# Patient Record
Sex: FEMALE | Race: BLACK OR AFRICAN AMERICAN | NOT HISPANIC OR LATINO | Employment: OTHER | URBAN - METROPOLITAN AREA
[De-identification: names, ages, dates, MRNs, and addresses within clinical notes are randomized per-mention and may not be internally consistent; named-entity substitution may affect disease eponyms.]

---

## 2017-01-30 ENCOUNTER — PATIENT MESSAGE (OUTPATIENT)
Dept: OPTOMETRY | Facility: CLINIC | Age: 72
End: 2017-01-30

## 2017-01-31 ENCOUNTER — TELEPHONE (OUTPATIENT)
Dept: OPTOMETRY | Facility: CLINIC | Age: 72
End: 2017-01-31

## 2017-02-08 ENCOUNTER — OFFICE VISIT (OUTPATIENT)
Dept: OPTOMETRY | Facility: CLINIC | Age: 72
End: 2017-02-08
Payer: MEDICARE

## 2017-02-08 DIAGNOSIS — H52.03 HYPEROPIA, BILATERAL: Primary | ICD-10-CM

## 2017-02-08 DIAGNOSIS — H25.13 NUCLEAR SCLEROSIS, BILATERAL: ICD-10-CM

## 2017-02-08 DIAGNOSIS — H52.4 PRESBYOPIA: ICD-10-CM

## 2017-02-08 PROCEDURE — 99999 PR PBB SHADOW E&M-EST. PATIENT-LVL II: CPT | Mod: PBBFAC,,, | Performed by: OPTOMETRIST

## 2017-02-08 PROCEDURE — 92014 COMPRE OPH EXAM EST PT 1/>: CPT | Mod: S$GLB,,, | Performed by: OPTOMETRIST

## 2017-02-08 PROCEDURE — 92015 DETERMINE REFRACTIVE STATE: CPT | Mod: S$GLB,,, | Performed by: OPTOMETRIST

## 2017-02-08 NOTE — PROGRESS NOTES
HPI     Odessa Vaughn is a 71 y.o. Female who returns  for continued    Hypertensive eye care. Date last seen was 09/24/2015.  She reports that   her distance and near vision is getting more blurry, especially while   driving.  She is not as confident as she once was when driving. Glare is   bothering her especially when the sun sets.    (+)blurred vision  (--)Headaches  (--)diplopia  (--)flashes  (+)floaters yes but stable  (--)pain  (--)Itching  (+)tearing the right eye horton a lot  (--)burning  (--)Dryness  (--) OTC Drops  (--)Photophobia       Last edited by Thais Culp, OD on 2/8/2017 11:01 AM.     ROS     Positive for: Genitourinary (h/o breast cancer s/p mastectomy)    Negative for: Constitutional, Gastrointestinal, Neurological, Skin,   Musculoskeletal, HENT, Endocrine, Cardiovascular, Eyes, Respiratory,   Psychiatric, Allergic/Imm, Heme/Lymph    Last edited by Thais Culp, OD on 2/8/2017 11:01 AM. (History)        Assessment /Plan     For exam results, see Encounter Report.    1. Hyperopia, bilateral with Presbyopia  - Spec Rx per final Rx below    Glasses Prescription (2/8/2017)       Sphere Cylinder Add   Right +3.50 Sphere +3.00   Left +2.25 Sphere +3.00       Type:  PAL    Expiration Date:  2/9/2018            2. Nuclear sclerosis, bilateral  - Defer treatment; Monitor annually        Parent education; RTC in 1 year, sooner prn at [facility]

## 2017-02-16 ENCOUNTER — TELEPHONE (OUTPATIENT)
Dept: SURGERY | Facility: CLINIC | Age: 72
End: 2017-02-16

## 2017-02-16 DIAGNOSIS — C50.511 BREAST CANCER OF LOWER-OUTER QUADRANT OF RIGHT FEMALE BREAST: ICD-10-CM

## 2017-02-16 RX ORDER — ANASTROZOLE 1 MG/1
TABLET ORAL
Qty: 90 TABLET | Refills: 0 | Status: SHIPPED | OUTPATIENT
Start: 2017-02-16 | End: 2017-05-21 | Stop reason: SDUPTHER

## 2017-02-16 NOTE — TELEPHONE ENCOUNTER
----- Message from Carmita Medina sent at 2/16/2017 11:56 AM CST -----  Contact: self  Pt is a Yolanda Gray pt and states she got a letter in the mail but, she isn't sure when she needs to schedule. Please call Odessa lara @[phone]. Thank you.

## 2017-02-16 NOTE — TELEPHONE ENCOUNTER
Called patient regarding message below, informed the patient her follow up breast exam and mammogram is due in 4/2017.  The patient voiced understanding of this information and stated she will call back to schedule the appointments when it gets closer to 4/2017.

## 2017-02-22 ENCOUNTER — OFFICE VISIT (OUTPATIENT)
Dept: RHEUMATOLOGY | Facility: CLINIC | Age: 72
End: 2017-02-22
Payer: MEDICARE

## 2017-02-22 VITALS
DIASTOLIC BLOOD PRESSURE: 63 MMHG | WEIGHT: 178.5 LBS | TEMPERATURE: 98 F | BODY MASS INDEX: 30.48 KG/M2 | HEART RATE: 73 BPM | SYSTOLIC BLOOD PRESSURE: 112 MMHG | HEIGHT: 64 IN

## 2017-02-22 DIAGNOSIS — R79.82 ELEVATED C-REACTIVE PROTEIN (CRP): ICD-10-CM

## 2017-02-22 DIAGNOSIS — M79.642 PAIN IN BOTH HANDS: ICD-10-CM

## 2017-02-22 DIAGNOSIS — M19.042 PRIMARY OSTEOARTHRITIS OF BOTH HANDS: ICD-10-CM

## 2017-02-22 DIAGNOSIS — M79.641 PAIN IN BOTH HANDS: ICD-10-CM

## 2017-02-22 DIAGNOSIS — M70.62 TROCHANTERIC BURSITIS OF LEFT HIP: Primary | ICD-10-CM

## 2017-02-22 DIAGNOSIS — M19.041 PRIMARY OSTEOARTHRITIS OF BOTH HANDS: ICD-10-CM

## 2017-02-22 PROBLEM — M19.049 PRIMARY OSTEOARTHRITIS OF HAND: Status: ACTIVE | Noted: 2017-02-22

## 2017-02-22 PROCEDURE — 99999 PR PBB SHADOW E&M-EST. PATIENT-LVL III: CPT | Mod: PBBFAC,,, | Performed by: INTERNAL MEDICINE

## 2017-02-22 PROCEDURE — 1160F RVW MEDS BY RX/DR IN RCRD: CPT | Mod: S$GLB,,, | Performed by: INTERNAL MEDICINE

## 2017-02-22 PROCEDURE — 3074F SYST BP LT 130 MM HG: CPT | Mod: S$GLB,,, | Performed by: INTERNAL MEDICINE

## 2017-02-22 PROCEDURE — 1159F MED LIST DOCD IN RCRD: CPT | Mod: S$GLB,,, | Performed by: INTERNAL MEDICINE

## 2017-02-22 PROCEDURE — 1125F AMNT PAIN NOTED PAIN PRSNT: CPT | Mod: S$GLB,,, | Performed by: INTERNAL MEDICINE

## 2017-02-22 PROCEDURE — 1157F ADVNC CARE PLAN IN RCRD: CPT | Mod: S$GLB,,, | Performed by: INTERNAL MEDICINE

## 2017-02-22 PROCEDURE — 3078F DIAST BP <80 MM HG: CPT | Mod: S$GLB,,, | Performed by: INTERNAL MEDICINE

## 2017-02-22 PROCEDURE — 99214 OFFICE O/P EST MOD 30 MIN: CPT | Mod: S$GLB,,, | Performed by: INTERNAL MEDICINE

## 2017-02-22 NOTE — PROGRESS NOTES
"Subjective:       Patient ID: Odessa Vaughn is a 71 y.o. female.    Chief Complaint: Back Pain and Pain      HPI:  Odessa Vaughn is a 71 y.o. female seen in July for left hip pain.  She improved with injection by primary care.  Since last visit pain returns intermittently.  Left thumb pain with nodule related to doing cleaning.   She has one hour of morning stiffness.  Meloxicam helps some times but not as well as it did in past.  Activity worsens pain.       Review of Systems   Constitutional: Positive for fatigue.   HENT: Negative.    Eyes: Negative.    Respiratory: Negative.    Cardiovascular: Negative.    Gastrointestinal: Negative.    Endocrine: Negative.    Genitourinary: Negative.    Musculoskeletal: Positive for arthralgias.   Skin: Negative.    Allergic/Immunologic: Negative.    Neurological: Negative.    Psychiatric/Behavioral: Negative.          Objective:     Visit Vitals    /63 (BP Location: Right arm, Patient Position: Sitting, BP Method: Automatic)    Pulse 73    Temp 98 °F (36.7 °C) (Oral)    Ht 5' 4" (1.626 m)    Wt 81 kg (178 lb 8 oz)    BMI 30.64 kg/m2        Physical Exam   Constitutional: She is oriented to person, place, and time and well-developed, well-nourished, and in no distress.   HENT:   Head: Normocephalic and atraumatic.   Eyes: Conjunctivae and EOM are normal.   Neck: Neck supple.   Cardiovascular: Normal rate and regular rhythm.    Pulmonary/Chest: Effort normal and breath sounds normal.   Abdominal: Soft. Bowel sounds are normal.   Neurological: She is alert and oriented to person, place, and time. Gait normal.   Skin: Skin is warm and dry.     Psychiatric: Mood and affect normal.   Musculoskeletal: She exhibits deformity. She exhibits no edema.   28 joint count: 0 swollen and 0 tender  Flexion contracture of right 5th finger  [place] on left 1st, 5th and right 2nd   FROM of hips.  No trochanteric bursa bilaterally               LABS    Component      " Latest Ref Rng & Units 7/21/2016 6/16/2016   Anti Sm Antibody      0.00 - 19.99 EU  4.44   Anti-Sm Interpretation      Negative  Negative   Anti-SSA Antibody      0.00 - 19.99 EU  1.07   Anti-SSA Interpretation      Negative  Negative   Anti-SSB Antibody      0.00 - 19.99 EU  1.32   Anti-SSB Interpretation      Negative  Negative   ds DNA Ab      Negative 1:10  Negative 1:10   Anti Sm/RNP Antibody      0.00 - 19.99 EU  0.98   Anti-Sm/RNP Interpretation      Negative  Negative   Rheumatoid Factor      0.0 - 15.0 IU/mL  <10.0   TALIB Screen      Negative <1:160  Positive (A)   TALIB HEP-2 Titer        Positive 1:320 Speckled   CRP      0.0 - 8.2 mg/L 13.1 (H)    Sed Rate      0 - 20 mm/Hr 47 (H)    CCP Antibodies      <5.0 U/mL <0.5        Assessment:       1. Left hip pain. Suspect trochanteric bursitis  2. Elevated CRP  3. 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 Left hand pain intermittent.    Plan:       1.  ESR and CRP  2.  Occupational Therapy  3.  RTO in 6 months/prn

## 2017-02-22 NOTE — MR AVS SNAPSHOT
[facility] - Rheumatology  [address]  [facility] [address]  Phone: [phone]  Fax: [phone]                  Odessa Vaughn   2017 11:00 AM   Office Visit    Description:  Female : 1945   Provider:  Taylor Warner MD   [facility]  [facility]           Reason for Visit     Back Pain     Pain           Diagnoses this Visit        Comments    Trochanteric bursitis of left hip    -  Primary     Pain in both hands         Elevated C-reactive protein (CRP)         Primary osteoarthritis of both hands                To Do List           Future Appointments        Provider Department Dept Phone    3/21/2017 7:45 AM Mariela Zavaleta MD [facility] - OB/GYN 5th Floor [phone]    2017 11:00 AM Taylor Warner MD [facility] Rheumatology [phone]      Goals (5 Years of Data)     None      Follow-Up and Disposition     Return in about 6 months (around 2017).    Follow-up and Disposition History      [facility] On Call     [facility] On Call Nurse Care Line - 24/ Assistance  Registered nurses in the [facility] On Call Center provide clinical advisement, health education, appointment booking, and other advisory services.  Call for this free service at [phone].             Medications           Message regarding Medications     Verify the changes and/or additions to your medication regime listed below are the same as discussed with your clinician today.  If any of these changes or additions are incorrect, please notify your healthcare provider.             Verify that the below list of medications is an accurate representation of the medications you are currently taking.  If none reported, the list may be blank. If incorrect, please contact your healthcare provider. Carry this list with you in case of emergency.           Current Medications     anastrozole (ARIMIDEX) 1 mg Tab TAKE 1 TABLET BY MOUTH EVERY DAY    aspirin 81 MG Chew Take 81 mg by  "mouth once daily.      cyanocobalamin (VITAMIN B-12) 250 MCG tablet Take 250 mcg by mouth once daily.    meloxicam (MOBIC) 15 MG tablet Take daily x 5 days, then daily as needed for pain. Take with food.    multivitamin capsule Take 1 capsule by mouth once daily.    PROPYLENE GLYCOL//PF (SYSTANE, PF, OPHT) Apply to eye.    triamterene-hydrochlorothiazide 37.5-25 mg (DYAZIDE) 37.5-25 mg per capsule TAKE ONE CAPSULE BY MOUTH ONCE DAILY           Clinical Reference Information           Your Vitals Were     BP Pulse Temp Height Weight BMI    112/63 (BP Location: Right arm, Patient Position: Sitting, BP Method: Automatic) 73 98 °F (36.7 °C) (Oral) 5' 4" (1.626 m) 81 kg (178 lb 8 oz) 30.64 kg/m2      Blood Pressure          Most Recent Value    BP  112/63      Allergies as of 2/22/2017     Vicodin [Hydrocodone-acetaminophen]      Immunizations Administered on Date of Encounter - 2/22/2017     None      Orders Placed During Today's Visit      Normal Orders This Visit    Ambulatory consult to Occupational Therapy     Future Labs/Procedures Expected by Expires    C-reactive protein  2/22/2017 2/22/2018    Sedimentation rate, manual  2/22/2017 2/22/2018      Language Assistance Services     ATTENTION: Language assistance services are available, free of charge. Please call [phone].      ATENCIÓN: Si habla jovani, tiene a acosta disposición servicios gratuitos de asistencia lingüística. Llame al [phone].     CHÚ Ý: N?u b?n nói Ti?ng Vi?t, có các d?ch v? h? tr? ngôn ng? mi?n phí dành cho b?n. G?i s? [phone].         [facility] complies with applicable Federal civil rights laws and does not discriminate on the basis of race, color, national origin, age, disability, or sex.        "

## 2017-02-23 ENCOUNTER — PATIENT MESSAGE (OUTPATIENT)
Dept: RHEUMATOLOGY | Facility: CLINIC | Age: 72
End: 2017-02-23

## 2017-03-08 ENCOUNTER — CLINICAL SUPPORT (OUTPATIENT)
Dept: REHABILITATION | Facility: HOSPITAL | Age: 72
End: 2017-03-08
Attending: INTERNAL MEDICINE
Payer: MEDICARE

## 2017-03-08 DIAGNOSIS — M79.642 PAIN IN BOTH HANDS: Primary | ICD-10-CM

## 2017-03-08 DIAGNOSIS — M79.641 PAIN IN BOTH HANDS: Primary | ICD-10-CM

## 2017-03-08 PROCEDURE — 97165 OT EVAL LOW COMPLEX 30 MIN: CPT

## 2017-03-08 PROCEDURE — 97110 THERAPEUTIC EXERCISES: CPT

## 2017-03-08 PROCEDURE — G8985 CARRY GOAL STATUS: HCPCS | Mod: CJ

## 2017-03-08 PROCEDURE — G8984 CARRY CURRENT STATUS: HCPCS | Mod: CJ

## 2017-03-08 PROCEDURE — 97018 PARAFFIN BATH THERAPY: CPT

## 2017-03-08 NOTE — PLAN OF CARE
Occupational Therapy -Hand / Wrist  Evaluation    Patient: Odessa Vaughn  Date of Evaluation: 3/8/2017  Referring Physician:  Dr. Taylor Warner  Diagnosis: BL osteoarthritis in hands  1. Pain in both hands       MRN: [id number]    Referral Orders:   Eval and treat     Start Time: 10:30  End Time: 11:30  Total Time: 60 min     Hand dominance: Right    Occupation:  retired  Working presently:  no  Workmen's Compensation:  no    Date of onset: gradual onset over the years  Involved area:  Bilateral hands  Mechanism of Injury:   No specific cause  Past Medical History/Physical Systems Review: Unremarkable     Environmental Concerns/ Fall Risk:  None  Barriers to Learning: None   Cultural/Spiritual : None   Developmental/Education: None   Abuse/ Neglect: none   Nutritional Deficit: None   Language: None   Hearing/Vision Deficit: None   Other:     Subjective:  Pt reports My hands are stiff and painful in the morning but when I run them under hot water, they feel better.    Pain   At rest: 0 out of 10  With work/ Activity: 3-4 out of 10  Sleepin out of 10  Location of Pain : both hands and fingers    Patients goals for therapy are:  decrease pain and stiffness in both hands    Objective:   Observation  :Pt has arthritic nodules noted of the IP of left thumb, DIP of right index and right small finger    Sensation: Pt reports occasional numbness/tingling in right middle finger but overall intact  Scar / Wound: none noted  Edema:  None noted       Range of Motion:   WFL in both wrists. Pt.'s AROM is WFL in BL hands with the following exceptions: Index to DPC: Right - lacks 1 cm; Left - lacks 1 cm                                                Manual Muscle Test: 5/5 throughout BL hands/wrists                                       Strength: (ROSA Dynamometer in lbs.), Average 3 trials, Position II: Right: 51 lbs., Left 46 lbs     Pinch Strength: (Pinch Gauge in psis), Average 3  trials   Right Left  Key 15 14  3pt 13 11  2 pt 10 10           Functional Limitations:   Self Care / ADL: increased pain in both hands with grooming, dressing in morning    Treatment Included:   OT evaluation and instruction in written HEP including paraffin bath x 15', TGEs,  thumb opposition. Thumb MP/IP flex/ext, RA/PA      Patient demonstrates good understanding of HEP/modality use for pain management.     Patient w/ FOTO score of 21% intake limitation rating, see attached for details     Assessment:   Problem List :     1) Pt exhibits increased pain in both hands in am with functional activities      History Examination Decision Making Complexity Score   Occupational Profile: Did an brief chart review        Medical and Therapy History:     Comorbidities that may affect POC include: None          Low   Performance Deficits   Dominant/Nondominant UE affected    Physical  Pain in BL hands  Cognitive  N/A      Psychosocial:    No deficits noted    Low Foto score:21%    Pt has several treatment options including modalities and instruction in written HEP for 1 x visit.      Discussed goals and Pt in agreement with goals.    Issued HEP at eval and pt able to perform all with minimal verbal and tactile cues provided by OT. Pt able to complete all without c/o and demonstrating good technique    Low Low               Goals: ( 1 weeks)   1)  Patient to be IND with HEP and modalities for pain management and to maintain ROM in both hands    Plan:   Patient to be treated by Occupational Therapy    1   time per week for   1                   weeks  during the certification period from    3/8/2017     to  3/8/2017   to achieve the established goals.  Treatment to include : paraffin bath instruction and HEP for AROM exercises to both hands.   I certify the need for these services furnished under this plan of treatment and while under my care    ____________________________________                          __________________  Physician/Referring Practitioner                                               Date of Signature

## 2017-03-21 ENCOUNTER — OFFICE VISIT (OUTPATIENT)
Dept: OBSTETRICS AND GYNECOLOGY | Facility: CLINIC | Age: 72
End: 2017-03-21
Payer: MEDICARE

## 2017-03-21 VITALS
HEIGHT: 64 IN | SYSTOLIC BLOOD PRESSURE: 126 MMHG | BODY MASS INDEX: 30.14 KG/M2 | DIASTOLIC BLOOD PRESSURE: 62 MMHG | WEIGHT: 176.56 LBS

## 2017-03-21 DIAGNOSIS — Z01.419 VISIT FOR GYNECOLOGIC EXAMINATION: Primary | ICD-10-CM

## 2017-03-21 DIAGNOSIS — N95.9 MENOPAUSAL AND PERIMENOPAUSAL DISORDER: ICD-10-CM

## 2017-03-21 PROCEDURE — G0101 CA SCREEN;PELVIC/BREAST EXAM: HCPCS | Mod: S$GLB,,, | Performed by: OBSTETRICS & GYNECOLOGY

## 2017-03-21 PROCEDURE — 99499 UNLISTED E&M SERVICE: CPT | Mod: S$GLB,,, | Performed by: OBSTETRICS & GYNECOLOGY

## 2017-03-21 PROCEDURE — 99999 PR PBB SHADOW E&M-EST. PATIENT-LVL III: CPT | Mod: PBBFAC,,, | Performed by: OBSTETRICS & GYNECOLOGY

## 2017-03-21 NOTE — PROGRESS NOTES
HISTORY OF PRESENT ILLNESS:    Odessa Bauman is a 71 y.o. female , presents for a routine exam and has no complaints.  DIAGNOSTIC MAMMO HAS BEEN ORDERED, PAP NORMAL LAST YEAR, REPEAT NOT INDICATED.  NO GYN C/O AND ALMOST 5 YRS SINCE DIAGNOSIS    LAST VISIT 2016:  MAMMO HAS BEEN ORDERED AND LAST PAP , PT WITH CANCER HX REQUESTS  OCCASIONAL VAGINAL ITCHING AND REQUESTS REFILL PRN LOTRISONE     LAST VISIT 2013:  AFTER VISIT LAST YEAR BREAST CANCER DIAGNOSED, TREATED. PAP IS UP TO DATE AND HAD BREAST F/U, MAMMO TODAY     LAST VISIT 2012:  DOING WELL AND STILL LOOKING AFTER HER GRANDKIDS (MOM IS LUC BAUMAN). REF MAMMO AND BMD UP TO DATE UNTIL .   PAP SUBMITTED - LAST ONE. DISCUSSED NEW SCREENING RECOMMENDATIONS, BUT PT WANTS LAST PAP DONE . . .    Past Medical History:   Diagnosis Date    Arthritis     hands, legs    Breast cancer 2012    Right breast invasive ductal carcinoma, ER positive, AK and Her2 negative    Bursitis of left hip     resolved    Essential hypertension 2015    Hyperlipidemia 9/15/2014       Past Surgical History:   Procedure Laterality Date    BREAST BIOPSY  2012    Right breast- IDC    BREAST SURGERY Right 2013    right mastectomy, SN biopsy     DILATION AND CURETTAGE OF UTERUS      MASTECTOMY          MEDICATIONS AND ALLERGIES:      Current Outpatient Prescriptions:     anastrozole (ARIMIDEX) 1 mg Tab, TAKE 1 TABLET BY MOUTH EVERY DAY, Disp: 90 tablet, Rfl: 0    aspirin 81 MG Chew, Take 81 mg by mouth once daily.  , Disp: , Rfl:     cyanocobalamin (VITAMIN B-12) 250 MCG tablet, Take 250 mcg by mouth once daily., Disp: , Rfl:     meloxicam (MOBIC) 15 MG tablet, Take daily x 5 days, then daily as needed for pain. Take with food., Disp: 30 tablet, Rfl: 1    multivitamin capsule, Take 1 capsule by mouth once daily., Disp: , Rfl:     PROPYLENE GLYCOL//PF (SYSTANE, PF, OPHT), Apply to eye., Disp: , Rfl:      triamterene-hydrochlorothiazide 37.5-25 mg (DYAZIDE) 37.5-25 mg per capsule, TAKE ONE CAPSULE BY MOUTH ONCE DAILY, Disp: 90 capsule, Rfl: 10    Review of patient's allergies indicates:   Allergen Reactions    Vicodin [hydrocodone-acetaminophen] Other (See Comments)     Dizziness       Family History   Problem Relation Age of Onset    Cancer Father      Pancreatic CA    Cataracts Father     Breast cancer Cousin 58    Breast cancer Cousin 58    No Known Problems Mother     Hypertension Sister     Arthritis Brother     No Known Problems Daughter     COPD Sister     No Known Problems Brother     No Known Problems Brother     No Known Problems Brother     No Known Problems Daughter     Breast cancer Paternal Aunt 55    Cancer Paternal Aunt      Breast Ca and Lung CA    No Known Problems Maternal Aunt     No Known Problems Maternal Uncle     No Known Problems Paternal Uncle     No Known Problems Maternal Grandmother     No Known Problems Maternal Grandfather     No Known Problems Paternal Grandmother     No Known Problems Paternal Grandfather     Ovarian cancer Neg Hx     Amblyopia Neg Hx     Blindness Neg Hx     Diabetes Neg Hx     Glaucoma Neg Hx     Macular degeneration Neg Hx     Retinal detachment Neg Hx     Strabismus Neg Hx     Stroke Neg Hx     Thyroid disease Neg Hx     Osteoarthritis Neg Hx     Rheum arthritis Neg Hx        Social History     Social History    Marital status:      Spouse name: N/A    Number of children: N/A    Years of education: N/A     Occupational History    Not on file.     Social History Main Topics    Smoking status: Never Smoker    Smokeless tobacco: Not on file      Comment: Retired;     Alcohol use 0.0 oz/week     0 Standard drinks or equivalent per week      Comment: holiday - occasional wine    Drug use: No    Sexual activity: Yes     Partners: Male     Other Topics Concern    Not on file     Social History  "Narrative       COMPREHENSIVE GYN HISTORY:  PAP History:  Denies abnormal Paps except a noted above.  Infection History: Denies STDs. Denies PID.  Benign History: Denies uterine fibroids. Denies ovarian cysts. Denies endometriosis.  Denies other conditions.  Cancer History: Denies cervical cancer. Denies uterine cancer or hyperplasia. Denies ovarian cancer. Denies vulvar cancer or pre-cancer. Denies vaginal cancer or pre-cancer. Denies breast cancer. Denies colon cancer.    ROS:  GENERAL: No weight changes. No swelling. No fatigue. No fever.  CARDIOVASCULAR: No chest pain. No shortness of breath. No leg cramps.   NEUROLOGICAL: No headaches. No vision changes.  BREASTS: No pain. No lumps. No discharge.  ABDOMEN: No pain. No nausea. No vomiting. No diarrhea. No constipation.  REPRODUCTIVE: No abnormal bleeding.   VULVA: No pain. No lesions. No itching.  VAGINA: No relaxation. No itching. No odor. No discharge. No lesions.  URINARY: No incontinence. No nocturia. No frequency. No dysuria.    /62  Ht 5' 4" (1.626 m)  Wt 80.1 kg (176 lb 9.4 oz)  BMI 30.31 kg/m2    PE:  APPEARANCE: Well nourished, well developed, in no acute distress.  AFFECT: WNL, alert and oriented x 3.  SKIN: No hirsutism or acne.  NECK: Neck symmetric without masses or thyromegaly.  NODES: No inguinal, cervical, axillary or femoral lymph node enlargement.  CHEST: Good respiratory effort.   ABDOMEN: Soft. No tenderness or masses. No hepatosplenomegaly. No hernias.  BREASTS: Symmetrical, no skin changes or visible lesions. No palpable masses, nipple discharge bilaterally.  PELVIC: ATROPHIC EXTERNAL FEMALE GENITALIA without lesions. Normal hair distribution. Adequate perineal body, normal urethral meatus. VAGINA DRY without lesions or discharge. CERVIX STENOTIC without lesions, discharge or tenderness. No significant cystocele or rectocele. Bimanual exam shows uterus to be normal size, regular, mobile and nontender. Adnexa without masses or " tenderness.  EXTREMITIES: No edema.    PROCEDURES:  Pap    DIAGNOSIS:  1. Visit for gynecologic examination     2. Menopausal and perimenopausal disorder         PLAN:    LABS AND TESTS ORDERED:  Mammogram    COUNSELING:  The patient was counseled today on osteoporosis prevention, calcium supplementation, and regular weight bearing exercise. The patient was also counseled today on ACS PAP guidelines, with recommendations for yearly pelvic exams unless their uterus, cervix, and ovaries were removed for benign reasons; in that case, examinations every 3-5 years are recommended.  The patient was also counseled regarding monthly breast self-examination, routine STD screening for at-risk populations, prophylactic immunizations for transmitted infections such as  HPV, Pertussis, or Influenza as appropriate, and yearly mammograms when indicated by ACS guidelines.  She was advised to see her primary care physician for all other health maintenance.    FOLLOW-UP with me annually.

## 2017-04-06 ENCOUNTER — TELEPHONE (OUTPATIENT)
Dept: OBSTETRICS AND GYNECOLOGY | Facility: CLINIC | Age: 72
End: 2017-04-06

## 2017-04-06 DIAGNOSIS — Z12.31 VISIT FOR SCREENING MAMMOGRAM: Primary | ICD-10-CM

## 2017-04-06 NOTE — TELEPHONE ENCOUNTER
----- Message from Elza Carmen sent at 4/6/2017  9:31 AM CDT -----  Contact: pt  x_  1st Request  _  2nd Request  _  3rd Request      Who:pt    Why: pt calling in regards MMG orders     What Number to Call Back: [phone] or [phone]    When to Expect a call back: (Before the end of the day)   -- if call after 3:00 call back will be tomorrow.

## 2017-04-11 ENCOUNTER — HOSPITAL ENCOUNTER (OUTPATIENT)
Dept: RADIOLOGY | Facility: HOSPITAL | Age: 72
Discharge: HOME | End: 2017-04-11
Attending: OBSTETRICS & GYNECOLOGY
Payer: MEDICARE

## 2017-04-11 DIAGNOSIS — Z12.31 VISIT FOR SCREENING MAMMOGRAM: ICD-10-CM

## 2017-04-11 PROCEDURE — 77067 SCR MAMMO BI INCL CAD: CPT | Mod: 26,,, | Performed by: RADIOLOGY

## 2017-04-11 PROCEDURE — 77063 BREAST TOMOSYNTHESIS BI: CPT | Mod: 26,,, | Performed by: RADIOLOGY

## 2017-04-11 PROCEDURE — 77067 SCR MAMMO BI INCL CAD: CPT | Mod: TC

## 2017-04-13 ENCOUNTER — OFFICE VISIT (OUTPATIENT)
Dept: INTERNAL MEDICINE | Facility: CLINIC | Age: 72
End: 2017-04-13
Payer: MEDICARE

## 2017-04-13 VITALS
HEART RATE: 88 BPM | DIASTOLIC BLOOD PRESSURE: 59 MMHG | SYSTOLIC BLOOD PRESSURE: 108 MMHG | HEIGHT: 64 IN | WEIGHT: 167.31 LBS | BODY MASS INDEX: 28.56 KG/M2

## 2017-04-13 DIAGNOSIS — R63.4 WEIGHT LOSS: ICD-10-CM

## 2017-04-13 DIAGNOSIS — T78.40XA ALLERGY, INITIAL ENCOUNTER: Primary | ICD-10-CM

## 2017-04-13 DIAGNOSIS — C50.511 BREAST CANCER OF LOWER-OUTER QUADRANT OF RIGHT FEMALE BREAST: ICD-10-CM

## 2017-04-13 PROCEDURE — 1159F MED LIST DOCD IN RCRD: CPT | Mod: S$GLB,,, | Performed by: FAMILY MEDICINE

## 2017-04-13 PROCEDURE — 1157F ADVNC CARE PLAN IN RCRD: CPT | Mod: S$GLB,,, | Performed by: FAMILY MEDICINE

## 2017-04-13 PROCEDURE — 3078F DIAST BP <80 MM HG: CPT | Mod: S$GLB,,, | Performed by: FAMILY MEDICINE

## 2017-04-13 PROCEDURE — 3074F SYST BP LT 130 MM HG: CPT | Mod: S$GLB,,, | Performed by: FAMILY MEDICINE

## 2017-04-13 PROCEDURE — 99214 OFFICE O/P EST MOD 30 MIN: CPT | Mod: S$GLB,,, | Performed by: FAMILY MEDICINE

## 2017-04-13 PROCEDURE — 99999 PR PBB SHADOW E&M-EST. PATIENT-LVL III: CPT | Mod: PBBFAC,,, | Performed by: FAMILY MEDICINE

## 2017-04-13 PROCEDURE — 1160F RVW MEDS BY RX/DR IN RCRD: CPT | Mod: S$GLB,,, | Performed by: FAMILY MEDICINE

## 2017-04-13 PROCEDURE — 1126F AMNT PAIN NOTED NONE PRSNT: CPT | Mod: S$GLB,,, | Performed by: FAMILY MEDICINE

## 2017-04-13 RX ORDER — MINERAL OIL
180 ENEMA (ML) RECTAL DAILY
Qty: 30 TABLET | Refills: 11 | Status: SHIPPED | OUTPATIENT
Start: 2017-04-13 | End: 2017-09-18

## 2017-04-13 RX ORDER — FLUTICASONE PROPIONATE 50 MCG
2 SPRAY, SUSPENSION (ML) NASAL DAILY
Qty: 16 G | Refills: 12 | Status: SHIPPED | OUTPATIENT
Start: 2017-04-13 | End: 2017-05-13

## 2017-04-13 NOTE — PROGRESS NOTES
Subjective:       Patient ID: Odessa Vaughn is a 71 y.o. female.    Chief Complaint: No chief complaint on file.  Odessa Vaughn 71 y.o. female is here for office visit to review care and physical exam, reports cough and sinus congestion for a few weeks now. Using otc decongestants w/o relief.  Note saw Gynecology a few week sago.  Pt asks that weight be checked from that visit.  Appears has lost 9-10 pounds?  Pt states diet the same?  No nausea or change in stools or abd discomfort.      HPI  Review of Systems   Constitutional: Negative for activity change, fatigue, fever and unexpected weight change.   HENT: Positive for postnasal drip, rhinorrhea and sneezing. Negative for congestion and hearing loss.    Eyes: Negative for redness and visual disturbance.   Respiratory: Negative for chest tightness, shortness of breath and wheezing.    Cardiovascular: Negative for chest pain, palpitations and leg swelling.   Gastrointestinal: Negative for abdominal distention.   Genitourinary: Negative for decreased urine volume, dysuria, flank pain, hematuria, pelvic pain and urgency.   Musculoskeletal: Negative for back pain, gait problem, joint swelling and neck stiffness.   Skin: Negative for color change, rash and wound.   Neurological: Negative for dizziness, syncope, weakness and headaches.   Psychiatric/Behavioral: Negative for behavioral problems, confusion and sleep disturbance. The patient is not nervous/anxious.        Objective:      Physical Exam   Constitutional: She is oriented to person, place, and time. She appears well-developed and well-nourished. No distress.   HENT:   Head: Normocephalic.   Mouth/Throat: No oropharyngeal exudate.   Blue and boggy turbinates.   Eyes: EOM are normal. Pupils are equal, round, and reactive to light. No scleral icterus.   Neck: Neck supple. No JVD present. No thyromegaly present.   Cardiovascular: Normal rate, regular rhythm and normal heart sounds.  Exam reveals no  gallop and no friction rub.    No murmur heard.  Pulmonary/Chest: Effort normal and breath sounds normal. She has no wheezes. She has no rales.   Abdominal: Soft. Bowel sounds are normal. She exhibits no distension and no mass. There is no tenderness. There is no guarding.   Musculoskeletal: Normal range of motion. She exhibits no edema.   Lymphadenopathy:     She has no cervical adenopathy.   Neurological: She is alert and oriented to person, place, and time. She has normal reflexes. She displays normal reflexes. No cranial nerve deficit. She exhibits normal muscle tone.   Skin: Skin is warm. No rash noted. No erythema.   Psychiatric: She has a normal mood and affect. Thought content normal.       Assessment:       No diagnosis found.    Plan:       Odessa was seen today for cough.    Diagnoses and all orders for this visit:    Allergy, initial encounter  -     fluticasone (FLONASE) 50 mcg/actuation nasal spray; 2 sprays by Each Nare route once daily.  -     fexofenadine (ALLEGRA) 180 MG tablet; Take 1 tablet (180 mg total) by mouth once daily.   -- otc tx discussed as well, call if no imp  Breast cancer of lower-outer quadrant of right female breast  - has f/u, chart reviewed  Weight loss  - has scale at home, call if weight not stable over next few weeks

## 2017-04-13 NOTE — MR AVS SNAPSHOT
[facility]  [address]  [place] [address]  Phone: [phone]  Fax: [phone]                  Odessa Vaughn   2017 10:40 AM   Office Visit    Description:  Female : 1945   Provider:  Xander Whitney MD   Department:  [facility] - Internal Medicine           Reason for Visit     Cough           Diagnoses this Visit        Comments    Allergy, initial encounter    -  Primary     Breast cancer of lower-outer quadrant of right female breast         Weight loss                To Do List           Future Appointments        Provider Department Dept Phone    2017 11:00 AM Taylor Warner MD [facility] - Rheumatology [phone]    2017 1:00 PM Bryce Perkins MD [facility] Internal Medicine [phone]      Goals (5 Years of Data)     None      Follow-Up and Disposition     Return if symptoms worsen or fail to improve.       These Medications        Disp Refills Start End    fluticasone (FLONASE) 50 mcg/actuation nasal spray 16 g 12 2017    2 sprays by Each Nare route once daily. - Each Nare    Pharmacy: [facility] [address] [place] [address] [address] AT [facility]ob Rod[facility]guez Ph #: [phone]       fexofenadine (ALLEGRA) 180 MG tablet 30 tablet 11 2017    Take 1 tablet (180 mg total) by mouth once daily. - Oral    Pharmacy: [facility] [address] [place] [address] [address] AT [facility] Rod[facility]uez Ph #: [phone]         [facility]     [facility] Nurse Care Line - 24/7 Assistance  Unless otherwise directed by your provider, please contact [facility], our nurse care line that is available for 24/7 assistance.     Registered nurses in the [facility] provide: appointment scheduling, clinical advisement, health education, and other advisory services.  Call: [phone] (toll free)               Medications           Message  "regarding Medications     Verify the changes and/or additions to your medication regime listed below are the same as discussed with your clinician today.  If any of these changes or additions are incorrect, please notify your healthcare provider.        START taking these NEW medications        Refills    fluticasone (FLONASE) 50 mcg/actuation nasal spray 12    Si sprays by Each Nare route once daily.    Class: Normal    Route: Each Nare    fexofenadine (ALLEGRA) 180 MG tablet 11    Sig: Take 1 tablet (180 mg total) by mouth once daily.    Class: Normal    Route: Oral           Verify that the below list of medications is an accurate representation of the medications you are currently taking.  If none reported, the list may be blank. If incorrect, please contact your healthcare provider. Carry this list with you in case of emergency.           Current Medications     anastrozole (ARIMIDEX) 1 mg Tab TAKE 1 TABLET BY MOUTH EVERY DAY    aspirin 81 MG Chew Take 81 mg by mouth once daily.      cyanocobalamin (VITAMIN B-12) 250 MCG tablet Take 250 mcg by mouth once daily.    meloxicam (MOBIC) 15 MG tablet Take daily x 5 days, then daily as needed for pain. Take with food.    multivitamin capsule Take 1 capsule by mouth once daily.    PROPYLENE GLYCOL//PF (SYSTANE, PF, OPHT) Apply to eye.    triamterene-hydrochlorothiazide 37.5-25 mg (DYAZIDE) 37.5-25 mg per capsule TAKE ONE CAPSULE BY MOUTH ONCE DAILY    fexofenadine (ALLEGRA) 180 MG tablet Take 1 tablet (180 mg total) by mouth once daily.    fluticasone (FLONASE) 50 mcg/actuation nasal spray 2 sprays by Each Nare route once daily.           Clinical Reference Information           Your Vitals Were     BP Pulse Height Weight BMI    108/59 88 5' 4" (1.626 m) 75.9 kg (167 lb 5.3 oz) 28.72 kg/m2      Blood Pressure          Most Recent Value    BP  (!)  108/59      Allergies as of 2017     Vicodin [Hydrocodone-acetaminophen]      Immunizations Administered on " Date of Encounter - 4/13/2017     None      Language Assistance Services     ATTENTION: Language assistance services are available, free of charge. Please call [phone].      ATENCIÓN: Si habdavid hahn, tiene a acosta disposición servicios gratuitos de asistencia lingüística. Llame al [phone].     CHÚ Ý: N?u b?n nói Ti?ng Vi?t, có các d?ch v? h? tr? ngôn ng? mi?n phí dành cho b?n. G?i s? [phone].         [facility] complies with applicable Federal civil rights laws and does not discriminate on the basis of race, color, national origin, age, disability, or sex.

## 2017-04-19 ENCOUNTER — TELEPHONE (OUTPATIENT)
Dept: INTERNAL MEDICINE | Facility: CLINIC | Age: 72
End: 2017-04-19

## 2017-04-19 NOTE — TELEPHONE ENCOUNTER
----- Message from Kevyn Velasquez MA sent at 4/18/2017  4:13 PM CDT -----  Contact: self - [phone]   Patient stated she was seen by Dr Whitney on 4/13/17 and still has a severe cough. Requesting to speak with Dr Whitney regarding medication. Possibly guaifenesin w/codeine. Please call. Thanks!

## 2017-05-09 ENCOUNTER — TELEPHONE (OUTPATIENT)
Dept: INTERNAL MEDICINE | Facility: CLINIC | Age: 72
End: 2017-05-09

## 2017-05-09 DIAGNOSIS — R79.9 ABNORMAL FINDING OF BLOOD CHEMISTRY: ICD-10-CM

## 2017-05-09 DIAGNOSIS — Z00.00 ROUTINE GENERAL MEDICAL EXAMINATION AT A HEALTH CARE FACILITY: Primary | ICD-10-CM

## 2017-05-09 NOTE — TELEPHONE ENCOUNTER
----- Message from Hernan Ko MA sent at 5/9/2017  3:18 PM CDT -----  Contact: [phone]  Type: Returning a call    Who left a message? Haydee    When did the practice call? 5/9/17    Comments: Please advise and call. Thanks!

## 2017-05-21 DIAGNOSIS — C50.511 BREAST CANCER OF LOWER-OUTER QUADRANT OF RIGHT FEMALE BREAST: ICD-10-CM

## 2017-05-22 RX ORDER — TRIAMTERENE AND HYDROCHLOROTHIAZIDE 37.5; 25 MG/1; MG/1
CAPSULE ORAL
Qty: 90 CAPSULE | Refills: 3 | Status: SHIPPED | OUTPATIENT
Start: 2017-05-22 | End: 2018-05-29 | Stop reason: SDUPTHER

## 2017-05-22 RX ORDER — ANASTROZOLE 1 MG/1
TABLET ORAL
Qty: 90 TABLET | Refills: 3 | Status: SHIPPED | OUTPATIENT
Start: 2017-05-22 | End: 2018-05-15 | Stop reason: SDUPTHER

## 2017-05-24 ENCOUNTER — OFFICE VISIT (OUTPATIENT)
Dept: SURGERY | Facility: CLINIC | Age: 72
End: 2017-05-24
Payer: MEDICARE

## 2017-05-24 VITALS
TEMPERATURE: 98 F | BODY MASS INDEX: 28.81 KG/M2 | SYSTOLIC BLOOD PRESSURE: 150 MMHG | HEART RATE: 76 BPM | WEIGHT: 172.94 LBS | HEIGHT: 65 IN | DIASTOLIC BLOOD PRESSURE: 63 MMHG

## 2017-05-24 DIAGNOSIS — Z80.3 FAMILY HISTORY OF BREAST CANCER: ICD-10-CM

## 2017-05-24 DIAGNOSIS — Z85.3 PERSONAL HISTORY OF BREAST CANCER: Primary | ICD-10-CM

## 2017-05-24 DIAGNOSIS — B37.89 CANDIDIASIS OF BREAST: ICD-10-CM

## 2017-05-24 PROCEDURE — 99213 OFFICE O/P EST LOW 20 MIN: CPT | Mod: S$GLB,,, | Performed by: NURSE PRACTITIONER

## 2017-05-24 PROCEDURE — 99999 PR PBB SHADOW E&M-EST. PATIENT-LVL III: CPT | Mod: PBBFAC,,, | Performed by: NURSE PRACTITIONER

## 2017-05-24 PROCEDURE — 1126F AMNT PAIN NOTED NONE PRSNT: CPT | Mod: S$GLB,,, | Performed by: NURSE PRACTITIONER

## 2017-05-24 PROCEDURE — 3078F DIAST BP <80 MM HG: CPT | Mod: S$GLB,,, | Performed by: NURSE PRACTITIONER

## 2017-05-24 PROCEDURE — 1160F RVW MEDS BY RX/DR IN RCRD: CPT | Mod: S$GLB,,, | Performed by: NURSE PRACTITIONER

## 2017-05-24 PROCEDURE — 1159F MED LIST DOCD IN RCRD: CPT | Mod: S$GLB,,, | Performed by: NURSE PRACTITIONER

## 2017-05-24 PROCEDURE — 3077F SYST BP >= 140 MM HG: CPT | Mod: S$GLB,,, | Performed by: NURSE PRACTITIONER

## 2017-05-24 RX ORDER — NYSTATIN 100000 U/G
CREAM TOPICAL 2 TIMES DAILY
Qty: 15 G | Refills: 3 | Status: SHIPPED | OUTPATIENT
Start: 2017-05-24 | End: 2017-10-24

## 2017-05-24 NOTE — PROGRESS NOTES
Subjective:      Patient ID: Odessa Vaughn is a 72 y.o. female.    Chief Complaint: Breast Cancer Screening (CBE/Hx of Right Breast Cancer)      HPI: (PF, EPF - 1-3) (Detailed, Comp, - 4) patient presents new to me, previously seen by CRISTIANO GOETZ and Dr Ibarra. S/p right mastectomy. Denies left breast mass, pain, nipple discharge, skin changes, right mass or skin changes associated with anterior chest wall, new onset bone pain, unexplained weight loss, cough/SOB    Left diag mmg 4- with no abnormality reported     12- right core breast biopsy with IDC, ER+, SC and HER-2 negative  1-2-2013 right mastectomy with 3cm IDC along with DCIS, negative margins, negative SN 0/5. Remains on adjuvant endocrine therapy, followed by Dr Ramires and last seen in October of 2016 with return in one year recommended.         Review of Systems   Constitutional: Negative for appetite change and fatigue.   Respiratory: Negative for cough and shortness of breath.    Cardiovascular: Negative for chest pain.   Musculoskeletal: Negative for back pain.     Objective:   Physical Exam   Pulmonary/Chest: She exhibits no mass, no tenderness, no laceration, no edema, no swelling and no retraction. Left breast exhibits no inverted nipple, no mass, no nipple discharge and no tenderness.   S/p right mastectomy with no mass or skin changes anterior chest wall. No upper extremity lymphedema. Breathing non-labored . Skin along left intramammary fold with darkened pigment suggestive of previous candidiasis , she reports intermittent rash and itching in this area   Lymphadenopathy:     She has no cervical adenopathy.     She has no axillary adenopathy.        Right: No supraclavicular adenopathy present.        Left: No supraclavicular adenopathy present.     Assessment:       1. Personal history of breast cancer    2. Candidiasis of breast        Plan:       Clinically NEELIMA  Prescription given for nystatin to use as needed for  itching/rash left inframammary fold.   Return in April 2018 with left diag mmg  She will also see Dr Ramires as planned in October.   Call for any interval palpable breast mass, pain, nipple discharge, skin changes or other breast related concerns    Family history of breast cancer, all post menopausal , most likely family clustering

## 2017-05-25 ENCOUNTER — TELEPHONE (OUTPATIENT)
Dept: INTERNAL MEDICINE | Facility: CLINIC | Age: 72
End: 2017-05-25

## 2017-05-25 NOTE — TELEPHONE ENCOUNTER
----- Message from Minda Gregory sent at 5/25/2017  1:17 PM CDT -----  Contact: self/[phone]  Pt called in regards to why she does not have an appointment  for June the second.        Please advise

## 2017-05-31 ENCOUNTER — LAB VISIT (OUTPATIENT)
Dept: LAB | Facility: HOSPITAL | Age: 72
End: 2017-05-31
Payer: MEDICARE

## 2017-05-31 DIAGNOSIS — R79.9 ABNORMAL FINDING OF BLOOD CHEMISTRY: ICD-10-CM

## 2017-05-31 DIAGNOSIS — Z00.00 ROUTINE GENERAL MEDICAL EXAMINATION AT A HEALTH CARE FACILITY: ICD-10-CM

## 2017-05-31 LAB
ALBUMIN SERPL BCP-MCNC: 3.3 G/DL
ALP SERPL-CCNC: 114 U/L
ALT SERPL W/O P-5'-P-CCNC: 21 U/L
ANION GAP SERPL CALC-SCNC: 9 MMOL/L
AST SERPL-CCNC: 20 U/L
BASOPHILS # BLD AUTO: 0.05 K/UL
BASOPHILS NFR BLD: 0.6 %
BILIRUB SERPL-MCNC: 0.4 MG/DL
BUN SERPL-MCNC: 18 MG/DL
CALCIUM SERPL-MCNC: 9.5 MG/DL
CHLORIDE SERPL-SCNC: 104 MMOL/L
CHOLEST/HDLC SERPL: 5.7 {RATIO}
CO2 SERPL-SCNC: 27 MMOL/L
CREAT SERPL-MCNC: 0.9 MG/DL
DIFFERENTIAL METHOD: ABNORMAL
EOSINOPHIL # BLD AUTO: 0.3 K/UL
EOSINOPHIL NFR BLD: 3.3 %
ERYTHROCYTE [DISTWIDTH] IN BLOOD BY AUTOMATED COUNT: 14.3 %
EST. GFR  (AFRICAN AMERICAN): >60 ML/MIN/1.73 M^2
EST. GFR  (NON AFRICAN AMERICAN): >60 ML/MIN/1.73 M^2
GLUCOSE SERPL-MCNC: 92 MG/DL
HCT VFR BLD AUTO: 39 %
HDL/CHOLESTEROL RATIO: 17.5 %
HDLC SERPL-MCNC: 240 MG/DL
HDLC SERPL-MCNC: 42 MG/DL
HGB BLD-MCNC: 12.4 G/DL
LDLC SERPL CALC-MCNC: 176.4 MG/DL
LYMPHOCYTES # BLD AUTO: 3.3 K/UL
LYMPHOCYTES NFR BLD: 36.7 %
MCH RBC QN AUTO: 26.8 PG
MCHC RBC AUTO-ENTMCNC: 31.8 %
MCV RBC AUTO: 84 FL
MONOCYTES # BLD AUTO: 0.6 K/UL
MONOCYTES NFR BLD: 7.2 %
NEUTROPHILS # BLD AUTO: 4.6 K/UL
NEUTROPHILS NFR BLD: 52.1 %
NONHDLC SERPL-MCNC: 198 MG/DL
PLATELET # BLD AUTO: 388 K/UL
PMV BLD AUTO: 8.4 FL
POTASSIUM SERPL-SCNC: 3.7 MMOL/L
PROT SERPL-MCNC: 7.4 G/DL
RBC # BLD AUTO: 4.62 M/UL
SODIUM SERPL-SCNC: 140 MMOL/L
TRIGL SERPL-MCNC: 108 MG/DL
WBC # BLD AUTO: 8.86 K/UL

## 2017-05-31 PROCEDURE — 36415 COLL VENOUS BLD VENIPUNCTURE: CPT

## 2017-05-31 PROCEDURE — 80061 LIPID PANEL: CPT

## 2017-05-31 PROCEDURE — 85025 COMPLETE CBC W/AUTO DIFF WBC: CPT

## 2017-05-31 PROCEDURE — 80053 COMPREHEN METABOLIC PANEL: CPT

## 2017-06-01 ENCOUNTER — TELEPHONE (OUTPATIENT)
Dept: INTERNAL MEDICINE | Facility: CLINIC | Age: 72
End: 2017-06-01

## 2017-06-01 ENCOUNTER — OFFICE VISIT (OUTPATIENT)
Dept: INTERNAL MEDICINE | Facility: CLINIC | Age: 72
End: 2017-06-01
Payer: MEDICARE

## 2017-06-01 VITALS
HEIGHT: 65 IN | WEIGHT: 174.38 LBS | SYSTOLIC BLOOD PRESSURE: 102 MMHG | BODY MASS INDEX: 29.05 KG/M2 | HEART RATE: 68 BPM | DIASTOLIC BLOOD PRESSURE: 58 MMHG

## 2017-06-01 DIAGNOSIS — Z23 VACCINE FOR STREPTOCOCCUS PNEUMONIAE AND INFLUENZA: ICD-10-CM

## 2017-06-01 DIAGNOSIS — M54.32 LEFT SIDED SCIATICA: Primary | ICD-10-CM

## 2017-06-01 DIAGNOSIS — D12.6 ADENOMATOUS POLYP OF COLON, UNSPECIFIED PART OF COLON: ICD-10-CM

## 2017-06-01 DIAGNOSIS — E78.5 HYPERLIPIDEMIA, UNSPECIFIED HYPERLIPIDEMIA TYPE: ICD-10-CM

## 2017-06-01 PROCEDURE — 90670 PCV13 VACCINE IM: CPT | Mod: S$GLB,,, | Performed by: INTERNAL MEDICINE

## 2017-06-01 PROCEDURE — 1125F AMNT PAIN NOTED PAIN PRSNT: CPT | Mod: S$GLB,,, | Performed by: INTERNAL MEDICINE

## 2017-06-01 PROCEDURE — 99999 PR PBB SHADOW E&M-EST. PATIENT-LVL III: CPT | Mod: PBBFAC,,, | Performed by: INTERNAL MEDICINE

## 2017-06-01 PROCEDURE — G0009 ADMIN PNEUMOCOCCAL VACCINE: HCPCS | Mod: S$GLB,,, | Performed by: INTERNAL MEDICINE

## 2017-06-01 PROCEDURE — 1159F MED LIST DOCD IN RCRD: CPT | Mod: S$GLB,,, | Performed by: INTERNAL MEDICINE

## 2017-06-01 PROCEDURE — 99214 OFFICE O/P EST MOD 30 MIN: CPT | Mod: S$GLB,,, | Performed by: INTERNAL MEDICINE

## 2017-06-01 PROCEDURE — 99499 UNLISTED E&M SERVICE: CPT | Mod: S$GLB,,, | Performed by: INTERNAL MEDICINE

## 2017-06-01 RX ORDER — ATORVASTATIN CALCIUM 10 MG/1
10 TABLET, FILM COATED ORAL DAILY
Qty: 90 TABLET | Refills: 3 | Status: SHIPPED | OUTPATIENT
Start: 2017-06-01 | End: 2018-05-29 | Stop reason: SDUPTHER

## 2017-06-01 NOTE — PROGRESS NOTES
Subjective:       Patient ID: Odessa Vaughn is a 72 y.o. female.    Chief Complaint: Annual Exam; Leg Pain (L); and Arm Pain (under R arm)    Leg Pain    The incident occurred more than 1 week ago. There was no injury mechanism. The pain is present in the left leg, left hip and left thigh. The quality of the pain is described as aching. The pain is moderate. The pain has been fluctuating since onset. Pertinent negatives include no inability to bear weight.   Hyperlipidemia   This is a chronic problem. Associated symptoms include leg pain. Pertinent negatives include no chest pain or shortness of breath. Current antihyperlipidemic treatment includes diet change. The current treatment provides no improvement of lipids.     Review of Systems   Constitutional: Negative for fatigue.   HENT: Negative for sore throat.    Eyes: Negative for visual disturbance.   Respiratory: Negative for shortness of breath.    Cardiovascular: Negative for chest pain and palpitations.   Gastrointestinal: Negative for abdominal pain.   Genitourinary: Negative for dysuria, frequency and hematuria.   Musculoskeletal: Positive for arthralgias. Negative for back pain, gait problem and joint swelling.   Skin: Negative for rash.   Neurological: Negative for speech difficulty and weakness.   Psychiatric/Behavioral: Negative for dysphoric mood.       Objective:      Physical Exam   Constitutional: She is oriented to person, place, and time. She appears well-developed and well-nourished. No distress.   HENT:   Head: Normocephalic and atraumatic.   Mouth/Throat: Oropharynx is clear and moist.   Eyes: Conjunctivae are normal. Pupils are equal, round, and reactive to light.   Neck: Normal range of motion. Neck supple.   Cardiovascular: Normal rate, regular rhythm and normal heart sounds.    Pulmonary/Chest: Effort normal and breath sounds normal. She has no wheezes.   Abdominal: Soft. Bowel sounds are normal. There is no tenderness.    Musculoskeletal: Normal range of motion. She exhibits no edema or tenderness.   Neurological: She is alert and oriented to person, place, and time. No cranial nerve deficit.   Skin: No erythema.   Psychiatric: She has a normal mood and affect.   Vitals reviewed.      Assessment:       1. Left sided sciatica    2. Hyperlipidemia, unspecified hyperlipidemia type    3. Adenomatous polyp of colon, unspecified part of colon    4. Vaccine for streptococcus pneumoniae and influenza        Plan:       Odessa was seen today for annual exam, leg pain and arm pain.    Diagnoses and all orders for this visit:    Left sided sciatica  -     Ambulatory consult to Physical Therapy    Hyperlipidemia, unspecified hyperlipidemia type  -     atorvastatin (LIPITOR) 10 MG tablet; Take 1 tablet (10 mg total) by mouth once daily.  -     Comprehensive metabolic panel; Future  -     Lipid panel; Future    Adenomatous polyp of colon, unspecified part of colon  -     Case request GI: COLONOSCOPY    Vaccine for streptococcus pneumoniae and influenza  -     Pneumococcal Conjugate Vaccine (13 Valent) (IM)        Return in about 3 months (around 9/1/2017) for F/U APPOINTMENT WITH ME.

## 2017-06-01 NOTE — TELEPHONE ENCOUNTER
----- Message from Dara Vianney sent at 6/1/2017 12:23 PM CDT -----  Contact: pt [phone]  Pt said she think she left her paper work at the office if she did her  will come pick it up,please advise pt

## 2017-06-05 ENCOUNTER — HOSPITAL ENCOUNTER (OUTPATIENT)
Dept: RADIOLOGY | Facility: HOSPITAL | Age: 72
Discharge: HOME | End: 2017-06-05
Attending: INTERNAL MEDICINE
Payer: MEDICARE

## 2017-06-05 ENCOUNTER — OFFICE VISIT (OUTPATIENT)
Dept: RHEUMATOLOGY | Facility: CLINIC | Age: 72
End: 2017-06-05
Payer: MEDICARE

## 2017-06-05 VITALS
TEMPERATURE: 98 F | SYSTOLIC BLOOD PRESSURE: 103 MMHG | BODY MASS INDEX: 28.66 KG/M2 | WEIGHT: 172 LBS | HEIGHT: 65 IN | HEART RATE: 71 BPM | DIASTOLIC BLOOD PRESSURE: 57 MMHG

## 2017-06-05 DIAGNOSIS — M89.9 DISORDER OF BONE AND CARTILAGE: ICD-10-CM

## 2017-06-05 DIAGNOSIS — M85.80 OSTEOPENIA, UNSPECIFIED LOCATION: ICD-10-CM

## 2017-06-05 DIAGNOSIS — G89.29 CHRONIC MIDLINE LOW BACK PAIN WITH LEFT-SIDED SCIATICA: Primary | ICD-10-CM

## 2017-06-05 DIAGNOSIS — G89.29 CHRONIC MIDLINE LOW BACK PAIN WITH LEFT-SIDED SCIATICA: ICD-10-CM

## 2017-06-05 DIAGNOSIS — M94.9 DISORDER OF BONE AND CARTILAGE: ICD-10-CM

## 2017-06-05 DIAGNOSIS — M54.42 CHRONIC MIDLINE LOW BACK PAIN WITH LEFT-SIDED SCIATICA: ICD-10-CM

## 2017-06-05 DIAGNOSIS — M54.42 CHRONIC MIDLINE LOW BACK PAIN WITH LEFT-SIDED SCIATICA: Primary | ICD-10-CM

## 2017-06-05 PROCEDURE — 99214 OFFICE O/P EST MOD 30 MIN: CPT | Mod: S$GLB,,, | Performed by: INTERNAL MEDICINE

## 2017-06-05 PROCEDURE — 1159F MED LIST DOCD IN RCRD: CPT | Mod: S$GLB,,, | Performed by: INTERNAL MEDICINE

## 2017-06-05 PROCEDURE — 72114 X-RAY EXAM L-S SPINE BENDING: CPT | Mod: 26,,, | Performed by: RADIOLOGY

## 2017-06-05 PROCEDURE — 1125F AMNT PAIN NOTED PAIN PRSNT: CPT | Mod: S$GLB,,, | Performed by: INTERNAL MEDICINE

## 2017-06-05 PROCEDURE — 99999 PR PBB SHADOW E&M-EST. PATIENT-LVL III: CPT | Mod: PBBFAC,,, | Performed by: INTERNAL MEDICINE

## 2017-06-05 PROCEDURE — 72114 X-RAY EXAM L-S SPINE BENDING: CPT | Mod: TC

## 2017-06-05 ASSESSMENT — ROUTINE ASSESSMENT OF PATIENT INDEX DATA (RAPID3)
PATIENT GLOBAL ASSESSMENT SCORE: 0
PSYCHOLOGICAL DISTRESS SCORE: 0
MDHAQ FUNCTION SCORE: .1
TOTAL RAPID3 SCORE: 1.94
AM STIFFNESS SCORE: 0, NO
FATIGUE SCORE: 5
PAIN SCORE: 5.5

## 2017-06-05 NOTE — PROGRESS NOTES
"Subjective:       Patient ID: Odessa Vaughn is a 72 y.o. female.    Chief Complaint: Hip Pain and Hand Pain      HPI:  Odessa Vaughn is a 72 y.o. female reports improvement in hand pain but now has left hip pain that goes down leg behind knee.  In past had lower back pain.  Last episode 1 month ago.  Aleve helps as well as heat.  Pain is 5/10 ache constantly worse with standing.   Pain in right hip sometimes (noted when sleeping in soft bed).    Review of Systems   Constitutional: Positive for fatigue.   HENT: Negative.    Eyes: Negative.    Respiratory: Negative.    Cardiovascular: Negative.    Gastrointestinal: Negative.    Endocrine: Negative.    Genitourinary: Negative.    Musculoskeletal: Positive for back pain.   Skin: Negative.    Allergic/Immunologic: Negative.    Neurological: Negative.    Hematological: Negative.    Psychiatric/Behavioral: Negative.          Objective:   BP (!) 103/57 (BP Location: Right arm, Patient Position: Sitting, BP Method: Automatic)   Pulse 71   Temp 98.1 °F (36.7 °C) (Oral)   Ht 5' 5" (1.651 m)   Wt 78 kg (172 lb)   BMI 28.62 kg/m²      Physical Exam   Constitutional: She is oriented to person, place, and time and well-developed, well-nourished, and in no distress.   HENT:   Head: Normocephalic and atraumatic.   Eyes: Conjunctivae and EOM are normal.   Neck: Neck supple.   Cardiovascular: Normal rate, regular rhythm and normal heart sounds.    Pulmonary/Chest: Effort normal and breath sounds normal.   Abdominal: Soft. Bowel sounds are normal.   Neurological: She is alert and oriented to person, place, and time. Gait normal.   Skin: Skin is warm and dry.     Psychiatric: Mood and affect normal.   Musculoskeletal: Normal range of motion.   No pain on palpation of trochanteric bursa bilaterally            Comprehensive Initial Assessment  Pain level: see MHAQ  Functional status: see MHAQ  Patient has:     Previous history malignancy: Breast   Weight loss: " No   Previous longstanding steroid use or immunosuppression: No    Recent significant infection: No (fungus under breast)   Fracture or suspected fracture: No   Bowel or bladder incontinence: No    Prior treatment and response: Aleve  Employment status: retired  Are radicular symptoms present? Left leg to knee    Physical examination  Straight leg raise test b/l : negative  Ankle and knee reflexes: hypoactive reflexes  Quadriceps; ankle and great toe dorsiflexion and plantar flexion strength: Normal  Pulses in lower extremities: Normal  Sensory exam: Normal    Treatment Plan  Advised against bed rest: Yes  Advised normal activity as tolerated: Yes  Assessment:       1. Left hip pain. Suspect related history of low back pain  2. Elevated CRP  3. 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   4. Left hand pain intermittent.  Now improved  5. Low back pain intermittently  6. Osteopenia. On Arimidex.  Last 2013  7. History breast cancer  Plan:       1.  X-ray lumbar spine  2.  Check DEXA  3.  NSAID  4.  Consider PT.

## 2017-06-06 ENCOUNTER — PATIENT MESSAGE (OUTPATIENT)
Dept: RHEUMATOLOGY | Facility: CLINIC | Age: 72
End: 2017-06-06

## 2017-06-07 ENCOUNTER — TELEPHONE (OUTPATIENT)
Dept: RHEUMATOLOGY | Facility: CLINIC | Age: 72
End: 2017-06-07

## 2017-06-07 DIAGNOSIS — M47.26 OSTEOARTHRITIS OF SPINE WITH RADICULOPATHY, LUMBAR REGION: ICD-10-CM

## 2017-06-07 NOTE — TELEPHONE ENCOUNTER
Patient informed of x-ray.  She would like to proceed with PT.  Will order.  She is taking Miralax for constipation now.

## 2017-06-07 NOTE — TELEPHONE ENCOUNTER
----- Message from Wendy Harrell MA sent at 6/6/2017  4:28 PM CDT -----  Contact: [email or web address], [phone]  Did you call for her?  ----- Message -----  From: Lexis Tinoco  Sent: 6/6/2017   4:21 PM  To: Bhaskar Vazquez A Staff    Pt called in stating that she was returning a missed call. She asked to please call her back, but if no answer, please call her daughter, Roxi at [phone].    Thank you

## 2017-06-08 ENCOUNTER — HOSPITAL ENCOUNTER (OUTPATIENT)
Dept: RADIOLOGY | Facility: CLINIC | Age: 72
Discharge: HOME | End: 2017-06-08
Attending: INTERNAL MEDICINE
Payer: MEDICARE

## 2017-06-08 DIAGNOSIS — M94.9 DISORDER OF BONE AND CARTILAGE: ICD-10-CM

## 2017-06-08 DIAGNOSIS — M89.9 DISORDER OF BONE AND CARTILAGE: ICD-10-CM

## 2017-06-08 PROCEDURE — 77080 DXA BONE DENSITY AXIAL: CPT | Mod: TC

## 2017-06-08 PROCEDURE — 77080 DXA BONE DENSITY AXIAL: CPT | Mod: 26,,, | Performed by: INTERNAL MEDICINE

## 2017-06-14 ENCOUNTER — PATIENT MESSAGE (OUTPATIENT)
Dept: RHEUMATOLOGY | Facility: CLINIC | Age: 72
End: 2017-06-14

## 2017-06-21 ENCOUNTER — CLINICAL SUPPORT (OUTPATIENT)
Dept: REHABILITATION | Facility: HOSPITAL | Age: 72
End: 2017-06-21
Attending: INTERNAL MEDICINE
Payer: MEDICARE

## 2017-06-21 ENCOUNTER — TELEPHONE (OUTPATIENT)
Dept: REHABILITATION | Facility: HOSPITAL | Age: 72
End: 2017-06-21

## 2017-06-21 DIAGNOSIS — M53.3 SACROILIAC DYSFUNCTION: ICD-10-CM

## 2017-06-21 DIAGNOSIS — M54.42 LEFT-SIDED LOW BACK PAIN WITH LEFT-SIDED SCIATICA, UNSPECIFIED CHRONICITY: ICD-10-CM

## 2017-06-21 DIAGNOSIS — R53.1 WEAKNESS: Primary | ICD-10-CM

## 2017-06-21 PROCEDURE — G8979 MOBILITY GOAL STATUS: HCPCS | Mod: CJ

## 2017-06-21 PROCEDURE — 97110 THERAPEUTIC EXERCISES: CPT

## 2017-06-21 PROCEDURE — G8978 MOBILITY CURRENT STATUS: HCPCS | Mod: CK

## 2017-06-21 PROCEDURE — 97161 PT EVAL LOW COMPLEX 20 MIN: CPT

## 2017-06-21 NOTE — PLAN OF CARE
Physical Therapy Evaluation    Name: Odessa Vaughn  [facility] [place] Number: [id number]    Diagnosis:   Encounter Diagnoses   Name Primary?    Weakness Yes    Sacroiliac dysfunction     Left-sided low back pain with left-sided sciatica, unspecified chronicity      Physician: Taylor Warner*  Treatment Orders: PT Eval and Treat    History     Past Medical History:   Diagnosis Date    Arthritis     hands, legs    Breast cancer 12/2012    Right breast invasive ductal carcinoma, ER positive, [place] and Her2 negative    Bursitis of left hip 2016    resolved    Chronic midline low back pain with left-sided sciatica 6/5/2017    Essential hypertension 9/21/2015    Hyperlipidemia 9/15/2014     Current Outpatient Prescriptions   Medication Sig    anastrozole (ARIMIDEX) 1 mg Tab TAKE 1 TABLET BY MOUTH EVERY DAY    aspirin 81 MG Chew Take 81 mg by mouth once daily.      atorvastatin (LIPITOR) 10 MG tablet Take 1 tablet (10 mg total) by mouth once daily.    fexofenadine (ALLEGRA) 180 MG tablet Take 1 tablet (180 mg total) by mouth once daily.    multivitamin capsule Take 1 capsule by mouth once daily.    nystatin (MYCOSTATIN) cream Apply topically 2 (two) times daily.    PROPYLENE GLYCOL//PF (SYSTANE, PF, OPHT) Apply to eye.    triamterene-hydrochlorothiazide 37.5-25 mg (DYAZIDE) 37.5-25 mg per capsule TAKE 1 CAPSULE BY MOUTH ONCE DAILY     No current facility-administered medications for this visit.      Review of patient's allergies indicates:   Allergen Reactions    Vicodin [hydrocodone-acetaminophen] Other (See Comments)     Dizziness       Precautions: standard    Evaluation Date: 6/21/17  Visit # authorized: 1/25  Authorization period: 12/31/17  Plan of care expiration: 8/20/17    Subjective     PMH: left hip bursitis: 2016  PLOF: care for grandchildren; household activities: washing clothes, mopping/sweeping, and stair negotiation: non-reciprocal; walking for leisure: walking 4 to 5 miles per day,  but has not continued since February.    Occupation: Pt is retired    Primary concern/ Chief complaints:  Odessa is a 72 y.o. female that presents to [facility] secondary to left lumbar radiculopathy. Injury/surgery occurred approximately: February 2017. Pt. presents with the following co-morbidities and personal factors that directly impact her plan of care: none.   X-ray/MRI was taken and revealed Multilevel degenerative disc disease.  Further evaluation with an MRI if clinically indicated. Pt reports numbness and tingling in left LE radiating to left knee.     Red flags:  Pt. denies bowel/bladder symptoms (urinary retention/fecal incontinence). Recent weight loss? No; Constant/Night pain that is unchanging with change of position? No; PMH of CA? breast CA: sx in 2013    Onset/LUDIN: gradual: gradual insidious onset left radiculopathy, nagging ache, which has reduced in irritation since cortisone shot    Previous treatment: cortisone injection     Pain Scale: Odessa rates pain on a scale of 0-10 to be 4 currently; 4 at best; 9 at worst .    Aggravating factors: Quite a bit of difficulty: standing 1hr; moderate difficulty: performing heavy activities around home, hobbies, bending/stooping; a little bit of difficulty: performing usual housework   Relieving factors: rest    ADLs: Pt has a decreased ability to perform ADLs such as see above.    Patient Goals: Pt would like to decrease pain and increase function so she can return to her normal daily activities: see above    Objective     Observation: deconditioned    Posture: subcranial hyperextension, cervical flattened lordosis, FHP, mild Tspine kyphosis, rolled anterior/IR shoulders, shoulder protraction, and excessive hip/knee flexion    Lumbar ROM: (measured in degrees)    Degrees Quality   Flexion   40 left leg ERP   Extension       Left Side Bending 35 ERP   Right Side Bending 40      Dermatomes: (impaired/normal)     RLE LLE   L2 Intact  Intact   L3 Intact Intact   L4 Intact Intact   L5 Intact Intact   S1 Intact Intact     Reflexes: unable to assess bilaterally    Lower Extremity Strength (graded 0-5 out of 5)   RLE LLE   Hip flexion: 4/5 4/5   Hip ER 4/5 4/5   Hip IR 4+/5 4+/5   Knee extension: 4+/5 4+/5   Ankle dorsiflexion: 4+/5 4+/5   Great toe extension: 4+/5 4+/5   Posterior fibers of Gluteus medius 4+/5 4-/5   Knee flexion 4/5 3-/5 pain posterior hip   Ankle plantarflexion 4+/5 4/5 pain posterior hip   Hip extension: 3+/5 3-/5 pain posterior hip     Special Tests: ((+): pos.; (-): neg.)   · Quadrant test: + left  · Slump Test: -  · SLR Test: right: 75 deg. increased with IR; left: 65 deg.  · Bridge Test: +  · Pirformis Test: moderate restriction left greater than right  Flexibility:   · Popliteal Angle: R =75  degrees ; L = 70 degrees  Palpation for condition:   · Position:  right ilial anterior, right sacral rotation right     · Warmth:  normal  · Swelling: none  · Texture: hypertonic left piriformis    Joint Mobility: (graded 0-6 out of 6) 2/6 sacrum/left hip    Functional Status Measures:    Intake Score     Pts Physical FS Primary Measure      55                          Risk Adjustment Statistical FOTO     57        PT reviewed FOTO scores for Odessa Vaughn on 06/21/2017.   FOTO scores were entered into [facility] - see media section.    History  Co-morbidities and personal factors that may impact the plan of care Examination  Body Structures and Functions, activity limitations and participation restrictions that may impact the plan of care Clinical Presentation   Decision Making/ Complexity Score   Co-morbidities:   none            Personal Factors:   none Body Regions: hip/lumbar/SIJ    Body Systems: Decreased AROM; pelvic dysfunction; core hip lumbopelvic weakness; poor posture; pain with transitional activities, decrease exercise ability, and pain with ADLs.     Activity limitations: lifting, prolonged standing    Participation  Restrictions: lifting/caring objects, prolonged sitting   stable and uncomplicated   low     Clinical Presentation/complexity category  Low complexity category: pt. has no personal factors and/or co-morbidities directly impacting the POC, 1-2 or more body system impairments/functional limitations/participation restrictions; as well as, condition is stable and uncomplicated clinical complexity.    PT Evaluation Completed? Yes  Discussed Plan of Care with patient: Yes    TREATMENT:  Therapeutic exercise: Odessa received therapeutic exercises to develop strength and endurance, flexibility for 10 minutes including: [id number]: bilateral piriformis stretch, MET right ilial anterior/left ilial posterior, bilateral sciatic nerve glide, diaphragmatic breathing, and knee fall out.    Pt. Education: Instructed pt. regarding:body mechanics, posture, activity modification/avoidance, and proper technique with all exercises. Pt. to demonstrate good understanding of the education provided. Odessa demonstrated good return demonstration of activities. No cultural, environmental, or spiritual barriers identified to treatment or learning.    Medical necessity is demonstrated by the following IMPAIRMENTS/PROBLEM LIST:   1) Pain limiting function   2) Posture dysfunction   3) Core/Lumbar/LE weakness   4) Decreased thoracic/lumbar joint mobility   5) Decreased Lumbar ROM   6) Decreased soft tissue extensibility/fascia restriction   7) Decreased LE flexibility: bilateral sciatic nerve, left hamstrings   8) Lack of HEP   9) LE paresthesia left radiating to left knee    GOALS:   Short Term Goals:  4 weeks  1. Report decreased left leg pain </= 5/10 at worst to increase tolerance for prolonged standing/sitting  2. Pt. to demonstrate proper cervical and scapula retraction requiring min. to no verbal cues from PT  3. Pt. to demonstrate increased MMT for core/lumbar paraspinals to 3/5 to increase endurance with prolonged sitting/standing.  4.  Pt. to demonstrate increased MMT for left gluteus medius to 4/5  to increase stability during community ambulation  5. Pt to tolerate HEP to improve ROM and independence with ADL's  6. Pt. to report a decrease in left LE paresthesia by >/= 50% to improve ability to perform prolonged sitting/standing    Long Term Goals: 8 weeks  1. Report decreased left leg pain </=  21/10 at worst to increase tolerance for community ambulation  2. Pt. to demonstrate proper cervical and scapula retraction requiring no verbal cues from PT  3. Increase thoracic joint mobility to 2+/6 to promote greater ease with self care skills  4. Increase lumbar joint mobility to 2+/6 to promote greater ease with prolonged sitting/standing  5. Pt. to demonstrate increased MMT for core/lumbar paraspinals to 3+/5 to increase endurance with prolonged sitting.   6. Pt. to demonstrate increased MMT for left gluteus medius to 4+/5  to increase stability during ambulation on uneven surfaces.  7. Pt. to demonstrate increased MMT for bilateral hip flexor to 4+/5 to increase tolerance for ADL and work activities.   8. Pt to be independent with HEP to improve ROM and independence with ADL's  9. Pt. to report a decrease in left LE paresthesia by >/= 75% to improve ability to perform community ambulation/liftin    Assessment   This is a 72 y.o. female referred to outpatient physical therapy who presents with a medical diagnosis of lumbar OA with radiculopathy and PT diagnosis of weakness, SIJ dysfunction, left sided sciatica demonstrating joint dysfunction and functional limitation as described above. Level of complexity is low;  based on patient's past medical history including the above co-morbidities and personal factors; functional limitations, and clinical presentation directly impacting his/her plan of care.     Patient was in agreement with set goals and plan of care. Pt was given a HEP consisting of posture reeducation, diaphragmatic breathing,  instruction on body mechanics, activity modification/avoidance, and core/lumbar/LE strengthening regimen. Pt. verbally understood instructions and demonstrated proper form/technique. Pt was advised to perform these exercises free of pain, and discontinue use if symptoms persist/worsen. Pt will benefit from physical therapy services in order to maximize pain free functional independence.     Plan     Pt will be treated by physical therapy 1-3 times a week for 8 weeks for pt. education, HEP, therapeutic exercises, neuromuscular re-education, soft tissue and joint mobilizations; and modalities prn to achieve established goals. Odessa may at times be seen by a PTA as part of the Rehab Team.     I certify the need for these services furnished under this plan of treatment and while under my care.______________________________ Physician/Referring Practitioner  Date of Signature

## 2017-06-21 NOTE — PROGRESS NOTES
Physical Therapy Evaluation    Name: Odessa Vaughn  [facility] Number: [id number]    Diagnosis:   Encounter Diagnoses   Name Primary?    Weakness Yes    Sacroiliac dysfunction     Left-sided low back pain with left-sided sciatica, unspecified chronicity      Physician: Taylor Warner*  Treatment Orders: PT Eval and Treat    History     Past Medical History:   Diagnosis Date    Arthritis     hands, legs    Breast cancer 12/2012    Right breast invasive ductal carcinoma, ER positive, [place] and Her2 negative    Bursitis of left hip 2016    resolved    Chronic midline low back pain with left-sided sciatica 6/5/2017    Essential hypertension 9/21/2015    Hyperlipidemia 9/15/2014     Current Outpatient Prescriptions   Medication Sig    anastrozole (ARIMIDEX) 1 mg Tab TAKE 1 TABLET BY MOUTH EVERY DAY    aspirin 81 MG Chew Take 81 mg by mouth once daily.      atorvastatin (LIPITOR) 10 MG tablet Take 1 tablet (10 mg total) by mouth once daily.    fexofenadine (ALLEGRA) 180 MG tablet Take 1 tablet (180 mg total) by mouth once daily.    multivitamin capsule Take 1 capsule by mouth once daily.    nystatin (MYCOSTATIN) cream Apply topically 2 (two) times daily.    PROPYLENE GLYCOL//PF (SYSTANE, PF, OPHT) Apply to eye.    triamterene-hydrochlorothiazide 37.5-25 mg (DYAZIDE) 37.5-25 mg per capsule TAKE 1 CAPSULE BY MOUTH ONCE DAILY     No current facility-administered medications for this visit.      Review of patient's allergies indicates:   Allergen Reactions    Vicodin [hydrocodone-acetaminophen] Other (See Comments)     Dizziness       Precautions: standard    Evaluation Date: 6/21/17  Visit # authorized: 1/25  Authorization period: 12/31/17  Plan of care expiration: 8/20/17    Subjective     PMH: left hip bursitis: 2016  PLOF: care for grandchildren; household activities: washing clothes, mopping/sweeping, and stair negotiation: non-reciprocal; walking for leisure: walking 4 to 5 miles per day,  but has not continued since February.    Occupation: Pt is retired    Primary concern/ Chief complaints:  Odessa is a 72 y.o. female that presents to [facility] secondary to left lumbar radiculopathy. Injury/surgery occurred approximately: February 2017. Pt. presents with the following co-morbidities and personal factors that directly impact her plan of care: none.   X-ray/MRI was taken and revealed Multilevel degenerative disc disease.  Further evaluation with an MRI if clinically indicated. Pt reports numbness and tingling in left LE radiating to left knee.     Red flags:  Pt. denies bowel/bladder symptoms (urinary retention/fecal incontinence). Recent weight loss? No; Constant/Night pain that is unchanging with change of position? No; PMH of CA? breast CA: sx in 2013    Onset/LUDIN: gradual: gradual insidious onset left radiculopathy, nagging ache, which has reduced in irritation since cortisone shot    Previous treatment: cortisone injection     Pain Scale: Odessa rates pain on a scale of 0-10 to be 4 currently; 4 at best; 9 at worst .    Aggravating factors: Quite a bit of difficulty: standing 1hr; moderate difficulty: performing heavy activities around home, hobbies, bending/stooping; a little bit of difficulty: performing usual housework   Relieving factors: rest    ADLs: Pt has a decreased ability to perform ADLs such as see above.    Patient Goals: Pt would like to decrease pain and increase function so she can return to her normal daily activities: see above    Objective     Observation: deconditioned    Posture: subcranial hyperextension, cervical flattened lordosis, FHP, mild Tspine kyphosis, rolled anterior/IR shoulders, shoulder protraction, and excessive hip/knee flexion    Lumbar ROM: (measured in degrees)    Degrees Quality   Flexion   40 left leg ERP   Extension       Left Side Bending 35 ERP   Right Side Bending 40      Dermatomes: (impaired/normal)     RLE LLE   L2 Intact  Intact   L3 Intact Intact   L4 Intact Intact   L5 Intact Intact   S1 Intact Intact     Reflexes: unable to assess bilaterally    Lower Extremity Strength (graded 0-5 out of 5)   RLE LLE   Hip flexion: 4/5 4/5   Hip ER 4/5 4/5   Hip IR 4+/5 4+/5   Knee extension: 4+/5 4+/5   Ankle dorsiflexion: 4+/5 4+/5   Great toe extension: 4+/5 4+/5   Posterior fibers of Gluteus medius 4+/5 4-/5   Knee flexion 4/5 3-/5 pain posterior hip   Ankle plantarflexion 4+/5 4/5 pain posterior hip   Hip extension: 3+/5 3-/5 pain posterior hip     Special Tests: ((+): pos.; (-): neg.)   · Quadrant test: + left  · Slump Test: -  · SLR Test: right: 75 deg. increased with IR; left: 65 deg.  · Bridge Test: +  · Pirformis Test: moderate restriction left greater than right  Flexibility:   · Popliteal Angle: R =75  degrees ; L = 70 degrees  Palpation for condition:   · Position:  right ilial anterior, right sacral rotation right     · Warmth:  normal  · Swelling: none  · Texture: hypertonic left piriformis    Joint Mobility: (graded 0-6 out of 6) 2/6 sacrum/left hip    Functional Status Measures:    Intake Score     Pts Physical FS Primary Measure      55                          Risk Adjustment Statistical FOTO     57        PT reviewed FOTO scores for Odessa Vaughn on 06/21/2017.   FOTO scores were entered into [employer] - see media section.    History  Co-morbidities and personal factors that may impact the plan of care Examination  Body Structures and Functions, activity limitations and participation restrictions that may impact the plan of care Clinical Presentation   Decision Making/ Complexity Score   Co-morbidities:   none            Personal Factors:   none Body Regions: hip/lumbar/SIJ    Body Systems: Decreased AROM; pelvic dysfunction; core hip lumbopelvic weakness; poor posture; pain with transitional activities, decrease exercise ability, and pain with ADLs.     Activity limitations: lifting, prolonged standing    Participation  Restrictions: lifting/caring objects, prolonged sitting   stable and uncomplicated   low     Clinical Presentation/complexity category  Low complexity category: pt. has no personal factors and/or co-morbidities directly impacting the POC, 1-2 or more body system impairments/functional limitations/participation restrictions; as well as, condition is stable and uncomplicated clinical complexity.    PT Evaluation Completed? Yes  Discussed Plan of Care with patient: Yes    TREATMENT:  Therapeutic exercise: Odessa received therapeutic exercises to develop strength and endurance, flexibility for 10 minutes including: YXQCRD3: bilateral piriformis stretch, MET right ilial anterior/left ilial posterior, bilateral sciatic nerve glide, diaphragmatic breathing, and knee fall out.    Pt. Education: Instructed pt. regarding:body mechanics, posture, activity modification/avoidance, and proper technique with all exercises. Pt. to demonstrate good understanding of the education provided. Odessa demonstrated good return demonstration of activities. No cultural, environmental, or spiritual barriers identified to treatment or learning.    Medical necessity is demonstrated by the following IMPAIRMENTS/PROBLEM LIST:   1) Pain limiting function   2) Posture dysfunction   3) Core/Lumbar/LE weakness   4) Decreased thoracic/lumbar joint mobility   5) Decreased Lumbar ROM   6) Decreased soft tissue extensibility/fascia restriction   7) Decreased LE flexibility: bilateral sciatic nerve, left hamstrings   8) Lack of HEP   9) LE paresthesia left radiating to left knee    GOALS:   Short Term Goals:  4 weeks  1. Report decreased left leg pain </= 5/10 at worst to increase tolerance for prolonged standing/sitting  2. Pt. to demonstrate proper cervical and scapula retraction requiring min. to no verbal cues from PT  3. Pt. to demonstrate increased MMT for core/lumbar paraspinals to 3/5 to increase endurance with prolonged sitting/standing.  4.  Pt. to demonstrate increased MMT for left gluteus medius to 4/5  to increase stability during community ambulation  5. Pt to tolerate HEP to improve ROM and independence with ADL's  6. Pt. to report a decrease in left LE paresthesia by >/= 50% to improve ability to perform prolonged sitting/standing    Long Term Goals: 8 weeks  1. Report decreased left leg pain </=  21/10 at worst to increase tolerance for community ambulation  2. Pt. to demonstrate proper cervical and scapula retraction requiring no verbal cues from PT  3. Increase thoracic joint mobility to 2+/6 to promote greater ease with self care skills  4. Increase lumbar joint mobility to 2+/6 to promote greater ease with prolonged sitting/standing  5. Pt. to demonstrate increased MMT for core/lumbar paraspinals to 3+/5 to increase endurance with prolonged sitting.   6. Pt. to demonstrate increased MMT for left gluteus medius to 4+/5  to increase stability during ambulation on uneven surfaces.  7. Pt. to demonstrate increased MMT for bilateral hip flexor to 4+/5 to increase tolerance for ADL and work activities.   8. Pt to be independent with HEP to improve ROM and independence with ADL's  9. Pt. to report a decrease in left LE paresthesia by >/= 75% to improve ability to perform community ambulation/liftin    Assessment   This is a 72 y.o. female referred to outpatient physical therapy who presents with a medical diagnosis of lumbar OA with radiculopathy and PT diagnosis of weakness, SIJ dysfunction, left sided sciatica demonstrating joint dysfunction and functional limitation as described above. Level of complexity is low;  based on patient's past medical history including the above co-morbidities and personal factors; functional limitations, and clinical presentation directly impacting his/her plan of care.     Patient was in agreement with set goals and plan of care. Pt was given a HEP consisting of posture reeducation, diaphragmatic breathing,  instruction on body mechanics, activity modification/avoidance, and core/lumbar/LE strengthening regimen. Pt. verbally understood instructions and demonstrated proper form/technique. Pt was advised to perform these exercises free of pain, and discontinue use if symptoms persist/worsen. Pt will benefit from physical therapy services in order to maximize pain free functional independence.     Plan     Pt will be treated by physical therapy 1-3 times a week for 8 weeks for pt. education, HEP, therapeutic exercises, neuromuscular re-education, soft tissue and joint mobilizations; and modalities prn to achieve established goals. Odessa may at times be seen by a PTA as part of the Rehab Team.     I certify the need for these services furnished under this plan of treatment and while under my care.______________________________ Physician/Referring Practitioner  Date of Signature

## 2017-06-23 ENCOUNTER — CLINICAL SUPPORT (OUTPATIENT)
Dept: REHABILITATION | Facility: HOSPITAL | Age: 72
End: 2017-06-23
Attending: INTERNAL MEDICINE
Payer: MEDICARE

## 2017-06-23 DIAGNOSIS — M53.3 SACROILIAC DYSFUNCTION: Primary | ICD-10-CM

## 2017-06-23 PROCEDURE — 97110 THERAPEUTIC EXERCISES: CPT

## 2017-06-23 NOTE — PROGRESS NOTES
Physical Therapy Progress Note    Name: Odessa Cai [facility] Number: [id number]    Diagnosis:   Encounter Diagnosis   Name Primary?    Sacroiliac dysfunction Yes     Physician: Taylor Warner*  Treatment Orders: PT Eval and Treat    History     Past Medical History:   Diagnosis Date    Arthritis     hands, legs    Breast cancer 12/2012    Right breast invasive ductal carcinoma, ER positive, [place] and Her2 negative    Bursitis of left hip 2016    resolved    Chronic midline low back pain with left-sided sciatica 6/5/2017    Essential hypertension 9/21/2015    Hyperlipidemia 9/15/2014     Current Outpatient Prescriptions   Medication Sig    anastrozole (ARIMIDEX) 1 mg Tab TAKE 1 TABLET BY MOUTH EVERY DAY    aspirin 81 MG Chew Take 81 mg by mouth once daily.      atorvastatin (LIPITOR) 10 MG tablet Take 1 tablet (10 mg total) by mouth once daily.    fexofenadine (ALLEGRA) 180 MG tablet Take 1 tablet (180 mg total) by mouth once daily.    multivitamin capsule Take 1 capsule by mouth once daily.    nystatin (MYCOSTATIN) cream Apply topically 2 (two) times daily.    PROPYLENE GLYCOL//PF (SYSTANE, PF, OPHT) Apply to eye.    triamterene-hydrochlorothiazide 37.5-25 mg (DYAZIDE) 37.5-25 mg per capsule TAKE 1 CAPSULE BY MOUTH ONCE DAILY     No current facility-administered medications for this visit.      Review of patient's allergies indicates:   Allergen Reactions    Vicodin [hydrocodone-acetaminophen] Other (See Comments)     Dizziness       Precautions: standard    Evaluation Date: 6/21/17  Visit # authorized: 2/25  Authorization period: 12/31/17  Plan of care expiration: 8/20/17    Subjective   Pt reports w/ 4/10 pn in L hip and hamstring.     Patient Goals: Pt would like to decrease pain and increase function so she can return to her normal daily activities: see above    Objective     Observation: deconditioned    Posture: subcranial hyperextension, cervical flattened lordosis, FHP, mild  Tspine kyphosis, rolled anterior/IR shoulders, shoulder protraction, and excessive hip/knee flexion    TREATMENT:  Therapeutic exercise: Odessa received therapeutic exercises to develop strength and endurance, flexibility for 38 minutes including: YXQCRD3:     bilateral piriformis stretch 5 x 30 sec   Supine clam shells 2 x 10 B w/ pilates ring  PPT 2 x 10   Bridges 2 x 10  Supine hip add 2 x 10 w/ sq and TA  Sciatic nerve glides 3 x 10 B     Pt. Education: Instructed pt. regarding:body mechanics, posture, activity modification/avoidance, and proper technique with all exercises. Pt. to demonstrate good understanding of the education provided. Odessa demonstrated good return demonstration of activities. No cultural, environmental, or spiritual barriers identified to treatment or learning.    Medical necessity is demonstrated by the following IMPAIRMENTS/PROBLEM LIST:   1) Pain limiting function   2) Posture dysfunction   3) Core/Lumbar/LE weakness   4) Decreased thoracic/lumbar joint mobility   5) Decreased Lumbar ROM   6) Decreased soft tissue extensibility/fascia restriction   7) Decreased LE flexibility: bilateral sciatic nerve, left hamstrings   8) Lack of HEP   9) LE paresthesia left radiating to left knee    GOALS:   Short Term Goals:  4 weeks  1. Report decreased left leg pain </= 5/10 at worst to increase tolerance for prolonged standing/sitting  2. Pt. to demonstrate proper cervical and scapula retraction requiring min. to no verbal cues from PT  3. Pt. to demonstrate increased MMT for core/lumbar paraspinals to 3/5 to increase endurance with prolonged sitting/standing.  4. Pt. to demonstrate increased MMT for left gluteus medius to 4/5  to increase stability during community ambulation  5. Pt to tolerate HEP to improve ROM and independence with ADL's  6. Pt. to report a decrease in left LE paresthesia by >/= 50% to improve ability to perform prolonged sitting/standing    Long Term Goals: 8 weeks  1.  Report decreased left leg pain </=  21/10 at worst to increase tolerance for community ambulation  2. Pt. to demonstrate proper cervical and scapula retraction requiring no verbal cues from PT  3. Increase thoracic joint mobility to 2+/6 to promote greater ease with self care skills  4. Increase lumbar joint mobility to 2+/6 to promote greater ease with prolonged sitting/standing  5. Pt. to demonstrate increased MMT for core/lumbar paraspinals to 3+/5 to increase endurance with prolonged sitting.   6. Pt. to demonstrate increased MMT for left gluteus medius to 4+/5  to increase stability during ambulation on uneven surfaces.  7. Pt. to demonstrate increased MMT for bilateral hip flexor to 4+/5 to increase tolerance for ADL and work activities.   8. Pt to be independent with HEP to improve ROM and independence with ADL's  9. Pt. to report a decrease in left LE paresthesia by >/= 75% to improve ability to perform community ambulation/liftin    Assessment   Pt jimena tx well.  Pn level decreased after tx session.  Pt disp;layed increased endurance during therex w/ VCs for technique.  Pt edu on and instructed to cont HEP.  Cont to progress as jimena.     Patient was in agreement with set goals and plan of care. Pt was given a HEP consisting of posture reeducation, diaphragmatic breathing, instruction on body mechanics, activity modification/avoidance, and core/lumbar/LE strengthening regimen. Pt. verbally understood instructions and demonstrated proper form/technique. Pt was advised to perform these exercises free of pain, and discontinue use if symptoms persist/worsen. Pt will benefit from physical therapy services in order to maximize pain free functional independence.     Plan     Pt will be treated by physical therapy 1-3 times a week for 8 weeks for pt. education, HEP, therapeutic exercises, neuromuscular re-education, soft tissue and joint mobilizations; and modalities prn to achieve established goals. Odessa caruso at  times be seen by a PTA as part of the Rehab Team.

## 2017-06-28 ENCOUNTER — CLINICAL SUPPORT (OUTPATIENT)
Dept: REHABILITATION | Facility: HOSPITAL | Age: 72
End: 2017-06-28
Attending: INTERNAL MEDICINE
Payer: MEDICARE

## 2017-06-28 DIAGNOSIS — R53.1 WEAKNESS: ICD-10-CM

## 2017-06-28 DIAGNOSIS — M53.3 SACROILIAC DYSFUNCTION: Primary | ICD-10-CM

## 2017-06-28 DIAGNOSIS — M54.42 CHRONIC LEFT-SIDED LOW BACK PAIN WITH LEFT-SIDED SCIATICA: ICD-10-CM

## 2017-06-28 DIAGNOSIS — G89.29 CHRONIC LEFT-SIDED LOW BACK PAIN WITH LEFT-SIDED SCIATICA: ICD-10-CM

## 2017-06-28 PROCEDURE — 97110 THERAPEUTIC EXERCISES: CPT

## 2017-06-28 NOTE — PROGRESS NOTES
Physical Therapy Progress Note    Name: Odessa Vaughn  [facility] Number: [id number]    Diagnosis:   Encounter Diagnoses   Name Primary?    Chronic left-sided low back pain with left-sided sciatica     Sacroiliac dysfunction Yes    Weakness      Physician: Taylor Warner*  Treatment Orders: PT Eval and Treat    History     Past Medical History:   Diagnosis Date    Arthritis     hands, legs    Breast cancer 12/2012    Right breast invasive ductal carcinoma, ER positive, [place] and Her2 negative    Bursitis of left hip 2016    resolved    Chronic midline low back pain with left-sided sciatica 6/5/2017    Essential hypertension 9/21/2015    Hyperlipidemia 9/15/2014     Current Outpatient Prescriptions   Medication Sig    anastrozole (ARIMIDEX) 1 mg Tab TAKE 1 TABLET BY MOUTH EVERY DAY    aspirin 81 MG Chew Take 81 mg by mouth once daily.      atorvastatin (LIPITOR) 10 MG tablet Take 1 tablet (10 mg total) by mouth once daily.    fexofenadine (ALLEGRA) 180 MG tablet Take 1 tablet (180 mg total) by mouth once daily.    multivitamin capsule Take 1 capsule by mouth once daily.    nystatin (MYCOSTATIN) cream Apply topically 2 (two) times daily.    PROPYLENE GLYCOL//PF (SYSTANE, PF, OPHT) Apply to eye.    triamterene-hydrochlorothiazide 37.5-25 mg (DYAZIDE) 37.5-25 mg per capsule TAKE 1 CAPSULE BY MOUTH ONCE DAILY     No current facility-administered medications for this visit.      Review of patient's allergies indicates:   Allergen Reactions    Vicodin [hydrocodone-acetaminophen] Other (See Comments)     Dizziness       Precautions: standard    Evaluation Date: 6/21/17  Visit # authorized: 3/25  Authorization period: 12/31/17  Plan of care expiration: 8/20/17    Subjective   Pt reports she had increased pain the last two days with this morning being about a 7/10; not sure if related to starting new exercises. Notes feeling better now. Pain is about 2/10.    Patient Goals: Pt would like to  "decrease pain and increase function so she can return to her normal daily activities: see above    Objective     Observation: deconditioned    Posture: subcranial hyperextension, cervical flattened lordosis, FHP, mild Tspine kyphosis, rolled anterior/IR shoulders, shoulder protraction, and excessive hip/knee flexion    TREATMENT:  Therapeutic exercise: Odessa received therapeutic exercises to develop strength and endurance, flexibility for 38 minutes including: YXQCRD3:     · bilateral piriformis stretch 3 x 30 sec   · MET right ilial anterior/left ilial posterior: 3x5"  · sciatic nerve glides: 3x10 bilaterally  · PPT 2 x 10   · Supine hip add 2 x 10 w/ sq and TA  · Bridges 2 x 10  · Supine clam shells 2 x 10 B w/ pilates ring    Pt. Education: Instructed pt. regarding:body mechanics, posture, activity modification/avoidance, and proper technique with all exercises. Pt. to demonstrate good understanding of the education provided. Odessa demonstrated good return demonstration of activities. No cultural, environmental, or spiritual barriers identified to treatment or learning.    Medical necessity is demonstrated by the following IMPAIRMENTS/PROBLEM LIST:   1) Pain limiting function   2) Posture dysfunction   3) Core/Lumbar/LE weakness   4) Decreased thoracic/lumbar joint mobility   5) Decreased Lumbar ROM   6) Decreased soft tissue extensibility/fascia restriction   7) Decreased LE flexibility: bilateral sciatic nerve, left hamstrings   8) Lack of HEP   9) LE paresthesia left radiating to left knee    GOALS:   Short Term Goals:  4 weeks  1. Report decreased left leg pain </= 5/10 at worst to increase tolerance for prolonged standing/sitting  2. Pt. to demonstrate proper cervical and scapula retraction requiring min. to no verbal cues from PT  3. Pt. to demonstrate increased MMT for core/lumbar paraspinals to 3/5 to increase endurance with prolonged sitting/standing.  4. Pt. to demonstrate increased MMT for left " gluteus medius to 4/5  to increase stability during community ambulation  5. Pt to tolerate HEP to improve ROM and independence with ADL's  6. Pt. to report a decrease in left LE paresthesia by >/= 50% to improve ability to perform prolonged sitting/standing    Long Term Goals: 8 weeks  1. Report decreased left leg pain </=  21/10 at worst to increase tolerance for community ambulation  2. Pt. to demonstrate proper cervical and scapula retraction requiring no verbal cues from PT  3. Increase thoracic joint mobility to 2+/6 to promote greater ease with self care skills  4. Increase lumbar joint mobility to 2+/6 to promote greater ease with prolonged sitting/standing  5. Pt. to demonstrate increased MMT for core/lumbar paraspinals to 3+/5 to increase endurance with prolonged sitting.   6. Pt. to demonstrate increased MMT for left gluteus medius to 4+/5  to increase stability during ambulation on uneven surfaces.  7. Pt. to demonstrate increased MMT for bilateral hip flexor to 4+/5 to increase tolerance for ADL and work activities.   8. Pt to be independent with HEP to improve ROM and independence with ADL's  9. Pt. to report a decrease in left LE paresthesia by >/= 75% to improve ability to perform community ambulation/liftin    Assessment   Pt required recurrent verbal/tactile cues for proper exercise technique during push/pull activity as pt. was performing it backwards. Pt. demonstrated improved endurance, exercise jimena. and denied increased pain. Pt. is progressing well towards goals.    Pt edu on and instructed to cont HEP.  Cont to progress as jimena.     Patient was in agreement with set goals and plan of care. Pt was given a HEP consisting of posture reeducation, diaphragmatic breathing, instruction on body mechanics, activity modification/avoidance, and core/lumbar/LE strengthening regimen. Pt. verbally understood instructions and demonstrated proper form/technique. Pt was advised to perform these exercises free  of pain, and discontinue use if symptoms persist/worsen. Pt will benefit from physical therapy services in order to maximize pain free functional independence.     Plan     Pt will be treated by physical therapy 1-3 times a week for 8 weeks for pt. education, HEP, therapeutic exercises, neuromuscular re-education, soft tissue and joint mobilizations; and modalities prn to achieve established goals. Odessa may at times be seen by a PTA as part of the Rehab Team.

## 2017-06-30 ENCOUNTER — CLINICAL SUPPORT (OUTPATIENT)
Dept: REHABILITATION | Facility: HOSPITAL | Age: 72
End: 2017-06-30
Attending: INTERNAL MEDICINE
Payer: MEDICARE

## 2017-06-30 DIAGNOSIS — M53.3 SACROILIAC DYSFUNCTION: Primary | ICD-10-CM

## 2017-06-30 DIAGNOSIS — G89.29 CHRONIC LEFT-SIDED LOW BACK PAIN WITH LEFT-SIDED SCIATICA: ICD-10-CM

## 2017-06-30 DIAGNOSIS — M54.42 CHRONIC LEFT-SIDED LOW BACK PAIN WITH LEFT-SIDED SCIATICA: ICD-10-CM

## 2017-06-30 DIAGNOSIS — R53.1 WEAKNESS: ICD-10-CM

## 2017-06-30 PROCEDURE — 97110 THERAPEUTIC EXERCISES: CPT

## 2017-06-30 NOTE — PROGRESS NOTES
Physical Therapy Progress Note    Name: Odessa Cai Johana  [facility] Number: [id number]    Diagnosis:   Encounter Diagnoses   Name Primary?    Chronic left-sided low back pain with left-sided sciatica     Sacroiliac dysfunction Yes    Weakness      Physician: Taylor Warner*  Treatment Orders: PT Eval and Treat    History     Past Medical History:   Diagnosis Date    Arthritis     hands, legs    Breast cancer 12/2012    Right breast invasive ductal carcinoma, ER positive, [place] and Her2 negative    Bursitis of left hip 2016    resolved    Chronic midline low back pain with left-sided sciatica 6/5/2017    Essential hypertension 9/21/2015    Hyperlipidemia 9/15/2014     Current Outpatient Prescriptions   Medication Sig    anastrozole (ARIMIDEX) 1 mg Tab TAKE 1 TABLET BY MOUTH EVERY DAY    aspirin 81 MG Chew Take 81 mg by mouth once daily.      atorvastatin (LIPITOR) 10 MG tablet Take 1 tablet (10 mg total) by mouth once daily.    fexofenadine (ALLEGRA) 180 MG tablet Take 1 tablet (180 mg total) by mouth once daily.    multivitamin capsule Take 1 capsule by mouth once daily.    nystatin (MYCOSTATIN) cream Apply topically 2 (two) times daily.    PROPYLENE GLYCOL//PF (SYSTANE, PF, OPHT) Apply to eye.    triamterene-hydrochlorothiazide 37.5-25 mg (DYAZIDE) 37.5-25 mg per capsule TAKE 1 CAPSULE BY MOUTH ONCE DAILY     No current facility-administered medications for this visit.      Review of patient's allergies indicates:   Allergen Reactions    Vicodin [hydrocodone-acetaminophen] Other (See Comments)     Dizziness       Precautions: standard    Evaluation Date: 6/21/17  Visit # authorized: 4/25  Authorization period: 12/31/17  Plan of care expiration: 8/20/17    Subjective   Pt reports she had some pain this morning: 3/10 that got worse with movement; but denies pain right now.     Patient Goals: Pt would like to decrease pain and increase function so she can return to her normal daily  "activities: see above    Objective     Observation: deconditioned    Posture: subcranial hyperextension, cervical flattened lordosis, FHP, mild Tspine kyphosis, rolled anterior/IR shoulders, shoulder protraction, and excessive hip/knee flexion    TREATMENT:  Therapeutic exercise: Odessa received therapeutic exercises to develop strength and endurance, flexibility for 38 minutes including: YXQCRD3:   Supine  · bilateral piriformis stretch 3 x 30 sec   · MET right ilial anterior: 3x5"  · sciatic nerve glides: 3x10 bilaterally  · PPT with ball hip adduction: 3x8  · Bridges with diaphragmatic breathing: 3x8  · Supine clam shells 3x8 bilateral w/ pilates ring  Sitting:  · scapular retraction with bilateral shoulder ER: 2x8    Manual therapy: Odessa  received the following manual therapy techniques x 15 min. to include soft tissue and joint mobilizations were applied to the: left leg/lumbar spine To include:   Soft tissue mobilization:  · massage roller left piriformis, ITB  Joint mobilization  · left hip short axis lateral distraction  Assessment   Pt was still challenged with the push/pull activity. As a result changed the activity to be isometric right hip extension with opposite leg straight. Pt. was able to duplicate MET without difficulty. Pt. demonstrated improved endurance, exercise jimena. and denied increased pain. Pt. is progressing well towards goals.    Pt edu on and instructed to cont HEP.  Cont to progress as jimena.     Patient was in agreement with set goals and plan of care. Pt was given a HEP consisting of posture reeducation, diaphragmatic breathing, instruction on body mechanics, activity modification/avoidance, and core/lumbar/LE strengthening regimen. Pt. verbally understood instructions and demonstrated proper form/technique. Pt was advised to perform these exercises free of pain, and discontinue use if symptoms persist/worsen. Pt will benefit from physical therapy services in order to maximize pain " free functional independence.   Pt. Education: Instructed pt. regarding:body mechanics, posture, activity modification/avoidance, and proper technique with all exercises. Pt. to demonstrate good understanding of the education provided. Odessa demonstrated good return demonstration of activities. No cultural, environmental, or spiritual barriers identified to treatment or learning.    Medical necessity is demonstrated by the following IMPAIRMENTS/PROBLEM LIST:   1) Pain limiting function   2) Posture dysfunction   3) Core/Lumbar/LE weakness   4) Decreased thoracic/lumbar joint mobility   5) Decreased Lumbar ROM   6) Decreased soft tissue extensibility/fascia restriction   7) Decreased LE flexibility: bilateral sciatic nerve, left hamstrings   8) Lack of HEP   9) LE paresthesia left radiating to left knee    GOALS:   Short Term Goals:  4 weeks  1. Report decreased left leg pain </= 5/10 at worst to increase tolerance for prolonged standing/sitting  2. Pt. to demonstrate proper cervical and scapula retraction requiring min. to no verbal cues from PT  3. Pt. to demonstrate increased MMT for core/lumbar paraspinals to 3/5 to increase endurance with prolonged sitting/standing.  4. Pt. to demonstrate increased MMT for left gluteus medius to 4/5  to increase stability during community ambulation  5. Pt to tolerate HEP to improve ROM and independence with ADL's  6. Pt. to report a decrease in left LE paresthesia by >/= 50% to improve ability to perform prolonged sitting/standing    Long Term Goals: 8 weeks  1. Report decreased left leg pain </=  21/10 at worst to increase tolerance for community ambulation  2. Pt. to demonstrate proper cervical and scapula retraction requiring no verbal cues from PT  3. Increase thoracic joint mobility to 2+/6 to promote greater ease with self care skills  4. Increase lumbar joint mobility to 2+/6 to promote greater ease with prolonged sitting/standing  5. Pt. to demonstrate increased MMT  for core/lumbar paraspinals to 3+/5 to increase endurance with prolonged sitting.   6. Pt. to demonstrate increased MMT for left gluteus medius to 4+/5  to increase stability during ambulation on uneven surfaces.  7. Pt. to demonstrate increased MMT for bilateral hip flexor to 4+/5 to increase tolerance for ADL and work activities.   8. Pt to be independent with HEP to improve ROM and independence with ADL's  9. Pt. to report a decrease in left LE paresthesia by >/= 75% to improve ability to perform community ambulation/liftin    Plan     Pt will be treated by physical therapy 1-3 times a week for 8 weeks for pt. education, HEP, therapeutic exercises, neuromuscular re-education, soft tissue and joint mobilizations; and modalities prn to achieve established goals. Odessa may at times be seen by a PTA as part of the Rehab Team.

## 2017-07-05 ENCOUNTER — CLINICAL SUPPORT (OUTPATIENT)
Dept: REHABILITATION | Facility: HOSPITAL | Age: 72
End: 2017-07-05
Attending: INTERNAL MEDICINE
Payer: MEDICARE

## 2017-07-05 DIAGNOSIS — R53.1 WEAKNESS: ICD-10-CM

## 2017-07-05 DIAGNOSIS — G89.29 CHRONIC LEFT-SIDED LOW BACK PAIN WITH LEFT-SIDED SCIATICA: ICD-10-CM

## 2017-07-05 DIAGNOSIS — M54.42 CHRONIC LEFT-SIDED LOW BACK PAIN WITH LEFT-SIDED SCIATICA: ICD-10-CM

## 2017-07-05 DIAGNOSIS — M53.3 SACROILIAC DYSFUNCTION: Primary | ICD-10-CM

## 2017-07-05 PROCEDURE — 97110 THERAPEUTIC EXERCISES: CPT

## 2017-07-05 NOTE — PROGRESS NOTES
Physical Therapy Progress Note    Name: Odessa Vaughn  Clinic Number: [id number]    Diagnosis:   No diagnosis found.  Physician: Taylor Warner*  Treatment Orders: PT Eval and Treat    History     Past Medical History:   Diagnosis Date    Arthritis     hands, legs    Breast cancer 12/2012    Right breast invasive ductal carcinoma, ER positive, [place] and Her2 negative    Bursitis of left hip 2016    resolved    Chronic midline low back pain with left-sided sciatica 6/5/2017    Essential hypertension 9/21/2015    Hyperlipidemia 9/15/2014     Current Outpatient Prescriptions   Medication Sig    anastrozole (ARIMIDEX) 1 mg Tab TAKE 1 TABLET BY MOUTH EVERY DAY    aspirin 81 MG Chew Take 81 mg by mouth once daily.      atorvastatin (LIPITOR) 10 MG tablet Take 1 tablet (10 mg total) by mouth once daily.    fexofenadine (ALLEGRA) 180 MG tablet Take 1 tablet (180 mg total) by mouth once daily.    multivitamin capsule Take 1 capsule by mouth once daily.    nystatin (MYCOSTATIN) cream Apply topically 2 (two) times daily.    PROPYLENE GLYCOL//PF (SYSTANE, PF, OPHT) Apply to eye.    triamterene-hydrochlorothiazide 37.5-25 mg (DYAZIDE) 37.5-25 mg per capsule TAKE 1 CAPSULE BY MOUTH ONCE DAILY     No current facility-administered medications for this visit.      Review of patient's allergies indicates:   Allergen Reactions    Vicodin [hydrocodone-acetaminophen] Other (See Comments)     Dizziness       Precautions: standard    Evaluation Date: 6/21/17  Visit # authorized: 5/25  Authorization period: 12/31/17  Plan of care expiration: 8/20/17    Subjective   Pt reports she had some pain this morning: 3/10 that got worse with movement; but denies pain right now.     Patient Goals: Pt would like to decrease pain and increase function so she can return to her normal daily activities: see above    Objective     Functional Status Measures:    Intake Score   7/5/17  Pts Physical FS Primary Measure     55     "61                           Risk Adjustment Statistical FOTO  57        PT reviewed FOTO scores for Odessa Vaughn on 7/5/17.   FOTO scores were entered into [employer] - see media section.  TREATMENT:  Therapeutic exercise: Odessa received therapeutic exercises to develop strength and endurance, flexibility for 38 minutes including: YXQCRD3:   Supine  · bilateral piriformis stretch 3 x 30 sec   · MET right ilial anterior: 3x5"  · sciatic nerve glides: 3x10 bilaterally  · PPT with ball hip adduction: 3x8  · Bridges with diaphragmatic breathing: 3x8  · Supine clam shells 3x8 bilateral w/ pilates ring  · LTR with ball hip adduction: 2x8  Sitting:  · scapular retraction with bilateral shoulder ER: 2x8    Manual therapy: Odessa  received the following manual therapy techniques x 15 min. to include soft tissue and joint mobilizations were applied to the: left leg/lumbar spine To include:   Soft tissue mobilization:  · massage roller left piriformis, ITB  Joint mobilization  · left hip short axis lateral distraction  Assessment   Pt had increased pain after LTR, that was releaved after piriformis stretch. Pt. demonstrated improved endurance, exercise jimena. and denied increased pain. Pt. is progressing well towards goals.    Pt edu on and instructed to cont HEP.  Cont to progress as jimena.     Patient was in agreement with set goals and plan of care. Pt was given a HEP consisting of posture reeducation, diaphragmatic breathing, instruction on body mechanics, activity modification/avoidance, and core/lumbar/LE strengthening regimen. Pt. verbally understood instructions and demonstrated proper form/technique. Pt was advised to perform these exercises free of pain, and discontinue use if symptoms persist/worsen. Pt will benefit from physical therapy services in order to maximize pain free functional independence.   Pt. Education: Instructed pt. regarding:body mechanics, posture, activity modification/avoidance, and proper " technique with all exercises. Pt. to demonstrate good understanding of the education provided. Odessa demonstrated good return demonstration of activities. No cultural, environmental, or spiritual barriers identified to treatment or learning.    Medical necessity is demonstrated by the following IMPAIRMENTS/PROBLEM LIST:   1) Pain limiting function   2) Posture dysfunction   3) Core/Lumbar/LE weakness   4) Decreased thoracic/lumbar joint mobility   5) Decreased Lumbar ROM   6) Decreased soft tissue extensibility/fascia restriction   7) Decreased LE flexibility: bilateral sciatic nerve, left hamstrings   8) Lack of HEP   9) LE paresthesia left radiating to left knee    GOALS:   Short Term Goals:  4 weeks  1. Report decreased left leg pain </= 5/10 at worst to increase tolerance for prolonged standing/sitting  2. Pt. to demonstrate proper cervical and scapula retraction requiring min. to no verbal cues from PT  3. Pt. to demonstrate increased MMT for core/lumbar paraspinals to 3/5 to increase endurance with prolonged sitting/standing.  4. Pt. to demonstrate increased MMT for left gluteus medius to 4/5  to increase stability during community ambulation  5. Pt to tolerate HEP to improve ROM and independence with ADL's  6. Pt. to report a decrease in left LE paresthesia by >/= 50% to improve ability to perform prolonged sitting/standing    Long Term Goals: 8 weeks  1. Report decreased left leg pain </=  21/10 at worst to increase tolerance for community ambulation  2. Pt. to demonstrate proper cervical and scapula retraction requiring no verbal cues from PT  3. Increase thoracic joint mobility to 2+/6 to promote greater ease with self care skills  4. Increase lumbar joint mobility to 2+/6 to promote greater ease with prolonged sitting/standing  5. Pt. to demonstrate increased MMT for core/lumbar paraspinals to 3+/5 to increase endurance with prolonged sitting.   6. Pt. to demonstrate increased MMT for left gluteus  medius to 4+/5  to increase stability during ambulation on uneven surfaces.  7. Pt. to demonstrate increased MMT for bilateral hip flexor to 4+/5 to increase tolerance for ADL and work activities.   8. Pt to be independent with HEP to improve ROM and independence with ADL's  9. Pt. to report a decrease in left LE paresthesia by >/= 75% to improve ability to perform community ambulation/liftin    Plan     Pt will be treated by physical therapy 1-3 times a week for 8 weeks for pt. education, HEP, therapeutic exercises, neuromuscular re-education, soft tissue and joint mobilizations; and modalities prn to achieve established goals. Odessa may at times be seen by a PTA as part of the Rehab Team.

## 2017-07-07 ENCOUNTER — CLINICAL SUPPORT (OUTPATIENT)
Dept: REHABILITATION | Facility: HOSPITAL | Age: 72
End: 2017-07-07
Attending: INTERNAL MEDICINE
Payer: MEDICARE

## 2017-07-07 DIAGNOSIS — R53.1 WEAKNESS: ICD-10-CM

## 2017-07-07 DIAGNOSIS — M53.3 SACROILIAC DYSFUNCTION: Primary | ICD-10-CM

## 2017-07-07 DIAGNOSIS — G89.29 CHRONIC LEFT-SIDED LOW BACK PAIN WITH LEFT-SIDED SCIATICA: ICD-10-CM

## 2017-07-07 DIAGNOSIS — M54.42 CHRONIC LEFT-SIDED LOW BACK PAIN WITH LEFT-SIDED SCIATICA: ICD-10-CM

## 2017-07-07 PROCEDURE — 97110 THERAPEUTIC EXERCISES: CPT

## 2017-07-07 NOTE — PROGRESS NOTES
Physical Therapy Progress Note    Name: Odessa Cai [facility] Number: [id number]    Diagnosis:   Encounter Diagnoses   Name Primary?    Chronic left-sided low back pain with left-sided sciatica     Sacroiliac dysfunction Yes    Weakness      Physician: Taylor Warner*  Treatment Orders: PT Eval and Treat    History     Past Medical History:   Diagnosis Date    Arthritis     hands, legs    Breast cancer 12/2012    Right breast invasive ductal carcinoma, ER positive, [place] and Her2 negative    Bursitis of left hip 2016    resolved    Chronic midline low back pain with left-sided sciatica 6/5/2017    Essential hypertension 9/21/2015    Hyperlipidemia 9/15/2014     Current Outpatient Prescriptions   Medication Sig    anastrozole (ARIMIDEX) 1 mg Tab TAKE 1 TABLET BY MOUTH EVERY DAY    aspirin 81 MG Chew Take 81 mg by mouth once daily.      atorvastatin (LIPITOR) 10 MG tablet Take 1 tablet (10 mg total) by mouth once daily.    fexofenadine (ALLEGRA) 180 MG tablet Take 1 tablet (180 mg total) by mouth once daily.    multivitamin capsule Take 1 capsule by mouth once daily.    nystatin (MYCOSTATIN) cream Apply topically 2 (two) times daily.    PROPYLENE GLYCOL//PF (SYSTANE, PF, OPHT) Apply to eye.    triamterene-hydrochlorothiazide 37.5-25 mg (DYAZIDE) 37.5-25 mg per capsule TAKE 1 CAPSULE BY MOUTH ONCE DAILY     No current facility-administered medications for this visit.      Review of patient's allergies indicates:   Allergen Reactions    Vicodin [hydrocodone-acetaminophen] Other (See Comments)     Dizziness       Precautions: standard    Evaluation Date: 6/21/17  Visit # authorized: 6/25  Authorization period: 12/31/17  Plan of care expiration: 8/20/17    Subjective   Pt reports she had some left hip pain this morning when she woke up; pain lingered for a short while and then it resolved on it's own.    Patient Goals: Pt would like to decrease pain and increase function so she can return  "to her normal daily activities: see above    Objective     Functional Status Measures:    Intake Score   7/5/17  Pts Physical FS Primary Measure     55    61                           Risk Adjustment Statistical FOTO  57        PT reviewed FOTO scores for Odessa Vaughn on 7/5/17.   FOTO scores were entered into [facility] - see media section.  TREATMENT:  Therapeutic exercise: Odessa received therapeutic exercises to develop strength and endurance, flexibility for 38 minutes including: YXQCRD3:   Supine  · bilateral piriformis stretch 3 x 30 sec   · MET right ilial anterior: 3x5"  · sciatic nerve glides: 3x10 bilaterally  · PPT with ball hip adduction: 3x8  · Bridges with diaphragmatic breathing: 3x8  · Supine clam shells 3x8 bilateral w/ pilates ring  · LTR with ball hip adduction: 2x8  Sitting:  · scapular retraction with bilateral shoulder ER: 2x8    Manual therapy: Odessa  received the following manual therapy techniques x 15 min. to include soft tissue and joint mobilizations were applied to the: left leg/lumbar spine To include:   Soft tissue mobilization:  · massage roller left piriformis, ITB  Joint mobilization  · left hip short axis lateral distraction  Assessment   Pt. had some relief after cupping at the end of the treatment session. Pt. is still challenged with LTR right causing left hip/low back pain. Good jimena. to exercise progression. Reassessment next visit. Pt. is progressing well towards goals.    Pt edu on and instructed to cont HEP.  Cont to progress as jimena.     Patient was in agreement with set goals and plan of care. Pt was given a HEP consisting of posture reeducation, diaphragmatic breathing, instruction on body mechanics, activity modification/avoidance, and core/lumbar/LE strengthening regimen. Pt. verbally understood instructions and demonstrated proper form/technique. Pt was advised to perform these exercises free of pain, and discontinue use if symptoms persist/worsen. Pt will " benefit from physical therapy services in order to maximize pain free functional independence.   Pt. Education: Instructed pt. regarding:body mechanics, posture, activity modification/avoidance, and proper technique with all exercises. Pt. to demonstrate good understanding of the education provided. Odessa demonstrated good return demonstration of activities. No cultural, environmental, or spiritual barriers identified to treatment or learning.    Medical necessity is demonstrated by the following IMPAIRMENTS/PROBLEM LIST:   1) Pain limiting function   2) Posture dysfunction   3) Core/Lumbar/LE weakness   4) Decreased thoracic/lumbar joint mobility   5) Decreased Lumbar ROM   6) Decreased soft tissue extensibility/fascia restriction   7) Decreased LE flexibility: bilateral sciatic nerve, left hamstrings   8) Lack of HEP   9) LE paresthesia left radiating to left knee    GOALS:   Short Term Goals:  4 weeks  1. Report decreased left leg pain </= 5/10 at worst to increase tolerance for prolonged standing/sitting  2. Pt. to demonstrate proper cervical and scapula retraction requiring min. to no verbal cues from PT  3. Pt. to demonstrate increased MMT for core/lumbar paraspinals to 3/5 to increase endurance with prolonged sitting/standing.  4. Pt. to demonstrate increased MMT for left gluteus medius to 4/5  to increase stability during community ambulation  5. Pt to tolerate HEP to improve ROM and independence with ADL's  6. Pt. to report a decrease in left LE paresthesia by >/= 50% to improve ability to perform prolonged sitting/standing    Long Term Goals: 8 weeks  1. Report decreased left leg pain </=  21/10 at worst to increase tolerance for community ambulation  2. Pt. to demonstrate proper cervical and scapula retraction requiring no verbal cues from PT  3. Increase thoracic joint mobility to 2+/6 to promote greater ease with self care skills  4. Increase lumbar joint mobility to 2+/6 to promote greater ease with  prolonged sitting/standing  5. Pt. to demonstrate increased MMT for core/lumbar paraspinals to 3+/5 to increase endurance with prolonged sitting.   6. Pt. to demonstrate increased MMT for left gluteus medius to 4+/5  to increase stability during ambulation on uneven surfaces.  7. Pt. to demonstrate increased MMT for bilateral hip flexor to 4+/5 to increase tolerance for ADL and work activities.   8. Pt to be independent with HEP to improve ROM and independence with ADL's  9. Pt. to report a decrease in left LE paresthesia by >/= 75% to improve ability to perform community ambulation/liftin    Plan     Pt will be treated by physical therapy 1-3 times a week for 8 weeks for pt. education, HEP, therapeutic exercises, neuromuscular re-education, soft tissue and joint mobilizations; and modalities prn to achieve established goals. Odessa may at times be seen by a PTA as part of the Rehab Team.

## 2017-07-12 ENCOUNTER — CLINICAL SUPPORT (OUTPATIENT)
Dept: REHABILITATION | Facility: HOSPITAL | Age: 72
End: 2017-07-12
Attending: INTERNAL MEDICINE
Payer: MEDICARE

## 2017-07-12 DIAGNOSIS — M53.3 SACROILIAC DYSFUNCTION: Primary | ICD-10-CM

## 2017-07-12 DIAGNOSIS — R53.1 WEAKNESS: ICD-10-CM

## 2017-07-12 DIAGNOSIS — G89.29 CHRONIC LEFT-SIDED LOW BACK PAIN WITH LEFT-SIDED SCIATICA: ICD-10-CM

## 2017-07-12 DIAGNOSIS — M54.42 CHRONIC LEFT-SIDED LOW BACK PAIN WITH LEFT-SIDED SCIATICA: ICD-10-CM

## 2017-07-12 PROCEDURE — 97110 THERAPEUTIC EXERCISES: CPT

## 2017-07-12 NOTE — PROGRESS NOTES
Physical Therapy Reassessment    Name: Odessa Cai [facility] Number: [id number]    Diagnosis:   Encounter Diagnoses   Name Primary?    Chronic left-sided low back pain with left-sided sciatica     Sacroiliac dysfunction Yes    Weakness      Physician: Taylor Warner*  Treatment Orders: PT Eval and Treat    History     Past Medical History:   Diagnosis Date    Arthritis     hands, legs    Breast cancer 12/2012    Right breast invasive ductal carcinoma, ER positive, [place] and Her2 negative    Bursitis of left hip 2016    resolved    Chronic midline low back pain with left-sided sciatica 6/5/2017    Essential hypertension 9/21/2015    Hyperlipidemia 9/15/2014     Current Outpatient Prescriptions   Medication Sig    anastrozole (ARIMIDEX) 1 mg Tab TAKE 1 TABLET BY MOUTH EVERY DAY    aspirin 81 MG Chew Take 81 mg by mouth once daily.      atorvastatin (LIPITOR) 10 MG tablet Take 1 tablet (10 mg total) by mouth once daily.    fexofenadine (ALLEGRA) 180 MG tablet Take 1 tablet (180 mg total) by mouth once daily.    multivitamin capsule Take 1 capsule by mouth once daily.    nystatin (MYCOSTATIN) cream Apply topically 2 (two) times daily.    PROPYLENE GLYCOL//PF (SYSTANE, PF, OPHT) Apply to eye.    triamterene-hydrochlorothiazide 37.5-25 mg (DYAZIDE) 37.5-25 mg per capsule TAKE 1 CAPSULE BY MOUTH ONCE DAILY     No current facility-administered medications for this visit.      Review of patient's allergies indicates:   Allergen Reactions    Vicodin [hydrocodone-acetaminophen] Other (See Comments)     Dizziness       Precautions: standard    Evaluation Date: 6/21/17  Visit # authorized: 7/25  Authorization period: 12/31/17  Plan of care expiration: 8/20/17    Subjective   Pt reports she is feeling so much better; notes she can turn over in bed without pain. Pt. notes compliance with HEP.    Patient Goals: Pt would like to decrease pain and increase function so she can return to her normal  "daily activities: see above    Objective     Functional Status Measures:    Intake Score   7/5/17  Pts Physical FS Primary Measure     55    61                           Risk Adjustment Statistical FOTO  57        PT reviewed FOTO scores for Odessa Vaughn on 7/5/17.   FOTO scores were entered into [employer] - see media section.  TREATMENT:  Therapeutic exercise: Odessa received therapeutic exercises to develop strength and endurance, flexibility for 38 minutes including: YXQCRD3:   Lumbar ROM: (measured in degrees)     Degrees Quality   Flexion    40 left leg ERP   Extension       Left Side Bending 35 ERP   Right Side Bending 40        Dermatomes: (impaired/normal)      RLE LLE   L2 Intact Intact   L3 Intact Intact   L4 Intact Intact   L5 Intact Intact   S1 Intact Intact      Reflexes: unable to assess bilaterally     Lower Extremity Strength (graded 0-5 out of 5)    RLE LLE   Hip flexion: 4+/5 4+/5   Hip ER 4/5 4/5   Hip IR 4+/5 4+/5   Knee extension: 4+/5 4+/5   Ankle dorsiflexion: 4+/5 4+/5   Great toe extension: 4+/5 4+/5   Posterior fibers of Gluteus medius 5-/5 5-/5   Knee flexion 4/5 4-/5    Ankle plantarflexion 4+/5 4+/5   Hip extension: 3+/5 3-/5 pain posterior hip      Special Tests: ((+): pos.; (-): neg.)   · Quadrant test: + left  · Slump Test: -  · SLR Test: right: 75 deg. increased with IR; left: 65 deg.  · Bridge Test: +  · Pirformis Test: moderate restriction left greater than right  Flexibility:   · Popliteal Angle: R =75  degrees ; L = 70 degrees  Palpation for condition:   · Position:  right ilial anterior, right sacral rotation right     · Warmth:  normal  · Swelling: none  · Texture: hypertonic left piriformis  Supine  · bilateral piriformis stretch 3 x 30 sec   · MET right ilial anterior: 3x5"  · sciatic nerve glides: 3x10 bilaterally  · PPT with ball hip adduction: 3x10  · Bridges with diaphragmatic breathing: 3x10  · Supine clam shells 3x10 bilateral w/ pilates ring  · LTR with ball hip " adduction: 2x9  Sitting:  · scapular retraction with bilateral shoulder ER: 2x10    Manual therapy: Odessa  received the following manual therapy techniques x 15 min. to include soft tissue and joint mobilizations were applied to the: left leg/lumbar spine To include:   Soft tissue mobilization:  · Vacuum/cupping: STM was performed to left ITB/lateral hip to decrease muscle tightness, increase circulation and promote healing process for 10 min. The pt's skin was monitored for redness adjusting pressure as needed. The pt was instructed in possible side effects of bruising and/or soreness.   Joint mobilization    Assessment   Pt. has increased LE strength and improved function since SOC. Pt. is progressing well towards goals. Pt. will continue to benefit from additional therapy to further progress core hip lumbopelvic stabilization as jimena.    Pt edu on and instructed to cont HEP.  Cont to progress as jimena.     Patient was in agreement with set goals and plan of care. Pt was given a HEP consisting of posture reeducation, diaphragmatic breathing, instruction on body mechanics, activity modification/avoidance, and core/lumbar/LE strengthening regimen. Pt. verbally understood instructions and demonstrated proper form/technique. Pt was advised to perform these exercises free of pain, and discontinue use if symptoms persist/worsen. Pt will benefit from physical therapy services in order to maximize pain free functional independence.   Pt. Education: Instructed pt. regarding:body mechanics, posture, activity modification/avoidance, and proper technique with all exercises. Pt. to demonstrate good understanding of the education provided. Odessa demonstrated good return demonstration of activities. No cultural, environmental, or spiritual barriers identified to treatment or learning.    Medical necessity is demonstrated by the following IMPAIRMENTS/PROBLEM LIST:   1) Pain limiting function   2) Posture dysfunction   3)  Core/Lumbar/LE weakness   4) Decreased thoracic/lumbar joint mobility   5) Decreased Lumbar ROM   6) Decreased soft tissue extensibility/fascia restriction   7) Decreased LE flexibility: bilateral sciatic nerve, left hamstrings   8) Lack of HEP   9) LE paresthesia left radiating to left knee    GOALS:   Short Term Goals:  4 weeks  1. Report decreased left leg pain </= 5/10 at worst to increase tolerance for prolonged standing/sitting  2. Pt. to demonstrate proper cervical and scapula retraction requiring min. to no verbal cues from PT  3. Pt. to demonstrate increased MMT for core/lumbar paraspinals to 3/5 to increase endurance with prolonged sitting/standing.  4. Pt. to demonstrate increased MMT for left gluteus medius to 4/5  to increase stability during community ambulation  5. Pt to tolerate HEP to improve ROM and independence with ADL's  6. Pt. to report a decrease in left LE paresthesia by >/= 50% to improve ability to perform prolonged sitting/standing    Long Term Goals: 8 weeks  1. Report decreased left leg pain </=  21/10 at worst to increase tolerance for community ambulation  2. Pt. to demonstrate proper cervical and scapula retraction requiring no verbal cues from PT  3. Increase thoracic joint mobility to 2+/6 to promote greater ease with self care skills  4. Increase lumbar joint mobility to 2+/6 to promote greater ease with prolonged sitting/standing  5. Pt. to demonstrate increased MMT for core/lumbar paraspinals to 3+/5 to increase endurance with prolonged sitting.   6. Pt. to demonstrate increased MMT for left gluteus medius to 4+/5  to increase stability during ambulation on uneven surfaces.  7. Pt. to demonstrate increased MMT for bilateral hip flexor to 4+/5 to increase tolerance for ADL and work activities.   8. Pt to be independent with HEP to improve ROM and independence with ADL's  9. Pt. to report a decrease in left LE paresthesia by >/= 75% to improve ability to perform community  ambulation/liftin    Plan     Pt will be treated by physical therapy 1-3 times a week for 8 weeks for pt. education, HEP, therapeutic exercises, neuromuscular re-education, soft tissue and joint mobilizations; and modalities prn to achieve established goals. Odessa may at times be seen by a PTA as part of the Rehab Team.

## 2017-07-14 ENCOUNTER — CLINICAL SUPPORT (OUTPATIENT)
Dept: REHABILITATION | Facility: HOSPITAL | Age: 72
End: 2017-07-14
Attending: INTERNAL MEDICINE
Payer: MEDICARE

## 2017-07-14 DIAGNOSIS — R53.1 WEAKNESS: ICD-10-CM

## 2017-07-14 DIAGNOSIS — M53.3 SACROILIAC DYSFUNCTION: Primary | ICD-10-CM

## 2017-07-14 DIAGNOSIS — M54.42 CHRONIC LEFT-SIDED LOW BACK PAIN WITH LEFT-SIDED SCIATICA: ICD-10-CM

## 2017-07-14 DIAGNOSIS — G89.29 CHRONIC LEFT-SIDED LOW BACK PAIN WITH LEFT-SIDED SCIATICA: ICD-10-CM

## 2017-07-14 PROCEDURE — 97110 THERAPEUTIC EXERCISES: CPT

## 2017-07-14 PROCEDURE — 97140 MANUAL THERAPY 1/> REGIONS: CPT

## 2017-07-14 NOTE — PROGRESS NOTES
Physical Therapy Progress Note    Name: Odessa Vaughn  Clinic Number: [id number]    Diagnosis:   No diagnosis found.  Physician: Taylor Warner*  Treatment Orders: PT Eval and Treat    History     Past Medical History:   Diagnosis Date    Arthritis     hands, legs    Breast cancer 12/2012    Right breast invasive ductal carcinoma, ER positive, OR and Her2 negative    Bursitis of left hip 2016    resolved    Chronic midline low back pain with left-sided sciatica 6/5/2017    Essential hypertension 9/21/2015    Hyperlipidemia 9/15/2014     Current Outpatient Prescriptions   Medication Sig    anastrozole (ARIMIDEX) 1 mg Tab TAKE 1 TABLET BY MOUTH EVERY DAY    aspirin 81 MG Chew Take 81 mg by mouth once daily.      atorvastatin (LIPITOR) 10 MG tablet Take 1 tablet (10 mg total) by mouth once daily.    fexofenadine (ALLEGRA) 180 MG tablet Take 1 tablet (180 mg total) by mouth once daily.    multivitamin capsule Take 1 capsule by mouth once daily.    nystatin (MYCOSTATIN) cream Apply topically 2 (two) times daily.    PROPYLENE GLYCOL//PF (SYSTANE, PF, OPHT) Apply to eye.    triamterene-hydrochlorothiazide 37.5-25 mg (DYAZIDE) 37.5-25 mg per capsule TAKE 1 CAPSULE BY MOUTH ONCE DAILY     No current facility-administered medications for this visit.      Review of patient's allergies indicates:   Allergen Reactions    Vicodin [hydrocodone-acetaminophen] Other (See Comments)     Dizziness       Precautions: standard    Evaluation Date: 6/21/17  Visit # authorized: 7/25  Authorization period: 12/31/17  Plan of care expiration: 8/20/17    Subjective   Pt reports she is feeling so much better; notes she can turn over in bed without pain. Pt. notes compliance with HEP.    Patient Goals: Pt would like to decrease pain and increase function so she can return to her normal daily activities: see above    Objective     Functional Status Measures:    Intake Score   7/5/17  Pts Physical FS Primary  "Measure     55    61                           Risk Adjustment Statistical FOTO  57        PT reviewed FOTO scores for Odessa Vaughn on 7/5/17.   FOTO scores were entered into EPIC - see media section.  TREATMENT:  Therapeutic exercise: Odessa received therapeutic exercises to develop strength and endurance, flexibility for 40 minutes including: [id number]:   Supine  · bilateral piriformis stretch 3 x 30 sec   · MET right ilial anterior: 3x5"  · sciatic nerve glides: 3x10 bilaterally  · PPT with ball hip adduction: 3x12  · Bridges with diaphragmatic breathing and pilates ring: 3x12  · Supine clam shells 3x10 bilateral w/ pilates ring  · LTR with ball hip adduction: 2x10  Sidelying  · s/l hip abduction: 1x8 bilaterally with tactile/verbal cues  Sitting:  · scapular retraction with bilateral shoulder ER: 3x8    Manual therapy: Odessa  received the following manual therapy techniques x 15 min. to include soft tissue and joint mobilizations were applied to the: left leg/lumbar spine To include:   Soft tissue mobilization:  · massage roller left hip/lateral thigh  Joint mobilization  · left lateral hip distraction  Assessment   Pt. notes pain is isolated to the hip alone; denies diffuse pain. Pt. is progressing well towards goals. Pt. will continue to benefit from additional therapy to further progress core hip lumbopelvic stabilization as jimena.    Pt edu on and instructed to cont HEP.  Cont to progress as jimena.     Patient was in agreement with set goals and plan of care. Pt was given a HEP consisting of posture reeducation, diaphragmatic breathing, instruction on body mechanics, activity modification/avoidance, and core/lumbar/LE strengthening regimen. Pt. verbally understood instructions and demonstrated proper form/technique. Pt was advised to perform these exercises free of pain, and discontinue use if symptoms persist/worsen. Pt will benefit from physical therapy services in order to maximize pain free " functional independence.   Pt. Education: Instructed pt. regarding:body mechanics, posture, activity modification/avoidance, and proper technique with all exercises. Pt. to demonstrate good understanding of the education provided. Odessa demonstrated good return demonstration of activities. No cultural, environmental, or spiritual barriers identified to treatment or learning.    Medical necessity is demonstrated by the following IMPAIRMENTS/PROBLEM LIST:   1) Pain limiting function   2) Posture dysfunction   3) Core/Lumbar/LE weakness   4) Decreased thoracic/lumbar joint mobility   5) Decreased Lumbar ROM   6) Decreased soft tissue extensibility/fascia restriction   7) Decreased LE flexibility: bilateral sciatic nerve, left hamstrings   8) Lack of HEP   9) LE paresthesia left radiating to left knee    GOALS:   Short Term Goals:  4 weeks  1. Report decreased left leg pain </= 5/10 at worst to increase tolerance for prolonged standing/sitting MET  2. Pt. to demonstrate proper cervical and scapula retraction requiring min. to no verbal cues from PT  3. Pt. to demonstrate increased MMT for core/lumbar paraspinals to 3/5 to increase endurance with prolonged sitting/standing.  4. Pt. to demonstrate increased MMT for left gluteus medius to 4/5  to increase stability during community ambulation MET  5. Pt to tolerate HEP to improve ROM and independence with ADL's MET  6. Pt. to report a decrease in left LE paresthesia by >/= 50% to improve ability to perform prolonged sitting/standing MET    Long Term Goals: 8 weeks  1. Report decreased left leg pain </=  21/10 at worst to increase tolerance for community ambulation  2. Pt. to demonstrate proper cervical and scapula retraction requiring no verbal cues from PT  3. Increase thoracic joint mobility to 2+/6 to promote greater ease with self care skills  4. Increase lumbar joint mobility to 2+/6 to promote greater ease with prolonged sitting/standing  5. Pt. to demonstrate  increased MMT for core/lumbar paraspinals to 3+/5 to increase endurance with prolonged sitting.   6. Pt. to demonstrate increased MMT for left gluteus medius to 4+/5  to increase stability during ambulation on uneven surfaces.  7. Pt. to demonstrate increased MMT for bilateral hip flexor to 4+/5 to increase tolerance for ADL and work activities.   8. Pt to be independent with HEP to improve ROM and independence with ADL's  9. Pt. to report a decrease in left LE paresthesia by >/= 75% to improve ability to perform community ambulation/liftin    Plan     Pt will be treated by physical therapy 1-3 times a week for 8 weeks for pt. education, HEP, therapeutic exercises, neuromuscular re-education, soft tissue and joint mobilizations; and modalities prn to achieve established goals. Odessa may at times be seen by a PTA as part of the Rehab Team.

## 2017-08-18 ENCOUNTER — TELEPHONE (OUTPATIENT)
Dept: INTERNAL MEDICINE | Facility: CLINIC | Age: 72
End: 2017-08-18

## 2017-08-18 DIAGNOSIS — Z12.11 SCREEN FOR COLON CANCER: Primary | ICD-10-CM

## 2017-08-22 ENCOUNTER — TELEPHONE (OUTPATIENT)
Dept: ENDOSCOPY | Facility: HOSPITAL | Age: 72
End: 2017-08-22

## 2017-08-22 DIAGNOSIS — Z12.11 SPECIAL SCREENING FOR MALIGNANT NEOPLASMS, COLON: Primary | ICD-10-CM

## 2017-08-22 RX ORDER — POLYETHYLENE GLYCOL 3350, SODIUM SULFATE ANHYDROUS, SODIUM BICARBONATE, SODIUM CHLORIDE, POTASSIUM CHLORIDE 236; 22.74; 6.74; 5.86; 2.97 G/4L; G/4L; G/4L; G/4L; G/4L
4 POWDER, FOR SOLUTION ORAL ONCE
Qty: 4000 ML | Refills: 0 | Status: SHIPPED | OUTPATIENT
Start: 2017-08-22 | End: 2017-08-22

## 2017-09-07 ENCOUNTER — ANESTHESIA (OUTPATIENT)
Dept: ENDOSCOPY | Facility: HOSPITAL | Age: 72
End: 2017-09-07
Payer: MEDICARE

## 2017-09-07 ENCOUNTER — ANESTHESIA EVENT (OUTPATIENT)
Dept: ENDOSCOPY | Facility: HOSPITAL | Age: 72
End: 2017-09-07
Payer: MEDICARE

## 2017-09-07 ENCOUNTER — HOSPITAL ENCOUNTER (OUTPATIENT)
Facility: HOSPITAL | Age: 72
Discharge: HOME | End: 2017-09-07
Attending: COLON & RECTAL SURGERY | Admitting: COLON & RECTAL SURGERY
Payer: MEDICARE

## 2017-09-07 VITALS
SYSTOLIC BLOOD PRESSURE: 106 MMHG | BODY MASS INDEX: 28.32 KG/M2 | TEMPERATURE: 98 F | HEIGHT: 65 IN | DIASTOLIC BLOOD PRESSURE: 63 MMHG | WEIGHT: 170 LBS | RESPIRATION RATE: 18 BRPM | HEART RATE: 73 BPM | OXYGEN SATURATION: 97 %

## 2017-09-07 DIAGNOSIS — Z12.11 SCREENING FOR COLON CANCER: ICD-10-CM

## 2017-09-07 PROCEDURE — 88305 TISSUE EXAM BY PATHOLOGIST: CPT | Mod: 26,,, | Performed by: PATHOLOGY

## 2017-09-07 PROCEDURE — 63600175 PHARM REV CODE 636 W HCPCS: Performed by: NURSE ANESTHETIST, CERTIFIED REGISTERED

## 2017-09-07 PROCEDURE — 88305 TISSUE EXAM BY PATHOLOGIST: CPT | Performed by: PATHOLOGY

## 2017-09-07 PROCEDURE — 45385 COLONOSCOPY W/LESION REMOVAL: CPT | Mod: PT,,, | Performed by: COLON & RECTAL SURGERY

## 2017-09-07 PROCEDURE — C1773 RET DEV, INSERTABLE: HCPCS | Performed by: COLON & RECTAL SURGERY

## 2017-09-07 PROCEDURE — 37000009 HC ANESTHESIA EA ADD 15 MINS: Performed by: COLON & RECTAL SURGERY

## 2017-09-07 PROCEDURE — 45385 COLONOSCOPY W/LESION REMOVAL: CPT | Performed by: COLON & RECTAL SURGERY

## 2017-09-07 PROCEDURE — D9220A PRA ANESTHESIA: Mod: PT,ANES,, | Performed by: ANESTHESIOLOGY

## 2017-09-07 PROCEDURE — 27201089 HC SNARE, DISP (ANY): Performed by: COLON & RECTAL SURGERY

## 2017-09-07 PROCEDURE — D9220A PRA ANESTHESIA: Mod: PT,CRNA,, | Performed by: NURSE ANESTHETIST, CERTIFIED REGISTERED

## 2017-09-07 PROCEDURE — 25000003 PHARM REV CODE 250: Performed by: NURSE PRACTITIONER

## 2017-09-07 PROCEDURE — 45380 COLONOSCOPY AND BIOPSY: CPT | Performed by: COLON & RECTAL SURGERY

## 2017-09-07 PROCEDURE — 45380 COLONOSCOPY AND BIOPSY: CPT | Mod: 59,,, | Performed by: COLON & RECTAL SURGERY

## 2017-09-07 PROCEDURE — 37000008 HC ANESTHESIA 1ST 15 MINUTES: Performed by: COLON & RECTAL SURGERY

## 2017-09-07 RX ORDER — SODIUM CHLORIDE 9 MG/ML
INJECTION, SOLUTION INTRAVENOUS CONTINUOUS
Status: DISCONTINUED | OUTPATIENT
Start: 2017-09-07 | End: 2017-09-07 | Stop reason: HOSPADM

## 2017-09-07 RX ORDER — PROPOFOL 10 MG/ML
VIAL (ML) INTRAVENOUS
Status: DISCONTINUED | OUTPATIENT
Start: 2017-09-07 | End: 2017-09-07

## 2017-09-07 RX ORDER — LIDOCAINE HCL/PF 100 MG/5ML
SYRINGE (ML) INTRAVENOUS
Status: DISCONTINUED | OUTPATIENT
Start: 2017-09-07 | End: 2017-09-07

## 2017-09-07 RX ORDER — PROPOFOL 10 MG/ML
VIAL (ML) INTRAVENOUS CONTINUOUS PRN
Status: DISCONTINUED | OUTPATIENT
Start: 2017-09-07 | End: 2017-09-07

## 2017-09-07 RX ADMIN — LIDOCAINE HYDROCHLORIDE 50 MG: 20 INJECTION, SOLUTION INTRAVENOUS at 08:09

## 2017-09-07 RX ADMIN — PROPOFOL 80 MG: 10 INJECTION, EMULSION INTRAVENOUS at 08:09

## 2017-09-07 RX ADMIN — SODIUM CHLORIDE: 900 INJECTION, SOLUTION INTRAVENOUS at 07:09

## 2017-09-07 RX ADMIN — PROPOFOL 125 MCG/KG/MIN: 10 INJECTION, EMULSION INTRAVENOUS at 08:09

## 2017-09-07 NOTE — PATIENT INSTRUCTIONS
Discharge Summary/Instructions after an Endoscopic Procedure  Patient Name: Odessa Vaughn  Patient MRN: [id number]  Patient YOB: 1945 Thursday, September 07, 2017  Syed Shah MD  RESTRICTIONS:  During your procedure today, you received medications for sedation.  These   medications may affect your judgment, balance and coordination.  Therefore,   for 24 hours, you have the following restrictions:   - DO NOT drive a car, operate machinery, make legal/financial decisions,   sign important papers or drink alcohol.    ACTIVITY:  The following day: return to full activity including work, except no heavy   lifting, straining or running for 3 days if polyps were removed.  DIET:  Eat and drink normally unless instructed otherwise.  TREATMENT FOR COMMON SIDE EFFECTS:  - Mild abdominal pain, belching, bloating or excessive gas: rest, eat   lightly and use a heating pad.  - Sore Throat: treat with throat lozenges and/or gargle with warm salt   water.  SYMPTOMS TO WATCH FOR AND REPORT TO YOUR PHYSICIAN:  1. Abdominal pain or bloating, other than gas cramps.  2. Chest pain.  3. Back pain.  4. Chills or fever occurring within 24 hours after the procedure.  5. Rectal bleeding, which would show as bright red, maroon, or black stools.   (A tablespoon of blood from the rectum is not serious, especially if   hemorrhoids are present.)  6. Vomiting.  7. Weakness or dizziness.  8. Because air was used during the procedure, expelling large amounts of air   from your rectum or belching is normal.  9. If a bowel prep was taken, you may not have a bowel movement for 1-3   days.  This is normal.  GO DIRECTLY TO THE EMERGENCY ROOM IF YOU HAVE ANY OF THE FOLLOWING:   Difficulty breathing   Chills and/or fever over 101 F   Persistent vomiting and/or vomiting blood   Severe abdominal pain   Severe chest pain   Black, tarry stools   Bleeding- more than one tablespoon  Your doctor recommends these additional instructions:  If any  biopsies were taken, your doctors clinic will call you in 1 to 2   weeks with any results.  Your physician has recommended a repeat colonoscopy in three years for   surveillance.  For questions, problems or results please call your physician - Syed Shah MD at Work:  [phone].  [facility], EMERGENCY ROOM PHONE NUMBER: [phone]  IF A COMPLICATION OR EMERGENCY SITUATION ARISES AND YOU ARE UNABLE TO REACH   YOUR PHYSICIAN - GO DIRECTLY TO THE EMERGENCY ROOM.  Syed Shah MD  9/7/2017 8:29:38 AM  This report has been verified and signed electronically.

## 2017-09-07 NOTE — H&P
Endoscopy H&P    Procedure : Colonoscopy      asymptomatic screening exam      Past Medical History:   Diagnosis Date    Arthritis     hands, legs    Breast cancer 12/2012    Right breast invasive ductal carcinoma, ER positive, [place] and Her2 negative    Bursitis of left hip 2016    resolved    Chronic midline low back pain with left-sided sciatica 6/5/2017    Essential hypertension 9/21/2015    Hyperlipidemia 9/15/2014       Family History   Problem Relation Age of Onset    Cancer Father      Pancreatic CA    Cataracts Father     Breast cancer Cousin 58    Breast cancer Cousin 58    No Known Problems Mother     Hypertension Sister     Arthritis Brother     No Known Problems Daughter     COPD Sister     No Known Problems Brother     No Known Problems Brother     No Known Problems Brother     No Known Problems Daughter     Breast cancer Paternal Aunt 55    Cancer Paternal Aunt      Breast Ca and Lung CA    No Known Problems Maternal Aunt     No Known Problems Maternal Uncle     No Known Problems Paternal Uncle     No Known Problems Maternal Grandmother     No Known Problems Maternal Grandfather     No Known Problems Paternal Grandmother     No Known Problems Paternal Grandfather     Ovarian cancer Neg Hx     Amblyopia Neg Hx     Blindness Neg Hx     Diabetes Neg Hx     Glaucoma Neg Hx     Macular degeneration Neg Hx     Retinal detachment Neg Hx     Strabismus Neg Hx     Stroke Neg Hx     Thyroid disease Neg Hx     Osteoarthritis Neg Hx     Rheum arthritis Neg Hx        Social History     Social History    Marital status:      Spouse name: N/A    Number of children: N/A    Years of education: N/A     Occupational History    Not on file.     Social History Main Topics    Smoking status: Never Smoker    Smokeless tobacco: Never Used      Comment: Retired;     Alcohol use 0.0  oz/week      Comment: holiday - occasional wine    Drug use: No    Sexual activity: Yes     Partners: Male     Other Topics Concern    Not on file     Social History Narrative    No narrative on file       Review of Systems:  Respiratory ROS: no cough, shortness of breath, or wheezing  Cardiovascular ROS: no chest pain or dyspnea on exertion  Gastrointestinal ROS: no abdominal pain, change in bowel habits, or black or bloody stools  Musculoskeletal ROS: negative  Neurological ROS: no TIA or stroke symptoms        Physical Exam:  General: no distress  Head: normocephalic  Neck: supple, symmetrical, trachea midline  Lungs:  clear to auscultation bilaterally and normal respiratory effort  Heart: regular rate and rhythm, S1, S2 normal, no murmur, rub or gallop  Abdomen: soft, non-tender non-distented; bowel sounds normal; no masses,  no organomegaly  Extremities: no cyanosis or edema, or clubbing       Deep Sedation: Mallampati Score II (hard and soft palate, upper portion of tonsils anduvula visible)    II    Assessment and Plan:  Proceed with Colonoscopy

## 2017-09-07 NOTE — ANESTHESIA POSTPROCEDURE EVALUATION
"Anesthesia Post Evaluation    Patient: Odessa Vaughn    Procedure(s) Performed: Procedure(s) (LRB):  COLONOSCOPY (N/A)    Final Anesthesia Type: general  Patient location during evaluation: PACU  Patient participation: Yes- Able to Participate  Level of consciousness: awake and alert and oriented  Post-procedure vital signs: reviewed and stable  Pain management: adequate  Airway patency: patent  PONV status at discharge: No PONV  Anesthetic complications: no      Cardiovascular status: stable  Respiratory status: unassisted, spontaneous ventilation and room air  Hydration status: euvolemic  Follow-up not needed.        Visit Vitals  BP (!) 105/59 (BP Location: Left arm, Patient Position: Lying)   Pulse 74   Temp 36.5 °C (97.7 °F) (Temporal)   Resp 18   Ht 5' 5" (1.651 m)   Wt 77.1 kg (170 lb)   SpO2 100%   Breastfeeding? No   BMI 28.29 kg/m²       Pain/Zari Score: Pain Assessment Performed: Yes (9/7/2017  8:41 AM)  Presence of Pain: denies (9/7/2017  8:41 AM)      "

## 2017-09-07 NOTE — TRANSFER OF CARE
"Anesthesia Transfer of Care Note    Patient: Odessa Vaughn    Procedure(s) Performed: Procedure(s) (LRB):  COLONOSCOPY (N/A)    Patient location: GI    Anesthesia Type: general    Transport from OR: Transported from OR on room air with adequate spontaneous ventilation    Post pain: adequate analgesia    Post assessment: no apparent anesthetic complications and tolerated procedure well    Post vital signs: stable    Level of consciousness: responds to stimulation    Nausea/Vomiting: no nausea/vomiting    Complications: none    Transfer of care protocol was followed      Last vitals:   Visit Vitals  BP (!) 103/58 (BP Location: Left arm, Patient Position: Lying)   Pulse 75   Temp 36.5 °C (97.7 °F) (Temporal)   Resp 18   Ht 5' 5" (1.651 m)   Wt 77.1 kg (170 lb)   SpO2 100%   Breastfeeding? No   BMI 28.29 kg/m²     "

## 2017-09-07 NOTE — ANESTHESIA PREPROCEDURE EVALUATION
09/07/2017  Odessa Vaughn is a 72 y.o., female.  Pre-operative evaluation for Procedure(s) (LRB):  COLONOSCOPY (N/A)    Review of patient's allergies indicates:   Allergen Reactions    Vicodin [hydrocodone-acetaminophen] Other (See Comments)     Dizziness       No current facility-administered medications on file prior to encounter.      Current Outpatient Prescriptions on File Prior to Encounter   Medication Sig Dispense Refill    anastrozole (ARIMIDEX) 1 mg Tab TAKE 1 TABLET BY MOUTH EVERY DAY 90 tablet 3    aspirin 81 MG Chew Take 81 mg by mouth once daily.        atorvastatin (LIPITOR) 10 MG tablet Take 1 tablet (10 mg total) by mouth once daily. 90 tablet 3    multivitamin capsule Take 1 capsule by mouth once daily.      PROPYLENE GLYCOL//PF (SYSTANE, PF, OPHT) Apply to eye.      triamterene-hydrochlorothiazide 37.5-25 mg (DYAZIDE) 37.5-25 mg per capsule TAKE 1 CAPSULE BY MOUTH ONCE DAILY 90 capsule 3    fexofenadine (ALLEGRA) 180 MG tablet Take 1 tablet (180 mg total) by mouth once daily. 30 tablet 11    nystatin (MYCOSTATIN) cream Apply topically 2 (two) times daily. 15 g 3       Patient Active Problem List   Diagnosis    S/P right mastectomy    Hyperlipidemia    Dependent edema    Osteopenia    Breast cancer of lower-outer quadrant of right female breast    Hand deformity, acquired    Trochanteric bursitis of left hip    Elevated C-reactive protein (CRP)    Pain in both hands    Primary osteoarthritis of hand    Weight loss    Chronic midline low back pain with left-sided sciatica    Disorder of bone and cartilage    Osteoarthritis of spine with radiculopathy, lumbar region    Weakness    Sacroiliac dysfunction    Left-sided low back pain with left-sided sciatica    Screening for colon cancer       Past Surgical History:   Procedure Laterality Date    BREAST  BIOPSY  12/19/2012    Right breast- [facility]    BREAST SURGERY Right 1/2013    right mastectomy, SN biopsy     DILATION AND CURETTAGE OF UTERUS      MASTECTOMY  1/13           No results for input(s): HCT in the last 72 hours.  No results for input(s): PLT in the last 72 hours.  No results for input(s): K in the last 72 hours.  No results for input(s): CREATININE in the last 72 hours.  No results for input(s): GLU in the last 72 hours.  Invalid input(s): PT                    Anesthesia Evaluation         Review of Systems  Anesthesia Hx:  No problems with previous Anesthesia   Social:  Non-Smoker, Alcohol Use    Hematology/Oncology:  Hematology Normal      Oncology Comments: Right mastectomy 2013, unsure about ln. Told no device rue, never any edema.  No chemo or radiation.  NEELIMA now.     Cardiovascular:   Denies Hypertension.  Denies MI.    Denies Angina.    Pulmonary:  Pulmonary Normal  Denies COPD.  Denies Asthma.  Denies Shortness of breath.    Renal/:   Denies Chronic Renal Disease.     Hepatic/GI:   Denies Liver Disease.    Neurological:   Denies TIA. Denies CVA. Denies Seizures.    Endocrine:   Denies Diabetes.        Physical Exam  General:  Well nourished    Airway/Jaw/Neck:  Airway Findings: Mouth Opening: Normal Tongue: Normal  General Airway Assessment: Adult, Average  Mallampati: II  TM Distance: Normal, at least 6 cm  Jaw/Neck Findings:  Neck ROM: Normal ROM            Mental Status:  Mental Status Findings:  Cooperative, Alert and Oriented         Anesthesia Plan  Type of Anesthesia, risks & benefits discussed:  Anesthesia Type:  general  Patient's Preference:   Intra-op Monitoring Plan:   Intra-op Monitoring Plan Comments:   Post Op Pain Control Plan:   Post Op Pain Control Plan Comments: As per surgeon's plan  Induction:   IV  Beta Blocker:  Patient is not currently on a Beta-Blocker (No further documentation required).       Informed Consent: Patient understands risks and agrees with Anesthesia  plan.  Questions answered. Anesthesia consent signed with patient.  ASA Score: 2     Day of Surgery Review of History & Physical:    H&P update referred to the surgeon.         Ready For Surgery From Anesthesia Perspective.

## 2017-09-11 ENCOUNTER — TELEPHONE (OUTPATIENT)
Dept: HEMATOLOGY/ONCOLOGY | Facility: CLINIC | Age: 72
End: 2017-09-11

## 2017-09-11 NOTE — TELEPHONE ENCOUNTER
----- Message from Latonya Mullins MA sent at 9/11/2017  3:30 PM CDT -----  Contact: Self      ----- Message -----  From: Becca Mack  Sent: 9/11/2017   3:16 PM  To: Ike Lugo    Good afternoon,     Pt is requesting an appt due to check up. Pt stated she can be scheduled anytime after 10/10/17.    Pt can be reached at [phone].    Thank you!

## 2017-09-12 ENCOUNTER — TELEPHONE (OUTPATIENT)
Dept: HEMATOLOGY/ONCOLOGY | Facility: CLINIC | Age: 72
End: 2017-09-12

## 2017-09-12 NOTE — TELEPHONE ENCOUNTER
Call returned to patient and she requested to schedule her follow up appointment. Scheduled October 24 at 9:15. Reminder notice mailed to patient.

## 2017-09-12 NOTE — TELEPHONE ENCOUNTER
----- Message from Smita Culp sent at 9/12/2017  1:38 PM CDT -----  Contact: pt   Pt contact [phone]    Pt states she wants to jesica her 6mo f/u

## 2017-09-13 ENCOUNTER — LAB VISIT (OUTPATIENT)
Dept: LAB | Facility: HOSPITAL | Age: 72
End: 2017-09-13
Attending: INTERNAL MEDICINE
Payer: MEDICARE

## 2017-09-13 DIAGNOSIS — E78.5 HYPERLIPIDEMIA, UNSPECIFIED HYPERLIPIDEMIA TYPE: ICD-10-CM

## 2017-09-13 LAB
ALBUMIN SERPL BCP-MCNC: 3.3 G/DL
ALP SERPL-CCNC: 104 U/L
ALT SERPL W/O P-5'-P-CCNC: 23 U/L
ANION GAP SERPL CALC-SCNC: 8 MMOL/L
AST SERPL-CCNC: 19 U/L
BILIRUB SERPL-MCNC: 0.3 MG/DL
BUN SERPL-MCNC: 16 MG/DL
CALCIUM SERPL-MCNC: 9.5 MG/DL
CHLORIDE SERPL-SCNC: 103 MMOL/L
CO2 SERPL-SCNC: 30 MMOL/L
CREAT SERPL-MCNC: 1 MG/DL
EST. GFR  (AFRICAN AMERICAN): >60 ML/MIN/1.73 M^2
EST. GFR  (NON AFRICAN AMERICAN): 56 ML/MIN/1.73 M^2
GLUCOSE SERPL-MCNC: 97 MG/DL
POTASSIUM SERPL-SCNC: 3.9 MMOL/L
PROT SERPL-MCNC: 7.5 G/DL
SODIUM SERPL-SCNC: 141 MMOL/L

## 2017-09-13 PROCEDURE — 36415 COLL VENOUS BLD VENIPUNCTURE: CPT

## 2017-09-13 PROCEDURE — 80053 COMPREHEN METABOLIC PANEL: CPT

## 2017-09-14 ENCOUNTER — TELEPHONE (OUTPATIENT)
Dept: ENDOSCOPY | Facility: HOSPITAL | Age: 72
End: 2017-09-14

## 2017-09-18 ENCOUNTER — IMMUNIZATION (OUTPATIENT)
Dept: INTERNAL MEDICINE | Facility: CLINIC | Age: 72
End: 2017-09-18
Payer: MEDICARE

## 2017-09-18 ENCOUNTER — OFFICE VISIT (OUTPATIENT)
Dept: INTERNAL MEDICINE | Facility: CLINIC | Age: 72
End: 2017-09-18
Payer: MEDICARE

## 2017-09-18 VITALS
WEIGHT: 170.75 LBS | HEART RATE: 62 BPM | DIASTOLIC BLOOD PRESSURE: 60 MMHG | BODY MASS INDEX: 28.45 KG/M2 | HEIGHT: 65 IN | SYSTOLIC BLOOD PRESSURE: 108 MMHG

## 2017-09-18 VITALS
SYSTOLIC BLOOD PRESSURE: 108 MMHG | WEIGHT: 169.81 LBS | HEART RATE: 62 BPM | HEIGHT: 65 IN | DIASTOLIC BLOOD PRESSURE: 60 MMHG | BODY MASS INDEX: 28.29 KG/M2

## 2017-09-18 DIAGNOSIS — C50.911 MALIGNANT NEOPLASM OF RIGHT FEMALE BREAST, UNSPECIFIED ESTROGEN RECEPTOR STATUS, UNSPECIFIED SITE OF BREAST: ICD-10-CM

## 2017-09-18 DIAGNOSIS — Z00.00 ENCOUNTER FOR PREVENTIVE HEALTH EXAMINATION: Primary | ICD-10-CM

## 2017-09-18 DIAGNOSIS — D75.839 THROMBOCYTOSIS: ICD-10-CM

## 2017-09-18 DIAGNOSIS — R60.9 DEPENDENT EDEMA: ICD-10-CM

## 2017-09-18 DIAGNOSIS — Z90.11 ACQUIRED ABSENCE OF BREAST AND NIPPLE, RIGHT: Primary | ICD-10-CM

## 2017-09-18 DIAGNOSIS — M70.52 BURSITIS OF LEFT KNEE, UNSPECIFIED BURSA: ICD-10-CM

## 2017-09-18 DIAGNOSIS — E88.09 HYPOALBUMINEMIA: ICD-10-CM

## 2017-09-18 DIAGNOSIS — C50.511 MALIGNANT NEOPLASM OF LOWER-OUTER QUADRANT OF RIGHT FEMALE BREAST, UNSPECIFIED ESTROGEN RECEPTOR STATUS: ICD-10-CM

## 2017-09-18 DIAGNOSIS — M47.26 OSTEOARTHRITIS OF SPINE WITH RADICULOPATHY, LUMBAR REGION: ICD-10-CM

## 2017-09-18 DIAGNOSIS — M85.80 OSTEOPENIA, UNSPECIFIED LOCATION: ICD-10-CM

## 2017-09-18 DIAGNOSIS — I70.0 CALCIFICATION OF AORTA: ICD-10-CM

## 2017-09-18 DIAGNOSIS — E78.5 HYPERLIPIDEMIA, UNSPECIFIED HYPERLIPIDEMIA TYPE: Primary | ICD-10-CM

## 2017-09-18 DIAGNOSIS — E78.5 HYPERLIPIDEMIA, UNSPECIFIED HYPERLIPIDEMIA TYPE: ICD-10-CM

## 2017-09-18 PROCEDURE — 1159F MED LIST DOCD IN RCRD: CPT | Mod: S$GLB,,, | Performed by: INTERNAL MEDICINE

## 2017-09-18 PROCEDURE — 3008F BODY MASS INDEX DOCD: CPT | Mod: S$GLB,,, | Performed by: INTERNAL MEDICINE

## 2017-09-18 PROCEDURE — 99999 PR PBB SHADOW E&M-EST. PATIENT-LVL IV: CPT | Mod: PBBFAC,,, | Performed by: NURSE PRACTITIONER

## 2017-09-18 PROCEDURE — G0008 ADMIN INFLUENZA VIRUS VAC: HCPCS | Mod: S$GLB,,, | Performed by: INTERNAL MEDICINE

## 2017-09-18 PROCEDURE — 3074F SYST BP LT 130 MM HG: CPT | Mod: S$GLB,,, | Performed by: INTERNAL MEDICINE

## 2017-09-18 PROCEDURE — 99499 UNLISTED E&M SERVICE: CPT | Mod: S$GLB,,, | Performed by: INTERNAL MEDICINE

## 2017-09-18 PROCEDURE — G0439 PPPS, SUBSEQ VISIT: HCPCS | Mod: S$GLB,,, | Performed by: NURSE PRACTITIONER

## 2017-09-18 PROCEDURE — 99499 UNLISTED E&M SERVICE: CPT | Mod: S$GLB,,, | Performed by: NURSE PRACTITIONER

## 2017-09-18 PROCEDURE — 99213 OFFICE O/P EST LOW 20 MIN: CPT | Mod: S$GLB,,, | Performed by: INTERNAL MEDICINE

## 2017-09-18 PROCEDURE — 90662 IIV NO PRSV INCREASED AG IM: CPT | Mod: S$GLB,,, | Performed by: INTERNAL MEDICINE

## 2017-09-18 PROCEDURE — 99999 PR PBB SHADOW E&M-EST. PATIENT-LVL III: CPT | Mod: PBBFAC,,, | Performed by: INTERNAL MEDICINE

## 2017-09-18 PROCEDURE — 1125F AMNT PAIN NOTED PAIN PRSNT: CPT | Mod: S$GLB,,, | Performed by: INTERNAL MEDICINE

## 2017-09-18 PROCEDURE — 3078F DIAST BP <80 MM HG: CPT | Mod: S$GLB,,, | Performed by: INTERNAL MEDICINE

## 2017-09-18 NOTE — PROGRESS NOTES
Subjective:       Patient ID: Odessa Vaughn is a 72 y.o. female.    Chief Complaint: Follow-up    Hyperlipidemia   This is a chronic problem. Pertinent negatives include no chest pain or shortness of breath. Current antihyperlipidemic treatment includes statins. The current treatment provides moderate improvement of lipids.   Knee Pain    The incident occurred more than 1 week ago. There was no injury mechanism. The pain is present in the left knee. The quality of the pain is described as aching. The pain is mild. The pain has been fluctuating since onset.     Review of Systems   Respiratory: Negative for shortness of breath.    Cardiovascular: Negative for chest pain.       Objective:      Physical Exam   Musculoskeletal:        Left knee: She exhibits no swelling, no effusion and no bony tenderness. Tenderness found. Lateral joint line tenderness noted.        Legs:      Assessment:       1. Hyperlipidemia, unspecified hyperlipidemia type    2. Bursitis of left knee, unspecified bursa        Plan:       Odessa was seen today for follow-up.    Diagnoses and all orders for this visit:    Hyperlipidemia, unspecified hyperlipidemia type  -     Comprehensive metabolic panel; Future  -     Lipid panel; Future  -     CBC auto differential; Future    Bursitis of left knee, unspecified bursa  Comments:  pain over head of fibula- will observe for now-  consider cortisone inj later        Return in about 6 months (around 3/18/2018) for F/U APPOINTMENT WITH ME, WITH LAB BEFORE.

## 2017-09-18 NOTE — PATIENT INSTRUCTIONS
Counseling and Referral of Other Preventative  (Italic type indicates deductible and co-insurance are waived)    Patient Name: Odessa Vaughn  Today's Date: 9/18/2017      SERVICE LIMITATIONS RECOMMENDATION    Vaccines    · Pneumococcal (once after 65)    · Influenza (annually)    · Hepatitis B (if medium/high risk)    · Prevnar 13      Hepatitis B medium/high risk factors:       - End-stage renal disease       - Hemophiliacs who received Factor VII or         IX concentrates       - Clients of institutions for the mentally             retarded       - Persons who live in the same house as          a HepB carrier       - Homosexual men       - Illicit injectable drug abusers     Pneumococcal: Done, no repeat necessary     Influenza: Recommended to patient, wants to wait      Hepatitis B: N/A     Prevnar 13: Done, no repeat necessary    Mammogram (biennial age 50-74)  Annually (age 40 or over)  Last done 04/17, recommend to repeat every 1  year    Pap (up to age 70 and after 70 if unknown history or abnormal study last 10 years)    Defer to gynecology     The USPSTF recommends against screening for cervical cancer in women older than age 65 years who have had adequate prior screening and are not otherwise at high risk for cervical cancer.      Colorectal cancer screening (to age 75)    · Fecal occult blood test (annual)  · Flexible sigmoidoscopy (5y)  · Screening colonoscopy (10y)  · Barium enema   Last done 09/17, recommend to repeat every 3  years    Diabetes self-management training (no USPSTF recommendations)  Requires referral by treating physician for patient with diabetes or renal disease. 10 hours of initial DSMT sessions of no less than 30 minutes each in a continuous 12-month period. 2 hours of follow-up DSMT in subsequent years.  N/A    Bone mass measurements (age 65 & older, biennial)  Requires diagnosis related to osteoporosis or estrogen deficiency. Biennial benefit unless patient has history of  long-term glucocorticoid  Done this year, repeat next year d/t anastrozole therapy    Glaucoma screening (no USPSTF recommendation)  Diabetes mellitus, family history   , age 50 or over    American, age 65 or over  done this year, repeat yearly    Medical nutrition therapy for diabetes or renal disease (no recommended schedule)  Requires referral by treating physician for patient with diabetes or renal disease or kidney transplant within the past 3 years.  Can be provided in same year as diabetes self-management training (DSMT), and CMS recommends medical nutrition therapy take place after DSMT. Up to 3 hours for initial year and 2 hours in subsequent years.  N/A    Cardiovascular screening blood tests (every 5 years)  · Fasting lipid panel  Order as a panel if possible  Done this year, repeat every year    Diabetes screening tests (at least every 3 years, Medicare covers annually or at 6-month intervals for prediabetic patients)  · Fasting blood sugar (FBS) or glucose tolerance test (GTT)  Patient must be diagnosed with one of the following:       - Hypertension       - Dyslipidemia       - Obesity (BMI 30kg/m2)       - Previous elevated impaired FBS or GTT       ... or any two of the following:       - Overweight (BMI 25 but <30)       - Family history of diabetes       - Age 65 or older       - History of gestational diabetes or birth of baby weighing more than 9 pounds  Done this year, repeat every year    HIV screening (annually for increased risk patients)  · HIV-1 and HIV-2 by EIA, or LIZETTE, rapid antibody test or oral mucosa transudate  Patients must be at increased risk for HIV infection per USPSTF guidelines or pregnant. Tests covered annually for patient at increased risk or as requested by the patient. Pregnant patients may receive up to 3 tests during pregnancy.  Risks discussed, screening is not recommended    Smoking cessation counseling (up to 8 sessions per year)  Patients  must be asymptomatic of tobacco-related conditions to receive as a preventative service.  Non-smoker    Subsequent annual wellness visit  At least 12 months since last AWV  Return in one year     The following information is provided to all patients.  This information is to help you find resources for any of the problems found today that may be affecting your health:                Living healthy guide: www.[facility].[place].[facility]      Understanding Diabetes: www.diabetes.org      Eating healthy: www.cdc.gov/healthyweight      CDC home safety checklist: www.cdc.gov/steadi/patient.html      Agency on Aging: www.goea.[place].[facility]      Alcoholics anonymous (AA): www.aa.org      Physical Activity: www.helena.nih.gov/xp7jcph      Tobacco use: www.quitwithusla.org

## 2017-09-18 NOTE — PROGRESS NOTES
"Odessa Vaughn presented for a  Medicare AWV and comprehensive Health Risk Assessment today. The following components were reviewed and updated:    · Medical history  · Family History  · Social history  · Allergies and Current Medications  · Health Risk Assessment  · Health Maintenance  · Care Team     ** See Completed Assessments for Annual Wellness Visit within the encounter summary.**       The following assessments were completed:  · Living Situation  · CAGE  · Depression Screening  · Timed Get Up and Go  · Whisper Test  · Cognitive Function Screening  · Nutrition Screening  · ADL Screening  · PAQ Screening            Vitals:    09/18/17 0842   BP: 108/60   BP Location: Right arm   Patient Position: Sitting   Pulse: 62   Weight: 77.4 kg (170 lb 11.9 oz)   Height: 5' 5" (1.651 m)     Body mass index is 28.41 kg/m².  Physical Exam   Constitutional: She is oriented to person, place, and time. She appears well-developed and well-nourished. No distress.   HENT:   Head: Normocephalic and atraumatic.   Eyes: No scleral icterus.   Neck: Normal range of motion. Neck supple.   Cardiovascular: Normal rate, regular rhythm, normal heart sounds and intact distal pulses.    Pulmonary/Chest: Effort normal and breath sounds normal. No respiratory distress.   Abdominal: She exhibits no distension.   Musculoskeletal: Normal range of motion. She exhibits no edema.   Neurological: She is alert and oriented to person, place, and time.   Skin: Skin is warm and dry. She is not diaphoretic.   Psychiatric: She has a normal mood and affect. Her behavior is normal.   Vitals reviewed.        Diagnoses and health risks identified today and associated recommendations/orders:    1. Encounter for preventive health examination  Assessments completed  Preventative health recommendations reviewed    2. Malignant neoplasm of lower-outer quadrant of right female breast, unspecified estrogen receptor status  Stable. Hx of right mastectomy  Currently " on armidex  Followed by hem/onc and breast surgery     3. Calcification of aorta  Stable. Seen on LXR from 06/05/17  Controlled with current medical therapy  Followed by PCP.     4. Thrombocytosis  Chronic, Stable.   Followed by PCP.     5. Hypoalbuminemia  Chronic, Stable. Last level was 3.3  Followed by PCP.     6. Osteoarthritis of spine with radiculopathy, lumbar region  Stable.   Controlled with current medical therapy  Followed by PCP and rheumatology.     7. Hyperlipidemia, unspecified hyperlipidemia type  Stable.   Controlled with current medical therapy  Followed by PCP.     8. Osteopenia, unspecified location  Stable. Last DEXA on 06/08/17 showed normal bmd of spine and hip  Followed by PCP.     9. Dependent edema  Stable.   Controlled with current medical therapy  Followed by PCP.     Provided Odessa with a 5-10 year written screening schedule and personal prevention plan. Recommendations were developed using the USPSTF age appropriate recommendations. Education, counseling, and referrals were provided as needed. After Visit Summary printed and given to patient which includes a list of additional screenings\tests needed.    The patient is seeing her PCP today, after her HRA visit.     Return for a routine visit with your primary care provider. .    Yolanda Pepper NP

## 2017-10-05 ENCOUNTER — PATIENT MESSAGE (OUTPATIENT)
Dept: RHEUMATOLOGY | Facility: CLINIC | Age: 72
End: 2017-10-05

## 2017-10-13 ENCOUNTER — OFFICE VISIT (OUTPATIENT)
Dept: RHEUMATOLOGY | Facility: CLINIC | Age: 72
End: 2017-10-13
Payer: MEDICARE

## 2017-10-13 VITALS
HEART RATE: 69 BPM | DIASTOLIC BLOOD PRESSURE: 61 MMHG | WEIGHT: 170.31 LBS | HEIGHT: 65 IN | BODY MASS INDEX: 28.37 KG/M2 | SYSTOLIC BLOOD PRESSURE: 105 MMHG

## 2017-10-13 DIAGNOSIS — M70.62 TROCHANTERIC BURSITIS OF LEFT HIP: Primary | ICD-10-CM

## 2017-10-13 PROCEDURE — 20610 DRAIN/INJ JOINT/BURSA W/O US: CPT | Mod: LT,S$GLB,, | Performed by: INTERNAL MEDICINE

## 2017-10-13 PROCEDURE — 99213 OFFICE O/P EST LOW 20 MIN: CPT | Mod: 25,S$GLB,, | Performed by: INTERNAL MEDICINE

## 2017-10-13 PROCEDURE — 99999 PR PBB SHADOW E&M-EST. PATIENT-LVL III: CPT | Mod: PBBFAC,,, | Performed by: INTERNAL MEDICINE

## 2017-10-13 RX ORDER — TRIAMCINOLONE ACETONIDE 40 MG/ML
40 INJECTION, SUSPENSION INTRA-ARTICULAR; INTRAMUSCULAR
Status: COMPLETED | OUTPATIENT
Start: 2017-10-13 | End: 2017-10-13

## 2017-10-13 RX ADMIN — TRIAMCINOLONE ACETONIDE 40 MG: 40 INJECTION, SUSPENSION INTRA-ARTICULAR; INTRAMUSCULAR at 12:10

## 2017-10-13 ASSESSMENT — ROUTINE ASSESSMENT OF PATIENT INDEX DATA (RAPID3)
PATIENT GLOBAL ASSESSMENT SCORE: 8
TOTAL RAPID3 SCORE: 5.83
AM STIFFNESS SCORE: 1, YES
MDHAQ FUNCTION SCORE: .3
PAIN SCORE: 8.5
WHEN YOU AWAKENED IN THE MORNING OVER THE LAST WEEK, PLEASE INDICATE THE AMOUNT OF TIME IT TAKES UNTIL YOU ARE AS LIMBER AS YOU WILL BE FOR THE DAY: 30 MIN
FATIGUE SCORE: 6
PSYCHOLOGICAL DISTRESS SCORE: 0

## 2017-10-13 NOTE — PROGRESS NOTES
"Subjective:       Patient ID: Odessa Vaughn is a 72 y.o. female.    Chief Complaint: Disease Management      HPI:  Odessa Vaughn is a 72 y.o. female reports improvement in hand pains but has persistent left hip pain that radiates down to lateral region knee.  Today pain is 6.5/10 ache that increases to 9/10.  Improves with getting off feet.  History of cortisone injection in hip on left that helped.  Lasted a couple month.       Interval History:    Reports improvement in pain some with physical therapy about 50% (did not complete 2 sessions).  Rubbing vics vapor rub helps some.     Review of Systems   Constitutional: Positive for fatigue.   HENT: Negative.    Eyes: Negative.    Respiratory: Negative.    Cardiovascular: Negative.    Gastrointestinal: Negative.    Endocrine: Negative.    Genitourinary: Negative.    Musculoskeletal: Positive for arthralgias.   Skin: Negative.    Allergic/Immunologic: Negative.    Neurological: Negative.    Hematological: Negative.    Psychiatric/Behavioral: Negative.          Objective:   /61   Pulse 69   Ht 5' 5" (1.651 m)   Wt 77.2 kg (170 lb 4.8 oz)   BMI 28.34 kg/m²      Physical Exam  Pain at left trochanteric bursa     Assessment:       1. Left hip pain. Suspect related history of low back pain but now trochanteric bursitis.   2. Elevated CRP  3. 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   4. Left hand pain intermittent.  Now improved  5. Low back pain intermittently  6. Osteopenia. On Arimidex.  Last 2013  7. History breast cancer  Plan:       1.  Proceed with exercises from PT  Procedure Note   I  have explained the risks, benefits, and alternatives of aspiration of the joint.  The patient voices understanding and questions have been answered.  The patient agrees to proceed.    The left trochanteric bursa  was prepped with 2% chlorhexidine gluconate and 70% isopropyl alcohol (ChloraPrep).  The area was numbed with topical refrigerant.  A 22 g needle was " introduced into the space and no fluid was aspirated. 40 mg Kenalog and 2 cc lidocaine injected.  Hemostasis obtained  The patient tolerated the procedure well.

## 2017-10-24 ENCOUNTER — OFFICE VISIT (OUTPATIENT)
Dept: HEMATOLOGY/ONCOLOGY | Facility: CLINIC | Age: 72
End: 2017-10-24
Payer: MEDICARE

## 2017-10-24 VITALS
HEART RATE: 67 BPM | SYSTOLIC BLOOD PRESSURE: 143 MMHG | BODY MASS INDEX: 27.92 KG/M2 | WEIGHT: 167.75 LBS | DIASTOLIC BLOOD PRESSURE: 70 MMHG | RESPIRATION RATE: 15 BRPM | TEMPERATURE: 98 F

## 2017-10-24 DIAGNOSIS — Z17.0 MALIGNANT NEOPLASM OF LOWER-OUTER QUADRANT OF RIGHT BREAST OF FEMALE, ESTROGEN RECEPTOR POSITIVE: Primary | ICD-10-CM

## 2017-10-24 DIAGNOSIS — C50.511 MALIGNANT NEOPLASM OF LOWER-OUTER QUADRANT OF RIGHT BREAST OF FEMALE, ESTROGEN RECEPTOR POSITIVE: Primary | ICD-10-CM

## 2017-10-24 PROCEDURE — 99213 OFFICE O/P EST LOW 20 MIN: CPT | Mod: S$GLB,,, | Performed by: INTERNAL MEDICINE

## 2017-10-24 PROCEDURE — 99499 UNLISTED E&M SERVICE: CPT | Mod: S$GLB,,, | Performed by: INTERNAL MEDICINE

## 2017-10-24 PROCEDURE — 99999 PR PBB SHADOW E&M-EST. PATIENT-LVL III: CPT | Mod: PBBFAC,,, | Performed by: INTERNAL MEDICINE

## 2017-10-24 NOTE — PROGRESS NOTES
Subjective:       Patient ID: Odessa Vaughn is a 72 y.o. female.    Chief Complaint: No chief complaint on file.    HPI Mrs. Vaughn is a 72-year-old female seen in follow-up for carcinoma of the right breast, T2 N0 ER +, HER - 2 neg. She has been on anastrozole since 2/2013.     Overall, she has been doing well.  Only complaint is been some intermittent left lateral hip pain radiating down her left leg.  She is followed in rheumatology with a diagnosis of bursitis.  The symptoms seem to come and go.  She has had some physical therapy for that with partial benefit.  She denies any shortness of breath.  Appetite and bowel function have been stable.      Review of Systems   Constitutional: Negative for activity change, appetite change, fatigue and unexpected weight change.   Respiratory: Negative for cough and shortness of breath.    Cardiovascular: Negative for chest pain.   Gastrointestinal: Negative for constipation and diarrhea.   Genitourinary: Negative for pelvic pain.   Musculoskeletal: Positive for arthralgias. Negative for back pain and neck pain.   Neurological: Negative for headaches.   Psychiatric/Behavioral: Negative for dysphoric mood. The patient is not nervous/anxious.        Objective:      Physical Exam   Constitutional: She is oriented to person, place, and time. She appears well-developed and well-nourished. No distress.   HENT:   Mouth/Throat: No oropharyngeal exudate.   Eyes: No scleral icterus.   Cardiovascular: Normal rate, regular rhythm and normal heart sounds.    Pulmonary/Chest: Effort normal and breath sounds normal. She has no wheezes. She has no rales. Left breast exhibits no mass, no nipple discharge and no skin change.       Abdominal: Soft. She exhibits no mass. There is no tenderness.   Lymphadenopathy:     She has no cervical adenopathy.     She has no axillary adenopathy.        Right: No supraclavicular adenopathy present.        Left: No supraclavicular adenopathy present.    Neurological: She is alert and oriented to person, place, and time.   Psychiatric: She has a normal mood and affect. Her behavior is normal. Thought content normal.       Assessment:      1. Malignant neoplasm of lower-outer quadrant of right breast of female, estrogen receptor positive        Plan:     she will be due for mammograms in April.See me 12 M

## 2018-02-22 ENCOUNTER — DOCUMENTATION ONLY (OUTPATIENT)
Dept: REHABILITATION | Facility: HOSPITAL | Age: 73
End: 2018-02-22

## 2018-02-22 DIAGNOSIS — M53.3 SACROILIAC DYSFUNCTION: Primary | ICD-10-CM

## 2018-02-22 DIAGNOSIS — G89.29 CHRONIC LEFT-SIDED LOW BACK PAIN WITH LEFT-SIDED SCIATICA: ICD-10-CM

## 2018-02-22 DIAGNOSIS — R53.1 WEAKNESS: ICD-10-CM

## 2018-02-22 DIAGNOSIS — M54.42 CHRONIC LEFT-SIDED LOW BACK PAIN WITH LEFT-SIDED SCIATICA: ICD-10-CM

## 2018-02-22 NOTE — PROGRESS NOTES
PHYSICAL THERAPY DISCHARGE SUMMARY     Name: Odessa Vaughn  Clinic Number: [id number]    Diagnosis:   Encounter Diagnoses   Name Primary?    Chronic left-sided low back pain with left-sided sciatica     Sacroiliac dysfunction Yes    Weakness      Physician: Taylor Warner  Treatment Orders: Therapeutic exercise  Past Medical History:   Diagnosis Date    Arthritis     hands, legs    Breast cancer 12/2012    Right breast invasive ductal carcinoma, ER positive, RI and Her2 negative    Bursitis of left hip 2016    resolved    Chronic midline low back pain with left-sided sciatica 6/5/2017    Essential hypertension 9/21/2015    Hyperlipidemia 9/15/2014       Initial visit: 6/21/17  Date of Last visit: 7/14/17  Date of Discharge Note:  2/22/18  Total Visits Received: 7  Missed Visits: 0  ASSESSMENT   Status Towards Goals Met:  Not met due to non-compliance    Goals Not achieved and why: see above    Discharge reason : Pt has not re-scheduled further follow-up sessions    G-CODE: Discharge g-code not filed due to discharge note being documentation only. Upon eval Pt was at CK at least 40% < 60% impaired, limited or restricted on the FOTO with g-code goal set at CJ at least 20% < 40% impaired, limited or restricted.    PLAN   This patient is discharged from Physical Therapy Services.       Medical necessity is demonstrated by the following IMPAIRMENTS/PROBLEM LIST:              1) Pain limiting function              2) Posture dysfunction              3) Core/Lumbar/LE weakness              4) Decreased thoracic/lumbar joint mobility              5) Decreased Lumbar ROM              6) Decreased soft tissue extensibility/fascia restriction              7) Decreased LE flexibility: bilateral sciatic nerve, left hamstrings              8) Lack of HEP              9) LE paresthesia left radiating to left knee     GOALS:   Short Term Goals:  4 weeks  1. Report decreased left leg pain </= 5/10 at worst to  increase tolerance for prolonged standing/sitting MET  2. Pt. to demonstrate proper cervical and scapula retraction requiring min. to no verbal cues from PT  3. Pt. to demonstrate increased MMT for core/lumbar paraspinals to 3/5 to increase endurance with prolonged sitting/standing.  4. Pt. to demonstrate increased MMT for left gluteus medius to 4/5  to increase stability during community ambulation MET  5. Pt to tolerate HEP to improve ROM and independence with ADL's MET  6. Pt. to report a decrease in left LE paresthesia by >/= 50% to improve ability to perform prolonged sitting/standing MET     Long Term Goals: 8 weeks  1. Report decreased left leg pain </=  21/10 at worst to increase tolerance for community ambulation  2. Pt. to demonstrate proper cervical and scapula retraction requiring no verbal cues from PT  3. Increase thoracic joint mobility to 2+/6 to promote greater ease with self care skills  4. Increase lumbar joint mobility to 2+/6 to promote greater ease with prolonged sitting/standing  5. Pt. to demonstrate increased MMT for core/lumbar paraspinals to 3+/5 to increase endurance with prolonged sitting.   6. Pt. to demonstrate increased MMT for left gluteus medius to 4+/5  to increase stability during ambulation on uneven surfaces.  7. Pt. to demonstrate increased MMT for bilateral hip flexor to 4+/5 to increase tolerance for ADL and work activities.   8. Pt to be independent with HEP to improve ROM and independence with ADL's  9. Pt. to report a decrease in left LE paresthesia by >/= 75% to improve ability to perform community ambulation/liftin

## 2018-04-25 ENCOUNTER — TELEPHONE (OUTPATIENT)
Dept: HEMATOLOGY/ONCOLOGY | Facility: CLINIC | Age: 73
End: 2018-04-25

## 2018-04-25 NOTE — TELEPHONE ENCOUNTER
----- Message from Tatyana Claudio sent at 4/25/2018 11:48 AM CDT -----  Contact: pt   Pt called to schedule appt that she missed on 4/23   Callback#[phone]  Thank You  DANIELLE Claudio

## 2018-04-25 NOTE — TELEPHONE ENCOUNTER
----- Message from Lisa Penny sent at 4/25/2018  3:23 PM CDT -----  Contact: Pt  Pt is returning call and can be reached at [phone].    Thank you

## 2018-05-01 ENCOUNTER — OFFICE VISIT (OUTPATIENT)
Dept: HEMATOLOGY/ONCOLOGY | Facility: CLINIC | Age: 73
End: 2018-05-01
Payer: MEDICARE

## 2018-05-01 VITALS
HEIGHT: 65 IN | OXYGEN SATURATION: 98 % | TEMPERATURE: 98 F | SYSTOLIC BLOOD PRESSURE: 109 MMHG | DIASTOLIC BLOOD PRESSURE: 53 MMHG | HEART RATE: 72 BPM | WEIGHT: 171.31 LBS | RESPIRATION RATE: 16 BRPM | BODY MASS INDEX: 28.54 KG/M2

## 2018-05-01 DIAGNOSIS — C50.511 MALIGNANT NEOPLASM OF LOWER-OUTER QUADRANT OF RIGHT FEMALE BREAST, UNSPECIFIED ESTROGEN RECEPTOR STATUS: Primary | ICD-10-CM

## 2018-05-01 PROCEDURE — 99213 OFFICE O/P EST LOW 20 MIN: CPT | Mod: S$GLB,,, | Performed by: PHYSICIAN ASSISTANT

## 2018-05-01 PROCEDURE — 3074F SYST BP LT 130 MM HG: CPT | Mod: CPTII,S$GLB,, | Performed by: PHYSICIAN ASSISTANT

## 2018-05-01 PROCEDURE — 3078F DIAST BP <80 MM HG: CPT | Mod: CPTII,S$GLB,, | Performed by: PHYSICIAN ASSISTANT

## 2018-05-01 PROCEDURE — 99999 PR PBB SHADOW E&M-EST. PATIENT-LVL III: CPT | Mod: PBBFAC,,, | Performed by: PHYSICIAN ASSISTANT

## 2018-05-01 NOTE — PROGRESS NOTES
Subjective:       Patient ID: Odessa Vaughn is a 73 y.o. female.    Chief Complaint: Malignant neoplasm of lower-outer quadrant of right breast o    Mrs. Vaughn is a 73-year-old female seen in follow-up for carcinoma of the right breast, T2 N0 ER +, HER - 2 neg. She has been on anastrozole since 2/2013.     Overall, she has been doing well. She has some generalized arthralgias that pre-date anastrazole. She denies any shortness of breath.  Appetite and bowel function have been stable.  Occasional chest wall discomfort at mastectomy site but otherwise doing well.  She just returned from [place] where she was visiting her family. She keeps busy with her two grandchildren here.  Anticipates joining [employer] soon with her .         Review of Systems   Constitutional: Negative.    HENT: Negative for congestion, rhinorrhea, sore throat and trouble swallowing.    Eyes: Negative for visual disturbance.   Respiratory: Negative for cough, chest tightness and shortness of breath.    Cardiovascular: Negative for chest pain, palpitations and leg swelling.   Gastrointestinal: Negative for abdominal pain, blood in stool, constipation, diarrhea, nausea and vomiting.   Genitourinary: Negative for dysuria, hematuria and vaginal bleeding.   Musculoskeletal: Positive for arthralgias. Negative for back pain and myalgias.   Skin: Negative for pallor and rash.   Neurological: Negative for dizziness, weakness and headaches.   Hematological: Negative for adenopathy. Does not bruise/bleed easily.   Psychiatric/Behavioral: Negative for dysphoric mood and suicidal ideas. The patient is not nervous/anxious.        Objective:      Physical Exam   Constitutional: She is oriented to person, place, and time. She appears well-developed and well-nourished. No distress.   Presents alone     HENT:   Head: Normocephalic.   Mouth/Throat: Oropharynx is clear and moist. No oropharyngeal exudate.   Eyes: Conjunctivae are normal.  Pupils are equal, round, and reactive to light. No scleral icterus.   Neck: Normal range of motion. Neck supple. No thyromegaly present.   Cardiovascular: Normal rate and regular rhythm.    Pulmonary/Chest: Effort normal and breath sounds normal. No respiratory distress.   S/p right mastectomy; no chest wall mass, nodule or skin changes. Left breast without mass,nodule or skin changes. No axillary or supraclavicular adenopathy.   Abdominal: Soft. Bowel sounds are normal. She exhibits no distension and no mass. There is no tenderness.   Musculoskeletal: Normal range of motion. She exhibits no edema or tenderness.   No spinal or paraspinal tenderness to palpation     Lymphadenopathy:     She has no cervical adenopathy.   Neurological: She is alert and oriented to person, place, and time. No cranial nerve deficit.   Skin: Skin is warm and dry.   Psychiatric: She has a normal mood and affect. Her behavior is normal. Thought content normal.   Vitals reviewed.      Assessment:       1. Malignant neoplasm of lower-outer quadrant of right female breast, unspecified estrogen receptor status        Plan:       Continue Armidex and return to clinic in 6 months.  Discussed with Dr. Ramires and plan to continue AI therapy for total of 7 years.   Annual mammogram scheduled for 5/28.    Distress Screening Results: Psychosocial Distress screening score of Distress Score: 6 noted and reviewed. No intervention indicated.

## 2018-05-01 NOTE — Clinical Note
T2N0 intermediate grade infiltrating ductal- she hit 5 years of AI therapy in 2/2018. Tolerating it well and still on it- continue for now or discontinue? Patient seemed quite amenable to staying on it

## 2018-05-03 ENCOUNTER — PES CALL (OUTPATIENT)
Dept: ADMINISTRATIVE | Facility: CLINIC | Age: 73
End: 2018-05-03

## 2018-05-04 ENCOUNTER — TELEPHONE (OUTPATIENT)
Dept: INTERNAL MEDICINE | Facility: CLINIC | Age: 73
End: 2018-05-04

## 2018-05-04 NOTE — TELEPHONE ENCOUNTER
----- Message from Lc Chaney sent at 5/4/2018 11:17 AM CDT -----  Contact: Patient [phone]  Patient wanting an sooner appt for Annual Physical, 9/25/18 is your soonest appt times, stating would like to come some time next week around her Eye appt, on 05/11/18 the appt is at 9:20 on Friday. If not any time in the Morning open days.    Please call and advise  Thank you

## 2018-05-11 ENCOUNTER — OFFICE VISIT (OUTPATIENT)
Dept: OPTOMETRY | Facility: CLINIC | Age: 73
End: 2018-05-11
Payer: COMMERCIAL

## 2018-05-11 DIAGNOSIS — Z01.00 EXAMINATION OF EYES AND VISION: Primary | ICD-10-CM

## 2018-05-11 DIAGNOSIS — H25.13 NUCLEAR SCLEROSIS, BILATERAL: ICD-10-CM

## 2018-05-11 DIAGNOSIS — H35.372 EPIRETINAL MEMBRANE (ERM) OF LEFT EYE: ICD-10-CM

## 2018-05-11 PROBLEM — E88.09 HYPOALBUMINEMIA: Status: RESOLVED | Noted: 2017-09-18 | Resolved: 2018-05-11

## 2018-05-11 PROBLEM — M54.42 LEFT-SIDED LOW BACK PAIN WITH LEFT-SIDED SCIATICA: Status: RESOLVED | Noted: 2017-06-21 | Resolved: 2018-05-11

## 2018-05-11 PROBLEM — R63.4 WEIGHT LOSS: Status: RESOLVED | Noted: 2017-04-13 | Resolved: 2018-05-11

## 2018-05-11 PROBLEM — D75.839 THROMBOCYTOSIS: Status: RESOLVED | Noted: 2017-09-18 | Resolved: 2018-05-11

## 2018-05-11 PROBLEM — R53.1 WEAKNESS: Status: RESOLVED | Noted: 2017-06-21 | Resolved: 2018-05-11

## 2018-05-11 PROCEDURE — 92015 DETERMINE REFRACTIVE STATE: CPT | Mod: S$GLB,,, | Performed by: OPTOMETRIST

## 2018-05-11 PROCEDURE — 92014 COMPRE OPH EXAM EST PT 1/>: CPT | Mod: S$GLB,,, | Performed by: OPTOMETRIST

## 2018-05-11 PROCEDURE — 99999 PR PBB SHADOW E&M-EST. PATIENT-LVL II: CPT | Mod: PBBFAC,,, | Performed by: OPTOMETRIST

## 2018-05-11 PROCEDURE — 92226 PR SPECIAL EYE EXAM, SUBSEQUENT: CPT | Mod: LT,S$GLB,, | Performed by: OPTOMETRIST

## 2018-05-11 NOTE — PROGRESS NOTES
HPI     Odessa Vaughn is a 73 y.o. Female who returns  for continued eye care.   She was last seen by us on 02/08/2017 for bilateral hyperopia,presbyopia.  She wears her progressive glasses all day but feels as if her distance and   near vision has gotten blurry. She also noticed that her right eye is very   watery.    Hx:Macula edema left eye     (+)blurred vision  (--)Headaches  (--)diplopia  (--)flashes  (--)floaters  (--)pain  (--)Itching  (+)tearing  (--)burning  (--)Dryness  (--) OTC Drops  (--)Photophobia    Last edited by Thais Culp, OD on 5/11/2018  9:59 AM. (History)        Review of Systems   Constitutional: Negative for chills, fever and malaise/fatigue.   HENT: Negative for congestion and hearing loss.    Eyes: Positive for blurred vision. Negative for double vision, photophobia, pain, discharge and redness.   Respiratory: Negative.    Cardiovascular: Negative.    Gastrointestinal: Negative.    Genitourinary: Negative.    Musculoskeletal: Positive for back pain and joint pain.   Skin: Negative.    Neurological: Negative for seizures.   Endo/Heme/Allergies: Negative for environmental allergies.   Psychiatric/Behavioral: Negative.        Assessment /Plan     For exam results, see Encounter Report.    1. Examination of eyes and vision  - Hyperopia with presbyopia  same specs ok  Glasses Prescription (5/11/2018)        Sphere Cylinder Dist [place] Add    Right +3.50 Sphere 20/20 +3.25    Left +2.50 Sphere 20/25-2 +3.25    Type:  PAL    Expiration Date:  5/12/2019        2. Epiretinal membrane (ERM) of left eye --> stable    3. Nuclear sclerosis, bilateral - not visually significant  - no treatment needed at this time; Monitor annually      Patient education; RTC in 1 year with DFE, sooner prn

## 2018-05-15 DIAGNOSIS — C50.511 BREAST CANCER OF LOWER-OUTER QUADRANT OF RIGHT FEMALE BREAST: ICD-10-CM

## 2018-05-15 RX ORDER — ANASTROZOLE 1 MG/1
TABLET ORAL
Qty: 90 TABLET | Refills: 0 | Status: SHIPPED | OUTPATIENT
Start: 2018-05-15 | End: 2018-08-15 | Stop reason: SDUPTHER

## 2018-05-28 ENCOUNTER — HOSPITAL ENCOUNTER (OUTPATIENT)
Dept: RADIOLOGY | Facility: HOSPITAL | Age: 73
Discharge: HOME | End: 2018-05-28
Attending: NURSE PRACTITIONER
Payer: MEDICARE

## 2018-05-28 ENCOUNTER — OFFICE VISIT (OUTPATIENT)
Dept: SURGERY | Facility: CLINIC | Age: 73
End: 2018-05-28
Payer: MEDICARE

## 2018-05-28 VITALS
TEMPERATURE: 98 F | HEART RATE: 73 BPM | HEIGHT: 65 IN | BODY MASS INDEX: 28.47 KG/M2 | SYSTOLIC BLOOD PRESSURE: 109 MMHG | DIASTOLIC BLOOD PRESSURE: 64 MMHG | WEIGHT: 170.88 LBS

## 2018-05-28 DIAGNOSIS — Z85.3 PERSONAL HISTORY OF BREAST CANCER: Primary | ICD-10-CM

## 2018-05-28 DIAGNOSIS — Z85.3 PERSONAL HISTORY OF BREAST CANCER: ICD-10-CM

## 2018-05-28 PROCEDURE — 99213 OFFICE O/P EST LOW 20 MIN: CPT | Mod: S$GLB,,, | Performed by: NURSE PRACTITIONER

## 2018-05-28 PROCEDURE — 77065 DX MAMMO INCL CAD UNI: CPT | Mod: TC,PO,LT

## 2018-05-28 PROCEDURE — 77065 DX MAMMO INCL CAD UNI: CPT | Mod: 26,LT,, | Performed by: RADIOLOGY

## 2018-05-28 PROCEDURE — 77061 BREAST TOMOSYNTHESIS UNI: CPT | Mod: 26,LT,, | Performed by: RADIOLOGY

## 2018-05-28 PROCEDURE — 99999 PR PBB SHADOW E&M-EST. PATIENT-LVL III: CPT | Mod: PBBFAC,,, | Performed by: NURSE PRACTITIONER

## 2018-05-28 PROCEDURE — 3074F SYST BP LT 130 MM HG: CPT | Mod: CPTII,S$GLB,, | Performed by: NURSE PRACTITIONER

## 2018-05-28 PROCEDURE — 77061 BREAST TOMOSYNTHESIS UNI: CPT | Mod: TC,PO,LT

## 2018-05-28 PROCEDURE — 3078F DIAST BP <80 MM HG: CPT | Mod: CPTII,S$GLB,, | Performed by: NURSE PRACTITIONER

## 2018-05-28 NOTE — PROGRESS NOTES
Subjective:      Patient ID: Odessa Vaughn is a 73 y.o. female.    Chief Complaint: Breast Cancer Screening (CBE/Hx of Breast Cancer)      HPI: (PF, EPF - 1-3) (Detailed, Comp, - 4)patient presents today for breast cancer surveillance. S/p right mastectomy. Denies left breast mass, pain, nipple discharge, skin changes, right mass or skin changes associated with anterior chest wall, new onset bone pain, unexplained weight loss, cough/SOB     Left diag mmg 4- with no abnormality reported      12- right core breast biopsy with IDC, ER+, ME and HER-2 negative  1-2-2013 right mastectomy with 3cm IDC along with DCIS, negative margins, negative SN 0/5. Remains on adjuvant endocrine therapy, followed by Dr Ramires and last seen in October of 2016 with return in one year recommended.        Review of Systems   Constitutional: Negative for appetite change and fatigue.   Respiratory: Negative for cough and shortness of breath.    Cardiovascular: Negative for chest pain.   Musculoskeletal: Negative for back pain.     Objective:   Physical Exam   Pulmonary/Chest: She exhibits no mass, no tenderness, no laceration, no edema, no swelling and no retraction. Left breast exhibits no inverted nipple, no mass, no nipple discharge, no skin change and no tenderness.   S/p right mastectomy with no mass or skin changes anterior chest wall. No upper extremity lymphedema. Breathing non-labored .    Lymphadenopathy:     She has no cervical adenopathy.     She has no axillary adenopathy.        Right: No supraclavicular adenopathy present.        Left: No supraclavicular adenopathy present.     Assessment:       1. Personal history of breast cancer        Plan:       Left mmg today, no changes or abnormality reported, clinically NEELIMA  Return in one year with left diag mmg, patient desires continued f/u here  Call for any interval palpable breast mass, pain, nipple discharge, skin changes or other breast related concerns

## 2018-05-29 DIAGNOSIS — E78.5 HYPERLIPIDEMIA, UNSPECIFIED HYPERLIPIDEMIA TYPE: ICD-10-CM

## 2018-05-30 RX ORDER — TRIAMTERENE AND HYDROCHLOROTHIAZIDE 37.5; 25 MG/1; MG/1
CAPSULE ORAL
Qty: 90 CAPSULE | Refills: 3 | Status: SHIPPED | OUTPATIENT
Start: 2018-05-30 | End: 2019-06-14 | Stop reason: SDUPTHER

## 2018-05-30 RX ORDER — ATORVASTATIN CALCIUM 10 MG/1
TABLET, FILM COATED ORAL
Qty: 90 TABLET | Refills: 3 | Status: SHIPPED | OUTPATIENT
Start: 2018-05-30 | End: 2019-05-28 | Stop reason: SDUPTHER

## 2018-06-05 ENCOUNTER — OFFICE VISIT (OUTPATIENT)
Dept: OBSTETRICS AND GYNECOLOGY | Facility: CLINIC | Age: 73
End: 2018-06-05
Payer: MEDICARE

## 2018-06-05 VITALS — WEIGHT: 169.75 LBS | HEIGHT: 65 IN | BODY MASS INDEX: 28.28 KG/M2

## 2018-06-05 DIAGNOSIS — Z01.419 VISIT FOR GYNECOLOGIC EXAMINATION: Primary | ICD-10-CM

## 2018-06-05 DIAGNOSIS — Z12.39 BREAST CANCER SCREENING: ICD-10-CM

## 2018-06-05 DIAGNOSIS — N95.9 MENOPAUSAL AND PERIMENOPAUSAL DISORDER: ICD-10-CM

## 2018-06-05 PROCEDURE — G0101 CA SCREEN;PELVIC/BREAST EXAM: HCPCS | Mod: S$GLB,,, | Performed by: OBSTETRICS & GYNECOLOGY

## 2018-06-05 PROCEDURE — 99999 PR PBB SHADOW E&M-EST. PATIENT-LVL II: CPT | Mod: PBBFAC,,, | Performed by: OBSTETRICS & GYNECOLOGY

## 2018-06-05 NOTE — PROGRESS NOTES
HISTORY OF PRESENT ILLNESS:    Odessa Bauman is a 73 y.o. female , presents for a routine exam and has no complaints.  L DX MAMMO WAS NORMAL AT THE END OF LAST MONTH; HAS BEEN 5 YRS SINCE BR CA DIAGNOSIS.   WITH RECENT BOUT OF SHINGLES, NOW FEELING BETTER  HAS HAD SOME MID LEFT BACK PIN, BUT HAS BEEN GETTING BETTER WITH REST, BIOFREEZE    LAST VISIT 3/2017:  DIAGNOSTIC MAMMO HAS BEEN ORDERED, PAP NORMAL LAST YEAR, REPEAT NOT INDICATED.  NO GYN C/O AND ALMOST 5 YRS SINCE DIAGNOSIS  LAST VISIT 2016:  MAMMO HAS BEEN ORDERED AND LAST PAP , PT WITH CANCER HX REQUESTS  OCCASIONAL VAGINAL ITCHING AND REQUESTS REFILL PRN LOTRISONE  LAST VISIT 2013:  AFTER VISIT LAST YEAR BREAST CANCER DIAGNOSED, TREATED. PAP IS UP TO DATE AND HAD BREAST F/U, MAMMO TODAY  LAST VISIT 2012:  DOING WELL AND STILL LOOKING AFTER HER GRANDKIDS (MOM IS LUC BAUMAN). REF MAMMO AND BMD UP TO DATE UNTIL .   PAP SUBMITTED - LAST ONE. DISCUSSED NEW SCREENING RECOMMENDATIONS, BUT PT WANTS LAST PAP DONE . . .    Past Medical History:   Diagnosis Date    Arthritis     hands, legs    Breast cancer 2012    Right breast invasive ductal carcinoma, ER positive, ID and Her2 negative    Bursitis of left hip     resolved    Chronic midline low back pain with left-sided sciatica 2017    Essential hypertension 2015    Hyperlipidemia 9/15/2014       Past Surgical History:   Procedure Laterality Date    BREAST BIOPSY  2012    Right breast- IDC    BREAST SURGERY Right 2013    right mastectomy, SN biopsy     COLONOSCOPY N/A 2017    Procedure: COLONOSCOPY;  Surgeon: Syed Shah MD;  Location: [address]);  Service: Endoscopy;  Laterality: N/A;    DILATION AND CURETTAGE OF UTERUS      MASTECTOMY          MEDICATIONS AND ALLERGIES:      Current Outpatient Prescriptions:     anastrozole (ARIMIDEX) 1 mg Tab, TAKE 1 TABLET BY MOUTH EVERY DAY, Disp: 90 tablet, Rfl: 0    aspirin  81 MG Chew, Take 81 mg by mouth once daily.  , Disp: , Rfl:     atorvastatin (LIPITOR) 10 MG tablet, TAKE 1 TABLET(10 MG) BY MOUTH EVERY DAY, Disp: 90 tablet, Rfl: 3    multivitamin capsule, Take 1 capsule by mouth once daily., Disp: , Rfl:     PROPYLENE GLYCOL//PF (SYSTANE, PF, OPHT), Apply to eye., Disp: , Rfl:     triamterene-hydrochlorothiazide 37.5-25 mg (DYAZIDE) 37.5-25 mg per capsule, TAKE 1 CAPSULE BY MOUTH ONCE DAILY, Disp: 90 capsule, Rfl: 3    Review of patient's allergies indicates:   Allergen Reactions    Vicodin [hydrocodone-acetaminophen] Other (See Comments)     Dizziness       Family History   Problem Relation Age of Onset    Cancer Father         Pancreatic CA    Cataracts Father     Breast cancer Cousin 58    Breast cancer Cousin 58    No Known Problems Mother     Hypertension Sister     Arthritis Brother     No Known Problems Daughter     COPD Sister     No Known Problems Brother     No Known Problems Brother     No Known Problems Brother     No Known Problems Daughter     Breast cancer Paternal Aunt 55    Cancer Paternal Aunt         Breast Ca and Lung CA    No Known Problems Maternal Aunt     No Known Problems Maternal Uncle     No Known Problems Paternal Uncle     No Known Problems Maternal Grandmother     No Known Problems Maternal Grandfather     No Known Problems Paternal Grandmother     No Known Problems Paternal Grandfather     Ovarian cancer Neg Hx     Amblyopia Neg Hx     Blindness Neg Hx     Diabetes Neg Hx     Glaucoma Neg Hx     Macular degeneration Neg Hx     Retinal detachment Neg Hx     Strabismus Neg Hx     Stroke Neg Hx     Thyroid disease Neg Hx     Osteoarthritis Neg Hx     Rheum arthritis Neg Hx        Social History     Social History    Marital status:      Spouse name: N/A    Number of children: N/A    Years of education: N/A     Occupational History    Not on file.     Social History Main Topics    Smoking status:  "Never Smoker    Smokeless tobacco: Never Used      Comment: Retired;     Alcohol use 0.0 oz/week      Comment: holiday - occasional wine    Drug use: No    Sexual activity: Yes     Partners: Male     Other Topics Concern    Not on file     Social History Narrative    No narrative on file       COMPREHENSIVE GYN HISTORY:  PAP History:  Denies abnormal Paps except a noted above.  Infection History: Denies STDs. Denies PID.  Benign History: Denies uterine fibroids. Denies ovarian cysts. Denies endometriosis.  Denies other conditions.  Cancer History: Denies cervical cancer. Denies uterine cancer or hyperplasia. Denies ovarian cancer. Denies vulvar cancer or pre-cancer. Denies vaginal cancer or pre-cancer. Denies breast cancer. Denies colon cancer.    ROS:  GENERAL: No weight changes. No swelling. No fatigue. No fever.  CARDIOVASCULAR: No chest pain. No shortness of breath. No leg cramps.   NEUROLOGICAL: No headaches. No vision changes.  BREASTS: No pain. No lumps. No discharge.  ABDOMEN: No pain. No nausea. No vomiting. No diarrhea. No constipation.  REPRODUCTIVE: No abnormal bleeding.   VULVA: No pain. No lesions. No itching.  VAGINA: No relaxation. No itching. No odor. No discharge. No lesions.  URINARY: No incontinence. No nocturia. No frequency. No dysuria.    Ht 5' 5" (1.651 m)   Wt 77 kg (169 lb 12.1 oz)   BMI 28.25 kg/m²     PE:  APPEARANCE: Well nourished, well developed, in no acute distress.  AFFECT: WNL, alert and oriented x 3.  SKIN: No hirsutism or acne.  NECK: Neck symmetric without masses or thyromegaly.  NODES: No inguinal, cervical, axillary or femoral lymph node enlargement.  CHEST: Good respiratory effort.   ABDOMEN: Soft. No tenderness or masses. No hepatosplenomegaly. No hernias.  BREASTS: Symmetrical, no skin changes or visible lesions. No palpable masses, nipple discharge bilaterally.  PELVIC: ATROPHIC EXTERNAL FEMALE GENITALIA without lesions. Normal hair distribution. " Adequate perineal body, normal urethral meatus. VAGINA DRY without lesions or discharge. CERVIX STENOTIC without lesions, discharge or tenderness. No significant cystocele or rectocele. Bimanual exam shows uterus to be normal size, regular, mobile and nontender. Adnexa without masses or tenderness.  EXTREMITIES: No edema.    PROCEDURES:  Pap    DIAGNOSIS:  1. Visit for gynecologic examination     2. Menopausal and perimenopausal disorder     3. Breast cancer screening         PLAN:    LABS AND TESTS ORDERED:  Mammogram    COUNSELING:  The patient was counseled today on osteoporosis prevention, calcium supplementation, and regular weight bearing exercise. The patient was also counseled today on ACS PAP guidelines, with recommendations for yearly pelvic exams unless their uterus, cervix, and ovaries were removed for benign reasons; in that case, examinations every 3-5 years are recommended.  The patient was also counseled regarding monthly breast self-examination, routine STD screening for at-risk populations, prophylactic immunizations for transmitted infections such as  HPV, Pertussis, or Influenza as appropriate, and yearly mammograms when indicated by ACS guidelines.  She was advised to see her primary care physician for all other health maintenance.    FOLLOW-UP with me annually.

## 2018-08-15 DIAGNOSIS — C50.511 BREAST CANCER OF LOWER-OUTER QUADRANT OF RIGHT FEMALE BREAST: ICD-10-CM

## 2018-08-16 RX ORDER — ANASTROZOLE 1 MG/1
TABLET ORAL
Qty: 90 TABLET | Refills: 0 | Status: SHIPPED | OUTPATIENT
Start: 2018-08-16 | End: 2018-11-10 | Stop reason: SDUPTHER

## 2018-08-30 ENCOUNTER — OFFICE VISIT (OUTPATIENT)
Dept: OPTOMETRY | Facility: CLINIC | Age: 73
End: 2018-08-30
Payer: MEDICARE

## 2018-08-30 DIAGNOSIS — H53.8 BLURRED VISION, LEFT EYE: Primary | ICD-10-CM

## 2018-08-30 PROCEDURE — 99499 UNLISTED E&M SERVICE: CPT | Mod: S$GLB,,, | Performed by: OPTOMETRIST

## 2018-08-30 PROCEDURE — 99999 PR PBB SHADOW E&M-EST. PATIENT-LVL II: CPT | Mod: PBBFAC,,, | Performed by: OPTOMETRIST

## 2018-08-30 RX ORDER — PENICILLIN V POTASSIUM 500 MG/1
TABLET, FILM COATED ORAL
Refills: 0 | COMMUNITY
Start: 2018-08-16 | End: 2018-08-30 | Stop reason: ALTCHOICE

## 2018-08-30 RX ORDER — CLOTRIMAZOLE 10 MG/1
LOZENGE ORAL; TOPICAL
Refills: 0 | COMMUNITY
Start: 2018-08-14 | End: 2018-08-30 | Stop reason: ALTCHOICE

## 2018-08-30 RX ORDER — IBUPROFEN 800 MG/1
TABLET ORAL
Refills: 0 | COMMUNITY
Start: 2018-08-16 | End: 2018-08-30 | Stop reason: ALTCHOICE

## 2018-08-30 RX ORDER — NYSTATIN 100000 [USP'U]/ML
SUSPENSION ORAL
Refills: 0 | COMMUNITY
Start: 2018-08-14 | End: 2018-08-30 | Stop reason: ALTCHOICE

## 2018-09-04 ENCOUNTER — LAB VISIT (OUTPATIENT)
Dept: LAB | Facility: HOSPITAL | Age: 73
End: 2018-09-04
Attending: INTERNAL MEDICINE
Payer: MEDICARE

## 2018-09-04 ENCOUNTER — OFFICE VISIT (OUTPATIENT)
Dept: INTERNAL MEDICINE | Facility: CLINIC | Age: 73
End: 2018-09-04
Payer: MEDICARE

## 2018-09-04 VITALS
WEIGHT: 173.94 LBS | SYSTOLIC BLOOD PRESSURE: 124 MMHG | BODY MASS INDEX: 28.98 KG/M2 | HEART RATE: 60 BPM | HEIGHT: 65 IN | DIASTOLIC BLOOD PRESSURE: 74 MMHG

## 2018-09-04 DIAGNOSIS — Z79.811 AROMATASE INHIBITOR USE: ICD-10-CM

## 2018-09-04 DIAGNOSIS — I70.0 ATHEROSCLEROSIS OF AORTA: ICD-10-CM

## 2018-09-04 DIAGNOSIS — M47.816 LUMBAR FACET ARTHROPATHY: ICD-10-CM

## 2018-09-04 DIAGNOSIS — Z90.11 S/P RIGHT MASTECTOMY: ICD-10-CM

## 2018-09-04 DIAGNOSIS — G89.29 CHRONIC MIDLINE LOW BACK PAIN WITH LEFT-SIDED SCIATICA: ICD-10-CM

## 2018-09-04 DIAGNOSIS — E78.5 HYPERLIPIDEMIA, UNSPECIFIED HYPERLIPIDEMIA TYPE: ICD-10-CM

## 2018-09-04 DIAGNOSIS — Z78.0 POST-MENOPAUSAL: ICD-10-CM

## 2018-09-04 DIAGNOSIS — D75.839 THROMBOCYTOSIS: ICD-10-CM

## 2018-09-04 DIAGNOSIS — C50.511 MALIGNANT NEOPLASM OF LOWER-OUTER QUADRANT OF RIGHT BREAST OF FEMALE, ESTROGEN RECEPTOR POSITIVE: ICD-10-CM

## 2018-09-04 DIAGNOSIS — Z00.00 ENCOUNTER FOR PREVENTIVE HEALTH EXAMINATION: Primary | ICD-10-CM

## 2018-09-04 DIAGNOSIS — M47.26 OSTEOARTHRITIS OF SPINE WITH RADICULOPATHY, LUMBAR REGION: ICD-10-CM

## 2018-09-04 DIAGNOSIS — M85.80 OSTEOPENIA, UNSPECIFIED LOCATION: ICD-10-CM

## 2018-09-04 DIAGNOSIS — Z17.0 MALIGNANT NEOPLASM OF LOWER-OUTER QUADRANT OF RIGHT BREAST OF FEMALE, ESTROGEN RECEPTOR POSITIVE: ICD-10-CM

## 2018-09-04 DIAGNOSIS — M54.42 CHRONIC MIDLINE LOW BACK PAIN WITH LEFT-SIDED SCIATICA: ICD-10-CM

## 2018-09-04 LAB
ALBUMIN SERPL BCP-MCNC: 3.5 G/DL
ALP SERPL-CCNC: 102 U/L
ALT SERPL W/O P-5'-P-CCNC: 21 U/L
ANION GAP SERPL CALC-SCNC: 10 MMOL/L
AST SERPL-CCNC: 21 U/L
BASOPHILS # BLD AUTO: 0.05 K/UL
BASOPHILS NFR BLD: 0.6 %
BILIRUB SERPL-MCNC: 0.5 MG/DL
BUN SERPL-MCNC: 18 MG/DL
CALCIUM SERPL-MCNC: 9.8 MG/DL
CHLORIDE SERPL-SCNC: 102 MMOL/L
CHOLEST SERPL-MCNC: 169 MG/DL
CHOLEST/HDLC SERPL: 4.2 {RATIO}
CO2 SERPL-SCNC: 27 MMOL/L
CREAT SERPL-MCNC: 0.9 MG/DL
DIFFERENTIAL METHOD: ABNORMAL
EOSINOPHIL # BLD AUTO: 0.3 K/UL
EOSINOPHIL NFR BLD: 3.9 %
ERYTHROCYTE [DISTWIDTH] IN BLOOD BY AUTOMATED COUNT: 14.2 %
EST. GFR  (AFRICAN AMERICAN): >60 ML/MIN/1.73 M^2
EST. GFR  (NON AFRICAN AMERICAN): >60 ML/MIN/1.73 M^2
GLUCOSE SERPL-MCNC: 100 MG/DL
HCT VFR BLD AUTO: 37.3 %
HDLC SERPL-MCNC: 40 MG/DL
HDLC SERPL: 23.7 %
HGB BLD-MCNC: 11.7 G/DL
LDLC SERPL CALC-MCNC: 111.2 MG/DL
LYMPHOCYTES # BLD AUTO: 4 K/UL
LYMPHOCYTES NFR BLD: 47.7 %
MCH RBC QN AUTO: 27.2 PG
MCHC RBC AUTO-ENTMCNC: 31.4 G/DL
MCV RBC AUTO: 87 FL
MONOCYTES # BLD AUTO: 0.6 K/UL
MONOCYTES NFR BLD: 7.6 %
NEUTROPHILS # BLD AUTO: 3.4 K/UL
NEUTROPHILS NFR BLD: 40 %
NONHDLC SERPL-MCNC: 129 MG/DL
PLATELET # BLD AUTO: 389 K/UL
PMV BLD AUTO: 9 FL
POTASSIUM SERPL-SCNC: 3.3 MMOL/L
PROT SERPL-MCNC: 7.5 G/DL
RBC # BLD AUTO: 4.3 M/UL
SODIUM SERPL-SCNC: 139 MMOL/L
TRIGL SERPL-MCNC: 89 MG/DL
WBC # BLD AUTO: 8.44 K/UL

## 2018-09-04 PROCEDURE — 80053 COMPREHEN METABOLIC PANEL: CPT

## 2018-09-04 PROCEDURE — 85025 COMPLETE CBC W/AUTO DIFF WBC: CPT

## 2018-09-04 PROCEDURE — 99214 OFFICE O/P EST MOD 30 MIN: CPT | Mod: PBBFAC | Performed by: NURSE PRACTITIONER

## 2018-09-04 PROCEDURE — 99999 PR PBB SHADOW E&M-EST. PATIENT-LVL IV: CPT | Mod: PBBFAC,,, | Performed by: NURSE PRACTITIONER

## 2018-09-04 PROCEDURE — G0439 PPPS, SUBSEQ VISIT: HCPCS | Mod: ,,, | Performed by: NURSE PRACTITIONER

## 2018-09-04 PROCEDURE — 80061 LIPID PANEL: CPT

## 2018-09-04 PROCEDURE — 3078F DIAST BP <80 MM HG: CPT | Mod: CPTII,,, | Performed by: NURSE PRACTITIONER

## 2018-09-04 PROCEDURE — 36415 COLL VENOUS BLD VENIPUNCTURE: CPT

## 2018-09-04 PROCEDURE — 3074F SYST BP LT 130 MM HG: CPT | Mod: CPTII,,, | Performed by: NURSE PRACTITIONER

## 2018-09-04 PROCEDURE — 99499 UNLISTED E&M SERVICE: CPT | Mod: S$GLB,,, | Performed by: NURSE PRACTITIONER

## 2018-09-04 NOTE — PROGRESS NOTES
"Odessa Vaughn presented for a  Medicare AWV and comprehensive Health Risk Assessment today. The following components were reviewed and updated:    · Medical history  · Family History  · Social history  · Allergies and Current Medications  · Health Risk Assessment  · Health Maintenance  · Care Team     ** See Completed Assessments for Annual Wellness Visit within the encounter summary.**       The following assessments were completed:  · Living Situation  · CAGE  · Depression Screening  · Timed Get Up and Go  · Whisper Test  · Cognitive Function Screening  ·   ·   ·   · Nutrition Screening  · ADL Screening  · PAQ Screening    Vitals:    09/04/18 0819   BP: 124/74   BP Location: Left arm   Pulse: 60   Weight: 78.9 kg (173 lb 15.1 oz)   Height: 5' 5" (1.651 m)     Body mass index is 28.95 kg/m².  Physical Exam   Constitutional: She is oriented to person, place, and time. She appears well-developed and well-nourished.   HENT:   Head: Normocephalic.   Cardiovascular: Normal rate and regular rhythm.   Pulmonary/Chest: Effort normal and breath sounds normal.   Abdominal: Soft. Bowel sounds are normal.   Musculoskeletal: Normal range of motion. She exhibits no edema.   Neurological: She is alert and oriented to person, place, and time.   Skin: Skin is warm and dry.   Psychiatric: She has a normal mood and affect.   Nursing note and vitals reviewed.        Diagnoses and health risks identified today and associated recommendations/orders:    1. Encounter for preventive health examination  Here for Health Risk Assessment/Annual Wellness Visit.  Health maintenance reviewed and updated. Follow up in one year.  Prescription for TDAP, Shingrix, Influenza vaccine given.    2. Post-menopausal  Order placed per Rheumatology recommendation secondary to anastrozole therapy.  - DXA Bone Density Spine And Hip; Future    3. Atherosclerosis of aorta  Chronic, stable on current medications. Noted Lumbar XR 6/05/17. Followed by PCP.    4. " Hyperlipidemia, unspecified hyperlipidemia type  Chronic, stable on current medication. Followed by PCP.    5. Malignant neoplasm of lower-outer quadrant of right breast of female, estrogen receptor positive  Chronic, stable with anastrozole. Followed by Oncology    6. S/P right mastectomy  Stable. Followed by Oncology.    7. Osteopenia, unspecified location  Chronic, stable. Followed by PCP, Rheumatology    8. Thrombocytosis  Chronic, stable. Followed by PCP.    9. Osteoarthritis of spine with radiculopathy, lumbar region  Chronic, stable. Followed by PCP, Rheumatology.    10. Lumbar facet arthropathy  Chronic, stable. Noted Lumbar XR 6/05/15. Followed by PCP, Rheumatology.    11. Chronic midline low back pain with left-sided sciatica  Chronic, stable. Followed by PCP, Rheumatology.      Provided Odessa with a 5-10 year written screening schedule and personal prevention plan. Recommendations were developed using the USPSTF age appropriate recommendations. Education, counseling, and referrals were provided as needed. After Visit Summary printed and given to patient which includes a list of additional screenings\tests needed.    Follow-up in 1 day (on 9/5/2018).with PCP    Shayy Graf NP

## 2018-09-04 NOTE — PATIENT INSTRUCTIONS
Counseling and Referral of Other Preventative  (Italic type indicates deductible and co-insurance are waived)    Patient Name: Odessa Vaughn  Today's Date: 9/4/2018    Health Maintenance       Date Due Completion Date    Influenza Vaccine 09/30/2018 (Originally 8/1/2018) 9/18/2017    DEXA SCAN 10/18/2018 (Originally 6/8/2018) 6/8/2017 - schedule    Override on 10/5/2010: Not Clinically Appropriate (DUE IN 2014)    TETANUS VACCINE 09/30/2019 (Originally 4/19/1963) ---    High Dose Statin 08/30/2019 8/30/2018    Mammogram 05/28/2020 5/28/2018    Lipid Panel 09/04/2023 9/4/2018 (Done)    Override on 9/4/2018: Done    Colonoscopy 09/07/2027 9/7/2017        No orders of the defined types were placed in this encounter.    The following information is provided to all patients.  This information is to help you find resources for any of the problems found today that may be affecting your health:                Living healthy guide: www.[facility].[place].gov      Understanding Diabetes: www.diabetes.org      Eating healthy: www.cdc.gov/healthyweight      CDC home safety checklist: www.cdc.gov/steadi/patient.html      Agency on Aging: www.goea.[place].gov      Alcoholics anonymous (AA): www.aa.org      Physical Activity: www.helena.nih.gov/si9efbh      Tobacco use: www.quitwithusla.org

## 2018-09-05 ENCOUNTER — OFFICE VISIT (OUTPATIENT)
Dept: INTERNAL MEDICINE | Facility: CLINIC | Age: 73
End: 2018-09-05
Payer: MEDICARE

## 2018-09-05 VITALS
WEIGHT: 173.5 LBS | HEART RATE: 69 BPM | OXYGEN SATURATION: 99 % | DIASTOLIC BLOOD PRESSURE: 58 MMHG | BODY MASS INDEX: 28.91 KG/M2 | SYSTOLIC BLOOD PRESSURE: 118 MMHG | HEIGHT: 65 IN

## 2018-09-05 DIAGNOSIS — E78.49 OTHER HYPERLIPIDEMIA: Primary | ICD-10-CM

## 2018-09-05 DIAGNOSIS — I70.0 ATHEROSCLEROSIS OF AORTA: ICD-10-CM

## 2018-09-05 DIAGNOSIS — Z85.3 HISTORY OF CANCER OF RIGHT BREAST: ICD-10-CM

## 2018-09-05 DIAGNOSIS — L91.8 INFLAMED SKIN TAG: ICD-10-CM

## 2018-09-05 PROCEDURE — 3074F SYST BP LT 130 MM HG: CPT | Mod: CPTII,,, | Performed by: INTERNAL MEDICINE

## 2018-09-05 PROCEDURE — 3078F DIAST BP <80 MM HG: CPT | Mod: CPTII,,, | Performed by: INTERNAL MEDICINE

## 2018-09-05 PROCEDURE — 99999 PR PBB SHADOW E&M-EST. PATIENT-LVL IV: CPT | Mod: PBBFAC,,, | Performed by: INTERNAL MEDICINE

## 2018-09-05 PROCEDURE — 1101F PT FALLS ASSESS-DOCD LE1/YR: CPT | Mod: CPTII,,, | Performed by: INTERNAL MEDICINE

## 2018-09-05 PROCEDURE — 99214 OFFICE O/P EST MOD 30 MIN: CPT | Mod: PBBFAC | Performed by: INTERNAL MEDICINE

## 2018-09-05 PROCEDURE — 99214 OFFICE O/P EST MOD 30 MIN: CPT | Mod: S$PBB,,, | Performed by: INTERNAL MEDICINE

## 2018-09-06 NOTE — PROGRESS NOTES
Subjective:       Patient ID: Odessa Vaughn is a 73 y.o. female.    Chief Complaint: Annual Exam    Hyperlipidemia   This is a chronic problem. The problem is controlled. Recent lipid tests were reviewed and are normal. Pertinent negatives include no chest pain or shortness of breath. The current treatment provides significant improvement of lipids. There are no compliance problems.      Review of Systems   Constitutional: Negative for fatigue.   HENT: Negative for sore throat.    Eyes: Negative for visual disturbance.   Respiratory: Negative for shortness of breath.    Cardiovascular: Negative for chest pain and palpitations.   Gastrointestinal: Negative for abdominal pain.   Genitourinary: Negative for dysuria, frequency and hematuria.   Musculoskeletal: Negative for joint swelling.   Skin: Negative for rash.        Lesions around neck, irritated   Neurological: Negative for speech difficulty and weakness.   Psychiatric/Behavioral: Negative for dysphoric mood.       Objective:      Physical Exam   Constitutional: She is oriented to person, place, and time. She appears well-developed and well-nourished. No distress.   HENT:   Head: Normocephalic and atraumatic.   Mouth/Throat: Oropharynx is clear and moist.   Eyes: Conjunctivae are normal. Pupils are equal, round, and reactive to light.   Neck: Normal range of motion. Neck supple.   Cardiovascular: Normal rate, regular rhythm and normal heart sounds.   Pulmonary/Chest: Effort normal and breath sounds normal. She has no wheezes.   Abdominal: Soft. Bowel sounds are normal. There is no tenderness.   Musculoskeletal: Normal range of motion. She exhibits no edema or tenderness.   Neurological: She is alert and oriented to person, place, and time. No cranial nerve deficit.   Skin: No erythema.        Psychiatric: She has a normal mood and affect.   Vitals reviewed.      Assessment:       1. Other hyperlipidemia    2. Inflamed skin tag    3. Atherosclerosis of  aorta    4. History of cancer of right breast        Plan:       Odessa was seen today for annual exam.    Diagnoses and all orders for this visit:    Other hyperlipidemia  -     CBC auto differential; Future  -     Comprehensive metabolic panel; Future  -     Lipid panel; Future  -     TSH; Future    Inflamed skin tag  -     Ambulatory consult to Dermatology    Atherosclerosis of aorta    History of cancer of right breast        Follow-up in about 1 year (around 9/5/2019) for F/U APPOINTMENT WITH ME, WITH LAB BEFORE.

## 2018-10-24 ENCOUNTER — TELEPHONE (OUTPATIENT)
Dept: HEMATOLOGY/ONCOLOGY | Facility: CLINIC | Age: 73
End: 2018-10-24

## 2018-10-24 ENCOUNTER — OFFICE VISIT (OUTPATIENT)
Dept: HEMATOLOGY/ONCOLOGY | Facility: CLINIC | Age: 73
End: 2018-10-24
Payer: MEDICARE

## 2018-10-24 VITALS
RESPIRATION RATE: 16 BRPM | WEIGHT: 171.06 LBS | SYSTOLIC BLOOD PRESSURE: 117 MMHG | HEIGHT: 65 IN | DIASTOLIC BLOOD PRESSURE: 56 MMHG | BODY MASS INDEX: 28.5 KG/M2 | OXYGEN SATURATION: 94 % | TEMPERATURE: 98 F | HEART RATE: 70 BPM

## 2018-10-24 DIAGNOSIS — Z79.811 AROMATASE INHIBITOR USE: ICD-10-CM

## 2018-10-24 DIAGNOSIS — Z85.3 HISTORY OF CANCER OF RIGHT BREAST: ICD-10-CM

## 2018-10-24 PROCEDURE — 99213 OFFICE O/P EST LOW 20 MIN: CPT | Mod: S$PBB,,, | Performed by: PHYSICIAN ASSISTANT

## 2018-10-24 PROCEDURE — 3078F DIAST BP <80 MM HG: CPT | Mod: CPTII,,, | Performed by: PHYSICIAN ASSISTANT

## 2018-10-24 PROCEDURE — 99999 PR PBB SHADOW E&M-EST. PATIENT-LVL III: CPT | Mod: PBBFAC,,, | Performed by: PHYSICIAN ASSISTANT

## 2018-10-24 PROCEDURE — 99213 OFFICE O/P EST LOW 20 MIN: CPT | Mod: PBBFAC | Performed by: PHYSICIAN ASSISTANT

## 2018-10-24 PROCEDURE — 3074F SYST BP LT 130 MM HG: CPT | Mod: CPTII,,, | Performed by: PHYSICIAN ASSISTANT

## 2018-10-24 PROCEDURE — 1101F PT FALLS ASSESS-DOCD LE1/YR: CPT | Mod: CPTII,,, | Performed by: PHYSICIAN ASSISTANT

## 2018-10-24 NOTE — PROGRESS NOTES
Subjective:       Patient ID: Odessa Vaughn is a 73 y.o. female.    Chief Complaint: Malignant neoplasm of lower-outer quadrant of right breast o    Mrs. Vaughn is a 73-year-old female seen in follow-up for carcinoma of the right breast, T2 N0 ER +, HER - 2 neg. She has been on anastrozole since 2/2013.     Overall, she has been doing well. She has some generalized arthralgias that pre-date anastrazole. She denies any shortness of breath.  Appetite and bowel function have been stable.  Occasional chest wall discomfort at mastectomy site but otherwise doing well.   She keeps busy with her two grandchildren here. Hopefully going to [place] to visit family over Thanksgiving.         Review of Systems   Constitutional: Negative.    HENT: Negative for congestion, rhinorrhea, sore throat and trouble swallowing.    Eyes: Negative for visual disturbance.   Respiratory: Negative for cough, chest tightness and shortness of breath.    Cardiovascular: Negative for chest pain, palpitations and leg swelling.   Gastrointestinal: Negative for abdominal pain, blood in stool, constipation, diarrhea, nausea and vomiting.   Genitourinary: Negative for dysuria, hematuria and vaginal bleeding.   Musculoskeletal: Positive for arthralgias. Negative for back pain and myalgias.   Skin: Negative for pallor and rash.   Neurological: Negative for dizziness, weakness and headaches.   Hematological: Negative for adenopathy. Does not bruise/bleed easily.   Psychiatric/Behavioral: Negative for dysphoric mood and suicidal ideas. The patient is not nervous/anxious.        Objective:      Physical Exam   Constitutional: She is oriented to person, place, and time. She appears well-developed and well-nourished. No distress.   Presents alone     HENT:   Head: Normocephalic.   Mouth/Throat: Oropharynx is clear and moist. No oropharyngeal exudate.   Eyes: Conjunctivae are normal. Pupils are equal, round, and reactive to light. No scleral icterus.    Neck: Normal range of motion. Neck supple. No thyromegaly present.   Cardiovascular: Normal rate and regular rhythm.   Pulmonary/Chest: Effort normal and breath sounds normal. No respiratory distress.   S/p right mastectomy; no chest wall mass, nodule or skin changes. Left breast without mass,nodule or skin changes. No axillary or supraclavicular adenopathy.   Abdominal: Soft. Bowel sounds are normal. She exhibits no distension and no mass. There is no tenderness.   Musculoskeletal: Normal range of motion. She exhibits no edema or tenderness.   No spinal or paraspinal tenderness to palpation     Lymphadenopathy:     She has no cervical adenopathy.   Neurological: She is alert and oriented to person, place, and time. No cranial nerve deficit.   Skin: Skin is warm and dry.   Psychiatric: She has a normal mood and affect. Her behavior is normal. Thought content normal.   Vitals reviewed.      Assessment:       1. History of cancer of right breast    2. Aromatase inhibitor use        Plan:       Continue Armidex and return to clinic in 6 months. Plan to continue AI therapy for total of 7 years.   Annual mammogram scheduled for 5/28.  Patient due for bone density screening.     Distress Screening Results: Psychosocial Distress screening score of Distress Score: 6 noted and reviewed. No intervention indicated.

## 2018-10-24 NOTE — TELEPHONE ENCOUNTER
----- Message from Patsy Mejias PA-C sent at 10/24/2018 10:04 AM CDT -----  Please schedule bone density sometime next week in the morning  Ike in 6 months

## 2018-10-29 ENCOUNTER — HOSPITAL ENCOUNTER (OUTPATIENT)
Dept: RADIOLOGY | Facility: CLINIC | Age: 73
Discharge: HOME | End: 2018-10-29
Attending: PHYSICIAN ASSISTANT
Payer: MEDICARE

## 2018-10-29 DIAGNOSIS — Z79.811 AROMATASE INHIBITOR USE: ICD-10-CM

## 2018-10-29 PROCEDURE — 77080 DXA BONE DENSITY AXIAL: CPT | Mod: TC

## 2018-10-29 PROCEDURE — 77080 DXA BONE DENSITY AXIAL: CPT | Mod: 26,,, | Performed by: INTERNAL MEDICINE

## 2018-11-10 DIAGNOSIS — C50.511 BREAST CANCER OF LOWER-OUTER QUADRANT OF RIGHT FEMALE BREAST: ICD-10-CM

## 2018-11-12 RX ORDER — ANASTROZOLE 1 MG/1
TABLET ORAL
Qty: 90 TABLET | Refills: 0 | Status: SHIPPED | OUTPATIENT
Start: 2018-11-12 | End: 2019-02-28 | Stop reason: SDUPTHER

## 2018-12-01 ENCOUNTER — OFFICE VISIT (OUTPATIENT)
Dept: URGENT CARE | Facility: CLINIC | Age: 73
End: 2018-12-01
Payer: MEDICARE

## 2018-12-01 VITALS
TEMPERATURE: 98 F | DIASTOLIC BLOOD PRESSURE: 68 MMHG | BODY MASS INDEX: 28.49 KG/M2 | HEIGHT: 65 IN | WEIGHT: 171 LBS | RESPIRATION RATE: 18 BRPM | HEART RATE: 72 BPM | OXYGEN SATURATION: 99 % | SYSTOLIC BLOOD PRESSURE: 110 MMHG

## 2018-12-01 DIAGNOSIS — J32.9 SINUSITIS, UNSPECIFIED CHRONICITY, UNSPECIFIED LOCATION: Primary | ICD-10-CM

## 2018-12-01 PROCEDURE — 3074F SYST BP LT 130 MM HG: CPT | Mod: CPTII,S$GLB,, | Performed by: NURSE PRACTITIONER

## 2018-12-01 PROCEDURE — 3078F DIAST BP <80 MM HG: CPT | Mod: CPTII,S$GLB,, | Performed by: NURSE PRACTITIONER

## 2018-12-01 PROCEDURE — 1101F PT FALLS ASSESS-DOCD LE1/YR: CPT | Mod: CPTII,S$GLB,, | Performed by: NURSE PRACTITIONER

## 2018-12-01 PROCEDURE — 99214 OFFICE O/P EST MOD 30 MIN: CPT | Mod: S$GLB,,, | Performed by: NURSE PRACTITIONER

## 2018-12-01 RX ORDER — AMOXICILLIN 500 MG/1
500 CAPSULE ORAL EVERY 12 HOURS
Qty: 20 CAPSULE | Refills: 0 | Status: SHIPPED | OUTPATIENT
Start: 2018-12-01 | End: 2018-12-11

## 2018-12-01 RX ORDER — BENZONATATE 100 MG/1
200 CAPSULE ORAL 3 TIMES DAILY PRN
Qty: 30 CAPSULE | Refills: 0 | Status: SHIPPED | OUTPATIENT
Start: 2018-12-01 | End: 2018-12-11

## 2018-12-01 NOTE — PROGRESS NOTES
"Subjective:       Patient ID: Odessa Vaughn is a 73 y.o. female.    Vitals:  height is 5' 5" (1.651 m) and weight is 77.6 kg (171 lb). Her temperature is 98.1 °F (36.7 °C). Her blood pressure is 110/68 and her pulse is 72. Her respiration is 18 and oxygen saturation is 99%.     Chief Complaint: Sinus Problem    Cough and congestion for 10 days   Using mucinex this week- started with green mucus on Saturday        Sinus Problem   This is a new problem. The current episode started in the past 7 days. The problem is unchanged. There has been no fever. Associated symptoms include congestion, coughing, headaches and sneezing. Pertinent negatives include no chills, diaphoresis, ear pain, shortness of breath, sinus pressure or sore throat. Treatments tried: Mucinex. The treatment provided no relief.       Constitution: Negative for chills, sweating, fatigue and fever.   HENT: Positive for congestion. Negative for ear pain, sinus pain, sinus pressure, sore throat and voice change.    Neck: Negative for painful lymph nodes.   Eyes: Negative for eye redness.   Respiratory: Positive for cough. Negative for chest tightness, sputum production, bloody sputum, COPD, shortness of breath, stridor, wheezing and asthma.    Gastrointestinal: Negative for nausea and vomiting.   Musculoskeletal: Negative for muscle ache.   Skin: Negative for rash.   Allergic/Immunologic: Positive for sneezing. Negative for seasonal allergies and asthma.   Neurological: Positive for headaches.   Hematologic/Lymphatic: Negative for swollen lymph nodes.       Objective:      Physical Exam   Constitutional: She is oriented to person, place, and time. She appears well-developed and well-nourished. She is cooperative.  Non-toxic appearance. She does not appear ill. No distress.   HENT:   Head: Normocephalic and atraumatic.   Right Ear: Hearing, tympanic membrane, external ear and ear canal normal.   Left Ear: Hearing, tympanic membrane, external ear and " ear canal normal.   Nose: Nose normal. No mucosal edema, rhinorrhea or nasal deformity. No epistaxis. Right sinus exhibits no maxillary sinus tenderness and no frontal sinus tenderness. Left sinus exhibits no maxillary sinus tenderness and no frontal sinus tenderness.   Mouth/Throat: Uvula is midline, oropharynx is clear and moist and mucous membranes are normal. No trismus in the jaw. Normal dentition. No uvula swelling. No posterior oropharyngeal erythema.   Eyes: Conjunctivae and lids are normal. Right eye exhibits no discharge. Left eye exhibits no discharge. No scleral icterus.   Sclera clear bilat   Neck: Trachea normal, normal range of motion, full passive range of motion without pain and phonation normal. Neck supple.   Cardiovascular: Normal rate, regular rhythm, normal heart sounds, intact distal pulses and normal pulses.   Pulmonary/Chest: Effort normal and breath sounds normal. No respiratory distress.   Abdominal: Soft. Normal appearance and bowel sounds are normal. She exhibits no distension, no pulsatile midline mass and no mass. There is no tenderness.   Musculoskeletal: Normal range of motion. She exhibits no edema or deformity.   Neurological: She is alert and oriented to person, place, and time. She exhibits normal muscle tone. Coordination normal.   Skin: Skin is warm, dry and intact. She is not diaphoretic. No pallor.   Psychiatric: She has a normal mood and affect. Her speech is normal and behavior is normal. Judgment and thought content normal. Cognition and memory are normal.   Nursing note and vitals reviewed.      Assessment:       1. Sinusitis, unspecified chronicity, unspecified location        Plan:         Sinusitis, unspecified chronicity, unspecified location    Other orders  -     amoxicillin (AMOXIL) 500 MG capsule; Take 1 capsule (500 mg total) by mouth every 12 (twelve) hours.  Dispense: 20 capsule; Refill: 0  -     benzonatate (TESSALON PERLES) 100 MG capsule; Take 2 capsules  (200 mg total) by mouth 3 (three) times daily as needed for Cough.  Dispense: 30 capsule; Refill: 0      Patient Instructions     anabiotics sent just in case worsening symptoms or not relieved with otc meds as discussed     Symptomatic treatment: (consider the following unless you are allergic or cannot take the following suggestions)  Alternate Tylenol (not to exceed 4000 mg per 24 hours) and Ibuprofen (400 mg every 3 hours) for pain and fever   * do not take tylenol if you have liver disease or issues with your liver   *do not take motrin if you have kidney disease or on blood thinners   For a sore throat try salt water gargles and honey/lemon water to soothe throat  Cepachol spray helps to numb the discomfort in throat  Elderberry to reduce duration of URI symptoms  Warm face compresses/hot showers as often as you can to open sinuses   Vicks vapor rub at night  Flonase or Nasacort (nasal steroid over the counter, 1-2 squirts to each nare)  Nasal saline spray reduces inflammation and dryness  Stay hydrated with increased water intake and simple foods like chicken noodle soup help hydrate and soothe the throat  Drink pedialyte, gatorade or propel.   Delsym helps with coughing at night  Zyrtec, Allegra, or Claritin during the day for allergy relief  You can try breathe right strips at night to help you breathe  A cool mist humidifier in bedroom may help with cough and relieve stuffy nose.   Zantac will help if there is reflux from the post nasal drip  REST as much as you can    Sinus rinses DO NOT USE TAP WATER, if you must, water must be a rolling boil for 1 minute, let it cool, then use.  May use distilled water, or over the counter nasal saline rinses.  Vics vapor rub in shower to help open nasal passages.  May use nasal gel to keep passages moisturized.  May use Nasal saline sprays during the day for added relief of congestion.   For those who go to the gym, please do not use the sauna or steam room now to  clear sinuses.    During pollen season, change shirt if you are outside for a while when you go in.  Also wash your face.  Do not touch your face with your hands.  Wash your hands often in general while ill, avoid face contact with hands.     Your symptoms will likely last 5-7 days, maybe longer depending on how it affects your body.  You are contagious 5-7, so minimize contact with others to reduce the spread to others and stay home from work or school as we discussed. Dehydration is preventable but is one of the main reasons why you will feel so badly.  Antibiotics are not needed unless a complication (such as Otitis Media, Bacterial sinus infection or pneumonia). Taking antibiotics for Flu/Cold is not supported by evidence based medicine and can expose you to unnecessary side effects of the medication, such as anaphylaxis.   If you experience any:  Chest pain, shortness of breath, wheezing or difficulty breathing  Severe headache, face, neck or ear pain  New rash  Fever over 101.5º F (38.6 C) for more than three days  Confusion, behavior change or seizure  Severe weakness or dizziness  Go to ER

## 2018-12-01 NOTE — PATIENT INSTRUCTIONS
anabiotics sent just in case worsening symptoms or not relieved with otc meds as discussed     Symptomatic treatment: (consider the following unless you are allergic or cannot take the following suggestions)  Alternate Tylenol (not to exceed 4000 mg per 24 hours) and Ibuprofen (400 mg every 3 hours) for pain and fever   * do not take tylenol if you have liver disease or issues with your liver   *do not take motrin if you have kidney disease or on blood thinners   For a sore throat try salt water gargles and honey/lemon water to soothe throat  Cepachol spray helps to numb the discomfort in throat  Elderberry to reduce duration of URI symptoms  Warm face compresses/hot showers as often as you can to open sinuses   Vicks vapor rub at night  Flonase or Nasacort (nasal steroid over the counter, 1-2 squirts to each nare)  Nasal saline spray reduces inflammation and dryness  Stay hydrated with increased water intake and simple foods like chicken noodle soup help hydrate and soothe the throat  Drink pedialyte, gatorade or propel.   Delsym helps with coughing at night  Zyrtec, Allegra, or Claritin during the day for allergy relief  You can try breathe right strips at night to help you breathe  A cool mist humidifier in bedroom may help with cough and relieve stuffy nose.   Zantac will help if there is reflux from the post nasal drip  REST as much as you can    Sinus rinses DO NOT USE TAP WATER, if you must, water must be a rolling boil for 1 minute, let it cool, then use.  May use distilled water, or over the counter nasal saline rinses.  Vics vapor rub in shower to help open nasal passages.  May use nasal gel to keep passages moisturized.  May use Nasal saline sprays during the day for added relief of congestion.   For those who go to the gym, please do not use the sauna or steam room now to clear sinuses.    During pollen season, change shirt if you are outside for a while when you go in.  Also wash your face.  Do not  touch your face with your hands.  Wash your hands often in general while ill, avoid face contact with hands.     Your symptoms will likely last 5-7 days, maybe longer depending on how it affects your body.  You are contagious 5-7, so minimize contact with others to reduce the spread to others and stay home from work or school as we discussed. Dehydration is preventable but is one of the main reasons why you will feel so badly.  Antibiotics are not needed unless a complication (such as Otitis Media, Bacterial sinus infection or pneumonia). Taking antibiotics for Flu/Cold is not supported by evidence based medicine and can expose you to unnecessary side effects of the medication, such as anaphylaxis.   If you experience any:  Chest pain, shortness of breath, wheezing or difficulty breathing  Severe headache, face, neck or ear pain  New rash  Fever over 101.5º F (38.6 C) for more than three days  Confusion, behavior change or seizure  Severe weakness or dizziness  Go to ER

## 2018-12-07 ENCOUNTER — TELEPHONE (OUTPATIENT)
Dept: SURGERY | Facility: CLINIC | Age: 73
End: 2018-12-07

## 2018-12-07 NOTE — TELEPHONE ENCOUNTER
Returned patients phone call. She complained of pain at the incision site from mastectomy in 2013, slight redness, no drainage. Scheduled her for an appt with Dr. Ibarra 12/11 at 9:30. Pt content with scheduled appt.

## 2018-12-07 NOTE — TELEPHONE ENCOUNTER
----- Message from Yohana Alexander RN sent at 12/7/2018 11:57 AM CST -----  Regarding: call patient to schedule follow up appointment with Dr Fred Hong there,  This is a good patient for you to practice with.  Give her a call regarding her incision pain.  Ask her if it is bright red, hot to the touch, swollen, and rate of pain.  She can see Dr Ibarra next week at [facility].  Thanks!    Yohana    ----- Message -----  From: Shraddha Tan RN  Sent: 12/7/2018  11:43 AM  To: Fred ACOSTA Staff, Hay Unger Staff    Good morning,   Ms Vaughn has reached out to us about pain she is having around the incision area where her breast surgery was done and is wondering if a f/u appointment may be needed to address this. Could someone from your staff please reach out to her since Dr Ramires is out of clinic this week.   Thank you in advance,   HECTOR Llanes

## 2018-12-11 ENCOUNTER — OFFICE VISIT (OUTPATIENT)
Dept: SURGERY | Facility: CLINIC | Age: 73
End: 2018-12-11
Payer: MEDICARE

## 2018-12-11 VITALS
SYSTOLIC BLOOD PRESSURE: 112 MMHG | DIASTOLIC BLOOD PRESSURE: 58 MMHG | HEIGHT: 65 IN | HEART RATE: 75 BPM | TEMPERATURE: 99 F | BODY MASS INDEX: 28.66 KG/M2 | WEIGHT: 172 LBS

## 2018-12-11 DIAGNOSIS — Z90.11 S/P RIGHT MASTECTOMY: ICD-10-CM

## 2018-12-11 DIAGNOSIS — Z85.3 HX OF BREAST CANCER: Primary | ICD-10-CM

## 2018-12-11 PROCEDURE — 99999 PR PBB SHADOW E&M-EST. PATIENT-LVL III: CPT | Mod: PBBFAC,HCNC,, | Performed by: SURGERY

## 2018-12-11 PROCEDURE — 3078F DIAST BP <80 MM HG: CPT | Mod: CPTII,HCNC,S$GLB, | Performed by: SURGERY

## 2018-12-11 PROCEDURE — 3074F SYST BP LT 130 MM HG: CPT | Mod: CPTII,HCNC,S$GLB, | Performed by: SURGERY

## 2018-12-11 PROCEDURE — 99213 OFFICE O/P EST LOW 20 MIN: CPT | Mod: HCNC,S$GLB,, | Performed by: SURGERY

## 2018-12-11 PROCEDURE — 1101F PT FALLS ASSESS-DOCD LE1/YR: CPT | Mod: CPTII,HCNC,S$GLB, | Performed by: SURGERY

## 2018-12-11 NOTE — MEDICAL/APP STUDENT
DIAGNOSIS:    This is a 73 y.o. female s/p right mastectomy complaining of discomfort along the incision scar.    Left diag mmg 5- with no abnormality reported. Follow up for CBE and left mmg scheduled for 5/2019.     12- right core breast biopsy with IDC, ER+, VA and HER-2 negative  1-2-2013 right mastectomy with 3cm IDC along with DCIS, negative margins, negative SN 0/5. Remains on adjuvant endocrine therapy (anastrozole), followed by Dr Ramires.    TREATMENT SUMMARY:  The patient is status post R mastectomy and sentinel node biopsy (all 4 negative) on 1/2/2013.  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     INTERVAL HISTORY:   Odessa Vaughn comes in for a post-op check.  She denies fever, chills, chest pain or shortness of breath.  Her pain is controlled, occasional sharp pain (4/10) near the sternum and midaxilla along the mastectomy scar.     MEDICATIONS:  Current Outpatient Medications   Medication Sig Dispense Refill    anastrozole (ARIMIDEX) 1 mg Tab TAKE 1 TABLET BY MOUTH EVERY DAY 90 tablet 0    aspirin 81 MG Chew Take 81 mg by mouth once daily.        atorvastatin (LIPITOR) 10 MG tablet TAKE 1 TABLET(10 MG) BY MOUTH EVERY DAY 90 tablet 3    multivitamin capsule Take 1 capsule by mouth once daily.      PROPYLENE GLYCOL//PF (SYSTANE, PF, OPHT) Apply to eye daily as needed.       triamterene-hydrochlorothiazide 37.5-25 mg (DYAZIDE) 37.5-25 mg per capsule TAKE 1 CAPSULE BY MOUTH ONCE DAILY 90 capsule 3     No current facility-administered medications for this visit.        ALLERGIES:   Review of patient's allergies indicates:   Allergen Reactions    Vicodin [hydrocodone-acetaminophen] Other (See Comments)     Dizziness       PHYSICAL EXAMINATION:   General:  This is a well appearing female with appropriate speech, affect and gait.     Breast:  Incision clean, dry, and intact - healed well with minimal scarring. No tenderness  on palpation, no lumps or masses. No costochondral tenderness on palpation.    IMPRESSION:   The patient has had an uneventful postoperative course.    PLAN:   1. Follow up PRN  2. Considered aromatase inhibitor effect on joint discomfort  3. Recommend monthly self exam, use tylenol/NSAIDs to control occasional pain

## 2018-12-16 NOTE — PROGRESS NOTES
DIAGNOSIS:    This is a 73 y.o. female s/p right mastectomy complaining of discomfort along the incision scar.     Left diag mmg 5- with no abnormality reported. Follow up for CBE and left mmg scheduled for 5/2019.     12- right core breast biopsy with IDC, ER+, PA and HER-2 negative  1-2-2013 right mastectomy with 3cm IDC along with DCIS, negative margins, negative SN 0/5. Remains on adjuvant endocrine therapy (anastrozole), followed by Dr aRmires.     TREATMENT SUMMARY:  The patient is status post R mastectomy and sentinel node biopsy (all 4 negative) on 1/2/2013.  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      INTERVAL HISTORY:   Odessa Vaughn comes in for a post-op check.  She denies fever, chills, chest pain or shortness of breath.  Her pain is controlled, occasional sharp pain (4/10) near the sternum and midaxilla along the mastectomy scar.      MEDICATIONS:  Current Medications          Current Outpatient Medications   Medication Sig Dispense Refill    anastrozole (ARIMIDEX) 1 mg Tab TAKE 1 TABLET BY MOUTH EVERY DAY 90 tablet 0    aspirin 81 MG Chew Take 81 mg by mouth once daily.          atorvastatin (LIPITOR) 10 MG tablet TAKE 1 TABLET(10 MG) BY MOUTH EVERY DAY 90 tablet 3    multivitamin capsule Take 1 capsule by mouth once daily.        PROPYLENE GLYCOL//PF (SYSTANE, PF, OPHT) Apply to eye daily as needed.         triamterene-hydrochlorothiazide 37.5-25 mg (DYAZIDE) 37.5-25 mg per capsule TAKE 1 CAPSULE BY MOUTH ONCE DAILY 90 capsule 3      No current facility-administered medications for this visit.             ALLERGIES:         Review of patient's allergies indicates:   Allergen Reactions    Vicodin [hydrocodone-acetaminophen] Other (See Comments)       Dizziness         PHYSICAL EXAMINATION:   General:  This is a well appearing female with appropriate speech, affect and gait.     Breast:  Incision clean, dry, and  intact - healed well with minimal scarring. No tenderness on palpation, no lumps or masses. No costochondral tenderness on palpation.     IMPRESSION:   The patient has had an uneventful postoperative course.     PLAN:   1. Follow up PRN  2. Considered aromatase inhibitor effect on joint discomfort  3. Recommend monthly self exam, use tylenol/NSAIDs to control occasional pain  I have personally taken the history and examined this patient and agree with the note as stated above.  Hx as above.  R mastectomy in 2013. No reconstruction, no XRT, no chemotherapy.  On AI for her IDC  Last MMG 5/18 was WNL.  Pt with occasional intermittent sharp pain of R chest wall 4 out of 10.  No suspicious masses, skin changes, or LAD along R chest wall or axilla.  Mastectomy site well healed with no suspicious clinical findings.  Left breast and axilla WNL  Reassurance.  Clinical findings WNL.  Perhaps some chest wall bone / joint discomforet secondary to AI use.  F/U prn  NEELIMA.

## 2019-01-17 ENCOUNTER — OFFICE VISIT (OUTPATIENT)
Dept: DERMATOLOGY | Facility: CLINIC | Age: 74
End: 2019-01-17
Payer: MEDICARE

## 2019-01-17 DIAGNOSIS — L82.1 SK (SEBORRHEIC KERATOSIS): ICD-10-CM

## 2019-01-17 DIAGNOSIS — L91.8 SKIN TAG: Primary | ICD-10-CM

## 2019-01-17 DIAGNOSIS — R20.9 SKIN SENSATION DISTURBANCE: ICD-10-CM

## 2019-01-17 PROCEDURE — 99201 PR OFFICE/OUTPT VISIT,NEW,LEVL I: ICD-10-PCS | Mod: 25,HCNC,S$GLB, | Performed by: DERMATOLOGY

## 2019-01-17 PROCEDURE — 1101F PR PT FALLS ASSESS DOC 0-1 FALLS W/OUT INJ PAST YR: ICD-10-PCS | Mod: CPTII,HCNC,S$GLB, | Performed by: DERMATOLOGY

## 2019-01-17 PROCEDURE — 99201 PR OFFICE/OUTPT VISIT,NEW,LEVL I: CPT | Mod: 25,HCNC,S$GLB, | Performed by: DERMATOLOGY

## 2019-01-17 PROCEDURE — 99999 PR PBB SHADOW E&M-EST. PATIENT-LVL II: CPT | Mod: PBBFAC,HCNC,, | Performed by: DERMATOLOGY

## 2019-01-17 PROCEDURE — 11200 PR REMOVAL OF SKIN TAGS, UP TO 15: ICD-10-PCS | Mod: HCNC,S$GLB,, | Performed by: DERMATOLOGY

## 2019-01-17 PROCEDURE — 99999 PR PBB SHADOW E&M-EST. PATIENT-LVL II: ICD-10-PCS | Mod: PBBFAC,HCNC,, | Performed by: DERMATOLOGY

## 2019-01-17 PROCEDURE — 11200 RMVL SKIN TAGS UP TO&INC 15: CPT | Mod: HCNC,S$GLB,, | Performed by: DERMATOLOGY

## 2019-01-17 PROCEDURE — 1101F PT FALLS ASSESS-DOCD LE1/YR: CPT | Mod: CPTII,HCNC,S$GLB, | Performed by: DERMATOLOGY

## 2019-01-17 NOTE — PROGRESS NOTES
Subjective:       Patient ID:  Odessa Vaughn is a 73 y.o. female who presents for   Chief Complaint   Patient presents with    Lesion     Pt c/o dark colored lesions on neck x many years. Not Getting larger.  No bleeding, pain or prev tx. Does have several other lesions that get irritated by her chain and clothing around her neck         Review of Systems   Skin: Negative for tendency to form keloidal scars.   Hematologic/Lymphatic: Does not bruise/bleed easily.        Objective:    Physical Exam   Constitutional: She appears well-developed and well-nourished. No distress.   Neurological: She is alert and oriented to person, place, and time. She is not disoriented.   Psychiatric: She has a normal mood and affect.   Skin:   Areas Examined (abnormalities noted in diagram):   Head / Face Inspection Performed  Neck Inspection Performed  Chest / Axilla Inspection Performed             Diagram Legend     Erythematous scaling macule/papule c/w actinic keratosis       Vascular papule c/w angioma      Pigmented verrucoid papule/plaque c/w seborrheic keratosis      Yellow umbilicated papule c/w sebaceous hyperplasia      Irregularly shaped tan macule c/w lentigo     1-2 mm smooth white papules consistent with Milia      Movable subcutaneous cyst with punctum c/w epidermal inclusion cyst      Subcutaneous movable cyst c/w pilar cyst      Firm pink to brown papule c/w dermatofibroma      Pedunculated fleshy papule(s) c/w skin tag(s)      Evenly pigmented macule c/w junctional nevus     Mildly variegated pigmented, slightly irregular-bordered macule c/w mildly atypical nevus      Flesh colored to evenly pigmented papule c/w intradermal nevus       Pink pearly papule/plaque c/w basal cell carcinoma      Erythematous hyperkeratotic cursted plaque c/w SCC      Surgical scar with no sign of skin cancer recurrence      Open and closed comedones      Inflammatory papules and pustules      Verrucoid papule consistent  consistent with wart     Erythematous eczematous patches and plaques     Dystrophic onycholytic nail with subungual debris c/w onychomycosis     Umbilicated papule    Erythematous-base heme-crusted tan verrucoid plaque consistent with inflamed seborrheic keratosis     Erythematous Silvery Scaling Plaque c/w Psoriasis     See annotation      Assessment / Plan:        Skin tag  Skin sensation disturbance  Verbal consent obtained. 5 lesions removed with scissor snip removal after anesthesia with 1% lidocaine with epinephrine. Hemostasis achieved with aluminum chloride and hyfrecation. No complications.  Discussed with patient that this procedure may not be covered by insurance as it can be considered cosmetic and pt would like to pursue treatment.  He/she understands that he/she  may be responsible for the bill and agrees to pay.     SK (seborrheic keratosis)  These are benign inherited growths without a malignant potential. Reassurance given to patient. No treatment is necessary.                  Follow-up if symptoms worsen or fail to improve.

## 2019-01-17 NOTE — LETTER
January 17, 2019      Bryce Perkins MD  [address] [facility] [address]           [place] [facility]  [address]  [place] [address]  Phone: [phone]  Fax: [phone]          Patient: Odessa Vaughn   MR Number: [id number]   YOB: 1945   Date of Visit: 1/17/2019       Dear Dr. Bryce Perkins:    Thank you for referring Odessa Vaughn to me for evaluation. Attached you will find relevant portions of my assessment and plan of care.    If you have questions, please do not hesitate to call me. I look forward to following Odessa Vaughn along with you.    Sincerely,    Dulce Craft MD    Enclosure  CC:  No Recipients    If you would like to receive this communication electronically, please contact [email or web address] or [phone] to request more information on [employer] Link access.    For providers and/or their staff who would like to refer a patient to [facility], please contact us through our one-stop-shop provider referral line, [facility], at [phone].    If you feel you have received this communication in error or would no longer like to receive these types of communications, please e-mail [email or web address]

## 2019-01-22 ENCOUNTER — TELEPHONE (OUTPATIENT)
Dept: SURGERY | Facility: CLINIC | Age: 74
End: 2019-01-22

## 2019-01-22 NOTE — TELEPHONE ENCOUNTER
----- Message from Buffy Youssef sent at 1/22/2019  1:01 PM CST -----  Contact: Self  Patient requesting for order for [employer] to be faxed over. Says she was in clinic last week to get the order signed and Dr. Ibarra was not in office.  Needs order for new bras.  Attn: Janet  Fax to: [phone]    Patient can be reached at [phone]

## 2019-02-08 DIAGNOSIS — C50.511 BREAST CANCER OF LOWER-OUTER QUADRANT OF RIGHT FEMALE BREAST: ICD-10-CM

## 2019-02-08 RX ORDER — ANASTROZOLE 1 MG/1
TABLET ORAL
Qty: 90 TABLET | Refills: 0 | OUTPATIENT
Start: 2019-02-08

## 2019-02-28 DIAGNOSIS — C50.511 BREAST CANCER OF LOWER-OUTER QUADRANT OF RIGHT FEMALE BREAST: ICD-10-CM

## 2019-02-28 RX ORDER — ANASTROZOLE 1 MG/1
TABLET ORAL
Qty: 90 TABLET | Refills: 3 | Status: SHIPPED | OUTPATIENT
Start: 2019-02-28 | End: 2020-02-24

## 2019-04-22 ENCOUNTER — TELEPHONE (OUTPATIENT)
Dept: HEMATOLOGY/ONCOLOGY | Facility: CLINIC | Age: 74
End: 2019-04-22

## 2019-04-22 NOTE — TELEPHONE ENCOUNTER
Spoke with pt to get her 5/3/19 appointment reschedule. Patient tentatively rescheduled to 5/14/19. Informed pt if she is still out of town at this time to call back to the clinic to get rescheduled.     ----- Message from Salma Cai sent at 4/22/2019  2:09 PM CDT -----  Contact: PT  Needs Advice    Reason for call:Patient is calling to speak to someone she left a message that she needed to reschedule the appt for 05/03/19 because she was out of town.        Communication Preference:[phone]    Additional Information:

## 2019-04-25 ENCOUNTER — PES CALL (OUTPATIENT)
Dept: ADMINISTRATIVE | Facility: CLINIC | Age: 74
End: 2019-04-25

## 2019-05-28 DIAGNOSIS — E78.5 HYPERLIPIDEMIA, UNSPECIFIED HYPERLIPIDEMIA TYPE: ICD-10-CM

## 2019-05-28 RX ORDER — ATORVASTATIN CALCIUM 10 MG/1
TABLET, FILM COATED ORAL
Qty: 90 TABLET | Refills: 0 | Status: SHIPPED | OUTPATIENT
Start: 2019-05-28 | End: 2019-08-27 | Stop reason: SDUPTHER

## 2019-06-14 RX ORDER — TRIAMTERENE AND HYDROCHLOROTHIAZIDE 37.5; 25 MG/1; MG/1
CAPSULE ORAL
Qty: 90 CAPSULE | Refills: 0 | Status: SHIPPED | OUTPATIENT
Start: 2019-06-14 | End: 2019-08-27 | Stop reason: SDUPTHER

## 2019-06-24 ENCOUNTER — TELEPHONE (OUTPATIENT)
Dept: HEMATOLOGY/ONCOLOGY | Facility: CLINIC | Age: 74
End: 2019-06-24

## 2019-06-24 NOTE — TELEPHONE ENCOUNTER
----- Message from Sharlene Ortega sent at 6/24/2019 10:50 AM CDT -----  Please call pt at [phone]. She needs to reschedule the 6mth checkup appt she cancelled last month and then see if there's something sooner for a mammo. 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 Please call her to confirm before scheduling.)

## 2019-07-10 ENCOUNTER — PATIENT OUTREACH (OUTPATIENT)
Dept: ADMINISTRATIVE | Facility: OTHER | Age: 74
End: 2019-07-10

## 2019-07-15 ENCOUNTER — HOSPITAL ENCOUNTER (OUTPATIENT)
Dept: RADIOLOGY | Facility: HOSPITAL | Age: 74
Discharge: HOME | End: 2019-07-15
Attending: OBSTETRICS & GYNECOLOGY
Payer: MEDICARE

## 2019-07-15 ENCOUNTER — OFFICE VISIT (OUTPATIENT)
Dept: OBSTETRICS AND GYNECOLOGY | Facility: CLINIC | Age: 74
End: 2019-07-15
Payer: MEDICARE

## 2019-07-15 ENCOUNTER — OFFICE VISIT (OUTPATIENT)
Dept: HEMATOLOGY/ONCOLOGY | Facility: CLINIC | Age: 74
End: 2019-07-15
Payer: MEDICARE

## 2019-07-15 VITALS
BODY MASS INDEX: 27.82 KG/M2 | DIASTOLIC BLOOD PRESSURE: 74 MMHG | SYSTOLIC BLOOD PRESSURE: 116 MMHG | WEIGHT: 167 LBS | HEIGHT: 65 IN

## 2019-07-15 VITALS
HEART RATE: 84 BPM | TEMPERATURE: 98 F | OXYGEN SATURATION: 99 % | BODY MASS INDEX: 27.92 KG/M2 | SYSTOLIC BLOOD PRESSURE: 149 MMHG | WEIGHT: 167.75 LBS | RESPIRATION RATE: 20 BRPM | DIASTOLIC BLOOD PRESSURE: 68 MMHG

## 2019-07-15 DIAGNOSIS — N95.9 MENOPAUSAL AND PERIMENOPAUSAL DISORDER: ICD-10-CM

## 2019-07-15 DIAGNOSIS — Z01.419 VISIT FOR GYNECOLOGIC EXAMINATION: Primary | ICD-10-CM

## 2019-07-15 DIAGNOSIS — Z85.3 HX OF BREAST CANCER: Primary | ICD-10-CM

## 2019-07-15 DIAGNOSIS — Z85.3 HX OF BREAST CANCER: ICD-10-CM

## 2019-07-15 PROCEDURE — 3078F PR MOST RECENT DIASTOLIC BLOOD PRESSURE < 80 MM HG: ICD-10-PCS | Mod: HCNC,CPTII,S$GLB, | Performed by: PHYSICIAN ASSISTANT

## 2019-07-15 PROCEDURE — G0101 PR CA SCREEN;PELVIC/BREAST EXAM: ICD-10-PCS | Mod: HCNC,S$GLB,, | Performed by: OBSTETRICS & GYNECOLOGY

## 2019-07-15 PROCEDURE — 77065 DX MAMMO INCL CAD UNI: CPT | Mod: TC,HCNC,PO,LT

## 2019-07-15 PROCEDURE — 3078F DIAST BP <80 MM HG: CPT | Mod: HCNC,CPTII,S$GLB, | Performed by: PHYSICIAN ASSISTANT

## 2019-07-15 PROCEDURE — 1101F PT FALLS ASSESS-DOCD LE1/YR: CPT | Mod: HCNC,CPTII,S$GLB, | Performed by: PHYSICIAN ASSISTANT

## 2019-07-15 PROCEDURE — 3074F SYST BP LT 130 MM HG: CPT | Mod: HCNC,CPTII,S$GLB, | Performed by: PHYSICIAN ASSISTANT

## 2019-07-15 PROCEDURE — 77065 MAMMO DIGITAL DIAGNOSTIC LEFT WITH TOMOSYNTHESIS_CAD: ICD-10-PCS | Mod: 26,HCNC,LT, | Performed by: RADIOLOGY

## 2019-07-15 PROCEDURE — 99213 OFFICE O/P EST LOW 20 MIN: CPT | Mod: HCNC,S$GLB,, | Performed by: PHYSICIAN ASSISTANT

## 2019-07-15 PROCEDURE — 3074F PR MOST RECENT SYSTOLIC BLOOD PRESSURE < 130 MM HG: ICD-10-PCS | Mod: HCNC,CPTII,S$GLB, | Performed by: PHYSICIAN ASSISTANT

## 2019-07-15 PROCEDURE — 99999 PR PBB SHADOW E&M-EST. PATIENT-LVL III: ICD-10-PCS | Mod: PBBFAC,HCNC,, | Performed by: PHYSICIAN ASSISTANT

## 2019-07-15 PROCEDURE — G0101 CA SCREEN;PELVIC/BREAST EXAM: HCPCS | Mod: HCNC,S$GLB,, | Performed by: OBSTETRICS & GYNECOLOGY

## 2019-07-15 PROCEDURE — 77061 MAMMO DIGITAL DIAGNOSTIC LEFT WITH TOMOSYNTHESIS_CAD: ICD-10-PCS | Mod: 26,HCNC,LT, | Performed by: RADIOLOGY

## 2019-07-15 PROCEDURE — 77061 BREAST TOMOSYNTHESIS UNI: CPT | Mod: TC,HCNC,PO,LT

## 2019-07-15 PROCEDURE — 99999 PR PBB SHADOW E&M-EST. PATIENT-LVL III: ICD-10-PCS | Mod: PBBFAC,HCNC,, | Performed by: OBSTETRICS & GYNECOLOGY

## 2019-07-15 PROCEDURE — 99999 PR PBB SHADOW E&M-EST. PATIENT-LVL III: CPT | Mod: PBBFAC,HCNC,, | Performed by: PHYSICIAN ASSISTANT

## 2019-07-15 PROCEDURE — 1101F PR PT FALLS ASSESS DOC 0-1 FALLS W/OUT INJ PAST YR: ICD-10-PCS | Mod: HCNC,CPTII,S$GLB, | Performed by: PHYSICIAN ASSISTANT

## 2019-07-15 PROCEDURE — 77065 DX MAMMO INCL CAD UNI: CPT | Mod: 26,HCNC,LT, | Performed by: RADIOLOGY

## 2019-07-15 PROCEDURE — 99999 PR PBB SHADOW E&M-EST. PATIENT-LVL III: CPT | Mod: PBBFAC,HCNC,, | Performed by: OBSTETRICS & GYNECOLOGY

## 2019-07-15 PROCEDURE — 99213 PR OFFICE/OUTPT VISIT, EST, LEVL III, 20-29 MIN: ICD-10-PCS | Mod: HCNC,S$GLB,, | Performed by: PHYSICIAN ASSISTANT

## 2019-07-15 PROCEDURE — 77061 BREAST TOMOSYNTHESIS UNI: CPT | Mod: 26,HCNC,LT, | Performed by: RADIOLOGY

## 2019-07-15 NOTE — PROGRESS NOTES
HISTORY OF PRESENT ILLNESS:    Odessa Bauman is a 74 y.o. female , presents for a routine exam and has no complaints.  L DX MAMMO IS DUE AND PAP NOT INDICATED.  RECENT DEXA - MILD OSTEOPENIA  TRIP TO [facility], WILL SEE [id number] AUNT AT THE END OF THIS WEEK.  THEN [place] TO SELL HER HOUSE THERE.    LAST VISIT 2018:   L DX MAMMO WAS NORMAL AT THE END OF LAST MONTH; HAS BEEN 5 YRS SINCE BR CA DIAGNOSIS.   WITH RECENT BOUT OF SHINGLES, NOW FEELING BETTER  HAS HAD SOME MID LEFT BACK PIN, BUT HAS BEEN GETTING BETTER WITH REST, BIOFREEZE  LAST VISIT 3/2017:  DIAGNOSTIC MAMMO HAS BEEN ORDERED, PAP NORMAL LAST YEAR, REPEAT NOT INDICATED.  NO GYN C/O AND ALMOST 5 YRS SINCE DIAGNOSIS  LAST VISIT 2016:  MAMMO HAS BEEN ORDERED AND LAST PAP , PT WITH CANCER HX REQUESTS  OCCASIONAL VAGINAL ITCHING AND REQUESTS REFILL PRN LOTRISONE  LAST VISIT 2013:  AFTER VISIT LAST YEAR BREAST CANCER DIAGNOSED, TREATED. PAP IS UP TO DATE AND HAD BREAST F/U, MAMMO TODAY  LAST VISIT 2012:  DOING WELL AND STILL LOOKING AFTER HER GRANDKIDS (MOM IS LUC BAUMAN). REF MAMMO AND BMD UP TO DATE UNTIL .   PAP SUBMITTED - LAST ONE. DISCUSSED NEW SCREENING RECOMMENDATIONS, BUT PT WANTS LAST PAP DONE . . .    Past Medical History:   Diagnosis Date    Arthritis     hands, legs    Breast cancer 2012    Right breast invasive ductal carcinoma, ER positive, MD and Her2 negative    Bursitis of left hip     resolved    Chronic midline low back pain with left-sided sciatica 2017    Essential hypertension 2015    Hyperlipidemia 9/15/2014       Past Surgical History:   Procedure Laterality Date    BIOPSY, LYMPH NODE, SENTINEL Right 2013    [facility] 2ND FLR    BREAST BIOPSY  2012    Right breast- IDC    BREAST SURGERY Right 2013    right mastectomy, SN biopsy     COLONOSCOPY N/A 2017    Performed by [facility]ed [facility] at [facility] ENDO (4TH FLR)     COLONOSCOPY N/A 6/6/2013    Performed [facility] at [facility] ENDO (4TH FLR)    DILATION AND CURETTAGE OF UTERUS      INJECTION, FOR SENTINEL NODE IDENTIFICATION Right 1/2/2013    [facility] 2ND FLR    LYMPHADENECTOMY, AXILLARY Right 1/2/2013    [facility] 2ND FLR    MASTECTOMY  1/13    MASTECTOMY, UNILATERAL Right 1/2/2013    Performed by [facility] at [facility] OR 2ND FLR        MEDICATIONS AND ALLERGIES:      Current Outpatient Medications:     anastrozole (ARIMIDEX) 1 mg Tab, TAKE 1 TABLET BY MOUTH EVERY DAY, Disp: 90 tablet, Rfl: 3    aspirin 81 MG Chew, Take 81 mg by mouth once daily.  , Disp: , Rfl:     atorvastatin (LIPITOR) 10 MG tablet, TAKE 1 TABLET(10 MG) BY MOUTH EVERY DAY, Disp: 90 tablet, Rfl: 0    multivitamin capsule, Take 1 capsule by mouth once daily., Disp: , Rfl:     PROPYLENE GLYCOL//PF (SYSTANE, PF, OPHT), Apply to eye daily as needed. , Disp: , Rfl:     triamterene-hydrochlorothiazide 37.5-25 mg (DYAZIDE) 37.5-25 mg per capsule, TAKE 1 CAPSULE BY MOUTH ONCE DAILY, Disp: 90 capsule, Rfl: 0    Review of patient's allergies indicates:   Allergen Reactions    Vicodin [hydrocodone-acetaminophen] Other (See Comments)     Dizziness       Family History   Problem Relation Age of Onset    Cancer Father         Pancreatic CA    Cataracts Father     Breast cancer Cousin 58    Breast cancer Cousin 58    No Known Problems Mother     Hypertension Sister     Arthritis Brother     No Known Problems Daughter     COPD Sister     No Known Problems Brother     No Known Problems Brother     No Known Problems Brother     No Known Problems Daughter     Breast cancer Paternal Aunt 55    Cancer Paternal Aunt         Breast Ca and Lung CA    No Known Problems Maternal Aunt     No Known Problems Maternal Uncle     No Known Problems Paternal Uncle     No Known Problems Maternal Grandmother      No Known Problems Maternal Grandfather     No Known Problems Paternal Grandmother     No Known Problems Paternal Grandfather     Ovarian cancer Neg Hx     Amblyopia Neg Hx     Blindness Neg Hx     Diabetes Neg Hx     Glaucoma Neg Hx     Macular degeneration Neg Hx     Retinal detachment Neg Hx     Strabismus Neg Hx     Stroke Neg Hx     Thyroid disease Neg Hx     Osteoarthritis Neg Hx     Rheum arthritis Neg Hx        Social History     Socioeconomic History    Marital status:      Spouse name: Not on file    Number of children: Not on file    Years of education: Not on file    Highest education level: Not on file   Occupational History    Not on file   Social Needs    Financial resource strain: Not on file    Food insecurity:     Worry: Not on file     Inability: Not on file    Transportation needs:     Medical: Not on file     Non-medical: Not on file   Tobacco Use    Smoking status: Never Smoker    Smokeless tobacco: Never Used    Tobacco comment: Retired;    Substance and Sexual Activity    Alcohol use: Yes     Alcohol/week: 0.0 oz     Comment: holiday - occasional wine    Drug use: No    Sexual activity: Yes     Partners: Male   Lifestyle    Physical activity:     Days per week: Not on file     Minutes per session: Not on file    Stress: Not on file   Relationships    Social connections:     Talks on phone: Not on file     Gets together: Not on file     Attends Scientology service: Not on file     Active member of club or organization: Not on file     Attends meetings of clubs or organizations: Not on file     Relationship status: Not on file   Other Topics Concern    Not on file   Social History Narrative    Not on file       COMPREHENSIVE GYN HISTORY:  PAP History:  Denies abnormal Paps except a noted above.  Infection History: Denies STDs. Denies PID.  Benign History: Denies uterine fibroids. Denies ovarian cysts. Denies endometriosis.  Denies other  "conditions.  Cancer History: Denies cervical cancer. Denies uterine cancer or hyperplasia. Denies ovarian cancer. Denies vulvar cancer or pre-cancer. Denies vaginal cancer or pre-cancer. Denies breast cancer. Denies colon cancer.    ROS:  GENERAL: No weight changes. No swelling. No fatigue. No fever.  CARDIOVASCULAR: No chest pain. No shortness of breath. No leg cramps.   NEUROLOGICAL: No headaches. No vision changes.  BREASTS: No pain. No lumps. No discharge.  ABDOMEN: No pain. No nausea. No vomiting. No diarrhea. No constipation.  REPRODUCTIVE: No abnormal bleeding.   VULVA: No pain. No lesions. No itching.  VAGINA: No relaxation. No itching. No odor. No discharge. No lesions.  URINARY: No incontinence. No nocturia. No frequency. No dysuria.    /74   Ht 5' 5" (1.651 m)   Wt 75.8 kg (167 lb)   BMI 27.79 kg/m²     PE:  APPEARANCE: Well nourished, well developed, in no acute distress.  AFFECT: WNL, alert and oriented x 3.  SKIN: No hirsutism or acne.  NECK: Neck symmetric without masses or thyromegaly.  NODES: No inguinal, cervical, axillary or femoral lymph node enlargement.  CHEST: Good respiratory effort.   ABDOMEN: Soft. No tenderness or masses. No hepatosplenomegaly. No hernias.  BREASTS: Symmetrical, no skin changes or visible lesions. No palpable masses, nipple discharge bilaterally.  PELVIC: ATROPHIC EXTERNAL FEMALE GENITALIA without lesions. Normal hair distribution. Adequate perineal body, normal urethral meatus. VAGINA DRY without lesions or discharge. CERVIX STENOTIC without lesions, discharge or tenderness. No significant cystocele or rectocele. Bimanual exam shows uterus to be normal size, regular, mobile and nontender. Adnexa without masses or tenderness.  EXTREMITIES: No edema.    PROCEDURES:    DIAGNOSIS:  1. Visit for gynecologic examination     2. Menopausal and perimenopausal disorder     3. Hx of breast cancer  Mammo Digital Diagnostic Left with Randal       PLAN:    LABS AND TESTS " ORDERED:  Mammogram    COUNSELING:  The patient was counseled today on osteoporosis prevention, calcium supplementation, and regular weight bearing exercise. The patient was also counseled today on ACS PAP guidelines, with recommendations for yearly pelvic exams unless their uterus, cervix, and ovaries were removed for benign reasons; in that case, examinations every 3-5 years are recommended.  The patient was also counseled regarding monthly breast self-examination, routine STD screening for at-risk populations, prophylactic immunizations for transmitted infections such as  HPV, Pertussis, or Influenza as appropriate, and yearly mammograms when indicated by ACS guidelines.  She was advised to see her primary care physician for all other health maintenance.    FOLLOW-UP with me annually.

## 2019-07-15 NOTE — PROGRESS NOTES
Subjective:       Patient ID: Odessa Vaughn is a 74 y.o. female.    Chief Complaint: Malignant neoplasm of lower-outer quadrant of right female b    Mrs. 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 She has been on anastrozole since 2/2013.      Overall, she has been doing well. She has some generalized arthralgias that pre-date anastrazole but has noticed some worsening arthritis in her hands. . She denies any shortness of breath.  Appetite and bowel function have been stable.  Occasional chest wall discomfort at mastectomy site but otherwise doing well.   She keeps busy with her two grandchildren here. Traveling to [place] and [facility] next week with family.       Mammogram from today was unremarkable.     Review of Systems   Constitutional: Negative.    HENT: Negative for congestion, rhinorrhea, sore throat and trouble swallowing.    Eyes: Negative for visual disturbance.   Respiratory: Negative for cough, chest tightness and shortness of breath.    Cardiovascular: Negative for chest pain, palpitations and leg swelling.   Gastrointestinal: Negative for abdominal pain, blood in stool, constipation, diarrhea, nausea and vomiting.   Genitourinary: Negative for dysuria, hematuria and vaginal bleeding.   Musculoskeletal: Positive for arthralgias (most noticeable in hands, worse in morning). Negative for back pain and myalgias.   Skin: Negative for pallor and rash.   Neurological: Negative for dizziness, weakness and headaches.   Hematological: Negative for adenopathy. Does not bruise/bleed easily.   Psychiatric/Behavioral: Negative for dysphoric mood and suicidal ideas. The patient is not nervous/anxious.        Objective:      Physical Exam   Constitutional: She is oriented to person, place, and time. She appears well-developed and well-nourished. No distress.   Presents alone     HENT:   Head: Normocephalic.   Mouth/Throat: Oropharynx is clear and  moist. No oropharyngeal exudate.   Eyes: Pupils are equal, round, and reactive to light. Conjunctivae are normal. No scleral icterus.   Neck: Normal range of motion. Neck supple. No thyromegaly present.   Cardiovascular: Normal rate and regular rhythm.   Pulmonary/Chest: Effort normal and breath sounds normal. No respiratory distress.   S/p right mastectomy; no chest wall mass, nodule or skin changes. Left breast without mass,nodule or skin changes. No axillary or supraclavicular adenopathy.   Abdominal: Soft. Bowel sounds are normal. She exhibits no distension and no mass. There is no tenderness.   Musculoskeletal: Normal range of motion. She exhibits no edema or tenderness.   No spinal or paraspinal tenderness to palpation     Lymphadenopathy:     She has no cervical adenopathy.   Neurological: She is alert and oriented to person, place, and time. No cranial nerve deficit.   Skin: Skin is warm and dry.   Psychiatric: She has a normal mood and affect. Her behavior is normal. Thought content normal.   Vitals reviewed.      Assessment:       1. Hx of breast cancer        Plan:       Continue Anastrazole and return to clinic in 6 months.  Annual mammogram due today following this appointment  RX for mastectomy bras and prosthesis provided to patient.

## 2019-08-27 DIAGNOSIS — E78.5 HYPERLIPIDEMIA, UNSPECIFIED HYPERLIPIDEMIA TYPE: ICD-10-CM

## 2019-08-27 RX ORDER — ATORVASTATIN CALCIUM 10 MG/1
TABLET, FILM COATED ORAL
Qty: 90 TABLET | Refills: 0 | Status: SHIPPED | OUTPATIENT
Start: 2019-08-27 | End: 2019-08-28 | Stop reason: SDUPTHER

## 2019-08-27 RX ORDER — TRIAMTERENE AND HYDROCHLOROTHIAZIDE 37.5; 25 MG/1; MG/1
CAPSULE ORAL
Qty: 90 CAPSULE | Refills: 0 | Status: SHIPPED | OUTPATIENT
Start: 2019-08-27 | End: 2019-08-28 | Stop reason: SDUPTHER

## 2019-08-28 ENCOUNTER — OFFICE VISIT (OUTPATIENT)
Dept: INTERNAL MEDICINE | Facility: CLINIC | Age: 74
End: 2019-08-28
Payer: MEDICARE

## 2019-08-28 ENCOUNTER — LAB VISIT (OUTPATIENT)
Dept: LAB | Facility: HOSPITAL | Age: 74
End: 2019-08-28
Attending: INTERNAL MEDICINE
Payer: MEDICARE

## 2019-08-28 VITALS
HEIGHT: 65 IN | HEART RATE: 67 BPM | SYSTOLIC BLOOD PRESSURE: 100 MMHG | WEIGHT: 167 LBS | BODY MASS INDEX: 27.82 KG/M2 | DIASTOLIC BLOOD PRESSURE: 54 MMHG

## 2019-08-28 DIAGNOSIS — I70.0 ATHEROSCLEROSIS OF AORTA: ICD-10-CM

## 2019-08-28 DIAGNOSIS — R25.2 BILATERAL LEG CRAMPS: ICD-10-CM

## 2019-08-28 DIAGNOSIS — R60.1 GENERALIZED EDEMA: ICD-10-CM

## 2019-08-28 DIAGNOSIS — M47.26 OSTEOARTHRITIS OF SPINE WITH RADICULOPATHY, LUMBAR REGION: ICD-10-CM

## 2019-08-28 DIAGNOSIS — E78.49 OTHER HYPERLIPIDEMIA: ICD-10-CM

## 2019-08-28 DIAGNOSIS — Z17.0 MALIGNANT NEOPLASM OF LOWER-OUTER QUADRANT OF RIGHT BREAST OF FEMALE, ESTROGEN RECEPTOR POSITIVE: ICD-10-CM

## 2019-08-28 DIAGNOSIS — D75.839 THROMBOCYTOSIS: ICD-10-CM

## 2019-08-28 DIAGNOSIS — E78.49 OTHER HYPERLIPIDEMIA: Primary | ICD-10-CM

## 2019-08-28 DIAGNOSIS — M46.96 INFLAMMATORY SPONDYLOPATHY OF LUMBAR REGION: ICD-10-CM

## 2019-08-28 DIAGNOSIS — C50.511 MALIGNANT NEOPLASM OF LOWER-OUTER QUADRANT OF RIGHT BREAST OF FEMALE, ESTROGEN RECEPTOR POSITIVE: ICD-10-CM

## 2019-08-28 DIAGNOSIS — E66.3 OVERWEIGHT (BMI 25.0-29.9): ICD-10-CM

## 2019-08-28 DIAGNOSIS — Z85.3 HX OF BREAST CANCER: ICD-10-CM

## 2019-08-28 DIAGNOSIS — E78.5 HYPERLIPIDEMIA, UNSPECIFIED HYPERLIPIDEMIA TYPE: ICD-10-CM

## 2019-08-28 DIAGNOSIS — I10 ESSENTIAL HYPERTENSION: ICD-10-CM

## 2019-08-28 LAB
ALBUMIN SERPL BCP-MCNC: 3.6 G/DL (ref 3.5–5.2)
ALP SERPL-CCNC: 99 U/L (ref 55–135)
ALT SERPL W/O P-5'-P-CCNC: 24 U/L (ref 10–44)
ANION GAP SERPL CALC-SCNC: 7 MMOL/L (ref 8–16)
AST SERPL-CCNC: 19 U/L (ref 10–40)
BASOPHILS # BLD AUTO: 0.05 K/UL (ref 0–0.2)
BASOPHILS NFR BLD: 0.7 % (ref 0–1.9)
BILIRUB SERPL-MCNC: 0.3 MG/DL (ref 0.1–1)
BUN SERPL-MCNC: 20 MG/DL (ref 8–23)
CALCIUM SERPL-MCNC: 9.7 MG/DL (ref 8.7–10.5)
CHLORIDE SERPL-SCNC: 103 MMOL/L (ref 95–110)
CHOLEST SERPL-MCNC: 161 MG/DL (ref 120–199)
CHOLEST/HDLC SERPL: 3.4 {RATIO} (ref 2–5)
CO2 SERPL-SCNC: 30 MMOL/L (ref 23–29)
CREAT SERPL-MCNC: 1 MG/DL (ref 0.5–1.4)
DIFFERENTIAL METHOD: ABNORMAL
EOSINOPHIL # BLD AUTO: 0.2 K/UL (ref 0–0.5)
EOSINOPHIL NFR BLD: 3.1 % (ref 0–8)
ERYTHROCYTE [DISTWIDTH] IN BLOOD BY AUTOMATED COUNT: 14.1 % (ref 11.5–14.5)
EST. GFR  (AFRICAN AMERICAN): >60 ML/MIN/1.73 M^2
EST. GFR  (NON AFRICAN AMERICAN): 55 ML/MIN/1.73 M^2
GLUCOSE SERPL-MCNC: 91 MG/DL (ref 70–110)
HCT VFR BLD AUTO: 37.2 % (ref 37–48.5)
HDLC SERPL-MCNC: 48 MG/DL (ref 40–75)
HDLC SERPL: 29.8 % (ref 20–50)
HGB BLD-MCNC: 11.8 G/DL (ref 12–16)
LDLC SERPL CALC-MCNC: 94.6 MG/DL (ref 63–159)
LYMPHOCYTES # BLD AUTO: 2.7 K/UL (ref 1–4.8)
LYMPHOCYTES NFR BLD: 37.3 % (ref 18–48)
MAGNESIUM SERPL-MCNC: 2.1 MG/DL (ref 1.6–2.6)
MCH RBC QN AUTO: 27.4 PG (ref 27–31)
MCHC RBC AUTO-ENTMCNC: 31.7 G/DL (ref 32–36)
MCV RBC AUTO: 86 FL (ref 82–98)
MONOCYTES # BLD AUTO: 0.4 K/UL (ref 0.3–1)
MONOCYTES NFR BLD: 6.2 % (ref 4–15)
NEUTROPHILS # BLD AUTO: 3.8 K/UL (ref 1.8–7.7)
NEUTROPHILS NFR BLD: 52.7 % (ref 38–73)
NONHDLC SERPL-MCNC: 113 MG/DL
PLATELET # BLD AUTO: 358 K/UL (ref 150–350)
PMV BLD AUTO: 8.2 FL (ref 9.2–12.9)
POTASSIUM SERPL-SCNC: 3.8 MMOL/L (ref 3.5–5.1)
PROT SERPL-MCNC: 7.7 G/DL (ref 6–8.4)
RBC # BLD AUTO: 4.31 M/UL (ref 4–5.4)
SODIUM SERPL-SCNC: 140 MMOL/L (ref 136–145)
TRIGL SERPL-MCNC: 92 MG/DL (ref 30–150)
TSH SERPL DL<=0.005 MIU/L-ACNC: 1.86 UIU/ML (ref 0.4–4)
WBC # BLD AUTO: 7.15 K/UL (ref 3.9–12.7)

## 2019-08-28 PROCEDURE — 1101F PT FALLS ASSESS-DOCD LE1/YR: CPT | Mod: HCNC,CPTII,S$GLB, | Performed by: NURSE PRACTITIONER

## 2019-08-28 PROCEDURE — 99214 PR OFFICE/OUTPT VISIT, EST, LEVL IV, 30-39 MIN: ICD-10-PCS | Mod: HCNC,S$GLB,, | Performed by: NURSE PRACTITIONER

## 2019-08-28 PROCEDURE — 80061 LIPID PANEL: CPT | Mod: HCNC

## 2019-08-28 PROCEDURE — 3078F PR MOST RECENT DIASTOLIC BLOOD PRESSURE < 80 MM HG: ICD-10-PCS | Mod: HCNC,CPTII,S$GLB, | Performed by: NURSE PRACTITIONER

## 2019-08-28 PROCEDURE — 36415 COLL VENOUS BLD VENIPUNCTURE: CPT | Mod: HCNC

## 2019-08-28 PROCEDURE — 80053 COMPREHEN METABOLIC PANEL: CPT | Mod: HCNC

## 2019-08-28 PROCEDURE — 99999 PR PBB SHADOW E&M-EST. PATIENT-LVL III: CPT | Mod: PBBFAC,HCNC,, | Performed by: NURSE PRACTITIONER

## 2019-08-28 PROCEDURE — 99999 PR PBB SHADOW E&M-EST. PATIENT-LVL III: ICD-10-PCS | Mod: PBBFAC,HCNC,, | Performed by: NURSE PRACTITIONER

## 2019-08-28 PROCEDURE — 99214 OFFICE O/P EST MOD 30 MIN: CPT | Mod: HCNC,S$GLB,, | Performed by: NURSE PRACTITIONER

## 2019-08-28 PROCEDURE — 85025 COMPLETE CBC W/AUTO DIFF WBC: CPT | Mod: HCNC

## 2019-08-28 PROCEDURE — 3078F DIAST BP <80 MM HG: CPT | Mod: HCNC,CPTII,S$GLB, | Performed by: NURSE PRACTITIONER

## 2019-08-28 PROCEDURE — 1101F PR PT FALLS ASSESS DOC 0-1 FALLS W/OUT INJ PAST YR: ICD-10-PCS | Mod: HCNC,CPTII,S$GLB, | Performed by: NURSE PRACTITIONER

## 2019-08-28 PROCEDURE — 3074F PR MOST RECENT SYSTOLIC BLOOD PRESSURE < 130 MM HG: ICD-10-PCS | Mod: HCNC,CPTII,S$GLB, | Performed by: NURSE PRACTITIONER

## 2019-08-28 PROCEDURE — 83735 ASSAY OF MAGNESIUM: CPT | Mod: HCNC

## 2019-08-28 PROCEDURE — 84443 ASSAY THYROID STIM HORMONE: CPT | Mod: HCNC

## 2019-08-28 PROCEDURE — 3074F SYST BP LT 130 MM HG: CPT | Mod: HCNC,CPTII,S$GLB, | Performed by: NURSE PRACTITIONER

## 2019-08-28 RX ORDER — ANASTROZOLE 1 MG/1
1 TABLET ORAL DAILY
Qty: 90 TABLET | Refills: 3 | Status: CANCELLED | OUTPATIENT
Start: 2019-08-28

## 2019-08-28 RX ORDER — ATORVASTATIN CALCIUM 10 MG/1
TABLET, FILM COATED ORAL
Qty: 90 TABLET | Refills: 3 | Status: SHIPPED | OUTPATIENT
Start: 2019-08-28 | End: 2020-11-23

## 2019-08-28 RX ORDER — TRIAMTERENE AND HYDROCHLOROTHIAZIDE 37.5; 25 MG/1; MG/1
1 CAPSULE ORAL DAILY
Qty: 90 CAPSULE | Refills: 3 | Status: SHIPPED | OUTPATIENT
Start: 2019-08-28 | End: 2020-09-10

## 2019-08-28 NOTE — PROGRESS NOTES
Subjective:       Patient ID: Odessa Vaughn is a 74 y.o. female.    Chief Complaint: Annual Exam    Pt of Dr. Perkins, here for annual exam.    Complains of right hand stiffness in the morning for about 2 months. Denies numbness. Reports pain in the palm of her hand. Tried ibuprofen which has helped.    Also complains of leg cramps off and on for a long time States more recently 7/23/19 was getting off of a plane. The cramping was in the back of her thighs. Denies swelling or redness.    Review of Systems   Constitutional: Negative for activity change, appetite change and unexpected weight change.   HENT: Negative for dental problem and hearing loss.    Eyes: Negative for visual disturbance.   Respiratory: Negative for apnea, cough, chest tightness and shortness of breath.    Cardiovascular: Positive for leg swelling. Negative for chest pain and palpitations.        Reports some mild edema of legs off and on   Gastrointestinal: Negative for abdominal distention, abdominal pain, anal bleeding, blood in stool, constipation, diarrhea, nausea, rectal pain and vomiting.   Endocrine: Negative for cold intolerance, heat intolerance, polydipsia, polyphagia and polyuria.   Genitourinary: Negative for difficulty urinating and hematuria.   Musculoskeletal: Positive for arthralgias. Negative for myalgias.        As documented in HPI     Skin: Negative for color change.   Allergic/Immunologic: Negative for environmental allergies, food allergies and immunocompromised state.   Neurological: Negative for dizziness, syncope, speech difficulty, weakness and numbness.   Hematological: Negative for adenopathy. Does not bruise/bleed easily.   Psychiatric/Behavioral: Negative for agitation, behavioral problems, sleep disturbance and suicidal ideas.         Review of patient's allergies indicates:   Allergen Reactions    Vicodin [hydrocodone-acetaminophen] Other (See Comments)     Dizziness     Current Outpatient Medications:      anastrozole (ARIMIDEX) 1 mg Tab, TAKE 1 TABLET BY MOUTH EVERY DAY, Disp: 90 tablet, Rfl: 3    aspirin 81 MG Chew, Take 81 mg by mouth once daily.  , Disp: , Rfl:     atorvastatin (LIPITOR) 10 MG tablet, TAKE 1 TABLET(10 MG) BY MOUTH EVERY DAY, Disp: 90 tablet, Rfl: 0    multivitamin capsule, Take 1 capsule by mouth once daily., Disp: , Rfl:     PROPYLENE GLYCOL//PF (SYSTANE, PF, OPHT), Apply to eye daily as needed. 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-sided sciatica    Osteoarthritis of spine with radiculopathy, lumbar region    Sacroiliac dysfunction    Atherosclerosis of aorta    Thrombocytosis    Lumbar facet arthropathy    Essential hypertension    Malignant neoplasm of lower-outer quadrant of right breast of female, estrogen receptor positive    Inflammatory spondylopathy of lumbar region     Past Medical History:   Diagnosis Date    Arthritis     hands, legs    Breast cancer 12/2012    Right breast invasive ductal carcinoma, ER positive, DC and Her2 negative    Bursitis of left hip 2016    resolved    Chronic midline low back pain with left-sided sciatica 6/5/2017    Essential hypertension 9/21/2015    Hyperlipidemia 9/15/2014     Past Surgical History:   Procedure Laterality Date    BIOPSY, LYMPH NODE, SENTINEL Right 1/2/2013    [facility] OR 2ND FLR    BREAST BIOPSY  12/19/2012    Right breast- IDC    BREAST SURGERY Right 1/2013    right mastectomy, SN biopsy     COLONOSCOPY N/A 9/7/2017    Performed by [facility]ed [facility] at [facility] ENDO (4TH FLR)    COLONOSCOPY N/A 6/6/2013    Performed by Syed Shah [facility] at [facility]  ENDO (4TH FLR)    DILATION AND CURETTAGE OF UTERUS      INJECTION, FOR SENTINEL NODE IDENTIFICATION Right 1/2/2013    [facility] 2ND FLR    LYMPHADENECTOMY, AXILLARY Right 1/2/2013    [facility] 2ND FLR    MASTECTOMY  1/13    MASTECTOMY, UNILATERAL Right 1/2/2013    Performed by [facility] at [facility] OR 2ND FLR     Social History     Socioeconomic History    Marital status:      Spouse name: Not on file    Number of children: Not on file    Years of education: Not on file    Highest education level: Not on file   Occupational History    Not on file   Social Needs    Financial resource strain: Not on file    Food insecurity:     Worry: Not on file     Inability: Not on file    Transportation needs:     Medical: Not on file     Non-medical: Not on file   Tobacco Use    Smoking status: Never Smoker    Smokeless tobacco: Never Used    Tobacco comment: Retired;    Substance and Sexual Activity    Alcohol use: Yes     Alcohol/week: 0.0 oz     Comment: holiday - occasional wine    Drug use: No    Sexual activity: Yes     Partners: Male   Lifestyle    Physical activity:     Days per week: Not on file     Minutes per session: Not on file    Stress: Not on file   Relationships    Social connections:     Talks on phone: Not on file     Gets together: Not on file     Attends Voodoo service: Not on file     Active member of club or organization: Not on file     Attends meetings of clubs or organizations: Not on file     Relationship status: Not on file   Other Topics Concern    Not on file   Social History Narrative    Not on file     Family History   Problem Relation Age of Onset    Cancer Father         Pancreatic CA    Cataracts Father     Breast cancer Cousin 58    Breast cancer Cousin 58    No Known Problems Mother     Hypertension Sister     Arthritis Brother     No Known Problems  "Daughter     COPD Sister     No Known Problems Brother     No Known Problems Brother     No Known Problems Brother     No Known Problems Daughter     Breast cancer Paternal Aunt 55    Cancer Paternal Aunt         Breast Ca and Lung CA    No Known Problems Maternal Aunt     No Known Problems Maternal Uncle     No Known Problems Paternal Uncle     No Known Problems Maternal Grandmother     No Known Problems Maternal Grandfather     No Known Problems Paternal Grandmother     No Known Problems Paternal Grandfather     Ovarian cancer Neg Hx     Amblyopia Neg Hx     Blindness Neg Hx     Diabetes Neg Hx     Glaucoma Neg Hx     Macular degeneration Neg Hx     Retinal detachment Neg Hx     Strabismus Neg Hx     Stroke Neg Hx     Thyroid disease Neg Hx     Osteoarthritis Neg Hx     Rheum arthritis Neg Hx        Objective:       Vitals:    08/28/19 0826   BP: (!) 100/54   Pulse: 67   Weight: 75.8 kg (167 lb)   Height: 5' 5" (1.651 m)   PainSc: 0-No pain       Body mass index is 27.79 kg/m².    Physical Exam   Constitutional: She is oriented to person, place, and time. Vital signs are normal. She appears well-developed and well-nourished.   overweight   HENT:   Head: Normocephalic.   Right Ear: Hearing, tympanic membrane, external ear and ear canal normal.   Left Ear: Hearing, tympanic membrane, external ear and ear canal normal.   Eyes: Pupils are equal, round, and reactive to light. Conjunctivae, EOM and lids are normal. Lids are everted and swept, no foreign bodies found.   Neck: Trachea normal, normal range of motion and full passive range of motion without pain. Neck supple. No JVD present. Carotid bruit is not present.   Cardiovascular: Normal rate, regular rhythm, S1 normal, S2 normal, normal heart sounds, intact distal pulses and normal pulses.   Pulmonary/Chest: Effort normal and breath sounds normal.   Abdominal: Soft. Normal appearance and bowel sounds are normal. There is no " hepatosplenomegaly.   Musculoskeletal: Normal range of motion.   Neurological: She is alert and oriented to person, place, and time. She has normal strength and normal reflexes.   Skin: Skin is warm, dry and intact. Capillary refill takes less than 2 seconds.   Psychiatric: She has a normal mood and affect. Her speech is normal and behavior is normal. Judgment and thought content normal. Cognition and memory are normal.   Nursing note and vitals reviewed.      Assessment:       1. Other hyperlipidemia    2. Essential hypertension    3. Atherosclerosis of aorta    4. Hx of breast cancer    5. Thrombocytosis    6. Osteoarthritis of spine with radiculopathy, lumbar region    7. Malignant neoplasm of lower-outer quadrant of right breast of female, estrogen receptor positive    8. Inflammatory spondylopathy of lumbar region    9. Breast cancer of lower-outer quadrant of right female breast    10. Hyperlipidemia, unspecified hyperlipidemia type    11. BMI 27.0-27.9,adult    12. Overweight (BMI 25.0-29.9)    13. Bilateral leg cramps    14. Generalized edema         Plan:       Odessa was seen today for annual exam.    Diagnoses and all orders for this visit:    Other hyperlipidemia  Refilled Cholesterol and HTN medication.  Continue cholesterol med, low fat diet, and exercise. Check lipids.    -     atorvastatin (LIPITOR) 10 MG tablet; TAKE 1 TABLET(10 MG) BY MOUTH EVERY DAY    Essential hypertension  -     triamterene-hydrochlorothiazide 37.5-25 mg (DYAZIDE) 37.5-25 mg per capsule; Take 1 capsule by mouth once daily.    Atherosclerosis of aorta  Chronic, followed by PCP    Hx of breast cancer  Stable, on Arimidex, managed by Hem/Onc    Thrombocytosis  Check CBC today    Osteoarthritis of spine with radiculopathy, lumbar region  Chronic, followed by PCP    Continue ibuprofen prn with food    Malignant neoplasm of lower-outer quadrant of right breast of female, estrogen receptor positive  You will need to have to have the  arimidex breast cancer script filled by your oncologist's office. However on 2-28-19 Dr. Ramires wrote for 90 day script with 3 refills so you should have enough until Feb 2020.    Inflammatory spondylopathy of lumbar region  Chronic, followed by PCP    Continue ibuprofen prn with food    Breast cancer of lower-outer quadrant of right female breast  You will need to have to have the arimidex breast cancer script filled by your oncologist's office. However on 2-28-19 Dr. Ramires wrote for 90 day script with 3 refills so you should have enough until Feb 2020.    BMI 27.0-27.9,adult  BMI reviewed    Overweight (BMI 25.0-29.9)  BMI reviewed.    Diet and exercise to lose weight.    Bilateral leg cramps  -     Magnesium; Future  -     US Lower Extremity Veins Bilateral; Future    Generalized edema   -     US Lower Extremity Veins Bilateral; Future    Check labs today, will call with results.    Check US both legs    Refilled Cholesterol and HTN medication.  Continue cholesterol med, low fat diet, and exercise. Check lipids.    Take medications as prescribed.    Monitor BP at home, goal BP < or = 140/80, call office if consistently above this range.    Follow low salt DASH diet and exercise.    BMI reviewed.    Go to ED if Headaches, blurred vision, chest pain, or SOB occurs along with elevated readings > or = 160/90.    You will need to have to have the arimidex breast cancer script filled by your oncologist's office. However on 2-28-19 Dr. Ramires wrote for 90 day script with 3 refills so you should have enough until Feb 2020.    Follow up in about 1 year (around 8/28/2020), or for annual or sooner as needed with PCP Dr. Perkins.

## 2019-08-28 NOTE — PATIENT INSTRUCTIONS
Check labs today, will call with results.    Check US both legs    Refilled Cholesterol and HTN medication.  Continue cholesterol med, low fat diet, and exercise. Check lipids.    Take medications as prescribed.    Monitor BP at home, goal BP < or = 140/80, call office if consistently above this range.    Follow low salt DASH diet and exercise.    BMI reviewed.    Go to ED if Headaches, blurred vision, chest pain, or SOB occurs along with elevated readings > or = 160/90.    You will need to have to have the arimidex breast cancer script filled by your oncologist's office. However on 2-28-19 Dr. Ramires wrote for 90 day script with 3 refills so you should have enough until Feb 2020.

## 2019-08-29 ENCOUNTER — HOSPITAL ENCOUNTER (OUTPATIENT)
Dept: RADIOLOGY | Facility: HOSPITAL | Age: 74
Discharge: HOME | End: 2019-08-29
Attending: NURSE PRACTITIONER
Payer: MEDICARE

## 2019-08-29 DIAGNOSIS — R60.1 GENERALIZED EDEMA: ICD-10-CM

## 2019-08-29 DIAGNOSIS — R25.2 BILATERAL LEG CRAMPS: ICD-10-CM

## 2019-08-29 PROCEDURE — 93970 US LOWER EXTREMITY VEINS BILATERAL: ICD-10-PCS | Mod: 26,HCNC,, | Performed by: RADIOLOGY

## 2019-08-29 PROCEDURE — 93970 EXTREMITY STUDY: CPT | Mod: TC,HCNC

## 2019-08-29 PROCEDURE — 93970 EXTREMITY STUDY: CPT | Mod: 26,HCNC,, | Performed by: RADIOLOGY

## 2019-08-30 ENCOUNTER — TELEPHONE (OUTPATIENT)
Dept: INTERNAL MEDICINE | Facility: CLINIC | Age: 74
End: 2019-08-30

## 2019-08-30 NOTE — TELEPHONE ENCOUNTER
----- Message from Rosa Us DNP sent at 8/30/2019  9:05 AM CDT -----  US of both legs do not show evidence of a bloodclot

## 2019-09-20 ENCOUNTER — PATIENT OUTREACH (OUTPATIENT)
Dept: ADMINISTRATIVE | Facility: OTHER | Age: 74
End: 2019-09-20

## 2019-09-23 ENCOUNTER — OFFICE VISIT (OUTPATIENT)
Dept: RHEUMATOLOGY | Facility: CLINIC | Age: 74
End: 2019-09-23
Payer: MEDICARE

## 2019-09-23 VITALS
WEIGHT: 175.5 LBS | DIASTOLIC BLOOD PRESSURE: 68 MMHG | HEIGHT: 64 IN | SYSTOLIC BLOOD PRESSURE: 119 MMHG | BODY MASS INDEX: 29.96 KG/M2 | HEART RATE: 69 BPM

## 2019-09-23 DIAGNOSIS — R79.82 ELEVATED C-REACTIVE PROTEIN (CRP): ICD-10-CM

## 2019-09-23 DIAGNOSIS — M19.041 PRIMARY OSTEOARTHRITIS OF BOTH HANDS: ICD-10-CM

## 2019-09-23 DIAGNOSIS — M79.642 BILATERAL HAND PAIN: Primary | ICD-10-CM

## 2019-09-23 DIAGNOSIS — M79.641 BILATERAL HAND PAIN: Primary | ICD-10-CM

## 2019-09-23 DIAGNOSIS — M19.042 PRIMARY OSTEOARTHRITIS OF BOTH HANDS: ICD-10-CM

## 2019-09-23 PROCEDURE — 99214 OFFICE O/P EST MOD 30 MIN: CPT | Mod: HCNC,S$GLB,, | Performed by: INTERNAL MEDICINE

## 2019-09-23 PROCEDURE — 99999 PR PBB SHADOW E&M-EST. PATIENT-LVL III: ICD-10-PCS | Mod: PBBFAC,HCNC,, | Performed by: INTERNAL MEDICINE

## 2019-09-23 PROCEDURE — 99999 PR PBB SHADOW E&M-EST. PATIENT-LVL III: CPT | Mod: PBBFAC,HCNC,, | Performed by: INTERNAL MEDICINE

## 2019-09-23 PROCEDURE — 3074F PR MOST RECENT SYSTOLIC BLOOD PRESSURE < 130 MM HG: ICD-10-PCS | Mod: HCNC,CPTII,S$GLB, | Performed by: INTERNAL MEDICINE

## 2019-09-23 PROCEDURE — 3078F PR MOST RECENT DIASTOLIC BLOOD PRESSURE < 80 MM HG: ICD-10-PCS | Mod: HCNC,CPTII,S$GLB, | Performed by: INTERNAL MEDICINE

## 2019-09-23 PROCEDURE — 3078F DIAST BP <80 MM HG: CPT | Mod: HCNC,CPTII,S$GLB, | Performed by: INTERNAL MEDICINE

## 2019-09-23 PROCEDURE — 3074F SYST BP LT 130 MM HG: CPT | Mod: HCNC,CPTII,S$GLB, | Performed by: INTERNAL MEDICINE

## 2019-09-23 PROCEDURE — 99214 PR OFFICE/OUTPT VISIT, EST, LEVL IV, 30-39 MIN: ICD-10-PCS | Mod: HCNC,S$GLB,, | Performed by: INTERNAL MEDICINE

## 2019-09-23 PROCEDURE — 1101F PR PT FALLS ASSESS DOC 0-1 FALLS W/OUT INJ PAST YR: ICD-10-PCS | Mod: HCNC,CPTII,S$GLB, | Performed by: INTERNAL MEDICINE

## 2019-09-23 PROCEDURE — 1101F PT FALLS ASSESS-DOCD LE1/YR: CPT | Mod: HCNC,CPTII,S$GLB, | Performed by: INTERNAL MEDICINE

## 2019-09-23 ASSESSMENT — ROUTINE ASSESSMENT OF PATIENT INDEX DATA (RAPID3)
TOTAL RAPID3 SCORE: 5.5
MDHAQ FUNCTION SCORE: 0
AM STIFFNESS SCORE: 1, YES
PAIN SCORE: 8
PSYCHOLOGICAL DISTRESS SCORE: 2.2
FATIGUE SCORE: 7.5
PATIENT GLOBAL ASSESSMENT SCORE: 8.5

## 2019-09-23 NOTE — PROGRESS NOTES
"Subjective:       Patient ID: Odessa Vaughn is a 74 y.o. female.    Chief Complaint: Hand pain    HPI:  Odessa Vaughn is a 74 y.o. female reports 4/10 ache right hand that increases to 9/10.  Improves with Aleve and Biofreeze.  Worse with activity.      Review of Systems   Constitutional: Positive for fatigue and unexpected weight change (gained unexpectedly 5 pounds).   HENT: Negative.    Eyes: Negative.    Respiratory: Negative.    Cardiovascular: Negative.    Gastrointestinal: Negative.    Endocrine: Negative.    Genitourinary: Negative.    Musculoskeletal: Positive for arthralgias.   Skin: Negative.    Allergic/Immunologic: Negative.    Neurological:        Tingling in feet   Hematological: Negative.    Psychiatric/Behavioral: Negative.          Objective:   /68 (BP Location: Left arm, Patient Position: Sitting, BP Method: Large (Automatic))   Pulse 69   Ht 5' 4" (1.626 m)   Wt 79.6 kg (175 lb 7.8 oz)   BMI 30.12 kg/m²      Physical Exam   Constitutional: She is oriented to person, place, and time and well-developed, well-nourished, and in no distress.   HENT:   Head: Normocephalic and atraumatic.   Eyes: Conjunctivae and EOM are normal.   Neck: Neck supple.   Cardiovascular: Normal rate, regular rhythm and normal heart sounds.    Pulmonary/Chest: Effort normal and breath sounds normal.   Abdominal: Soft. Bowel sounds are normal.   Neurological: She is alert and oriented to person, place, and time. Gait normal.   Skin: Skin is warm and dry.     Psychiatric: Mood and affect normal.   Musculoskeletal:   Decrease ability to flex DIPs  No swelling   Bony enlargement right 5th PIP             Assessment:       1. Bilateral hand pain.  R>L.  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   2. Elevated CRP  3. Left hip pain. Suspect related history of low back pain but now trochanteric bursitis.   4. Left hand pain intermittent.  Now improved  5. Low back pain intermittently  6. Osteopenia. On Arimidex.   7. " History breast cancer  8. Obesity.  Recent weight gain.  Patient notes dietary indiscretion with football games and plans to work on it.   Plan:       1.  Labs  2.  X-ray hands  3.  Exercises from prior Occupational Therapy.      RTO 6 months/prn

## 2019-09-24 ENCOUNTER — HOSPITAL ENCOUNTER (OUTPATIENT)
Dept: RADIOLOGY | Facility: HOSPITAL | Age: 74
Discharge: HOME | End: 2019-09-24
Attending: INTERNAL MEDICINE
Payer: MEDICARE

## 2019-09-24 ENCOUNTER — IMMUNIZATION (OUTPATIENT)
Dept: PHARMACY | Facility: CLINIC | Age: 74
End: 2019-09-24

## 2019-09-24 ENCOUNTER — IMMUNIZATION (OUTPATIENT)
Dept: PHARMACY | Facility: CLINIC | Age: 74
End: 2019-09-24
Payer: MEDICARE

## 2019-09-24 DIAGNOSIS — R79.82 ELEVATED C-REACTIVE PROTEIN (CRP): ICD-10-CM

## 2019-09-24 DIAGNOSIS — M79.641 BILATERAL HAND PAIN: ICD-10-CM

## 2019-09-24 DIAGNOSIS — M79.642 BILATERAL HAND PAIN: ICD-10-CM

## 2019-09-24 DIAGNOSIS — M19.042 PRIMARY OSTEOARTHRITIS OF BOTH HANDS: ICD-10-CM

## 2019-09-24 DIAGNOSIS — M19.041 PRIMARY OSTEOARTHRITIS OF BOTH HANDS: ICD-10-CM

## 2019-09-24 PROCEDURE — 73130 X-RAY EXAM OF HAND: CPT | Mod: 26,50,HCNC, | Performed by: RADIOLOGY

## 2019-09-24 PROCEDURE — 73130 XR HAND COMPLETE 3 VIEWS BILATERAL: ICD-10-PCS | Mod: 26,50,HCNC, | Performed by: RADIOLOGY

## 2019-09-24 PROCEDURE — 73130 X-RAY EXAM OF HAND: CPT | Mod: 50,TC,HCNC

## 2019-09-25 ENCOUNTER — TELEPHONE (OUTPATIENT)
Dept: INTERNAL MEDICINE | Facility: CLINIC | Age: 74
End: 2019-09-25

## 2019-09-26 ENCOUNTER — OFFICE VISIT (OUTPATIENT)
Dept: INTERNAL MEDICINE | Facility: CLINIC | Age: 74
End: 2019-09-26
Payer: MEDICARE

## 2019-09-26 ENCOUNTER — TELEPHONE (OUTPATIENT)
Dept: RHEUMATOLOGY | Facility: CLINIC | Age: 74
End: 2019-09-26

## 2019-09-26 VITALS
HEIGHT: 64 IN | DIASTOLIC BLOOD PRESSURE: 66 MMHG | SYSTOLIC BLOOD PRESSURE: 112 MMHG | HEART RATE: 70 BPM | BODY MASS INDEX: 29.47 KG/M2 | WEIGHT: 172.63 LBS

## 2019-09-26 DIAGNOSIS — M54.42 CHRONIC MIDLINE LOW BACK PAIN WITH LEFT-SIDED SCIATICA: ICD-10-CM

## 2019-09-26 DIAGNOSIS — D75.839 THROMBOCYTOSIS: ICD-10-CM

## 2019-09-26 DIAGNOSIS — M53.3 SACROILIAC DYSFUNCTION: ICD-10-CM

## 2019-09-26 DIAGNOSIS — Z00.00 ENCOUNTER FOR PREVENTIVE HEALTH EXAMINATION: Primary | ICD-10-CM

## 2019-09-26 DIAGNOSIS — I10 ESSENTIAL HYPERTENSION: ICD-10-CM

## 2019-09-26 DIAGNOSIS — M19.042 PRIMARY OSTEOARTHRITIS OF BOTH HANDS: ICD-10-CM

## 2019-09-26 DIAGNOSIS — Z17.0 MALIGNANT NEOPLASM OF LOWER-OUTER QUADRANT OF RIGHT BREAST OF FEMALE, ESTROGEN RECEPTOR POSITIVE: ICD-10-CM

## 2019-09-26 DIAGNOSIS — M46.96 INFLAMMATORY SPONDYLOPATHY OF LUMBAR REGION: ICD-10-CM

## 2019-09-26 DIAGNOSIS — I70.0 ATHEROSCLEROSIS OF AORTA: ICD-10-CM

## 2019-09-26 DIAGNOSIS — Z90.11 S/P RIGHT MASTECTOMY: ICD-10-CM

## 2019-09-26 DIAGNOSIS — C50.511 MALIGNANT NEOPLASM OF LOWER-OUTER QUADRANT OF RIGHT BREAST OF FEMALE, ESTROGEN RECEPTOR POSITIVE: ICD-10-CM

## 2019-09-26 DIAGNOSIS — M47.26 OSTEOARTHRITIS OF SPINE WITH RADICULOPATHY, LUMBAR REGION: ICD-10-CM

## 2019-09-26 DIAGNOSIS — M19.041 PRIMARY OSTEOARTHRITIS OF BOTH HANDS: ICD-10-CM

## 2019-09-26 DIAGNOSIS — M47.816 LUMBAR FACET ARTHROPATHY: ICD-10-CM

## 2019-09-26 DIAGNOSIS — E78.49 OTHER HYPERLIPIDEMIA: ICD-10-CM

## 2019-09-26 DIAGNOSIS — M85.80 OSTEOPENIA, UNSPECIFIED LOCATION: ICD-10-CM

## 2019-09-26 DIAGNOSIS — G89.29 CHRONIC MIDLINE LOW BACK PAIN WITH LEFT-SIDED SCIATICA: ICD-10-CM

## 2019-09-26 PROCEDURE — G0439 PR MEDICARE ANNUAL WELLNESS SUBSEQUENT VISIT: ICD-10-PCS | Mod: HCNC,S$GLB,, | Performed by: NURSE PRACTITIONER

## 2019-09-26 PROCEDURE — 3074F PR MOST RECENT SYSTOLIC BLOOD PRESSURE < 130 MM HG: ICD-10-PCS | Mod: HCNC,CPTII,S$GLB, | Performed by: NURSE PRACTITIONER

## 2019-09-26 PROCEDURE — 3078F PR MOST RECENT DIASTOLIC BLOOD PRESSURE < 80 MM HG: ICD-10-PCS | Mod: HCNC,CPTII,S$GLB, | Performed by: NURSE PRACTITIONER

## 2019-09-26 PROCEDURE — 99999 PR PBB SHADOW E&M-EST. PATIENT-LVL IV: CPT | Mod: PBBFAC,HCNC,, | Performed by: NURSE PRACTITIONER

## 2019-09-26 PROCEDURE — 3074F SYST BP LT 130 MM HG: CPT | Mod: HCNC,CPTII,S$GLB, | Performed by: NURSE PRACTITIONER

## 2019-09-26 PROCEDURE — 3078F DIAST BP <80 MM HG: CPT | Mod: HCNC,CPTII,S$GLB, | Performed by: NURSE PRACTITIONER

## 2019-09-26 PROCEDURE — 99499 RISK ADDL DX/OHS AUDIT: ICD-10-PCS | Mod: HCNC,S$GLB,, | Performed by: NURSE PRACTITIONER

## 2019-09-26 PROCEDURE — G0439 PPPS, SUBSEQ VISIT: HCPCS | Mod: HCNC,S$GLB,, | Performed by: NURSE PRACTITIONER

## 2019-09-26 PROCEDURE — 99999 PR PBB SHADOW E&M-EST. PATIENT-LVL IV: ICD-10-PCS | Mod: PBBFAC,HCNC,, | Performed by: NURSE PRACTITIONER

## 2019-09-26 PROCEDURE — 99499 UNLISTED E&M SERVICE: CPT | Mod: HCNC,S$GLB,, | Performed by: NURSE PRACTITIONER

## 2019-09-26 RX ORDER — NAPROXEN SODIUM 220 MG
220 TABLET ORAL 2 TIMES DAILY PRN
COMMUNITY
End: 2021-03-05

## 2019-09-26 NOTE — PROGRESS NOTES
"Odessa Vaughn presented for a  Medicare AWV and comprehensive Health Risk Assessment today. The following components were reviewed and updated:    · Medical history  · Family History  · Social history  · Allergies and Current Medications  · Health Risk Assessment  · Health Maintenance  · Care Team     ** See Completed Assessments for Annual Wellness Visit within the encounter summary.**       The following assessments were completed:  · Living Situation  · CAGE  · Depression Screening  · Timed Get Up and Go  · Whisper Test  · Cognitive Function Screening      ·   · Nutrition Screening  · ADL Screening  · PAQ Screening    Vitals:    09/26/19 1029   BP: 112/66   BP Location: Left arm   Pulse: 70   Weight: 78.3 kg (172 lb 9.9 oz)   Height: 5' 4" (1.626 m)     Body mass index is 29.63 kg/m².  Physical Exam   Constitutional: She is oriented to person, place, and time. She appears well-developed and well-nourished.   HENT:   Head: Normocephalic.   Cardiovascular: Normal rate and regular rhythm.   Pulmonary/Chest: Effort normal and breath sounds normal.   Abdominal: Soft. Bowel sounds are normal.   Musculoskeletal: Normal range of motion. She exhibits no edema.   Neurological: She is alert and oriented to person, place, and time.   Skin: Skin is warm and dry.   Psychiatric: She has a normal mood and affect.   Nursing note and vitals reviewed.        Diagnoses and health risks identified today and associated recommendations/orders:    1. Encounter for preventive health examination  Here for Health Risk Assessment/Annual Wellness Visit.  Health maintenance reviewed and updated. Follow up in one year.    2. Essential hypertension  Chronic, stable on current medications. Followed by PCP.    3. Atherosclerosis of aorta  Chronic, stable on current medications. Noted Lumbar XR 6/05/17. Followed by PCP.    4. Other hyperlipidemia  Chronic, stable on current medication. Followed by PCP.    5. Malignant neoplasm of lower-outer " quadrant of right breast of female, estrogen receptor positive  Stable with anastrozole. Followed by Oncology, PCP    6. S/P right mastectomy  Stable. Followed by Surgery.    7. Thrombocytosis  Chronic, stable.  Followed by PCP.    8. Primary osteoarthritis of both hands  Chronic, stable on current OTC PRN medication. Followed by PCP, Rheumatology.    9. Osteopenia, unspecified location  Chronic, stable on current medications. Followed by PCP, Rheumatology.    10. Inflammatory spondylopathy of lumbar region  Chronic, stable on current OTC PRN medication. Followed by PCP, Rheumatology..    11. Chronic midline low back pain with left-sided sciatica  Chronic, stable on current OTC PRN medication. Followed by PCP, Rheumatology..    12. Lumbar facet arthropathy  Chronic, stable on current OTC PRN medication. Followed by PCP, Rheumatology..    13. Osteoarthritis of spine with radiculopathy, lumbar region  Chronic, stable on current OTC PRN medication. Followed by PCP, Rheumatology.    14. Sacroiliac dysfunction  Chronic, stable on current OTC PRN medication. Followed by PCP, Rheumatology.      Provided Odessa with a 5-10 year written screening schedule and personal prevention plan. Recommendations were developed using the USPSTF age appropriate recommendations. Education, counseling, and referrals were provided as needed. After Visit Summary printed and given to patient which includes a list of additional screenings\tests needed.    Follow up in about 11 months (around 8/28/2020).with PCP    Shayy Graf NP

## 2019-09-26 NOTE — PATIENT INSTRUCTIONS
Counseling and Referral of Other Preventative  (Italic type indicates deductible and co-insurance are waived)    Patient Name: Odessa Vaughn  Today's Date: 9/26/2019    Health Maintenance       Date Due Completion Date    Shingles Vaccine (3 of 3) 11/19/2019 9/24/2019    Mammogram 07/15/2020 7/15/2019    Lipid Panel 08/28/2020 8/28/2019    Override on 9/4/2018: Done    Colonoscopy 09/07/2020 9/7/2017    High Dose Statin 09/23/2020 9/23/2019    DEXA SCAN 10/29/2021 10/29/2018    Override on 10/5/2010: Not Clinically Appropriate (DUE IN 2014)    TETANUS VACCINE 10/11/2028 10/11/2018        No orders of the defined types were placed in this encounter.    The following information is provided to all patients.  This information is to help you find resources for any of the problems found today that may be affecting your health:                Living healthy guide: www.[facility].[place].gov      Understanding Diabetes: www.diabetes.org      Eating healthy: www.cdc.gov/healthyweight      CDC home safety checklist: www.cdc.gov/steadi/patient.html      Agency on Aging: www.goea.[place].[facility]      Alcoholics anonymous (AA): www.aa.org      Physical Activity: www.helena.nih.gov/ff7rzdd      Tobacco use: www.quitwithusla.org

## 2019-10-17 ENCOUNTER — TELEPHONE (OUTPATIENT)
Dept: INTERNAL MEDICINE | Facility: CLINIC | Age: 74
End: 2019-10-17

## 2019-10-17 NOTE — TELEPHONE ENCOUNTER
----- Message from Bryce Perkins MD sent at 10/16/2019 10:48 AM CDT -----  Please advise pt the labs were OK

## 2019-10-21 ENCOUNTER — TELEPHONE (OUTPATIENT)
Dept: RHEUMATOLOGY | Facility: CLINIC | Age: 74
End: 2019-10-21

## 2019-10-21 DIAGNOSIS — M79.641 BILATERAL HAND PAIN: Primary | ICD-10-CM

## 2019-10-21 DIAGNOSIS — M79.642 BILATERAL HAND PAIN: Primary | ICD-10-CM

## 2019-10-21 DIAGNOSIS — M19.042 PRIMARY OSTEOARTHRITIS OF BOTH HANDS: ICD-10-CM

## 2019-10-21 DIAGNOSIS — M19.041 PRIMARY OSTEOARTHRITIS OF BOTH HANDS: ICD-10-CM

## 2019-10-21 NOTE — TELEPHONE ENCOUNTER
----- Message from Haydee Blanton MA sent at 10/15/2019  9:43 AM CDT -----  Contact:   Pt  [phone]  Patient is aware you are out until Monday Oct 2, she will wait for your return. #Haydee  ----- Message -----  From: Kaylyn Bobo  Sent: 10/15/2019   9:34 AM CDT  To: Bhaskar COURTNEY Staff    Pt calling to orders to have additional physical therapy done with her hands, pt still experiencing some issues ..  Call the pt back to discuss

## 2019-11-05 ENCOUNTER — CLINICAL SUPPORT (OUTPATIENT)
Dept: REHABILITATION | Facility: HOSPITAL | Age: 74
End: 2019-11-05
Attending: INTERNAL MEDICINE
Payer: MEDICARE

## 2019-11-05 DIAGNOSIS — M25.60 RANGE OF MOTION DEFICIT: ICD-10-CM

## 2019-11-05 PROCEDURE — G8984 CARRY CURRENT STATUS: HCPCS | Mod: CK,HCNC

## 2019-11-05 PROCEDURE — 97165 OT EVAL LOW COMPLEX 30 MIN: CPT | Mod: HCNC

## 2019-11-05 PROCEDURE — 97022 WHIRLPOOL THERAPY: CPT | Mod: HCNC

## 2019-11-05 PROCEDURE — 97110 THERAPEUTIC EXERCISES: CPT | Mod: HCNC

## 2019-11-05 PROCEDURE — G8985 CARRY GOAL STATUS: HCPCS | Mod: CJ,HCNC

## 2019-11-05 NOTE — PLAN OF CARE
"  [facility] Occupational Therapy  Initial Evaluation     Name: Odessa Vaughn  Clinic Number: [id number]    Therapy Diagnosis:   Encounter Diagnosis   Name Primary?    Range of motion deficit      Physician: Taylor Warner*    Physician Orders:Evaluate and treat.  Please instruct in paraffin wax use.     Other Comments: Pt states that she only needs therapy on her right hand.  Medical Diagnosis: M79.641,M79.642 (ICD-10-CM) - Bilateral hand pain M19.041,M19.042 (ICD-10-CM) - Primary osteoarthritis of both hands     Surgical Procedure and Date: N/A  Evaluation Date: 11/5/2019  Insurance: [employer]  Insurance Authorization period Expiration: 12/31/2019     Plan of Care Certification Period: 11/5/2019 to 1/5/2019    Visit # / Visits Authortized: 1 / 20     Time In:9:30 AM  Time Out: 10:30 AM  Total Billable Time: 60 minutes    Precautions: Standard    Subjective     Involved Side: right  Dominant Side: Right  Date of Onset: 3-4 months ago  Mechanism of Injury: incidious  History of Current Condition: Pt reports that it starting bothering her especially in the morning with pain and stiffness.  Imaging: X-rays 9/14/2019: FINDINGS:  Left: Mild degenerative changes are seen 1 the distal interphalangeal joints.  Proximal interphalangeal joints are intact.  The head of the 2nd metacarpal carpal shows some cystic change and a little irregularity but this is relatively similar to the study of 3 years ago.  Degenerative changes are noted at the 1st carpal metacarpal joint space on the left.    Right: Mild degenerative changes at the distal interphalangeal joints are seen on the right and at the right 5th proximal interphalangeal joint.  MP joints are intact.  Mild degenerative changes seen at the 1st carpal metacarpal joint space on the right.     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     Current FunctionalStatus   Home/Living environment : lives with their spouse      Limitation of Functional Status as follows:   ADLs/IADLs:     - Feeding:Independent    - Bathing: Independent    - Dressing/Grooming: Independent    - Driving: Independent     Leisure: walking      Past Medical History/Physical Systems Review:   Odessa Vaughn  has a past medical history of Arthritis, Breast cancer, Bursitis of left hip, Chronic midline low back pain with left-sided sciatica, Essential hypertension, and Hyperlipidemia.    Odessa Vaughn  has a past surgical history that includes Dilation and curettage of uterus; Breast biopsy (12/19/2012); Mastectomy (1/13); Breast surgery (Right, 1/2013); and Colonoscopy (N/A, 9/7/2017).    Odessa has a current medication list which includes the following prescription(s): anastrozole, aspirin, atorvastatin, multivitamin, naproxen sodium, propylene glycol/peg 400/pf, and triamterene-hydrochlorothiazide 37.5-25 mg.    Review of patient's allergies indicates:   Allergen Reactions    Vicodin [hydrocodone-acetaminophen] Other (See Comments)     Dizziness          Objective   Observation/Appearance:  Skin intact and OA deformity noted over small PIP jt    Edema. Measured in centimeters.   11/5/2019 11/5/2019    Left Right   Proximal Wrist Crease 15.5 16   Midpalm 19.5 19.2   MCPs 17.5 17.8         Hand ROM. Measured in degrees.   11/5/2019    Right       Index: MP  0/60              PIP     0/100              DIP 0/20              RETANA 180       Long:  MP 0/58              PIP 0/95              DIP 0/35               RETANA 188       Ring:   MP 0/63              PIP 0/105              DIP 0/37              RETANA 205       Small:  MP 0/72               PIP 0/92               DIP 0/39              RETANA 203       Thumb: DPC Touch base of 5th     Sensation: Intact    Palpation: Pt tender over volar MPs of index, middle, ring and small finger     Strength (Dyanmometer) and Pinch Strength (Pinch Gauge)  Measured in pounds and psi. Average of three trials.   11/5/2019 11/5/2019    Left Right   Rung II 39 20   Key Pinch 12 12   3pt Pinch 12 10   2pt Pinch 10 9           CMS Impairment/Limitation/Restriction for FOTO Hand Survey    Therapist reviewed FOTO scores for Odessa Vaughn on 11/5/2019.   FOTO documents entered into [employer] [facility] - see Media section.    Limitation Score: 58%  Category: Carrying    Current : CK = at least 40% but < 60% impaired, limited or restricted  Goal: CJ = at least 20% but < 40% impaired, limited or restricted           Treatment     Treatment Time In: 9:30 AM  Treatment Time Out: 10:30 AM  Total Treatment time separate from Evaluation time:10 minutes    Odessa received the following supervised modalities after being cleared for contradictions for 15 minutes:   -Patient received fluidotherapy to right hand for 15 minutes to increase blood flow, circulation, desensitization, sensory re-education and for pain management.       Odessa received the following direct contact modalities after being cleared for contraindications for 0 minutes:  -NT    Odessa received the following manual therapy techniques for 5 minutes:   -IASTYM to volar MPs    Odessa received therapeutic exercises for 10 minutes including:  -AROM as follows: jt blocking, TGEs, finger ext, finger abd/add x 10 reps each    Odessa participated in dynamic functional therapeutic activities to improve functional performance for 0  minutes, including:  -NT    Home Exercise Program/Education:  Issued HEP (see patient instructions in EMR)  "and educated on modality use for pain management . Exercises were reviewed and Odessa was able to demonstrate them prior to the end of the session.   Pt received a written copy of exercises to perform at home. Odessa demonstrated good  understanding of the education provided.  Pt was advised to perform these exercises free of pain, and to stop performing them if pain occurs.    Patient/Family Education: role of OT, goals for OT, scheduling/cancellations - pt verbalized understanding. Discussed insurance limitations with patient.    Additional Education provided: none    Assessment     Odessa Vaughn is a 74 y.o. female referred to outpatient occupational/hand therapy and presents with a medical diagnosis of OA of both hands, resulting in pain, decreased range of motion, decreased functional use of right hand and demonstrates limitations as described in the chart below. Following a brief medical record review it is determined that pt will benefit from occupational therapy services in order to maximize pain free and/or functional use of right hand.     The patient's rehab potential is Good.     Anticipated barriers to occupational therapy: none  Pt has no cultural, educational or language barriers to learning provided.    Profile and History Assessment of Occupational Performance Level of Clinical Decision Making Complexity Score   Occupational Profile:   Odessa Vaughn is a 74 y.o. female who lives with their spouse and is currently retired. Odessa Vaughn has difficulty with  housework/household chores  affecting his/her daily functional abilities. His/her main goal for therapy is  "decrease stiffness and pain in right hand".     Comorbidities:    has a past medical history of Arthritis, Breast cancer, Bursitis of left hip, Chronic midline low back pain with left-sided sciatica, Essential hypertension, and Hyperlipidemia.        Medical and Therapy History Review:   Brief               " Performance Deficits    Physical:  Joint Mobility  Muscle Power/Strength  Muscle Endurance   Strength  Pinch Strength  Fine Motor Coordination  Pain    Cognitive:  No Deficits    Psychosocial:    No Deficits     Clinical Decision Making:  low    Assessment Process:  Problem-Focused Assessments    Modification/Need for Assistance:  Not Necessary    Intervention Selection:  Limited Treatment Options       low  Based on PMHX, co morbidities , data from assessments and functional level of assistance required with task and clinical presentation directly impacting function.       The following goals were discussed with the patient and patient is in agreement with them as to be addressed in the treatment plan.     Goals:   Short Term (4 weeks on 12/5/2019):  1)   Patient to be IND with HEP and modalities for pain management.  2)   Increase ROM 15-20 degrees for RETANA of index, middle, ring and small finger  to increase functional hand use for grasping.  3)   Increase  strength 5-10 lbs. to grasp objects.    Long Term (by discharge):  1)   Pt will report 0 out of 10 pain with right hand use.  2)   Patient to score at CJ  on FOTO to demonstrate improved perception of functional right hand use.  3)   Pt will return to prior level of function for ADLs and household management.       Plan   Certification Period/Plan of care expiration: 11/5/2019 to 1/5/2020.    Outpatient Occupational Therapy 2 times weekly for 8 weeks may include the following interventions: Paraffin, Fluidotherapy, Manual therapy/joint mobilizations, Modalities for pain management, US 3 mhz, Therapeutic exercises/activities., Strengthening and Orthotic Fabrication/Fit/Training.      Kristina Barros, OT

## 2019-11-15 ENCOUNTER — CLINICAL SUPPORT (OUTPATIENT)
Dept: REHABILITATION | Facility: HOSPITAL | Age: 74
End: 2019-11-15
Attending: INTERNAL MEDICINE
Payer: MEDICARE

## 2019-11-15 DIAGNOSIS — M25.60 RANGE OF MOTION DEFICIT: ICD-10-CM

## 2019-11-15 PROCEDURE — 97140 MANUAL THERAPY 1/> REGIONS: CPT | Mod: HCNC

## 2019-11-15 PROCEDURE — 97110 THERAPEUTIC EXERCISES: CPT | Mod: HCNC

## 2019-11-15 NOTE — PROGRESS NOTES
Occupational Therapy Daily Treatment Note     Date: 11/15/2019  Name: Odessa Vaughn  Clinic Number: [id number]    Therapy Diagnosis:   Encounter Diagnosis   Name Primary?    Range of motion deficit      Physician: Taylor Warner*  Physician Orders:Evaluate and treat.  Please instruct in paraffin wax use.      Other Comments: Pt states that she only needs therapy on her right hand.  Medical Diagnosis: M79.641,M79.642 (ICD-10-CM) - Bilateral hand pain M19.041,M19.042 (ICD-10-CM) - Primary osteoarthritis of both hands      Surgical Procedure and Date: N/A  Evaluation Date: 11/5/2019  Insurance: [employer]  Insurance Authorization period Expiration: 12/31/2019      Plan of Care Certification Period: 11/5/2019 to 1/5/2019     Visit # / Visits Authortized: 2 / 20      Time In:9:50 AM  Time Out: 10:50 AM  Total Billable Time: 30  minutes  Charges:   1 MT, 1 TE    Precautions: Standard     Subjective   Pt reports: Pt was compliant with HEP X 3. Pt stated she hasn't done HEP x 4 because she is busy.    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 Measured in centimeters.    11/5/2019 11/5/2019     Left Right   Proximal Wrist Crease 15.5 16   Midpalm 19.5 19.2   MCPs 17.5 17.8            Hand ROM. Measured in degrees.    11/5/2019     Right         Index: MP  0/60              PIP     0/100              DIP 0/20              RETANA 180         Long:  MP 0/58              PIP 0/95              DIP 0/35              RETANA 188         Ring:   MP 0/63              PIP 0/105              DIP 0/37              RETANA 205         Small:  MP 0/72               PIP 0/92               DIP 0/39              RETANA 203         Thumb: DPC Touch base of 5th      Sensation:  Intact     Palpation: Pt tender over volar MPs of index, middle, ring and small finger      Strength (Dyanmometer) and Pinch Strength (Pinch Gauge)  Measured in pounds and psi. Average of three trials.    11/5/2019 11/5/2019     Left Right   Rung II 39 20   Key Pinch 12 12   3pt Pinch 12 10   2pt Pinch 10 9      Odessa received the following supervised modalities after being cleared for contradictions for 15 minutes:   -Patient received fluidotherapy to right hand for 15 minutes to increase blood flow, circulation, desensitization, sensory re-education and for pain management.         Odessa received the following direct contact modalities after being cleared for contraindications for 0 minutes:  -NT     Odessa received the following manual therapy techniques for 10 minutes:   -STM to right hand  -IASTYM to volar MPs     Odessa received therapeutic exercises for 15 minutes including:  -AROM as follows: jt blocking, TGEs, finger ext, finger abd/add x 10 reps each  -isopheres x 2'  -yellow sponge x 2'     Odessa participated in dynamic functional therapeutic activities to improve functional performance for 0  minutes, including:  -NT       Home Exercises and Education Provided     Education provided: Reviewed HEP  - yellow sponge 1 x per day for 2 minutes  -paraffin at home  -Progress toward goals      Written Home Exercises Provided: Yes  Exercises were reviewed and Odessa was able to demonstrate them prior to the end of the session.  Odessa demonstrated good understanding of the HEP provided.   .   See EMR under Patient Instructions for exercises provided during initial evaluation.     Assessment   Odessa Vaughn is a 74 y.o. female referred to outpatient occupational/hand therapy and presents with a medical diagnosis of OA of both hands, resulting in pain, decreased range of motion, decreased functional use of right hand.  Pt reported using right hand more often during the day after  performing the stretches in the morning.  Advanced pt to isopheres and yellow sponge with no difficulty.  Discussed instructions for paraffin use at home.     The patient's rehab potential is Good.      Anticipated barriers to occupational therapy: none  Pt has no cultural, educational or language barriers to learning provided      Odessa is  progressing well towards her goals and there are no updates to goals at this time. Pt prognosis is good.  Pt will continue to benefit from skilled outpatient occupational therapy to address the deficits listed in the problem list on initial evaluation provide pt/family education and to maximize pt's level of independence in the home and community environment.     Pt's spiritual, cultural and educational needs considered and pt agreeable to plan of care and goals.    Goals:  Short Term (4 weeks on 12/5/2019):  1)   Patient to be IND with HEP and modalities for pain management.  2)   Increase ROM 15-20 degrees for RETANA of index, middle, ring and small finger  to increase functional hand use for grasping.  3)   Increase  strength 5-10 lbs. to grasp objects.     Long Term (by discharge):  1)   Pt will report 0 out of 10 pain with right hand use.  2)   Patient to score at   on FOTO to demonstrate improved perception of functional right hand use.  3)   Pt will return to prior level of function for ADLs and household management.          Plan Continue OT     Certification Period/Plan of care expiration: 11/5/2019 to 1/5/2020.     Outpatient Occupational Therapy 2 times weekly for 8 weeks may include the following interventions: Paraffin, Fluidotherapy, Manual therapy/joint mobilizations, Modalities for pain management, US 3 mhz, Therapeutic exercises/activities., Strengthening and Orthotic Fabrication/Fit/Training.    Student:     Oliva Modi    I certify that I was present in the room directing the student in service delivery and guiding them using my skilled  judgment. As the co-signing therapist I have reviewed the students documentation and am responsible for the treatment, assessment, and plan.       Maria Luz Sands, OT

## 2019-11-18 ENCOUNTER — CLINICAL SUPPORT (OUTPATIENT)
Dept: REHABILITATION | Facility: HOSPITAL | Age: 74
End: 2019-11-18
Attending: INTERNAL MEDICINE
Payer: MEDICARE

## 2019-11-18 DIAGNOSIS — M25.60 RANGE OF MOTION DEFICIT: ICD-10-CM

## 2019-11-18 PROCEDURE — 97140 MANUAL THERAPY 1/> REGIONS: CPT | Mod: HCNC

## 2019-11-18 PROCEDURE — 97022 WHIRLPOOL THERAPY: CPT | Mod: HCNC

## 2019-11-18 PROCEDURE — 97110 THERAPEUTIC EXERCISES: CPT | Mod: HCNC

## 2019-11-18 NOTE — PROGRESS NOTES
Occupational Therapy Daily Treatment Note     Date: 11/18/2019  Name: Odessa Vaughn  Clinic Number: [id number]    Therapy Diagnosis:   Encounter Diagnosis   Name Primary?    Range of motion deficit      Physician: Taylor Warner*  Physician Orders:Evaluate and treat.  Please instruct in paraffin wax use.      Other Comments: Pt states that she only needs therapy on her right hand.  Medical Diagnosis: M79.641,M79.642 (ICD-10-CM) - Bilateral hand pain M19.041,M19.042 (ICD-10-CM) - Primary osteoarthritis of both hands      Surgical Procedure and Date: N/A  Evaluation Date: 11/5/2019  Insurance: [employer]  Insurance Authorization period Expiration: 12/31/2019      Plan of Care Certification Period: 11/5/2019 to 1/5/2019     Visit # / Visits Authortized: 3 / 20      Time In:10:00 AM  Time Out: 10:50 AM  Total Billable Time: 30  minutes  Charges:   1 MT, 1 TE    Precautions: Standard     Subjective   Pt reports: Pt was compliant with HEP x 3 times/day.  Pt stated that the pain has gone down in her fingers tremendously and she was able to make a fist upon waking.    Response to previous treatment: less pain, able to make a fist in morning  Functional change: able to wring out mop with hand     Pain: 2/10   Location: right hand along volar MPs and ulnar side of hand       Objective     Observation/Appearance:  Skin intact and OA deformity noted over small PIP jt     Edema. Measured in centimeters.    11/5/2019 11/5/2019     Left Right   Proximal Wrist Crease 15.5 16   Midpalm 19.5 19.2   MCPs 17.5 17.8            Hand ROM. Measured in degrees.    11/5/2019     Right         Index: MP  0/60              PIP     0/100              DIP 0/20              RETANA 180         Long:  MP 0/58              PIP 0/95              DIP 0/35              RETANA 188         Ring:   MP 0/63              PIP 0/105              DIP 0/37              RETANA 205         Small:  MP 0/72               PIP 0/92               DIP 0/39               RETANA 203         Thumb: DPC Touch base of 5th      Sensation: Intact     Palpation: Pt tender over volar MPs of index, middle, ring and small finger      Strength (Dyanmometer) and Pinch Strength (Pinch Gauge)  Measured in pounds and psi. Average of three trials.    11/5/2019 11/5/2019     Left Right   Rung II 39 20   Key Pinch 12 12   3pt Pinch 12 10   2pt Pinch 10 9      Odessa received the following supervised modalities after being cleared for contradictions for 15 minutes:   -Patient received fluidotherapy to right hand for 15 minutes to increase blood flow, circulation, desensitization, sensory re-education and for pain management.         Odessa received the following direct contact modalities after being cleared for contraindications for 0 minutes:  -NT     Odessa received the following manual therapy techniques for 10 minutes:   -STM to right hand  -IASTYM to volar MPs     Odessa received therapeutic exercises for 25 minutes including:  -AROM as follows: jt blocking, TGEs, finger ext, finger abd/add x 10 reps each  -isopheres x 2'  -yellow sponge x 2'  -nesting small pom poms x 3 containers  -towel scrunches right hand x 10      Odessa participated in dynamic functional therapeutic activities to improve functional performance for 0  minutes, including:  -NT       Home Exercises and Education Provided     Education provided: Reviewed HEP  -IASTYM massage   -Progress toward goals      Written Home Exercises Provided: Yes  Exercises were reviewed and Odessa was able to demonstrate them prior to the end of the session.  Odessa demonstrated good understanding of the HEP provided.   .   See EMR under Patient Instructions for exercises provided during initial evaluation.     Assessment   Odessa Vaughn is a 74 y.o. female referred to outpatient occupational/hand therapy and presents with a medical diagnosis of OA of both hands, resulting in pain, decreased range of motion, decreased  functional use of right hand.  Pt reported increased use of her hand for cleaning due to less pain.  Pt reported increase in composite fist upon waking due to less pain and stiffness.  Pt advanced to nesting and towel scrunches with no difficulty.     The patient's rehab potential is Good.      Anticipated barriers to occupational therapy: none  Pt has no cultural, educational or language barriers to learning provided      Odessa is  progressing well towards her goals and there are no updates to goals at this time. Pt prognosis is good.  Pt will continue to benefit from skilled outpatient occupational therapy to address the deficits listed in the problem list on initial evaluation provide pt/family education and to maximize pt's level of independence in the home and community environment.     Pt's spiritual, cultural and educational needs considered and pt agreeable to plan of care and goals.    Goals:  Short Term (4 weeks on 12/5/2019):  1)   Patient to be IND with HEP and modalities for pain management.- progressing  2)   Increase ROM 15-20 degrees for RETANA of index, middle, ring and small finger  to increase functional hand use for grasping. .- progressing  3)   Increase  strength 5-10 lbs. to grasp objects. .- progressing       Long Term (by discharge):  1)   Pt will report 0 out of 10 pain with right hand use. .- progressing  2)   Patient to score at   on FOTO to demonstrate improved perception of functional right hand use. .- progressing  3)   Pt will return to prior level of function for ADLs and household management. .- progressing           Plan  Advance as tolerated. Measure next session.     Certification Period/Plan of care expiration: 11/5/2019 to 1/5/2020.     Outpatient Occupational Therapy 2 times weekly for 8 weeks may include the following interventions: Paraffin, Fluidotherapy, Manual therapy/joint mobilizations, Modalities for pain management, US 3 mhz, Therapeutic exercises/activities.,  Strengthening and Orthotic Fabrication/Fit/Training.    Student:     Oliva Modi    I certify that I was present in the room directing the student in service delivery and guiding them using my skilled judgment. As the co-signing therapist I have reviewed the students documentation and am responsible for the treatment, assessment, and plan.       Kristina Barros, OT

## 2019-11-25 ENCOUNTER — CLINICAL SUPPORT (OUTPATIENT)
Dept: REHABILITATION | Facility: HOSPITAL | Age: 74
End: 2019-11-25
Attending: INTERNAL MEDICINE
Payer: MEDICARE

## 2019-11-25 DIAGNOSIS — M25.60 RANGE OF MOTION DEFICIT: ICD-10-CM

## 2019-11-25 PROCEDURE — 97018 PARAFFIN BATH THERAPY: CPT | Mod: HCNC

## 2019-11-25 PROCEDURE — 97110 THERAPEUTIC EXERCISES: CPT | Mod: HCNC

## 2019-11-25 NOTE — PATIENT INSTRUCTIONS
[facility]  OCCUPATIONAL THERAPY  HOME EXERCISE PROGRAM     Complete the following strengthening program 1x/day.                     _            FEDERICO Cruz, MARIE  Certified Hand Therapist  Occupational Therapist

## 2019-11-25 NOTE — PROGRESS NOTES
Occupational Therapy Discharge Note     Date: 11/25/2019  Name: Odessa Vaughn  Clinic Number: [id number]    Therapy Diagnosis:   Encounter Diagnosis   Name Primary?    Range of motion deficit      Physician: Taylor Warner*  Physician Orders:Evaluate and treat.  Please instruct in paraffin wax use.      Other Comments: Pt states that she only needs therapy on her right hand.  Medical Diagnosis: M79.641,M79.642 (ICD-10-CM) - Bilateral hand pain M19.041,M19.042 (ICD-10-CM) - Primary osteoarthritis of both hands      Surgical Procedure and Date: N/A  Evaluation Date: 11/5/2019  Insurance: [employer]  Insurance Authorization period Expiration: 12/31/2019      Plan of Care Certification Period: 11/5/2019 to 1/5/2019     Visit # / Visits Authortized: 4 / 20      Time In: 1:50 PM  Time Out: 2:50 PM  Total Billable Time: 30  minutes  Charges:  2 TE, paraffin    Precautions: Standard     Subjective   Pt reports: Pt was compliant with HEP x 4 times/day. Pt reports increased morning stiffness over the weekend. Pt stated that she purchased a paraffin bath for home.   Response to previous treatment: less pain except in MCP of index   Functional change: able to make fist     Pain: 0/10   Location: right hand along volar MPs and ulnar side of hand       Objective     Observation/Appearance:  Skin intact and OA deformity noted over small PIP jt     Edema. Measured in centimeters.    11/5/2019 11/5/2019     Left Right   Proximal Wrist Crease 15.5 16   Midpalm 19.5 19.2   MCPs 17.5 17.8            Hand ROM. Measured in degrees.    11/5/2019 11/25/2019     Right Right          Index: MP  0/60 0/72              PIP     0/100 0/104              DIP 0/20 0/22              RETANA 180 198          Long:  MP 0/58 0/79              PIP 0/95 0/100              DIP 0/35 0/41              RETANA 188 220          Ring:   MP 0/63 0/74              PIP 0/105 0/110              DIP 0/37 0/37              RETANA 205 221          Small:  MP 0/72  0/84               PIP 0/92 0/97               DIP 0/39 0/54              RETANA 203 235          Thumb: DPC Touch base of 5th Touch base of 5th      Sensation: Intact     Palpation: Pt tender over volar MPs of index, middle, ring and small finger      Strength (Dyanmometer) and Pinch Strength (Pinch Gauge)  Measured in pounds and psi. Average of three trials.      11/5/2019 11/5/2019 11/25/2019     Left Right Right   Rung II 39 20 40   Griffin Pinch 12 12 14   3pt Pinch 12 10 10   2pt Pinch 10 9 9      Odessa received the following supervised modalities after being cleared for contradictions for 15 minutes:   -Patient received fluidotherapy to right hand for 15 minutes to increase blood flow, circulation, desensitization, sensory re-education and for pain management. (NT)  -Patient received MH and paraffin x 15 min to right hand to increase blood flow, circulation and tissue elasticity prior to therex     Odessa received the following direct contact modalities after being cleared for contraindications for 0 minutes:  -NT     Odessa received the following manual therapy techniques for 10 minutes:   -STM to right hand  -IASTYM to volar MPs     Odessa received therapeutic exercises for 35 minutes including:  -AROM as follows: jt blocking, TGEs, finger ext, finger abd/add x 10 reps each  -isopheres x 2'  -yellow putty x 2' molding, 2' grasping  -nesting small pom poms x 3 containers  -towel scrunches right hand x 10      Odessa participated in dynamic functional therapeutic activities to improve functional performance for 0  minutes, including:  -NT       Home Exercises and Education Provided     Education provided: Reviewed HEP  - continue HEP, paraffin bath use   -Progress toward goals: Good      Written Home Exercises Provided: Yes  Exercises were reviewed and Odessa was able to demonstrate them prior to the end of the session.  Odessa demonstrated good understanding of the HEP provided.   .   See EMR  under Patient Instructions for exercises provided during initial evaluation.     Assessment   Odessa Vaughn is a 74 y.o. female referred to outpatient occupational/hand therapy and presents with a medical diagnosis of OA of both hands, resulting in pain, decreased range of motion, decreased functional use of right hand.  Pt reported that she has taken a break in cleaning/scrubbing motions.  Pt purchased paraffin bath, but has not used it yet.  Measurements were taken today.  Range of motion has improved in all fingers;  and key pinch have also improved. Pt was instructed to continue HEP and use the paraffin bath.  Pt was also educated on arthritis.          Anticipated barriers to occupational therapy: none  Pt has no cultural, educational or language barriers to learning provided      Odessa is  progressing well towards her goals and there are no updates to goals at this time. Pt prognosis is good.  Pt will continue to benefit from skilled outpatient occupational therapy to address the deficits listed in the problem list on initial evaluation provide pt/family education and to maximize pt's level of independence in the home and community environment.     Pt's spiritual, cultural and educational needs considered and pt agreeable to plan of care and goals.    Goals:  Short Term (4 weeks on 12/5/2019):  1)   Patient to be IND with HEP and modalities for pain management.- Met 11/25/2019  2)   Increase ROM 15-20 degrees for RETANA of index, middle, ring and small finger  to increase functional hand use for grasping. .- Met 11/25/2019  3)   Increase  strength 5-10 lbs. to grasp objects. .- progressing     Long Term (by discharge):  1)   Pt will report 0 out of 10 pain with right hand use. .- Met 11/25/2019  2)   Patient to score at   on FOTO to demonstrate improved perception of functional right hand use. .- progressing  3)   Pt will return to prior level of function for ADLs and household management. .-  Met 11/25/2019         Plan  Discharge from OT     Certification Period/Plan of care expiration: 11/5/2019 to 1/5/2020.     Outpatient Occupational Therapy 2 times weekly for 8 weeks may include the following interventions: Paraffin, Fluidotherapy, Manual therapy/joint mobilizations, Modalities for pain management, US 3 mhz, Therapeutic exercises/activities., Strengthening and Orthotic Fabrication/Fit/Training.    Student:     Oliva Modi    I certify that I was present in the room directing the student in service delivery and guiding them using my skilled judgment. As the co-signing therapist I have reviewed the students documentation and am responsible for the treatment, assessment, and plan.       Kristina Barros, OT

## 2019-11-27 NOTE — PATIENT INSTRUCTIONS
"[facility] - OCCUPATIONAL THERAPY  HOME EXERCISE PROGRAM       Complete the following exercises with 10 repetitions each, 4 x/day.     AROM: DIP Flexion / Extension  Pinch middle knuckle to prevent bending. Bend end knuckle until stretch is felt.   Hold 3 seconds. Relax. Straighten finger as far as possible.    AROM: PIP Flexion / Extension  Pinch bottom knuckle  to prevent bending. Actively bend middle knuckle until stretch is felt.   Hold 3 seconds. Relax. Straighten finger as far as possible.      AROM: Isolated MCP Flexion / Extension ("Wave")   Bend only your large, bottom knuckles. Hold 3 seconds. Keep the tips of your fingers straight. Straighten fingers.    AROM: Isolated IPJ Flexion / Extension ("Hook")  Bend only your middle and end knuckles. Hold 3 seconds.   Straighten your fingers.     AROM: MCP and PIP Flexion / Extension ("Straight Fist")  Bend your bottom and middle knuckles, keeping the tips of your fingers straight. Try to touch the pads of your fingers on your palm. Hold 3 seconds. Straighten your fingers.     AROM: Composite Flexion / Extension ("Full Fist")  Bend every joint in your hand into a fist. Hold 3 seconds. Straighten your fingers.     AROM: Composte Extension ("Finger Lifts")  Lift your finger off of the table one at a time. Hold 3 seconds. Relax your finger.    AROM: Abduction / Adduction  With hand flat on table, spread all fingers apart, then bring them together as close as possible.    Copyright © I. All rights reserved.     Therapist: FEDERICO Cruz CHT    "
Warm

## 2020-01-16 ENCOUNTER — OFFICE VISIT (OUTPATIENT)
Dept: HEMATOLOGY/ONCOLOGY | Facility: CLINIC | Age: 75
End: 2020-01-16
Payer: MEDICARE

## 2020-01-16 VITALS
WEIGHT: 170.44 LBS | SYSTOLIC BLOOD PRESSURE: 128 MMHG | BODY MASS INDEX: 29.1 KG/M2 | RESPIRATION RATE: 16 BRPM | HEIGHT: 64 IN | OXYGEN SATURATION: 95 % | DIASTOLIC BLOOD PRESSURE: 60 MMHG | TEMPERATURE: 98 F | HEART RATE: 70 BPM

## 2020-01-16 DIAGNOSIS — Z13.820 OSTEOPOROSIS SCREENING: ICD-10-CM

## 2020-01-16 DIAGNOSIS — Z85.3 HISTORY OF BREAST CANCER: ICD-10-CM

## 2020-01-16 DIAGNOSIS — Z78.0 ASYMPTOMATIC MENOPAUSAL STATE: ICD-10-CM

## 2020-01-16 PROCEDURE — 1101F PR PT FALLS ASSESS DOC 0-1 FALLS W/OUT INJ PAST YR: ICD-10-PCS | Mod: HCNC,CPTII,S$GLB, | Performed by: PHYSICIAN ASSISTANT

## 2020-01-16 PROCEDURE — 99999 PR PBB SHADOW E&M-EST. PATIENT-LVL IV: CPT | Mod: PBBFAC,HCNC,, | Performed by: PHYSICIAN ASSISTANT

## 2020-01-16 PROCEDURE — 3078F DIAST BP <80 MM HG: CPT | Mod: HCNC,CPTII,S$GLB, | Performed by: PHYSICIAN ASSISTANT

## 2020-01-16 PROCEDURE — 1101F PT FALLS ASSESS-DOCD LE1/YR: CPT | Mod: HCNC,CPTII,S$GLB, | Performed by: PHYSICIAN ASSISTANT

## 2020-01-16 PROCEDURE — 1159F MED LIST DOCD IN RCRD: CPT | Mod: HCNC,S$GLB,, | Performed by: PHYSICIAN ASSISTANT

## 2020-01-16 PROCEDURE — 99999 PR PBB SHADOW E&M-EST. PATIENT-LVL IV: ICD-10-PCS | Mod: PBBFAC,HCNC,, | Performed by: PHYSICIAN ASSISTANT

## 2020-01-16 PROCEDURE — 1159F PR MEDICATION LIST DOCUMENTED IN MEDICAL RECORD: ICD-10-PCS | Mod: HCNC,S$GLB,, | Performed by: PHYSICIAN ASSISTANT

## 2020-01-16 PROCEDURE — 99213 PR OFFICE/OUTPT VISIT, EST, LEVL III, 20-29 MIN: ICD-10-PCS | Mod: HCNC,S$GLB,, | Performed by: PHYSICIAN ASSISTANT

## 2020-01-16 PROCEDURE — 1126F AMNT PAIN NOTED NONE PRSNT: CPT | Mod: HCNC,S$GLB,, | Performed by: PHYSICIAN ASSISTANT

## 2020-01-16 PROCEDURE — 3074F SYST BP LT 130 MM HG: CPT | Mod: HCNC,CPTII,S$GLB, | Performed by: PHYSICIAN ASSISTANT

## 2020-01-16 PROCEDURE — 3074F PR MOST RECENT SYSTOLIC BLOOD PRESSURE < 130 MM HG: ICD-10-PCS | Mod: HCNC,CPTII,S$GLB, | Performed by: PHYSICIAN ASSISTANT

## 2020-01-16 PROCEDURE — 1126F PR PAIN SEVERITY QUANTIFIED, NO PAIN PRESENT: ICD-10-PCS | Mod: HCNC,S$GLB,, | Performed by: PHYSICIAN ASSISTANT

## 2020-01-16 PROCEDURE — 3078F PR MOST RECENT DIASTOLIC BLOOD PRESSURE < 80 MM HG: ICD-10-PCS | Mod: HCNC,CPTII,S$GLB, | Performed by: PHYSICIAN ASSISTANT

## 2020-01-16 PROCEDURE — 99213 OFFICE O/P EST LOW 20 MIN: CPT | Mod: HCNC,S$GLB,, | Performed by: PHYSICIAN ASSISTANT

## 2020-01-16 NOTE — Clinical Note
She is at 7 years on arimidex and having no side effects, we talked about going to 10 years unless you prefer otherwise. i'm checking a bone density to make sure there has been no change.

## 2020-01-16 NOTE — PROGRESS NOTES
Subjective:       Patient ID: Odessa Vaughn is a 74 y.o. female.    Chief Complaint: Follow-up    Mrs. 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 She has been on anastrozole since 2/2013.      Overall, she has been doing well. She has some generalized arthralgias that pre-date anastrazole, has gone to PT for those issues with arthralgias in the right hand. She denies any shortness of breath.  Appetite and bowel function have been stable.  Occasional chest wall discomfort at mastectomy site but otherwise doing well.    Just returned from [place] where spent the holidays and is trying to sell her house there.     Review of Systems   Constitutional: Negative.    HENT: Negative for congestion, rhinorrhea, sore throat and trouble swallowing.    Eyes: Negative for visual disturbance.   Respiratory: Negative for cough, chest tightness and shortness of breath.    Cardiovascular: Negative for chest pain, palpitations and leg swelling.   Gastrointestinal: Negative for abdominal pain, blood in stool, constipation, diarrhea, nausea and vomiting.   Genitourinary: Negative for dysuria, hematuria and vaginal bleeding.   Musculoskeletal: Positive for arthralgias (right hand). Negative for back pain and myalgias.   Skin: Negative for pallor and rash.   Neurological: Negative for dizziness, weakness and headaches.   Hematological: Negative for adenopathy. Does not bruise/bleed easily.   Psychiatric/Behavioral: Negative for dysphoric mood and suicidal ideas. The patient is not nervous/anxious.        Objective:      Physical Exam   Constitutional: She is oriented to person, place, and time. She appears well-developed and well-nourished. No distress.   Presents alone  ECOG 0   HENT:   Head: Normocephalic.   Mouth/Throat: Oropharynx is clear and moist. No oropharyngeal exudate.   Eyes: Pupils are equal, round, and reactive to light. Conjunctivae are normal. No  scleral icterus.   Neck: Normal range of motion. Neck supple. No thyromegaly present.   Cardiovascular: Normal rate and regular rhythm.   Pulmonary/Chest: Effort normal and breath sounds normal. No respiratory distress.   S/p right mastectomy; no chest wall mass, nodule or skin changes. Left breast without mass,nodule or skin changes. No axillary or supraclavicular adenopathy.   Abdominal: Soft. Bowel sounds are normal. She exhibits no distension and no mass. There is no tenderness.   Musculoskeletal: Normal range of motion. She exhibits no edema or tenderness.   No spinal or paraspinal tenderness to palpation     Lymphadenopathy:     She has no cervical adenopathy.   Neurological: She is alert and oriented to person, place, and time. No cranial nerve deficit.   Skin: Skin is warm and dry.   Psychiatric: She has a normal mood and affect. Her behavior is normal. Thought content normal.   Vitals reviewed.      Assessment:       1. History of breast cancer    2. Osteoporosis screening    3. Asymptomatic menopausal state         Plan:       Continue Anastrazole and return to clinic in 6 months.  We discussed 10 years of treatment as she is tolerating treatment well and without significant complaints.   She is due for a bone density scan which has been ordered.  Mammogram due 7/2020

## 2020-01-23 ENCOUNTER — DOCUMENTATION ONLY (OUTPATIENT)
Dept: HEMATOLOGY/ONCOLOGY | Facility: CLINIC | Age: 75
End: 2020-01-23

## 2020-01-23 ENCOUNTER — TELEPHONE (OUTPATIENT)
Dept: HEMATOLOGY/ONCOLOGY | Facility: CLINIC | Age: 75
End: 2020-01-23

## 2020-01-23 NOTE — TELEPHONE ENCOUNTER
Message fwd to PA.     ----- Message from Nena Coleman sent at 1/23/2020  1:15 PM CST -----  Type:  Patient Returning Call    Who Called Odessa  Who Left Message for Patient: Odessa  Does the patient know what this is regarding?:returning a call @1:16pm  Would the patient rather a call back or a response via Immediatelyner? call  Best Call Back Number [phone]  Additional Information: call back

## 2020-01-23 NOTE — PROGRESS NOTES
Left message with patient to call me back.  I discussed her case with Dr. Ramires and she is ok to come off of adjuvant endocrine therapy as she has had 7 years of treatment.  When patient returns call I will discuss his recommendations with her.

## 2020-02-23 DIAGNOSIS — C50.511 BREAST CANCER OF LOWER-OUTER QUADRANT OF RIGHT FEMALE BREAST: ICD-10-CM

## 2020-02-24 RX ORDER — ANASTROZOLE 1 MG/1
TABLET ORAL
Qty: 90 TABLET | Refills: 3 | Status: SHIPPED | OUTPATIENT
Start: 2020-02-24 | End: 2020-07-20

## 2020-05-27 ENCOUNTER — PES CALL (OUTPATIENT)
Dept: ADMINISTRATIVE | Facility: CLINIC | Age: 75
End: 2020-05-27

## 2020-06-19 ENCOUNTER — TELEPHONE (OUTPATIENT)
Dept: INTERNAL MEDICINE | Facility: CLINIC | Age: 75
End: 2020-06-19

## 2020-06-22 ENCOUNTER — IMMUNIZATION (OUTPATIENT)
Dept: PHARMACY | Facility: CLINIC | Age: 75
End: 2020-06-22
Payer: MEDICARE

## 2020-06-22 ENCOUNTER — OFFICE VISIT (OUTPATIENT)
Dept: INTERNAL MEDICINE | Facility: CLINIC | Age: 75
End: 2020-06-22
Payer: MEDICARE

## 2020-06-22 ENCOUNTER — TELEPHONE (OUTPATIENT)
Dept: INTERNAL MEDICINE | Facility: CLINIC | Age: 75
End: 2020-06-22

## 2020-06-22 VITALS
DIASTOLIC BLOOD PRESSURE: 80 MMHG | SYSTOLIC BLOOD PRESSURE: 130 MMHG | HEART RATE: 65 BPM | BODY MASS INDEX: 29.62 KG/M2 | WEIGHT: 173.5 LBS | HEIGHT: 64 IN

## 2020-06-22 DIAGNOSIS — M47.816 LUMBAR FACET ARTHROPATHY: ICD-10-CM

## 2020-06-22 DIAGNOSIS — Z00.00 ROUTINE GENERAL MEDICAL EXAMINATION AT A HEALTH CARE FACILITY: ICD-10-CM

## 2020-06-22 DIAGNOSIS — D75.839 THROMBOCYTOSIS: ICD-10-CM

## 2020-06-22 DIAGNOSIS — Z85.3 HX OF BREAST CANCER: ICD-10-CM

## 2020-06-22 DIAGNOSIS — I10 ESSENTIAL HYPERTENSION: ICD-10-CM

## 2020-06-22 DIAGNOSIS — E78.49 OTHER HYPERLIPIDEMIA: ICD-10-CM

## 2020-06-22 DIAGNOSIS — Z00.00 ENCOUNTER FOR PREVENTIVE HEALTH EXAMINATION: Primary | ICD-10-CM

## 2020-06-22 DIAGNOSIS — M85.80 OSTEOPENIA, UNSPECIFIED LOCATION: ICD-10-CM

## 2020-06-22 DIAGNOSIS — M46.96 INFLAMMATORY SPONDYLOPATHY OF LUMBAR REGION: ICD-10-CM

## 2020-06-22 DIAGNOSIS — M54.42 CHRONIC MIDLINE LOW BACK PAIN WITH LEFT-SIDED SCIATICA: ICD-10-CM

## 2020-06-22 DIAGNOSIS — Z01.84 ENCOUNTER FOR ANTIBODY RESPONSE EXAMINATION: ICD-10-CM

## 2020-06-22 DIAGNOSIS — Z90.11 S/P RIGHT MASTECTOMY: ICD-10-CM

## 2020-06-22 DIAGNOSIS — I70.0 ATHEROSCLEROSIS OF AORTA: ICD-10-CM

## 2020-06-22 DIAGNOSIS — M53.3 SACROILIAC DYSFUNCTION: ICD-10-CM

## 2020-06-22 DIAGNOSIS — I10 ESSENTIAL HYPERTENSION: Primary | ICD-10-CM

## 2020-06-22 DIAGNOSIS — E78.5 HYPERLIPIDEMIA, UNSPECIFIED HYPERLIPIDEMIA TYPE: ICD-10-CM

## 2020-06-22 DIAGNOSIS — G89.29 CHRONIC MIDLINE LOW BACK PAIN WITH LEFT-SIDED SCIATICA: ICD-10-CM

## 2020-06-22 DIAGNOSIS — M47.26 OSTEOARTHRITIS OF SPINE WITH RADICULOPATHY, LUMBAR REGION: ICD-10-CM

## 2020-06-22 PROCEDURE — 3079F DIAST BP 80-89 MM HG: CPT | Mod: HCNC,CPTII,S$GLB, | Performed by: NURSE PRACTITIONER

## 2020-06-22 PROCEDURE — 3075F SYST BP GE 130 - 139MM HG: CPT | Mod: HCNC,CPTII,S$GLB, | Performed by: NURSE PRACTITIONER

## 2020-06-22 PROCEDURE — 99999 PR PBB SHADOW E&M-EST. PATIENT-LVL IV: CPT | Mod: PBBFAC,HCNC,, | Performed by: NURSE PRACTITIONER

## 2020-06-22 PROCEDURE — 3075F PR MOST RECENT SYSTOLIC BLOOD PRESS GE 130-139MM HG: ICD-10-PCS | Mod: HCNC,CPTII,S$GLB, | Performed by: NURSE PRACTITIONER

## 2020-06-22 PROCEDURE — 99999 PR PBB SHADOW E&M-EST. PATIENT-LVL IV: ICD-10-PCS | Mod: PBBFAC,HCNC,, | Performed by: NURSE PRACTITIONER

## 2020-06-22 PROCEDURE — G0439 PPPS, SUBSEQ VISIT: HCPCS | Mod: HCNC,S$GLB,, | Performed by: NURSE PRACTITIONER

## 2020-06-22 PROCEDURE — 99499 RISK ADDL DX/OHS AUDIT: ICD-10-PCS | Mod: S$GLB,,, | Performed by: NURSE PRACTITIONER

## 2020-06-22 PROCEDURE — 3079F PR MOST RECENT DIASTOLIC BLOOD PRESSURE 80-89 MM HG: ICD-10-PCS | Mod: HCNC,CPTII,S$GLB, | Performed by: NURSE PRACTITIONER

## 2020-06-22 PROCEDURE — G0439 PR MEDICARE ANNUAL WELLNESS SUBSEQUENT VISIT: ICD-10-PCS | Mod: HCNC,S$GLB,, | Performed by: NURSE PRACTITIONER

## 2020-06-22 PROCEDURE — 99499 UNLISTED E&M SERVICE: CPT | Mod: S$GLB,,, | Performed by: NURSE PRACTITIONER

## 2020-06-22 NOTE — PATIENT INSTRUCTIONS
Counseling and Referral of Other Preventative  (Italic type indicates deductible and co-insurance are waived)    Patient Name: Odessa Vaughn  Today's Date: 6/22/2020    Health Maintenance       Date Due Completion Date    Shingles Vaccine (3 of 3) 11/19/2019 9/24/2019 - need 2nd Shingrix    Mammogram 07/15/2020 7/15/2019    Lipid Panel 08/28/2020 8/28/2019    Override on 9/4/2018: Done    Colonoscopy 09/07/2020 9/7/2017    DEXA SCAN 10/29/2021 10/29/2018    Override on 10/5/2010: Not Clinically Appropriate (DUE IN 2014)    TETANUS VACCINE 10/11/2028 10/11/2018        Orders Placed This Encounter   Procedures    COVID-19 (SARS CoV-2) IgG Antibody     The following information is provided to all patients.  This information is to help you find resources for any of the problems found today that may be affecting your health:                Living healthy guide: www.[facility].[place].[facility]      Understanding Diabetes: www.diabetes.org      Eating healthy: www.cdc.gov/healthyweight      CDC home safety checklist: www.cdc.gov/steadi/patient.html      Agency on Aging: www.goea.[place].[facility]      Alcoholics anonymous (AA): www.aa.org      Physical Activity: www.helena.nih.gov/xt9ccws      Tobacco use: www.quitwithusla.org     Counseling and Referral of Other Preventative  (Italic type indicates deductible and co-insurance are waived)    Patient Name: Odessa Vaughn  Today's Date: 6/22/2020    Health Maintenance       Date Due Completion Date    Shingles Vaccine (3 of 3) 11/19/2019 9/24/2019    Mammogram 07/15/2020 7/15/2019    Lipid Panel 08/28/2020 8/28/2019    Override on 9/4/2018: Done    Colonoscopy 09/07/2020 9/7/2017    DEXA SCAN 10/29/2021 10/29/2018    Override on 10/5/2010: Not Clinically Appropriate (DUE IN 2014)    TETANUS VACCINE 10/11/2028 10/11/2018        Orders Placed This Encounter   Procedures    COVID-19 (SARS CoV-2) IgG Antibody     The following information is provided to all patients.  This information is to  help you find resources for any of the problems found today that may be affecting your health:                Living healthy guide: www.[facility].[place].[facility]      Understanding Diabetes: www.diabetes.org      Eating healthy: www.cdc.gov/healthyweight      CDC home safety checklist: www.cdc.gov/steadi/patient.html      Agency on Aging: www.goea.[place]      Alcoholics anonymous (AA): www.aa.org      Physical Activity: www.helena.nih.gov/fe5exai      Tobacco use: www.quitwithusla.org

## 2020-06-22 NOTE — PROGRESS NOTES
"Odessa Vaughn presented for a  Medicare AWV and comprehensive Health Risk Assessment today. The following components were reviewed and updated:    · Medical history  · Family History  · Social history  · Allergies and Current Medications  · Health Risk Assessment  · Health Maintenance  · Care Team     ** See Completed Assessments for Annual Wellness Visit within the encounter summary.**       The following assessments were completed:  · Living Situation  · CAGE  · Depression Screening  · Timed Get Up and Go  · Whisper Test  · Cognitive Function Screening      ·   · Nutrition Screening  · ADL Screening  · PAQ Screening    Vitals:    06/22/20 0811   BP: 130/80   BP Location: Left arm   Pulse: 65   Weight: 78.7 kg (173 lb 8 oz)   Height: 5' 4" (1.626 m)     Body mass index is 29.78 kg/m².  Physical Exam  Vitals signs and nursing note reviewed.   Constitutional:       Appearance: She is well-developed.   HENT:      Head: Normocephalic.   Cardiovascular:      Rate and Rhythm: Normal rate and regular rhythm.   Pulmonary:      Effort: Pulmonary effort is normal.      Breath sounds: Normal breath sounds.   Abdominal:      General: Bowel sounds are normal.      Palpations: Abdomen is soft.   Musculoskeletal: Normal range of motion.   Skin:     General: Skin is warm and dry.   Neurological:      Mental Status: She is alert and oriented to person, place, and time.      Motor: No abnormal muscle tone.   Psychiatric:         Mood and Affect: Mood normal.           Diagnoses and health risks identified today and associated recommendations/orders:    1. Encounter for preventive health examination  Here for Health Risk Assessment/Annual Wellness Visit.  Health maintenance reviewed and updated. Follow up in one year.    2. Encounter for antibody response examination  - COVID-19 (SARS CoV-2) IgG Antibody; Future    3. Essential hypertension  Chronic, stable on current medications. Followed by PCP.    4. Atherosclerosis of " aorta  Chronic, stable on current medications. Noted CXR 6/05/17. Followed by PCP.    5. Other hyperlipidemia  Chronic, stable on current medications. Followed by PCP.    6. Hx of breast cancer  Stable. Completed anastrozole. Followed by Oncology.    7. S/P right mastectomy  Stable. Followed by Oncology.    8. Thrombocytosis  Chronic, stable. Followed by PCP.    9. Osteopenia, unspecified location  Chronic, stable. Followed by PCP.    10. Sacroiliac dysfunction  Chronic, stable with PRN OTC medication. Followed by PCP.    11. Osteoarthritis of spine with radiculopathy, lumbar region  Chronic, stable with PRN OTC medication. Followed by PCP.    12. Chronic midline low back pain with left-sided sciatica  Chronic, stable with PRN OTC medication. Followed by PCP.    13. Lumbar facet arthropathy  Chronic, stable with PRN OTC medication. Followed by PCP.    14. Inflammatory spondylopathy of lumbar region  Chronic, stable with PRN OTC medication. Followed by PCP.      Provided Odessa with a 5-10 year written screening schedule and personal prevention plan. Recommendations were developed using the USPSTF age appropriate recommendations. Education, counseling, and referrals were provided as needed. After Visit Summary printed and given to patient which includes a list of additional screenings\tests needed.    Follow up in about 3 months (around 9/22/2020).with PCP    Shayy Graf NP

## 2020-06-30 NOTE — PROGRESS NOTES
"INTERNAL MEDICINE CLINIC - SAME DAY APPOINTMENT  Progress Note    PRESENTING HISTORY     PCP: Bryce Perkins MD  Chief Complaint/Reason for Visit:   No chief complaint on file.      History of Present Illness & ROS : Ms. Odessa Vaughn is a 75 y.o. female.  Here for Same Day appt. Very pleasant lady.  Last seen by this Provider in 2016.     Reports that "thinks" something "bit, started out as a small pimple, but getting hard and red now, also tender if I touch it". Has not tried anything for it.   Denies any "drainage from the site".   Denies this being of a recurrence.   No fevers or chills.     No recent travels.     Review of Systems:  Eyes: denies visual changes at this time denies floaters   ENT: no nasal congestion or sore throat  Respiratory: no cough or shorness of breath  Cardiovascular: no chest pain or palpitations  Gastrointestinal: no nausea or vomiting, no abdominal pain or change in bowel habits  Genitourinary: no hematuria or dysuria; denies frequency  Hematologic/Lymphatic: no easy bruising or lymphadenopathy  Musculoskeletal: no arthralgias or myalgias  Neurological: no seizures or tremors  Endocrine: no heat or cold intolerance      PAST HISTORY:     Past Medical History:   Diagnosis Date    Arthritis     hands, legs    Breast cancer 12/2012    Right breast invasive ductal carcinoma, ER positive, MS and Her2 negative    Bursitis of left hip 2016    resolved    Chronic midline low back pain with left-sided sciatica 6/5/2017    Essential hypertension 9/21/2015    Hyperlipidemia 9/15/2014       Past Surgical History:   Procedure Laterality Date    BREAST BIOPSY  12/19/2012    Right breast- [facility]    BREAST SURGERY Right 1/2013    right mastectomy, SN biopsy     COLONOSCOPY N/A 9/7/2017    Procedure: COLONOSCOPY;  Surgeon: Syed Shah MD;  Location: [address]);  Service: Endoscopy;  Laterality: N/A;    DILATION AND CURETTAGE OF UTERUS      MASTECTOMY  1/13       Family " History   Problem Relation Age of Onset    Cancer Father         Pancreatic CA    Cataracts Father     Breast cancer Cousin 58    Breast cancer Cousin 58    No Known Problems Mother     Hypertension Sister     Arthritis Brother     No Known Problems Daughter     COPD Sister     No Known Problems Brother     No Known Problems Brother     No Known Problems Brother     No Known Problems Daughter     Breast cancer Paternal Aunt 55    Cancer Paternal Aunt         Breast Ca and Lung CA    No Known Problems Maternal Aunt     No Known Problems Maternal Uncle     No Known Problems Paternal Uncle     No Known Problems Maternal Grandmother     No Known Problems Maternal Grandfather     No Known Problems Paternal Grandmother     No Known Problems Paternal Grandfather     Ovarian cancer Neg Hx     Amblyopia Neg Hx     Blindness Neg Hx     Diabetes Neg Hx     Glaucoma Neg Hx     Macular degeneration Neg Hx     Retinal detachment Neg Hx     Strabismus Neg Hx     Stroke Neg Hx     Thyroid disease Neg Hx     Osteoarthritis Neg Hx     Rheum arthritis Neg Hx        Social History     Socioeconomic History    Marital status:      Spouse name: Not on file    Number of children: Not on file    Years of education: Not on file    Highest education level: Not on file   Occupational History    Not on file   Social Needs    Financial resource strain: Not on file    Food insecurity     Worry: Not on file     Inability: Not on file    Transportation needs     Medical: Not on file     Non-medical: Not on file   Tobacco Use    Smoking status: Never Smoker    Smokeless tobacco: Never Used    Tobacco comment: Retired;    Substance and Sexual Activity    Alcohol use: Yes     Alcohol/week: 0.0 standard drinks     Comment: holiday - occasional wine    Drug use: No    Sexual activity: Yes     Partners: Male   Lifestyle    Physical activity     Days per week: Not on file      Minutes per session: Not on file    Stress: Not on file   Relationships    Social connections     Talks on phone: Not on file     Gets together: Not on file     Attends Synagogue service: Not on file     Active member of club or organization: Not on file     Attends meetings of clubs or organizations: Not on file     Relationship status: Not on file   Other Topics Concern    Not on file   Social History Narrative    Not on file       MEDICATIONS & ALLERGIES:     Current Outpatient Medications on File Prior to Visit   Medication Sig Dispense Refill    anastrozole (ARIMIDEX) 1 mg Tab TAKE 1 TABLET BY MOUTH EVERY DAY (Patient not taking: Reported on 6/22/2020) 90 tablet 3    aspirin 81 MG Chew Take 81 mg by mouth once daily.        atorvastatin (LIPITOR) 10 MG tablet TAKE 1 TABLET(10 MG) BY MOUTH EVERY DAY 90 tablet 3    multivitamin capsule Take 1 capsule by mouth once daily.      naproxen sodium (ANAPROX) 220 MG tablet Take 220 mg by mouth 2 (two) times daily as needed.      PROPYLENE GLYCOL//PF (SYSTANE, PF, OPHT) Apply to eye daily as needed.       triamterene-hydrochlorothiazide 37.5-25 mg (DYAZIDE) 37.5-25 mg per capsule Take 1 capsule by mouth once daily. 90 capsule 3     No current facility-administered medications on file prior to visit.         Review of patient's allergies indicates:   Allergen Reactions    Vicodin [hydrocodone-acetaminophen] Other (See Comments)     Dizziness       Medications Reconciliation:   I have reconciled the patient's home medications with the patient/family. I have updated all changes.  Refer to After-Visit Medication List.    OBJECTIVE:     Vital Signs:  There were no vitals filed for this visit.  Wt Readings from Last 1 Encounters:   06/22/20 0811 78.7 kg (173 lb 8 oz)     There is no height or weight on file to calculate BMI.     Wt Readings from Last 3 Encounters:   07/01/20 79.4 kg (175 lb 0.7 oz)   06/22/20 78.7 kg (173 lb 8 oz)   01/16/20 77.3 kg (170 lb  6.7 oz)     Temp Readings from Last 3 Encounters:   01/16/20 98.1 °F (36.7 °C) (Oral)   07/15/19 97.9 °F (36.6 °C) (Oral)   12/11/18 98.7 °F (37.1 °C) (Oral)     BP Readings from Last 3 Encounters:   07/01/20 120/62   06/22/20 130/80   01/16/20 128/60     Pulse Readings from Last 3 Encounters:   06/22/20 65   01/16/20 70   09/26/19 70       Physical Exam:  General: Well developed, well nourished. No distress.  HEENT: Head is normocephalic, atraumatic; ears are normal.   Eyes: Clear conjunctiva.  Neck: Supple, symmetrical neck; trachea midline.  Lungs: Clear to auscultation bilaterally and normal respiratory effort.  Cardiovascular: Heart with regular rate and rhythm. No murmurs, gallops or rubs  Extremities: No LE edema. Pulses 2+ and symmetric.   Abdomen: Abdomen is soft, non-tender non-distended with normal bowel sounds.  Skin: Skin color, texture, turgor normal. No rashes.  Moderately indurated, erythematous region to right lower quadrant region with small pinpoint notable (beneath skin's surface) what appears to be purulence.  Musculoskeletal: Normal gait.   Lymph Nodes: No cervical or supraclavicular adenopathy.  Neurologic: Normal strength and tone. No focal numbness or weakness.   Psychiatric: Not depressed.        Laboratory  Lab Results   Component Value Date    WBC 7.15 08/28/2019    HGB 11.8 (L) 08/28/2019    HCT 37.2 08/28/2019     (H) 08/28/2019    CHOL 161 08/28/2019    TRIG 92 08/28/2019    HDL 48 08/28/2019    ALT 24 08/28/2019    AST 19 08/28/2019     08/28/2019    K 3.8 08/28/2019     08/28/2019    CREATININE 1.0 08/28/2019    BUN 20 08/28/2019    CO2 30 (H) 08/28/2019    TSH 1.859 08/28/2019    HGBA1C 6.1 09/08/2014         ASSESSMENT & PLAN:     Here for Same Day appt.     Abscess of abdominal wall    Hair follicle infection    Other orders  -     doxycycline (VIBRA-TABS) 100 MG tablet; Take 1 tablet (100 mg total) by mouth 2 (two) times daily. for 10 days  Dispense: 20 tablet;  Refill: 0  -     mupirocin (BACTROBAN) 2 % ointment; Apply topically 3 (three) times daily.  Dispense: 30 g; Refill: 1    She is scheduled to be seen by Dr. Perkins on Monday (7/6), if not improving, may need to be seen by Gen Surgery for I & D.     *Advised to apply warm compresses to abd wall 2-3 times daily, x 20 min intervals, and to use the Bactroban josephine twice daily, after the moist heat applications.     Immunizations:   Up to Date: TDaP: 2018         Future Appointments   Date Time Provider Department Center   7/1/2020  8:00 AM PARMINDER Funes [facility]   7/6/2020 10:30 AM Bryce Perkins MD [facility]        Medication List          Accurate as of July 1, 2020  7:23 AM. If you have any questions, ask your nurse or doctor.            START taking these medications    doxycycline 100 MG tablet  Commonly known as: VIBRA-TABS  Take 1 tablet (100 mg total) by mouth 2 (two) times daily. for 10 days  Started by: PARMINDER Goodson     mupirocin 2 % ointment  Commonly known as: BACTROBAN  Apply topically 3 (three) times daily.  Started by: PARMINDER Goodson        CONTINUE taking these medications    anastrozole 1 mg Tab  Commonly known as: ARIMIDEX  TAKE 1 TABLET BY MOUTH EVERY DAY     aspirin 81 MG Chew     atorvastatin 10 MG tablet  Commonly known as: LIPITOR  TAKE 1 TABLET(10 MG) BY MOUTH EVERY DAY     multivitamin capsule     naproxen sodium 220 MG tablet  Commonly known as: ANAPROX     SYSTANE (PF) OPHT     triamterene-hydrochlorothiazide 37.5-25 mg 37.5-25 mg per capsule  Commonly known as: DYAZIDE  Take 1 capsule by mouth once daily.           Where to Get Your Medications      These medications were sent to [id number] - [place] - [address] AT [facility] [address]  [address], [facility] [address]    Phone: [phone]   · doxycycline 100 MG tablet  · mupirocin 2 % ointment           Signing  Physician:  PARMINDER Goodson

## 2020-07-01 ENCOUNTER — OFFICE VISIT (OUTPATIENT)
Dept: INTERNAL MEDICINE | Facility: CLINIC | Age: 75
End: 2020-07-01
Payer: MEDICARE

## 2020-07-01 ENCOUNTER — LAB VISIT (OUTPATIENT)
Dept: LAB | Facility: HOSPITAL | Age: 75
End: 2020-07-01
Attending: INTERNAL MEDICINE
Payer: MEDICARE

## 2020-07-01 VITALS
WEIGHT: 175.06 LBS | BODY MASS INDEX: 30.05 KG/M2 | RESPIRATION RATE: 20 BRPM | SYSTOLIC BLOOD PRESSURE: 120 MMHG | DIASTOLIC BLOOD PRESSURE: 62 MMHG

## 2020-07-01 DIAGNOSIS — L73.9 HAIR FOLLICLE INFECTION: ICD-10-CM

## 2020-07-01 DIAGNOSIS — Z00.00 ROUTINE GENERAL MEDICAL EXAMINATION AT A HEALTH CARE FACILITY: ICD-10-CM

## 2020-07-01 DIAGNOSIS — I10 ESSENTIAL HYPERTENSION: ICD-10-CM

## 2020-07-01 DIAGNOSIS — E78.5 HYPERLIPIDEMIA, UNSPECIFIED HYPERLIPIDEMIA TYPE: ICD-10-CM

## 2020-07-01 DIAGNOSIS — L02.211 ABSCESS OF ABDOMINAL WALL: Primary | ICD-10-CM

## 2020-07-01 LAB
ALBUMIN SERPL BCP-MCNC: 3.4 G/DL (ref 3.5–5.2)
ALP SERPL-CCNC: 124 U/L (ref 55–135)
ALT SERPL W/O P-5'-P-CCNC: 15 U/L (ref 10–44)
ANION GAP SERPL CALC-SCNC: 8 MMOL/L (ref 8–16)
AST SERPL-CCNC: 17 U/L (ref 10–40)
BASOPHILS # BLD AUTO: 0.07 K/UL (ref 0–0.2)
BASOPHILS NFR BLD: 0.7 % (ref 0–1.9)
BILIRUB SERPL-MCNC: 0.3 MG/DL (ref 0.1–1)
BUN SERPL-MCNC: 18 MG/DL (ref 8–23)
CALCIUM SERPL-MCNC: 9.9 MG/DL (ref 8.7–10.5)
CHLORIDE SERPL-SCNC: 101 MMOL/L (ref 95–110)
CHOLEST SERPL-MCNC: 161 MG/DL (ref 120–199)
CHOLEST/HDLC SERPL: 3.5 {RATIO} (ref 2–5)
CO2 SERPL-SCNC: 29 MMOL/L (ref 23–29)
CREAT SERPL-MCNC: 0.9 MG/DL (ref 0.5–1.4)
DIFFERENTIAL METHOD: ABNORMAL
EOSINOPHIL # BLD AUTO: 0.3 K/UL (ref 0–0.5)
EOSINOPHIL NFR BLD: 3 % (ref 0–8)
ERYTHROCYTE [DISTWIDTH] IN BLOOD BY AUTOMATED COUNT: 14.1 % (ref 11.5–14.5)
EST. GFR  (AFRICAN AMERICAN): >60 ML/MIN/1.73 M^2
EST. GFR  (NON AFRICAN AMERICAN): >60 ML/MIN/1.73 M^2
GLUCOSE SERPL-MCNC: 95 MG/DL (ref 70–110)
HCT VFR BLD AUTO: 39.6 % (ref 37–48.5)
HDLC SERPL-MCNC: 46 MG/DL (ref 40–75)
HDLC SERPL: 28.6 % (ref 20–50)
HGB BLD-MCNC: 11.9 G/DL (ref 12–16)
IMM GRANULOCYTES # BLD AUTO: 0.04 K/UL (ref 0–0.04)
IMM GRANULOCYTES NFR BLD AUTO: 0.4 % (ref 0–0.5)
LDLC SERPL CALC-MCNC: 98.4 MG/DL (ref 63–159)
LYMPHOCYTES # BLD AUTO: 2.6 K/UL (ref 1–4.8)
LYMPHOCYTES NFR BLD: 27.8 % (ref 18–48)
MCH RBC QN AUTO: 26.9 PG (ref 27–31)
MCHC RBC AUTO-ENTMCNC: 30.1 G/DL (ref 32–36)
MCV RBC AUTO: 90 FL (ref 82–98)
MONOCYTES # BLD AUTO: 0.7 K/UL (ref 0.3–1)
MONOCYTES NFR BLD: 7.9 % (ref 4–15)
NEUTROPHILS # BLD AUTO: 5.7 K/UL (ref 1.8–7.7)
NEUTROPHILS NFR BLD: 60.2 % (ref 38–73)
NONHDLC SERPL-MCNC: 115 MG/DL
NRBC BLD-RTO: 0 /100 WBC
PLATELET # BLD AUTO: 361 K/UL (ref 150–350)
PMV BLD AUTO: 8.5 FL (ref 9.2–12.9)
POTASSIUM SERPL-SCNC: 3.7 MMOL/L (ref 3.5–5.1)
PROT SERPL-MCNC: 7.8 G/DL (ref 6–8.4)
RBC # BLD AUTO: 4.42 M/UL (ref 4–5.4)
SODIUM SERPL-SCNC: 138 MMOL/L (ref 136–145)
TRIGL SERPL-MCNC: 83 MG/DL (ref 30–150)
WBC # BLD AUTO: 9.39 K/UL (ref 3.9–12.7)

## 2020-07-01 PROCEDURE — 3074F PR MOST RECENT SYSTOLIC BLOOD PRESSURE < 130 MM HG: ICD-10-PCS | Mod: HCNC,CPTII,S$GLB, | Performed by: NURSE PRACTITIONER

## 2020-07-01 PROCEDURE — 99999 PR PBB SHADOW E&M-EST. PATIENT-LVL III: ICD-10-PCS | Mod: PBBFAC,HCNC,, | Performed by: NURSE PRACTITIONER

## 2020-07-01 PROCEDURE — 80053 COMPREHEN METABOLIC PANEL: CPT | Mod: HCNC

## 2020-07-01 PROCEDURE — 36415 COLL VENOUS BLD VENIPUNCTURE: CPT | Mod: HCNC

## 2020-07-01 PROCEDURE — 1159F MED LIST DOCD IN RCRD: CPT | Mod: HCNC,S$GLB,, | Performed by: NURSE PRACTITIONER

## 2020-07-01 PROCEDURE — 99214 PR OFFICE/OUTPT VISIT, EST, LEVL IV, 30-39 MIN: ICD-10-PCS | Mod: HCNC,S$GLB,, | Performed by: NURSE PRACTITIONER

## 2020-07-01 PROCEDURE — 85025 COMPLETE CBC W/AUTO DIFF WBC: CPT | Mod: HCNC

## 2020-07-01 PROCEDURE — 1101F PR PT FALLS ASSESS DOC 0-1 FALLS W/OUT INJ PAST YR: ICD-10-PCS | Mod: HCNC,CPTII,S$GLB, | Performed by: NURSE PRACTITIONER

## 2020-07-01 PROCEDURE — 99214 OFFICE O/P EST MOD 30 MIN: CPT | Mod: HCNC,S$GLB,, | Performed by: NURSE PRACTITIONER

## 2020-07-01 PROCEDURE — 99999 PR PBB SHADOW E&M-EST. PATIENT-LVL III: CPT | Mod: PBBFAC,HCNC,, | Performed by: NURSE PRACTITIONER

## 2020-07-01 PROCEDURE — 80061 LIPID PANEL: CPT | Mod: HCNC

## 2020-07-01 PROCEDURE — 3074F SYST BP LT 130 MM HG: CPT | Mod: HCNC,CPTII,S$GLB, | Performed by: NURSE PRACTITIONER

## 2020-07-01 PROCEDURE — 1101F PT FALLS ASSESS-DOCD LE1/YR: CPT | Mod: HCNC,CPTII,S$GLB, | Performed by: NURSE PRACTITIONER

## 2020-07-01 PROCEDURE — 1159F PR MEDICATION LIST DOCUMENTED IN MEDICAL RECORD: ICD-10-PCS | Mod: HCNC,S$GLB,, | Performed by: NURSE PRACTITIONER

## 2020-07-01 PROCEDURE — 3078F DIAST BP <80 MM HG: CPT | Mod: HCNC,CPTII,S$GLB, | Performed by: NURSE PRACTITIONER

## 2020-07-01 PROCEDURE — 3078F PR MOST RECENT DIASTOLIC BLOOD PRESSURE < 80 MM HG: ICD-10-PCS | Mod: HCNC,CPTII,S$GLB, | Performed by: NURSE PRACTITIONER

## 2020-07-01 RX ORDER — DOXYCYCLINE HYCLATE 100 MG
100 TABLET ORAL 2 TIMES DAILY
Qty: 20 TABLET | Refills: 0 | Status: SHIPPED | OUTPATIENT
Start: 2020-07-01 | End: 2020-07-11

## 2020-07-01 RX ORDER — MUPIROCIN 20 MG/G
OINTMENT TOPICAL 3 TIMES DAILY
Qty: 30 G | Refills: 1 | Status: SHIPPED | OUTPATIENT
Start: 2020-07-01 | End: 2021-06-23

## 2020-07-06 ENCOUNTER — OFFICE VISIT (OUTPATIENT)
Dept: INTERNAL MEDICINE | Facility: CLINIC | Age: 75
End: 2020-07-06
Payer: MEDICARE

## 2020-07-06 ENCOUNTER — HOSPITAL ENCOUNTER (OUTPATIENT)
Dept: RADIOLOGY | Facility: HOSPITAL | Age: 75
Discharge: HOME | End: 2020-07-06
Attending: PHYSICIAN ASSISTANT
Payer: MEDICARE

## 2020-07-06 VITALS
HEIGHT: 64 IN | WEIGHT: 176.56 LBS | HEART RATE: 72 BPM | DIASTOLIC BLOOD PRESSURE: 58 MMHG | BODY MASS INDEX: 30.14 KG/M2 | SYSTOLIC BLOOD PRESSURE: 108 MMHG

## 2020-07-06 DIAGNOSIS — I10 ESSENTIAL HYPERTENSION: Primary | ICD-10-CM

## 2020-07-06 DIAGNOSIS — E78.49 OTHER HYPERLIPIDEMIA: ICD-10-CM

## 2020-07-06 DIAGNOSIS — Z85.3 HISTORY OF BREAST CANCER: ICD-10-CM

## 2020-07-06 DIAGNOSIS — Z90.11 S/P RIGHT MASTECTOMY: ICD-10-CM

## 2020-07-06 DIAGNOSIS — L73.9 FOLLICULITIS: ICD-10-CM

## 2020-07-06 DIAGNOSIS — N76.0 ACUTE VAGINITIS: ICD-10-CM

## 2020-07-06 DIAGNOSIS — Z85.3 HX OF BREAST CANCER: ICD-10-CM

## 2020-07-06 PROCEDURE — 77061 MAMMO DIGITAL DIAGNOSTIC LEFT WITH TOMOSYNTHESIS_CAD: ICD-10-PCS | Mod: 26,HCNC,LT, | Performed by: RADIOLOGY

## 2020-07-06 PROCEDURE — 99999 PR PBB SHADOW E&M-EST. PATIENT-LVL III: CPT | Mod: PBBFAC,HCNC,, | Performed by: INTERNAL MEDICINE

## 2020-07-06 PROCEDURE — 77065 DX MAMMO INCL CAD UNI: CPT | Mod: TC,HCNC,PO,LT

## 2020-07-06 PROCEDURE — 99214 PR OFFICE/OUTPT VISIT, EST, LEVL IV, 30-39 MIN: ICD-10-PCS | Mod: HCNC,S$GLB,, | Performed by: INTERNAL MEDICINE

## 2020-07-06 PROCEDURE — 77065 DX MAMMO INCL CAD UNI: CPT | Mod: 26,HCNC,LT, | Performed by: RADIOLOGY

## 2020-07-06 PROCEDURE — 3078F DIAST BP <80 MM HG: CPT | Mod: HCNC,CPTII,S$GLB, | Performed by: INTERNAL MEDICINE

## 2020-07-06 PROCEDURE — 99214 OFFICE O/P EST MOD 30 MIN: CPT | Mod: HCNC,S$GLB,, | Performed by: INTERNAL MEDICINE

## 2020-07-06 PROCEDURE — 1101F PT FALLS ASSESS-DOCD LE1/YR: CPT | Mod: HCNC,CPTII,S$GLB, | Performed by: INTERNAL MEDICINE

## 2020-07-06 PROCEDURE — 99999 PR PBB SHADOW E&M-EST. PATIENT-LVL III: ICD-10-PCS | Mod: PBBFAC,HCNC,, | Performed by: INTERNAL MEDICINE

## 2020-07-06 PROCEDURE — 1126F PR PAIN SEVERITY QUANTIFIED, NO PAIN PRESENT: ICD-10-PCS | Mod: HCNC,S$GLB,, | Performed by: INTERNAL MEDICINE

## 2020-07-06 PROCEDURE — 3074F SYST BP LT 130 MM HG: CPT | Mod: HCNC,CPTII,S$GLB, | Performed by: INTERNAL MEDICINE

## 2020-07-06 PROCEDURE — 3078F PR MOST RECENT DIASTOLIC BLOOD PRESSURE < 80 MM HG: ICD-10-PCS | Mod: HCNC,CPTII,S$GLB, | Performed by: INTERNAL MEDICINE

## 2020-07-06 PROCEDURE — 1159F PR MEDICATION LIST DOCUMENTED IN MEDICAL RECORD: ICD-10-PCS | Mod: HCNC,S$GLB,, | Performed by: INTERNAL MEDICINE

## 2020-07-06 PROCEDURE — 77061 BREAST TOMOSYNTHESIS UNI: CPT | Mod: TC,HCNC,PO,LT

## 2020-07-06 PROCEDURE — 77065 MAMMO DIGITAL DIAGNOSTIC LEFT WITH TOMOSYNTHESIS_CAD: ICD-10-PCS | Mod: 26,HCNC,LT, | Performed by: RADIOLOGY

## 2020-07-06 PROCEDURE — 3074F PR MOST RECENT SYSTOLIC BLOOD PRESSURE < 130 MM HG: ICD-10-PCS | Mod: HCNC,CPTII,S$GLB, | Performed by: INTERNAL MEDICINE

## 2020-07-06 PROCEDURE — 77061 BREAST TOMOSYNTHESIS UNI: CPT | Mod: 26,HCNC,LT, | Performed by: RADIOLOGY

## 2020-07-06 PROCEDURE — 1159F MED LIST DOCD IN RCRD: CPT | Mod: HCNC,S$GLB,, | Performed by: INTERNAL MEDICINE

## 2020-07-06 PROCEDURE — 1126F AMNT PAIN NOTED NONE PRSNT: CPT | Mod: HCNC,S$GLB,, | Performed by: INTERNAL MEDICINE

## 2020-07-06 PROCEDURE — 1101F PR PT FALLS ASSESS DOC 0-1 FALLS W/OUT INJ PAST YR: ICD-10-PCS | Mod: HCNC,CPTII,S$GLB, | Performed by: INTERNAL MEDICINE

## 2020-07-20 ENCOUNTER — TELEPHONE (OUTPATIENT)
Dept: INTERNAL MEDICINE | Facility: CLINIC | Age: 75
End: 2020-07-20

## 2020-07-20 PROBLEM — Z85.3 HISTORY OF RIGHT BREAST CANCER: Status: ACTIVE | Noted: 2019-08-28

## 2020-07-20 NOTE — PROGRESS NOTES
Subjective:       Patient ID: Odessa Vaughn is a 75 y.o. female.    Chief Complaint: Annual Exam, Hyperlipidemia, Recurrent Skin Infections (boil L lower abd), and Vaginal Itching    Hyperlipidemia  This is a chronic problem. The problem is controlled. Recent lipid tests were reviewed and are normal. Pertinent negatives include no chest pain or shortness of breath. Current antihyperlipidemic treatment includes statins. The current treatment provides significant improvement of lipids. There are no compliance problems.    Vaginal Itching  The patient's primary symptoms include genital itching. The patient's pertinent negatives include no genital lesions or vaginal discharge. This is a new problem. The current episode started in the past 7 days. Pertinent negatives include no abdominal pain, dysuria, frequency, hematuria, rash or sore throat.     Review of Systems   Constitutional: Negative for fatigue.   HENT: Negative for sore throat.    Eyes: Negative for visual disturbance.   Respiratory: Negative for shortness of breath.    Cardiovascular: Negative for chest pain and palpitations.   Gastrointestinal: Negative for abdominal pain.   Genitourinary: Negative for dysuria, frequency, hematuria and vaginal discharge.   Musculoskeletal: Negative for joint swelling.   Skin: Positive for wound. Negative for rash.   Neurological: Negative for speech difficulty and weakness.   Psychiatric/Behavioral: Negative for dysphoric mood.       Objective:      Physical Exam  Vitals signs reviewed.   Constitutional:       General: She is not in acute distress.     Appearance: She is well-developed.   HENT:      Head: Normocephalic and atraumatic.   Eyes:      Conjunctiva/sclera: Conjunctivae normal.      Pupils: Pupils are equal, round, and reactive to light.   Neck:      Musculoskeletal: Normal range of motion and neck supple.   Cardiovascular:      Rate and Rhythm: Normal rate and regular rhythm.      Heart sounds: Normal heart  sounds.   Pulmonary:      Effort: Pulmonary effort is normal.      Breath sounds: Normal breath sounds. No wheezing.   Abdominal:      General: Bowel sounds are normal.      Palpations: Abdomen is soft.      Tenderness: There is no abdominal tenderness.   Musculoskeletal: Normal range of motion.         General: No tenderness.   Skin:     Findings: No erythema.          Neurological:      Mental Status: She is alert and oriented to person, place, and time.      Cranial Nerves: No cranial nerve deficit.         Assessment:       1. Essential hypertension    2. Hx of breast cancer    3. S/P right mastectomy    4. Other hyperlipidemia    5. Folliculitis    6. Acute vaginitis        Plan:       Odessa was seen today for annual exam, hyperlipidemia, recurrent skin infections and vaginal itching.    Diagnoses and all orders for this visit:    Essential hypertension    Hx of breast cancer  -     Mammo Digital Diagnostic Left; Future    S/P right mastectomy  -     Mammo Digital Diagnostic Left; Future    Other hyperlipidemia  -     Comprehensive metabolic panel; Future  -     Lipid Panel; Future    Folliculitis  Comments:  right lower adb wall folliculitis, I&D performed with some minor purulence- cont doxy    Acute vaginitis  Comments:  likely related to doxy-  rec OTC antifungal        Follow up in about 6 months (around 1/6/2021) for F/U APPOINTMENT WITH ME, WITH LAB BEFORE.

## 2020-09-01 ENCOUNTER — OFFICE VISIT (OUTPATIENT)
Dept: OBSTETRICS AND GYNECOLOGY | Facility: CLINIC | Age: 75
End: 2020-09-01
Payer: MEDICARE

## 2020-09-01 VITALS
SYSTOLIC BLOOD PRESSURE: 122 MMHG | BODY MASS INDEX: 29.62 KG/M2 | WEIGHT: 173.5 LBS | HEIGHT: 64 IN | DIASTOLIC BLOOD PRESSURE: 74 MMHG

## 2020-09-01 DIAGNOSIS — L01.02 FOLLICULITIS AND PERIFOLLICULITIS: ICD-10-CM

## 2020-09-01 DIAGNOSIS — N95.9 MENOPAUSAL AND PERIMENOPAUSAL DISORDER: ICD-10-CM

## 2020-09-01 DIAGNOSIS — L73.9 FOLLICULITIS AND PERIFOLLICULITIS: ICD-10-CM

## 2020-09-01 DIAGNOSIS — Z01.419 VISIT FOR GYNECOLOGIC EXAMINATION: Primary | ICD-10-CM

## 2020-09-01 PROCEDURE — 99999 PR PBB SHADOW E&M-EST. PATIENT-LVL III: CPT | Mod: PBBFAC,HCNC,, | Performed by: OBSTETRICS & GYNECOLOGY

## 2020-09-01 PROCEDURE — 99999 PR PBB SHADOW E&M-EST. PATIENT-LVL III: ICD-10-PCS | Mod: PBBFAC,HCNC,, | Performed by: OBSTETRICS & GYNECOLOGY

## 2020-09-01 PROCEDURE — G0101 CA SCREEN;PELVIC/BREAST EXAM: HCPCS | Mod: HCNC,S$GLB,, | Performed by: OBSTETRICS & GYNECOLOGY

## 2020-09-01 PROCEDURE — G0101 PR CA SCREEN;PELVIC/BREAST EXAM: ICD-10-PCS | Mod: HCNC,S$GLB,, | Performed by: OBSTETRICS & GYNECOLOGY

## 2020-09-01 NOTE — PROGRESS NOTES
"HISTORY OF PRESENT ILLNESS:    Odessa Bauman is a 75 y.o. female , presents for a routine exam and has no complaints.   DX MAMMO HAS BEEN ORDERED, AND ASK RE COLONOSCOPY - LAST IN .  HAS NOTED "KNOTS" RIGHT AXILLA 2 DAYS, LEFT GROIN AND RIGHT LABIUM MAJUS - REASSURED SEEMS FOLLICULITIS.  HUSB ALSO WITH DEEP SKIN INFECTION ON HIS BACK, GOING TO BE DRAINED TODAY.  PT COUNSELED RE LOCAL CARE AND RXN BACTORBAN    LAST 2019:  L DX MAMMO IS DUE AND PAP NOT INDICATED.  RECENT DEXA - MILD OSTEOPENIA  TRIP TO [facility], WILL SEE [id number] AUNT AT THE END OF THIS WEEK.  THEN [place] TO SELL HER HOUSE THERE.  LAST VISIT 2018:   L DX MAMMO WAS NORMAL AT THE END OF LAST MONTH; HAS BEEN 5 YRS SINCE BR CA DIAGNOSIS.   WITH RECENT BOUT OF SHINGLES, NOW FEELING BETTER  HAS HAD SOME MID LEFT BACK PIN, BUT HAS BEEN GETTING BETTER WITH REST, BIOFREEZE  LAST VISIT 3/2017:  DIAGNOSTIC MAMMO HAS BEEN ORDERED, PAP NORMAL LAST YEAR, REPEAT NOT INDICATED.  NO GYN C/O AND ALMOST 5 YRS SINCE DIAGNOSIS  LAST VISIT 2016:  MAMMO HAS BEEN ORDERED AND LAST PAP , PT WITH CANCER HX REQUESTS  OCCASIONAL VAGINAL ITCHING AND REQUESTS REFILL PRN LOTRISONE  LAST VISIT 2013:  AFTER VISIT LAST YEAR BREAST CANCER DIAGNOSED, TREATED. PAP IS UP TO DATE AND HAD BREAST F/U, MAMMO TODAY  LAST VISIT 2012:  DOING WELL AND STILL LOOKING AFTER HER GRANDKIDS (MOM IS LUC BAUMAN). REF MAMMO AND BMD UP TO DATE UNTIL .   PAP SUBMITTED - LAST ONE. DISCUSSED NEW SCREENING RECOMMENDATIONS, BUT PT WANTS LAST PAP DONE . . .    Past Medical History:   Diagnosis Date    Arthritis     hands, legs    Breast cancer 2012    Right breast invasive ductal carcinoma, [facility] positive, [facility] and [facility]    Bursitis of left hip     resolved    Chronic midline low back pain with left-sided sciatica 2017    Essential hypertension 2015    Hyperlipidemia 9/15/2014       Past Surgical History:   Procedure Laterality Date "    BREAST BIOPSY  12/19/2012    Right breast- IDC    BREAST SURGERY Right 1/2013    right mastectomy, SN biopsy     COLONOSCOPY N/A 9/7/2017    Procedure: COLONOSCOPY;  Surgeon: Syed Shah MD;  Location: [facility] ([address] [place] [address]);  Service: Endoscopy;  Laterality: N/A;    DILATION AND CURETTAGE OF UTERUS      MASTECTOMY  1/13        MEDICATIONS AND ALLERGIES:      Current Outpatient Medications:     aspirin 81 MG Chew, Take 81 mg by mouth once daily.  , Disp: , Rfl:     atorvastatin (LIPITOR) 10 MG tablet, TAKE 1 TABLET(10 MG) BY MOUTH EVERY DAY, Disp: 90 tablet, Rfl: 3    multivitamin capsule, Take 1 capsule by mouth once daily., Disp: , Rfl:     mupirocin (BACTROBAN) 2 % ointment, Apply topically 3 (three) times daily., Disp: 30 g, Rfl: 1    naproxen sodium (ANAPROX) 220 MG tablet, Take 220 mg by mouth 2 (two) times daily as needed., Disp: , Rfl:     PROPYLENE GLYCOL//PF (SYSTANE, PF, OPHT), Apply to eye daily as needed. , Disp: , Rfl:     triamterene-hydrochlorothiazide 37.5-25 mg (DYAZIDE) 37.5-25 mg per capsule, Take 1 capsule by mouth once daily., Disp: 90 capsule, Rfl: 3    mupirocin calcium 2% nasl oint (BACTROBAN NASAL) 2 % Oint, by Nasal route 2 (two) times daily. for 14 days, Disp: 1 g, Rfl: 0    Review of patient's allergies indicates:   Allergen Reactions    Vicodin [hydrocodone-acetaminophen] Other (See Comments)     Dizziness       Family History   Problem Relation Age of Onset    Cancer Father         Pancreatic CA    Cataracts Father     Breast cancer Cousin 58    Breast cancer Cousin 58    No Known Problems Mother     Hypertension Sister     Arthritis Brother     No Known Problems Daughter     COPD Sister     No Known Problems Brother     No Known Problems Brother     No Known Problems Brother     No Known Problems Daughter     Breast cancer Paternal Aunt 55    Cancer Paternal Aunt         Breast Ca and Lung CA    No Known Problems Maternal Aunt     No Known  Problems Maternal Uncle     No Known Problems Paternal Uncle     No Known Problems Maternal Grandmother     No Known Problems Maternal Grandfather     No Known Problems Paternal Grandmother     No Known Problems Paternal Grandfather     Ovarian cancer Neg Hx     Amblyopia Neg Hx     Blindness Neg Hx     Diabetes Neg Hx     Glaucoma Neg Hx     Macular degeneration Neg Hx     Retinal detachment Neg Hx     Strabismus Neg Hx     Stroke Neg Hx     Thyroid disease Neg Hx     Osteoarthritis Neg Hx     Rheum arthritis Neg Hx        Social History     Socioeconomic History    Marital status:      Spouse name: Not on file    Number of children: Not on file    Years of education: Not on file    Highest education level: Not on file   Occupational History    Not on file   Social Needs    Financial resource strain: Not on file    Food insecurity     Worry: Not on file     Inability: Not on file    Transportation needs     Medical: Not on file     Non-medical: Not on file   Tobacco Use    Smoking status: Never Smoker    Smokeless tobacco: Never Used    Tobacco comment: Retired;    Substance and Sexual Activity    Alcohol use: Yes     Alcohol/week: 0.0 standard drinks     Comment: holiday - occasional wine    Drug use: No    Sexual activity: Yes     Partners: Male   Lifestyle    Physical activity     Days per week: Not on file     Minutes per session: Not on file    Stress: Not on file   Relationships    Social connections     Talks on phone: Not on file     Gets together: Not on file     Attends Jewish service: Not on file     Active member of club or organization: Not on file     Attends meetings of clubs or organizations: Not on file     Relationship status: Not on file   Other Topics Concern    Not on file   Social History Narrative    Not on file       COMPREHENSIVE GYN HISTORY:  PAP History:  Denies abnormal Paps except a noted above.  Infection History: Denies  "STDs. Denies PID.  Benign History: Denies uterine fibroids. Denies ovarian cysts. Denies endometriosis.  Denies other conditions.  Cancer History: Denies cervical cancer. Denies uterine cancer or hyperplasia. Denies ovarian cancer. Denies vulvar cancer or pre-cancer. Denies vaginal cancer or pre-cancer. Denies breast cancer. Denies colon cancer.    ROS:  GENERAL: No weight changes. No swelling. No fatigue. No fever.  CARDIOVASCULAR: No chest pain. No shortness of breath. No leg cramps.   NEUROLOGICAL: No headaches. No vision changes.  BREASTS: No pain. No lumps. No discharge.  ABDOMEN: No pain. No nausea. No vomiting. No diarrhea. No constipation.  REPRODUCTIVE: No abnormal bleeding.   VULVA: No pain. No lesions. No itching.  VAGINA: No relaxation. No itching. No odor. No discharge. No lesions.  URINARY: No incontinence. No nocturia. No frequency. No dysuria.    /74 (BP Location: Left arm, Patient Position: Sitting)   Ht 5' 4" (1.626 m)   Wt 78.7 kg (173 lb 8 oz)   BMI 29.78 kg/m²     PE:  APPEARANCE: Well nourished, well developed, in no acute distress.  AFFECT: WNL, alert and oriented x 3.  SKIN: No hirsutism or acne.  NECK: Neck symmetric without masses or thyromegaly.  NODES: No inguinal, cervical, axillary or femoral lymph node enlargement.  CHEST: Good respiratory effort.   ABDOMEN: Soft. No tenderness or masses. No hepatosplenomegaly. No hernias.  BREASTS: Symmetrical, no skin changes or visible lesions. No palpable masses, nipple discharge bilaterally.  PELVIC: ATROPHIC EXTERNAL FEMALE GENITALIA without lesions. Normal hair distribution. Adequate perineal body, normal urethral meatus. VAGINA DRY without lesions or discharge. CERVIX STENOTIC without lesions, discharge or tenderness. No significant cystocele or rectocele. Bimanual exam shows uterus to be normal size, regular, mobile and nontender. Adnexa without masses or tenderness.  EXTREMITIES: No edema.    PROCEDURES:      DIAGNOSIS:  1. Visit for " gynecologic examination     2. Menopausal and perimenopausal disorder     3. Folliculitis and perifolliculitis           COUNSELING:  The patient was counseled today on osteoporosis prevention, calcium supplementation, and regular weight bearing exercise. The patient was also counseled today on ACS PAP guidelines, with recommendations for yearly pelvic exams unless their uterus, cervix, and ovaries were removed for benign reasons; in that case, examinations every 3-5 years are recommended.  The patient was also counseled regarding monthly breast self-examination, routine STD screening for at-risk populations, prophylactic immunizations for transmitted infections such as  HPV, Pertussis, or Influenza as appropriate, and yearly mammograms when indicated by ACS guidelines.  She was advised to see her primary care physician for all other health maintenance.    FOLLOW-UP with me annually.

## 2020-09-17 ENCOUNTER — IMMUNIZATION (OUTPATIENT)
Dept: INTERNAL MEDICINE | Facility: CLINIC | Age: 75
End: 2020-09-17
Payer: MEDICARE

## 2020-09-17 PROCEDURE — 90694 FLU VACCINE - QUADRIVALENT - ADJUVANTED: ICD-10-PCS | Mod: HCNC,S$GLB,, | Performed by: INTERNAL MEDICINE

## 2020-09-17 PROCEDURE — G0008 ADMIN INFLUENZA VIRUS VAC: HCPCS | Mod: HCNC,S$GLB,, | Performed by: INTERNAL MEDICINE

## 2020-09-17 PROCEDURE — 90694 VACC AIIV4 NO PRSRV 0.5ML IM: CPT | Mod: HCNC,S$GLB,, | Performed by: INTERNAL MEDICINE

## 2020-09-17 PROCEDURE — G0008 PR ADMIN INFLUENZA VIRUS VAC: ICD-10-PCS | Mod: HCNC,S$GLB,, | Performed by: INTERNAL MEDICINE

## 2020-09-29 ENCOUNTER — PATIENT MESSAGE (OUTPATIENT)
Dept: OTHER | Facility: OTHER | Age: 75
End: 2020-09-29

## 2020-12-11 ENCOUNTER — PATIENT MESSAGE (OUTPATIENT)
Dept: OTHER | Facility: OTHER | Age: 75
End: 2020-12-11

## 2021-01-02 ENCOUNTER — PATIENT MESSAGE (OUTPATIENT)
Dept: INTERNAL MEDICINE | Facility: CLINIC | Age: 76
End: 2021-01-02

## 2021-01-05 ENCOUNTER — IMMUNIZATION (OUTPATIENT)
Dept: INTERNAL MEDICINE | Facility: CLINIC | Age: 76
End: 2021-01-05
Payer: MEDICARE

## 2021-01-05 DIAGNOSIS — Z23 NEED FOR VACCINATION: ICD-10-CM

## 2021-01-05 PROCEDURE — 91300 COVID-19, MRNA, LNP-S, PF, 30 MCG/0.3 ML DOSE VACCINE: CPT | Mod: PBBFAC | Performed by: INTERNAL MEDICINE

## 2021-01-26 ENCOUNTER — IMMUNIZATION (OUTPATIENT)
Dept: INTERNAL MEDICINE | Facility: CLINIC | Age: 76
End: 2021-01-26
Payer: MEDICARE

## 2021-01-26 DIAGNOSIS — Z23 NEED FOR VACCINATION: Primary | ICD-10-CM

## 2021-01-26 PROCEDURE — 91300 COVID-19, MRNA, LNP-S, PF, 30 MCG/0.3 ML DOSE VACCINE: CPT | Mod: PBBFAC | Performed by: INTERNAL MEDICINE

## 2021-01-26 PROCEDURE — 0002A COVID-19, MRNA, LNP-S, PF, 30 MCG/0.3 ML DOSE VACCINE: CPT | Mod: PBBFAC | Performed by: INTERNAL MEDICINE

## 2021-02-15 ENCOUNTER — PES CALL (OUTPATIENT)
Dept: ADMINISTRATIVE | Facility: CLINIC | Age: 76
End: 2021-02-15

## 2021-03-04 ENCOUNTER — TELEPHONE (OUTPATIENT)
Dept: INTERNAL MEDICINE | Facility: CLINIC | Age: 76
End: 2021-03-04

## 2021-03-05 ENCOUNTER — OFFICE VISIT (OUTPATIENT)
Dept: INTERNAL MEDICINE | Facility: CLINIC | Age: 76
End: 2021-03-05
Payer: MEDICARE

## 2021-03-05 VITALS
HEART RATE: 72 BPM | HEIGHT: 64 IN | BODY MASS INDEX: 29.74 KG/M2 | WEIGHT: 174.19 LBS | DIASTOLIC BLOOD PRESSURE: 68 MMHG | TEMPERATURE: 98 F | SYSTOLIC BLOOD PRESSURE: 120 MMHG

## 2021-03-05 DIAGNOSIS — L73.9 FOLLICULITIS: Primary | ICD-10-CM

## 2021-03-05 DIAGNOSIS — M46.96 INFLAMMATORY SPONDYLOPATHY OF LUMBAR REGION: ICD-10-CM

## 2021-03-05 DIAGNOSIS — I70.0 ATHEROSCLEROSIS OF AORTA: ICD-10-CM

## 2021-03-05 DIAGNOSIS — D75.839 THROMBOCYTOSIS: ICD-10-CM

## 2021-03-05 PROCEDURE — 99999 PR PBB SHADOW E&M-EST. PATIENT-LVL III: ICD-10-PCS | Mod: PBBFAC,,, | Performed by: INTERNAL MEDICINE

## 2021-03-05 PROCEDURE — 3288F FALL RISK ASSESSMENT DOCD: CPT | Mod: CPTII,S$GLB,, | Performed by: INTERNAL MEDICINE

## 2021-03-05 PROCEDURE — 99212 PR OFFICE/OUTPT VISIT, EST, LEVL II, 10-19 MIN: ICD-10-PCS | Mod: S$GLB,,, | Performed by: INTERNAL MEDICINE

## 2021-03-05 PROCEDURE — 1159F PR MEDICATION LIST DOCUMENTED IN MEDICAL RECORD: ICD-10-PCS | Mod: S$GLB,,, | Performed by: INTERNAL MEDICINE

## 2021-03-05 PROCEDURE — 3074F PR MOST RECENT SYSTOLIC BLOOD PRESSURE < 130 MM HG: ICD-10-PCS | Mod: CPTII,S$GLB,, | Performed by: INTERNAL MEDICINE

## 2021-03-05 PROCEDURE — 3074F SYST BP LT 130 MM HG: CPT | Mod: CPTII,S$GLB,, | Performed by: INTERNAL MEDICINE

## 2021-03-05 PROCEDURE — 99499 RISK ADDL DX/OHS AUDIT: ICD-10-PCS | Mod: S$GLB,,, | Performed by: INTERNAL MEDICINE

## 2021-03-05 PROCEDURE — 1126F PR PAIN SEVERITY QUANTIFIED, NO PAIN PRESENT: ICD-10-PCS | Mod: S$GLB,,, | Performed by: INTERNAL MEDICINE

## 2021-03-05 PROCEDURE — 3078F DIAST BP <80 MM HG: CPT | Mod: CPTII,S$GLB,, | Performed by: INTERNAL MEDICINE

## 2021-03-05 PROCEDURE — 1101F PR PT FALLS ASSESS DOC 0-1 FALLS W/OUT INJ PAST YR: ICD-10-PCS | Mod: CPTII,S$GLB,, | Performed by: INTERNAL MEDICINE

## 2021-03-05 PROCEDURE — 1126F AMNT PAIN NOTED NONE PRSNT: CPT | Mod: S$GLB,,, | Performed by: INTERNAL MEDICINE

## 2021-03-05 PROCEDURE — 1101F PT FALLS ASSESS-DOCD LE1/YR: CPT | Mod: CPTII,S$GLB,, | Performed by: INTERNAL MEDICINE

## 2021-03-05 PROCEDURE — 3078F PR MOST RECENT DIASTOLIC BLOOD PRESSURE < 80 MM HG: ICD-10-PCS | Mod: CPTII,S$GLB,, | Performed by: INTERNAL MEDICINE

## 2021-03-05 PROCEDURE — 3288F PR FALLS RISK ASSESSMENT DOCUMENTED: ICD-10-PCS | Mod: CPTII,S$GLB,, | Performed by: INTERNAL MEDICINE

## 2021-03-05 PROCEDURE — 99499 UNLISTED E&M SERVICE: CPT | Mod: S$GLB,,, | Performed by: INTERNAL MEDICINE

## 2021-03-05 PROCEDURE — 99999 PR PBB SHADOW E&M-EST. PATIENT-LVL III: CPT | Mod: PBBFAC,,, | Performed by: INTERNAL MEDICINE

## 2021-03-05 PROCEDURE — 99212 OFFICE O/P EST SF 10 MIN: CPT | Mod: S$GLB,,, | Performed by: INTERNAL MEDICINE

## 2021-03-05 PROCEDURE — 1159F MED LIST DOCD IN RCRD: CPT | Mod: S$GLB,,, | Performed by: INTERNAL MEDICINE

## 2021-03-05 RX ORDER — SULFAMETHOXAZOLE AND TRIMETHOPRIM 800; 160 MG/1; MG/1
1 TABLET ORAL 3 TIMES DAILY
Qty: 21 TABLET | Refills: 0 | Status: SHIPPED | OUTPATIENT
Start: 2021-03-05 | End: 2021-04-19 | Stop reason: ALTCHOICE

## 2021-03-08 ENCOUNTER — OFFICE VISIT (OUTPATIENT)
Dept: OPTOMETRY | Facility: CLINIC | Age: 76
End: 2021-03-08
Payer: COMMERCIAL

## 2021-03-08 DIAGNOSIS — H35.361 RETINAL DRUSEN OF RIGHT EYE: ICD-10-CM

## 2021-03-08 DIAGNOSIS — H52.01 HYPEROPIA OF RIGHT EYE: ICD-10-CM

## 2021-03-08 DIAGNOSIS — H43.812 VITREOUS DETACHMENT OF LEFT EYE: ICD-10-CM

## 2021-03-08 DIAGNOSIS — H25.13 NUCLEAR SCLEROSIS OF BOTH EYES: Primary | ICD-10-CM

## 2021-03-08 DIAGNOSIS — H52.202 HYPEROPIA OF LEFT EYE WITH ASTIGMATISM: ICD-10-CM

## 2021-03-08 DIAGNOSIS — H43.393 VITREOUS FLOATERS OF BOTH EYES: ICD-10-CM

## 2021-03-08 DIAGNOSIS — H52.4 PRESBYOPIA OF BOTH EYES: ICD-10-CM

## 2021-03-08 DIAGNOSIS — H52.02 HYPEROPIA OF LEFT EYE WITH ASTIGMATISM: ICD-10-CM

## 2021-03-08 DIAGNOSIS — H26.9 CORTICAL CATARACT OF BOTH EYES: ICD-10-CM

## 2021-03-08 PROCEDURE — 92004 COMPRE OPH EXAM NEW PT 1/>: CPT | Mod: S$GLB,,, | Performed by: OPTOMETRIST

## 2021-03-08 PROCEDURE — 92004 PR EYE EXAM, NEW PATIENT,COMPREHESV: ICD-10-PCS | Mod: S$GLB,,, | Performed by: OPTOMETRIST

## 2021-03-08 PROCEDURE — 92015 DETERMINE REFRACTIVE STATE: CPT | Mod: S$GLB,,, | Performed by: OPTOMETRIST

## 2021-03-08 PROCEDURE — 99999 PR PBB SHADOW E&M-EST. PATIENT-LVL III: CPT | Mod: PBBFAC,,, | Performed by: OPTOMETRIST

## 2021-03-08 PROCEDURE — 92015 PR REFRACTION: ICD-10-PCS | Mod: S$GLB,,, | Performed by: OPTOMETRIST

## 2021-03-08 PROCEDURE — 99999 PR PBB SHADOW E&M-EST. PATIENT-LVL III: ICD-10-PCS | Mod: PBBFAC,,, | Performed by: OPTOMETRIST

## 2021-03-11 ENCOUNTER — LAB VISIT (OUTPATIENT)
Dept: LAB | Facility: HOSPITAL | Age: 76
End: 2021-03-11
Attending: INTERNAL MEDICINE
Payer: MEDICARE

## 2021-03-11 DIAGNOSIS — E78.49 OTHER HYPERLIPIDEMIA: ICD-10-CM

## 2021-03-11 LAB
ALBUMIN SERPL BCP-MCNC: 3.5 G/DL (ref 3.5–5.2)
ALP SERPL-CCNC: 106 U/L (ref 55–135)
ALT SERPL W/O P-5'-P-CCNC: 24 U/L (ref 10–44)
ANION GAP SERPL CALC-SCNC: 8 MMOL/L (ref 8–16)
AST SERPL-CCNC: 23 U/L (ref 10–40)
BILIRUB SERPL-MCNC: 0.3 MG/DL (ref 0.1–1)
BUN SERPL-MCNC: 19 MG/DL (ref 8–23)
CALCIUM SERPL-MCNC: 9.5 MG/DL (ref 8.7–10.5)
CHLORIDE SERPL-SCNC: 102 MMOL/L (ref 95–110)
CHOLEST SERPL-MCNC: 160 MG/DL (ref 120–199)
CHOLEST/HDLC SERPL: 3.8 {RATIO} (ref 2–5)
CO2 SERPL-SCNC: 29 MMOL/L (ref 23–29)
CREAT SERPL-MCNC: 1.2 MG/DL (ref 0.5–1.4)
EST. GFR  (AFRICAN AMERICAN): 50.7 ML/MIN/1.73 M^2
EST. GFR  (NON AFRICAN AMERICAN): 44 ML/MIN/1.73 M^2
GLUCOSE SERPL-MCNC: 89 MG/DL (ref 70–110)
HDLC SERPL-MCNC: 42 MG/DL (ref 40–75)
HDLC SERPL: 26.3 % (ref 20–50)
LDLC SERPL CALC-MCNC: 98.2 MG/DL (ref 63–159)
NONHDLC SERPL-MCNC: 118 MG/DL
POTASSIUM SERPL-SCNC: 4.4 MMOL/L (ref 3.5–5.1)
PROT SERPL-MCNC: 8 G/DL (ref 6–8.4)
SODIUM SERPL-SCNC: 139 MMOL/L (ref 136–145)
TRIGL SERPL-MCNC: 99 MG/DL (ref 30–150)

## 2021-03-11 PROCEDURE — 36415 COLL VENOUS BLD VENIPUNCTURE: CPT | Performed by: INTERNAL MEDICINE

## 2021-03-11 PROCEDURE — 80053 COMPREHEN METABOLIC PANEL: CPT | Performed by: INTERNAL MEDICINE

## 2021-03-11 PROCEDURE — 80061 LIPID PANEL: CPT | Performed by: INTERNAL MEDICINE

## 2021-03-18 ENCOUNTER — PATIENT MESSAGE (OUTPATIENT)
Dept: RESEARCH | Facility: HOSPITAL | Age: 76
End: 2021-03-18

## 2021-03-19 ENCOUNTER — OFFICE VISIT (OUTPATIENT)
Dept: INFECTIOUS DISEASES | Facility: CLINIC | Age: 76
End: 2021-03-19
Payer: MEDICARE

## 2021-03-19 VITALS
WEIGHT: 176.13 LBS | DIASTOLIC BLOOD PRESSURE: 57 MMHG | TEMPERATURE: 98 F | SYSTOLIC BLOOD PRESSURE: 120 MMHG | HEIGHT: 64 IN | BODY MASS INDEX: 30.07 KG/M2 | HEART RATE: 74 BPM

## 2021-03-19 DIAGNOSIS — L08.9 RECURRENT INFECTION OF SKIN: Primary | ICD-10-CM

## 2021-03-19 PROCEDURE — 99999 PR PBB SHADOW E&M-EST. PATIENT-LVL III: ICD-10-PCS | Mod: PBBFAC,,, | Performed by: INTERNAL MEDICINE

## 2021-03-19 PROCEDURE — 1126F AMNT PAIN NOTED NONE PRSNT: CPT | Mod: S$GLB,,, | Performed by: INTERNAL MEDICINE

## 2021-03-19 PROCEDURE — 99203 PR OFFICE/OUTPT VISIT, NEW, LEVL III, 30-44 MIN: ICD-10-PCS | Mod: S$GLB,,, | Performed by: INTERNAL MEDICINE

## 2021-03-19 PROCEDURE — 1101F PR PT FALLS ASSESS DOC 0-1 FALLS W/OUT INJ PAST YR: ICD-10-PCS | Mod: CPTII,S$GLB,, | Performed by: INTERNAL MEDICINE

## 2021-03-19 PROCEDURE — 3074F SYST BP LT 130 MM HG: CPT | Mod: CPTII,S$GLB,, | Performed by: INTERNAL MEDICINE

## 2021-03-19 PROCEDURE — 1159F MED LIST DOCD IN RCRD: CPT | Mod: S$GLB,,, | Performed by: INTERNAL MEDICINE

## 2021-03-19 PROCEDURE — 99203 OFFICE O/P NEW LOW 30 MIN: CPT | Mod: S$GLB,,, | Performed by: INTERNAL MEDICINE

## 2021-03-19 PROCEDURE — 1101F PT FALLS ASSESS-DOCD LE1/YR: CPT | Mod: CPTII,S$GLB,, | Performed by: INTERNAL MEDICINE

## 2021-03-19 PROCEDURE — 1159F PR MEDICATION LIST DOCUMENTED IN MEDICAL RECORD: ICD-10-PCS | Mod: S$GLB,,, | Performed by: INTERNAL MEDICINE

## 2021-03-19 PROCEDURE — 3074F PR MOST RECENT SYSTOLIC BLOOD PRESSURE < 130 MM HG: ICD-10-PCS | Mod: CPTII,S$GLB,, | Performed by: INTERNAL MEDICINE

## 2021-03-19 PROCEDURE — 3078F DIAST BP <80 MM HG: CPT | Mod: CPTII,S$GLB,, | Performed by: INTERNAL MEDICINE

## 2021-03-19 PROCEDURE — 3288F PR FALLS RISK ASSESSMENT DOCUMENTED: ICD-10-PCS | Mod: CPTII,S$GLB,, | Performed by: INTERNAL MEDICINE

## 2021-03-19 PROCEDURE — 3288F FALL RISK ASSESSMENT DOCD: CPT | Mod: CPTII,S$GLB,, | Performed by: INTERNAL MEDICINE

## 2021-03-19 PROCEDURE — 99999 PR PBB SHADOW E&M-EST. PATIENT-LVL III: CPT | Mod: PBBFAC,,, | Performed by: INTERNAL MEDICINE

## 2021-03-19 PROCEDURE — 3078F PR MOST RECENT DIASTOLIC BLOOD PRESSURE < 80 MM HG: ICD-10-PCS | Mod: CPTII,S$GLB,, | Performed by: INTERNAL MEDICINE

## 2021-03-19 PROCEDURE — 1126F PR PAIN SEVERITY QUANTIFIED, NO PAIN PRESENT: ICD-10-PCS | Mod: S$GLB,,, | Performed by: INTERNAL MEDICINE

## 2021-03-26 ENCOUNTER — PATIENT MESSAGE (OUTPATIENT)
Dept: RESEARCH | Facility: HOSPITAL | Age: 76
End: 2021-03-26

## 2021-03-29 ENCOUNTER — OFFICE VISIT (OUTPATIENT)
Dept: INTERNAL MEDICINE | Facility: CLINIC | Age: 76
End: 2021-03-29
Payer: MEDICARE

## 2021-03-29 VITALS
BODY MASS INDEX: 29.96 KG/M2 | DIASTOLIC BLOOD PRESSURE: 58 MMHG | HEART RATE: 60 BPM | WEIGHT: 175.5 LBS | HEIGHT: 64 IN | SYSTOLIC BLOOD PRESSURE: 110 MMHG

## 2021-03-29 DIAGNOSIS — I10 ESSENTIAL HYPERTENSION: Primary | ICD-10-CM

## 2021-03-29 DIAGNOSIS — Z87.2 HX OF FOLLICULITIS: ICD-10-CM

## 2021-03-29 DIAGNOSIS — E78.49 OTHER HYPERLIPIDEMIA: ICD-10-CM

## 2021-03-29 PROCEDURE — 99999 PR PBB SHADOW E&M-EST. PATIENT-LVL III: ICD-10-PCS | Mod: PBBFAC,,, | Performed by: INTERNAL MEDICINE

## 2021-03-29 PROCEDURE — 1101F PR PT FALLS ASSESS DOC 0-1 FALLS W/OUT INJ PAST YR: ICD-10-PCS | Mod: CPTII,S$GLB,, | Performed by: INTERNAL MEDICINE

## 2021-03-29 PROCEDURE — 3074F PR MOST RECENT SYSTOLIC BLOOD PRESSURE < 130 MM HG: ICD-10-PCS | Mod: CPTII,S$GLB,, | Performed by: INTERNAL MEDICINE

## 2021-03-29 PROCEDURE — 99999 PR PBB SHADOW E&M-EST. PATIENT-LVL III: CPT | Mod: PBBFAC,,, | Performed by: INTERNAL MEDICINE

## 2021-03-29 PROCEDURE — 99213 OFFICE O/P EST LOW 20 MIN: CPT | Mod: S$GLB,,, | Performed by: INTERNAL MEDICINE

## 2021-03-29 PROCEDURE — 3078F PR MOST RECENT DIASTOLIC BLOOD PRESSURE < 80 MM HG: ICD-10-PCS | Mod: CPTII,S$GLB,, | Performed by: INTERNAL MEDICINE

## 2021-03-29 PROCEDURE — 3078F DIAST BP <80 MM HG: CPT | Mod: CPTII,S$GLB,, | Performed by: INTERNAL MEDICINE

## 2021-03-29 PROCEDURE — 3288F PR FALLS RISK ASSESSMENT DOCUMENTED: ICD-10-PCS | Mod: CPTII,S$GLB,, | Performed by: INTERNAL MEDICINE

## 2021-03-29 PROCEDURE — 99213 PR OFFICE/OUTPT VISIT, EST, LEVL III, 20-29 MIN: ICD-10-PCS | Mod: S$GLB,,, | Performed by: INTERNAL MEDICINE

## 2021-03-29 PROCEDURE — 1126F PR PAIN SEVERITY QUANTIFIED, NO PAIN PRESENT: ICD-10-PCS | Mod: S$GLB,,, | Performed by: INTERNAL MEDICINE

## 2021-03-29 PROCEDURE — 1126F AMNT PAIN NOTED NONE PRSNT: CPT | Mod: S$GLB,,, | Performed by: INTERNAL MEDICINE

## 2021-03-29 PROCEDURE — 1101F PT FALLS ASSESS-DOCD LE1/YR: CPT | Mod: CPTII,S$GLB,, | Performed by: INTERNAL MEDICINE

## 2021-03-29 PROCEDURE — 3074F SYST BP LT 130 MM HG: CPT | Mod: CPTII,S$GLB,, | Performed by: INTERNAL MEDICINE

## 2021-03-29 PROCEDURE — 1159F MED LIST DOCD IN RCRD: CPT | Mod: S$GLB,,, | Performed by: INTERNAL MEDICINE

## 2021-03-29 PROCEDURE — 1159F PR MEDICATION LIST DOCUMENTED IN MEDICAL RECORD: ICD-10-PCS | Mod: S$GLB,,, | Performed by: INTERNAL MEDICINE

## 2021-03-29 PROCEDURE — 3288F FALL RISK ASSESSMENT DOCD: CPT | Mod: CPTII,S$GLB,, | Performed by: INTERNAL MEDICINE

## 2021-04-19 ENCOUNTER — PATIENT MESSAGE (OUTPATIENT)
Dept: INFECTIOUS DISEASES | Facility: CLINIC | Age: 76
End: 2021-04-19

## 2021-04-19 PROBLEM — Z87.2: Status: ACTIVE | Noted: 2021-04-19

## 2021-04-21 ENCOUNTER — TELEPHONE (OUTPATIENT)
Dept: INFECTIOUS DISEASES | Facility: CLINIC | Age: 76
End: 2021-04-21

## 2021-04-21 DIAGNOSIS — L08.9 RECURRENT INFECTION OF SKIN: Primary | ICD-10-CM

## 2021-04-21 RX ORDER — DOXYCYCLINE HYCLATE 100 MG
100 TABLET ORAL 2 TIMES DAILY
Qty: 14 TABLET | Refills: 0 | Status: SHIPPED | OUTPATIENT
Start: 2021-04-21 | End: 2021-04-28

## 2021-05-20 ENCOUNTER — TELEPHONE (OUTPATIENT)
Dept: HEMATOLOGY/ONCOLOGY | Facility: CLINIC | Age: 76
End: 2021-05-20

## 2021-05-20 DIAGNOSIS — Z85.3 HISTORY OF BREAST CANCER: Primary | ICD-10-CM

## 2021-06-11 ENCOUNTER — HOSPITAL ENCOUNTER (OUTPATIENT)
Dept: RADIOLOGY | Facility: CLINIC | Age: 76
Discharge: HOME | End: 2021-06-11
Payer: MEDICARE

## 2021-06-11 DIAGNOSIS — Z13.820 OSTEOPOROSIS SCREENING: ICD-10-CM

## 2021-06-11 DIAGNOSIS — Z78.0 ASYMPTOMATIC MENOPAUSAL STATE: ICD-10-CM

## 2021-06-11 PROCEDURE — 77080 DEXA BONE DENSITY SPINE HIP: ICD-10-PCS | Mod: 26,,, | Performed by: INTERNAL MEDICINE

## 2021-06-11 PROCEDURE — 77080 DXA BONE DENSITY AXIAL: CPT | Mod: 26,,, | Performed by: INTERNAL MEDICINE

## 2021-06-11 PROCEDURE — 77080 DXA BONE DENSITY AXIAL: CPT | Mod: TC

## 2021-06-14 ENCOUNTER — OFFICE VISIT (OUTPATIENT)
Dept: HEMATOLOGY/ONCOLOGY | Facility: CLINIC | Age: 76
End: 2021-06-14
Attending: INTERNAL MEDICINE
Payer: MEDICARE

## 2021-06-14 VITALS
WEIGHT: 176.81 LBS | BODY MASS INDEX: 30.19 KG/M2 | OXYGEN SATURATION: 98 % | HEART RATE: 69 BPM | DIASTOLIC BLOOD PRESSURE: 70 MMHG | HEIGHT: 64 IN | SYSTOLIC BLOOD PRESSURE: 130 MMHG | TEMPERATURE: 98 F | RESPIRATION RATE: 18 BRPM

## 2021-06-14 DIAGNOSIS — Z12.31 ENCOUNTER FOR SCREENING MAMMOGRAM FOR HIGH-RISK PATIENT: ICD-10-CM

## 2021-06-14 DIAGNOSIS — Z90.10 DEFORMITY DUE TO MASTECTOMY: ICD-10-CM

## 2021-06-14 DIAGNOSIS — Z85.3 HISTORY OF RIGHT BREAST CANCER: Primary | ICD-10-CM

## 2021-06-14 DIAGNOSIS — N64.89 DEFORMITY DUE TO MASTECTOMY: ICD-10-CM

## 2021-06-14 PROCEDURE — 1126F AMNT PAIN NOTED NONE PRSNT: CPT | Mod: S$GLB,,, | Performed by: INTERNAL MEDICINE

## 2021-06-14 PROCEDURE — 1126F PR PAIN SEVERITY QUANTIFIED, NO PAIN PRESENT: ICD-10-PCS | Mod: S$GLB,,, | Performed by: INTERNAL MEDICINE

## 2021-06-14 PROCEDURE — 3288F PR FALLS RISK ASSESSMENT DOCUMENTED: ICD-10-PCS | Mod: CPTII,S$GLB,, | Performed by: INTERNAL MEDICINE

## 2021-06-14 PROCEDURE — 3288F FALL RISK ASSESSMENT DOCD: CPT | Mod: CPTII,S$GLB,, | Performed by: INTERNAL MEDICINE

## 2021-06-14 PROCEDURE — 1159F PR MEDICATION LIST DOCUMENTED IN MEDICAL RECORD: ICD-10-PCS | Mod: S$GLB,,, | Performed by: INTERNAL MEDICINE

## 2021-06-14 PROCEDURE — 99213 OFFICE O/P EST LOW 20 MIN: CPT | Mod: S$GLB,,, | Performed by: INTERNAL MEDICINE

## 2021-06-14 PROCEDURE — 99213 PR OFFICE/OUTPT VISIT, EST, LEVL III, 20-29 MIN: ICD-10-PCS | Mod: S$GLB,,, | Performed by: INTERNAL MEDICINE

## 2021-06-14 PROCEDURE — 99999 PR PBB SHADOW E&M-EST. PATIENT-LVL III: CPT | Mod: PBBFAC,,, | Performed by: INTERNAL MEDICINE

## 2021-06-14 PROCEDURE — 1101F PR PT FALLS ASSESS DOC 0-1 FALLS W/OUT INJ PAST YR: ICD-10-PCS | Mod: CPTII,S$GLB,, | Performed by: INTERNAL MEDICINE

## 2021-06-14 PROCEDURE — 1159F MED LIST DOCD IN RCRD: CPT | Mod: S$GLB,,, | Performed by: INTERNAL MEDICINE

## 2021-06-14 PROCEDURE — 1101F PT FALLS ASSESS-DOCD LE1/YR: CPT | Mod: CPTII,S$GLB,, | Performed by: INTERNAL MEDICINE

## 2021-06-14 PROCEDURE — 99999 PR PBB SHADOW E&M-EST. PATIENT-LVL III: ICD-10-PCS | Mod: PBBFAC,,, | Performed by: INTERNAL MEDICINE

## 2021-06-22 ENCOUNTER — TELEPHONE (OUTPATIENT)
Dept: INTERNAL MEDICINE | Facility: CLINIC | Age: 76
End: 2021-06-22

## 2021-06-23 ENCOUNTER — TELEPHONE (OUTPATIENT)
Dept: INTERNAL MEDICINE | Facility: CLINIC | Age: 76
End: 2021-06-23

## 2021-06-23 DIAGNOSIS — Z09 FOLLOW UP: Primary | ICD-10-CM

## 2021-06-23 RX ORDER — MUPIROCIN 20 MG/G
OINTMENT TOPICAL 2 TIMES DAILY
Qty: 1 TUBE | Refills: 3 | Status: SHIPPED | OUTPATIENT
Start: 2021-06-23 | End: 2021-07-03

## 2021-06-24 ENCOUNTER — TELEPHONE (OUTPATIENT)
Dept: INTERNAL MEDICINE | Facility: CLINIC | Age: 76
End: 2021-06-24

## 2021-06-24 ENCOUNTER — LAB VISIT (OUTPATIENT)
Dept: LAB | Facility: HOSPITAL | Age: 76
End: 2021-06-24
Attending: INTERNAL MEDICINE
Payer: MEDICARE

## 2021-06-24 ENCOUNTER — PATIENT MESSAGE (OUTPATIENT)
Dept: INTERNAL MEDICINE | Facility: CLINIC | Age: 76
End: 2021-06-24

## 2021-06-24 DIAGNOSIS — Z09 FOLLOW UP: ICD-10-CM

## 2021-06-24 LAB
ANION GAP SERPL CALC-SCNC: 10 MMOL/L (ref 8–16)
BUN SERPL-MCNC: 15 MG/DL (ref 8–23)
CALCIUM SERPL-MCNC: 9.9 MG/DL (ref 8.7–10.5)
CHLORIDE SERPL-SCNC: 103 MMOL/L (ref 95–110)
CO2 SERPL-SCNC: 26 MMOL/L (ref 23–29)
CREAT SERPL-MCNC: 0.9 MG/DL (ref 0.5–1.4)
EST. GFR  (AFRICAN AMERICAN): >60 ML/MIN/1.73 M^2
EST. GFR  (NON AFRICAN AMERICAN): >60 ML/MIN/1.73 M^2
GLUCOSE SERPL-MCNC: 96 MG/DL (ref 70–110)
POTASSIUM SERPL-SCNC: 3.7 MMOL/L (ref 3.5–5.1)
SODIUM SERPL-SCNC: 139 MMOL/L (ref 136–145)

## 2021-06-24 PROCEDURE — 36415 COLL VENOUS BLD VENIPUNCTURE: CPT | Performed by: NURSE PRACTITIONER

## 2021-06-24 PROCEDURE — 80048 BASIC METABOLIC PNL TOTAL CA: CPT | Performed by: NURSE PRACTITIONER

## 2021-06-24 RX ORDER — SULFAMETHOXAZOLE AND TRIMETHOPRIM 800; 160 MG/1; MG/1
1 TABLET ORAL 2 TIMES DAILY
Qty: 14 TABLET | Refills: 0 | Status: SHIPPED | OUTPATIENT
Start: 2021-06-24 | End: 2021-09-15 | Stop reason: ALTCHOICE

## 2021-07-13 ENCOUNTER — HOSPITAL ENCOUNTER (OUTPATIENT)
Dept: RADIOLOGY | Facility: HOSPITAL | Age: 76
Discharge: HOME | End: 2021-07-13
Attending: INTERNAL MEDICINE
Payer: MEDICARE

## 2021-07-13 VITALS — WEIGHT: 173 LBS | BODY MASS INDEX: 29.7 KG/M2

## 2021-07-13 DIAGNOSIS — Z12.31 ENCOUNTER FOR SCREENING MAMMOGRAM FOR HIGH-RISK PATIENT: ICD-10-CM

## 2021-07-13 PROCEDURE — 77067 SCR MAMMO BI INCL CAD: CPT | Mod: 26,,, | Performed by: RADIOLOGY

## 2021-07-13 PROCEDURE — 77067 SCR MAMMO BI INCL CAD: CPT | Mod: TC

## 2021-07-13 PROCEDURE — 77067 MAMMO DIGITAL SCREENING LEFT WITH TOMO: ICD-10-PCS | Mod: 26,,, | Performed by: RADIOLOGY

## 2021-07-13 PROCEDURE — 77063 MAMMO DIGITAL SCREENING LEFT WITH TOMO: ICD-10-PCS | Mod: 26,,, | Performed by: RADIOLOGY

## 2021-07-13 PROCEDURE — 77063 BREAST TOMOSYNTHESIS BI: CPT | Mod: 26,,, | Performed by: RADIOLOGY

## 2021-09-10 ENCOUNTER — LAB VISIT (OUTPATIENT)
Dept: LAB | Facility: HOSPITAL | Age: 76
End: 2021-09-10
Attending: INTERNAL MEDICINE
Payer: MEDICARE

## 2021-09-10 DIAGNOSIS — E78.49 OTHER HYPERLIPIDEMIA: ICD-10-CM

## 2021-09-10 LAB
ALBUMIN SERPL BCP-MCNC: 3.5 G/DL (ref 3.5–5.2)
ALP SERPL-CCNC: 91 U/L (ref 55–135)
ALT SERPL W/O P-5'-P-CCNC: 20 U/L (ref 10–44)
ANION GAP SERPL CALC-SCNC: 9 MMOL/L (ref 8–16)
AST SERPL-CCNC: 22 U/L (ref 10–40)
BILIRUB SERPL-MCNC: 0.4 MG/DL (ref 0.1–1)
BUN SERPL-MCNC: 19 MG/DL (ref 8–23)
CALCIUM SERPL-MCNC: 9.6 MG/DL (ref 8.7–10.5)
CHLORIDE SERPL-SCNC: 104 MMOL/L (ref 95–110)
CHOLEST SERPL-MCNC: 151 MG/DL (ref 120–199)
CHOLEST/HDLC SERPL: 3.4 {RATIO} (ref 2–5)
CO2 SERPL-SCNC: 27 MMOL/L (ref 23–29)
CREAT SERPL-MCNC: 0.8 MG/DL (ref 0.5–1.4)
EST. GFR  (AFRICAN AMERICAN): >60 ML/MIN/1.73 M^2
EST. GFR  (NON AFRICAN AMERICAN): >60 ML/MIN/1.73 M^2
GLUCOSE SERPL-MCNC: 94 MG/DL (ref 70–110)
HDLC SERPL-MCNC: 44 MG/DL (ref 40–75)
HDLC SERPL: 29.1 % (ref 20–50)
LDLC SERPL CALC-MCNC: 91.4 MG/DL (ref 63–159)
NONHDLC SERPL-MCNC: 107 MG/DL
POTASSIUM SERPL-SCNC: 3.7 MMOL/L (ref 3.5–5.1)
PROT SERPL-MCNC: 7.6 G/DL (ref 6–8.4)
SODIUM SERPL-SCNC: 140 MMOL/L (ref 136–145)
TRIGL SERPL-MCNC: 78 MG/DL (ref 30–150)

## 2021-09-10 PROCEDURE — 36415 COLL VENOUS BLD VENIPUNCTURE: CPT | Performed by: INTERNAL MEDICINE

## 2021-09-10 PROCEDURE — 80061 LIPID PANEL: CPT | Performed by: INTERNAL MEDICINE

## 2021-09-10 PROCEDURE — 80053 COMPREHEN METABOLIC PANEL: CPT | Performed by: INTERNAL MEDICINE

## 2021-09-15 ENCOUNTER — OFFICE VISIT (OUTPATIENT)
Dept: INTERNAL MEDICINE | Facility: CLINIC | Age: 76
End: 2021-09-15
Payer: MEDICARE

## 2021-09-15 VITALS
SYSTOLIC BLOOD PRESSURE: 102 MMHG | WEIGHT: 177.94 LBS | DIASTOLIC BLOOD PRESSURE: 60 MMHG | HEIGHT: 64 IN | BODY MASS INDEX: 30.38 KG/M2 | HEART RATE: 68 BPM

## 2021-09-15 DIAGNOSIS — I10 ESSENTIAL HYPERTENSION: ICD-10-CM

## 2021-09-15 DIAGNOSIS — M54.31 RIGHT SIDED SCIATICA: Primary | ICD-10-CM

## 2021-09-15 DIAGNOSIS — E78.49 OTHER HYPERLIPIDEMIA: ICD-10-CM

## 2021-09-15 DIAGNOSIS — Z11.59 NEED FOR HEPATITIS C SCREENING TEST: ICD-10-CM

## 2021-09-15 PROCEDURE — 1101F PT FALLS ASSESS-DOCD LE1/YR: CPT | Mod: CPTII,S$GLB,, | Performed by: INTERNAL MEDICINE

## 2021-09-15 PROCEDURE — 1159F PR MEDICATION LIST DOCUMENTED IN MEDICAL RECORD: ICD-10-PCS | Mod: CPTII,S$GLB,, | Performed by: INTERNAL MEDICINE

## 2021-09-15 PROCEDURE — 1101F PR PT FALLS ASSESS DOC 0-1 FALLS W/OUT INJ PAST YR: ICD-10-PCS | Mod: CPTII,S$GLB,, | Performed by: INTERNAL MEDICINE

## 2021-09-15 PROCEDURE — 1125F PR PAIN SEVERITY QUANTIFIED, PAIN PRESENT: ICD-10-PCS | Mod: CPTII,S$GLB,, | Performed by: INTERNAL MEDICINE

## 2021-09-15 PROCEDURE — 99499 UNLISTED E&M SERVICE: CPT | Mod: HCNC,S$GLB,, | Performed by: INTERNAL MEDICINE

## 2021-09-15 PROCEDURE — 1125F AMNT PAIN NOTED PAIN PRSNT: CPT | Mod: CPTII,S$GLB,, | Performed by: INTERNAL MEDICINE

## 2021-09-15 PROCEDURE — 1160F RVW MEDS BY RX/DR IN RCRD: CPT | Mod: CPTII,S$GLB,, | Performed by: INTERNAL MEDICINE

## 2021-09-15 PROCEDURE — 3288F PR FALLS RISK ASSESSMENT DOCUMENTED: ICD-10-PCS | Mod: CPTII,S$GLB,, | Performed by: INTERNAL MEDICINE

## 2021-09-15 PROCEDURE — 3288F FALL RISK ASSESSMENT DOCD: CPT | Mod: CPTII,S$GLB,, | Performed by: INTERNAL MEDICINE

## 2021-09-15 PROCEDURE — 99999 PR PBB SHADOW E&M-EST. PATIENT-LVL III: ICD-10-PCS | Mod: PBBFAC,,, | Performed by: INTERNAL MEDICINE

## 2021-09-15 PROCEDURE — 99499 RISK ADDL DX/OHS AUDIT: ICD-10-PCS | Mod: HCNC,S$GLB,, | Performed by: INTERNAL MEDICINE

## 2021-09-15 PROCEDURE — 3074F PR MOST RECENT SYSTOLIC BLOOD PRESSURE < 130 MM HG: ICD-10-PCS | Mod: CPTII,S$GLB,, | Performed by: INTERNAL MEDICINE

## 2021-09-15 PROCEDURE — 99213 PR OFFICE/OUTPT VISIT, EST, LEVL III, 20-29 MIN: ICD-10-PCS | Mod: S$GLB,,, | Performed by: INTERNAL MEDICINE

## 2021-09-15 PROCEDURE — 3078F DIAST BP <80 MM HG: CPT | Mod: CPTII,S$GLB,, | Performed by: INTERNAL MEDICINE

## 2021-09-15 PROCEDURE — 99213 OFFICE O/P EST LOW 20 MIN: CPT | Mod: S$GLB,,, | Performed by: INTERNAL MEDICINE

## 2021-09-15 PROCEDURE — 1160F PR REVIEW ALL MEDS BY PRESCRIBER/CLIN PHARMACIST DOCUMENTED: ICD-10-PCS | Mod: CPTII,S$GLB,, | Performed by: INTERNAL MEDICINE

## 2021-09-15 PROCEDURE — 99999 PR PBB SHADOW E&M-EST. PATIENT-LVL III: CPT | Mod: PBBFAC,,, | Performed by: INTERNAL MEDICINE

## 2021-09-15 PROCEDURE — 3078F PR MOST RECENT DIASTOLIC BLOOD PRESSURE < 80 MM HG: ICD-10-PCS | Mod: CPTII,S$GLB,, | Performed by: INTERNAL MEDICINE

## 2021-09-15 PROCEDURE — 1159F MED LIST DOCD IN RCRD: CPT | Mod: CPTII,S$GLB,, | Performed by: INTERNAL MEDICINE

## 2021-09-15 PROCEDURE — 3074F SYST BP LT 130 MM HG: CPT | Mod: CPTII,S$GLB,, | Performed by: INTERNAL MEDICINE

## 2021-09-16 ENCOUNTER — PATIENT MESSAGE (OUTPATIENT)
Dept: INTERNAL MEDICINE | Facility: CLINIC | Age: 76
End: 2021-09-16

## 2021-09-16 DIAGNOSIS — I10 ESSENTIAL HYPERTENSION: ICD-10-CM

## 2021-09-17 RX ORDER — TRIAMTERENE AND HYDROCHLOROTHIAZIDE 37.5; 25 MG/1; MG/1
1 CAPSULE ORAL DAILY
Qty: 90 CAPSULE | Refills: 3 | Status: SHIPPED | OUTPATIENT
Start: 2021-09-17 | End: 2022-09-07

## 2021-09-27 ENCOUNTER — CLINICAL SUPPORT (OUTPATIENT)
Dept: REHABILITATION | Facility: HOSPITAL | Age: 76
End: 2021-09-27
Attending: INTERNAL MEDICINE
Payer: MEDICARE

## 2021-09-27 DIAGNOSIS — M54.31 RIGHT SIDED SCIATICA: ICD-10-CM

## 2021-09-27 PROCEDURE — 97161 PT EVAL LOW COMPLEX 20 MIN: CPT

## 2021-10-04 ENCOUNTER — CLINICAL SUPPORT (OUTPATIENT)
Dept: REHABILITATION | Facility: HOSPITAL | Age: 76
End: 2021-10-04
Attending: INTERNAL MEDICINE
Payer: MEDICARE

## 2021-10-04 DIAGNOSIS — M54.41 CHRONIC MIDLINE LOW BACK PAIN WITH RIGHT-SIDED SCIATICA: ICD-10-CM

## 2021-10-04 DIAGNOSIS — M62.81 WEAKNESS OF TRUNK MUSCULATURE: ICD-10-CM

## 2021-10-04 DIAGNOSIS — G89.29 CHRONIC MIDLINE LOW BACK PAIN WITH RIGHT-SIDED SCIATICA: ICD-10-CM

## 2021-10-04 DIAGNOSIS — M54.31 RIGHT SIDED SCIATICA: Primary | ICD-10-CM

## 2021-10-04 PROCEDURE — 97140 MANUAL THERAPY 1/> REGIONS: CPT | Mod: HCNC

## 2021-10-04 PROCEDURE — 97110 THERAPEUTIC EXERCISES: CPT | Mod: HCNC

## 2021-10-05 ENCOUNTER — IMMUNIZATION (OUTPATIENT)
Dept: INTERNAL MEDICINE | Facility: CLINIC | Age: 76
End: 2021-10-05
Payer: MEDICARE

## 2021-10-05 DIAGNOSIS — Z23 NEED FOR VACCINATION: Primary | ICD-10-CM

## 2021-10-05 PROCEDURE — 91300 COVID-19, MRNA, LNP-S, PF, 30 MCG/0.3 ML DOSE VACCINE: CPT | Mod: HCNC,PBBFAC | Performed by: INTERNAL MEDICINE

## 2021-10-05 PROCEDURE — 0003A COVID-19, MRNA, LNP-S, PF, 30 MCG/0.3 ML DOSE VACCINE: CPT | Mod: HCNC,CV19,PBBFAC | Performed by: INTERNAL MEDICINE

## 2021-10-06 ENCOUNTER — CLINICAL SUPPORT (OUTPATIENT)
Dept: REHABILITATION | Facility: HOSPITAL | Age: 76
End: 2021-10-06
Attending: INTERNAL MEDICINE
Payer: MEDICARE

## 2021-10-06 DIAGNOSIS — M62.81 WEAKNESS OF TRUNK MUSCULATURE: ICD-10-CM

## 2021-10-06 PROCEDURE — 97110 THERAPEUTIC EXERCISES: CPT | Mod: HCNC

## 2021-10-06 PROCEDURE — 97140 MANUAL THERAPY 1/> REGIONS: CPT | Mod: HCNC

## 2021-10-11 ENCOUNTER — CLINICAL SUPPORT (OUTPATIENT)
Dept: REHABILITATION | Facility: HOSPITAL | Age: 76
End: 2021-10-11
Attending: INTERNAL MEDICINE
Payer: MEDICARE

## 2021-10-11 DIAGNOSIS — M62.81 WEAKNESS OF TRUNK MUSCULATURE: ICD-10-CM

## 2021-10-11 PROCEDURE — 97110 THERAPEUTIC EXERCISES: CPT | Mod: HCNC

## 2021-10-11 PROCEDURE — 97140 MANUAL THERAPY 1/> REGIONS: CPT | Mod: HCNC

## 2021-10-13 ENCOUNTER — TELEPHONE (OUTPATIENT)
Dept: INTERNAL MEDICINE | Facility: CLINIC | Age: 76
End: 2021-10-13

## 2021-10-13 ENCOUNTER — CLINICAL SUPPORT (OUTPATIENT)
Dept: REHABILITATION | Facility: HOSPITAL | Age: 76
End: 2021-10-13
Attending: INTERNAL MEDICINE
Payer: MEDICARE

## 2021-10-13 DIAGNOSIS — M62.81 WEAKNESS OF TRUNK MUSCULATURE: ICD-10-CM

## 2021-10-13 PROCEDURE — 97140 MANUAL THERAPY 1/> REGIONS: CPT | Mod: HCNC

## 2021-10-13 PROCEDURE — 97110 THERAPEUTIC EXERCISES: CPT | Mod: HCNC

## 2021-10-17 ENCOUNTER — PATIENT MESSAGE (OUTPATIENT)
Dept: INTERNAL MEDICINE | Facility: CLINIC | Age: 76
End: 2021-10-17
Payer: MEDICARE

## 2021-10-18 ENCOUNTER — CLINICAL SUPPORT (OUTPATIENT)
Dept: REHABILITATION | Facility: HOSPITAL | Age: 76
End: 2021-10-18
Attending: INTERNAL MEDICINE
Payer: MEDICARE

## 2021-10-18 DIAGNOSIS — M62.81 WEAKNESS OF TRUNK MUSCULATURE: ICD-10-CM

## 2021-10-18 PROCEDURE — 97140 MANUAL THERAPY 1/> REGIONS: CPT | Mod: HCNC

## 2021-10-18 PROCEDURE — 97110 THERAPEUTIC EXERCISES: CPT | Mod: HCNC

## 2021-10-19 ENCOUNTER — HOSPITAL ENCOUNTER (OUTPATIENT)
Dept: RADIOLOGY | Facility: HOSPITAL | Age: 76
Discharge: HOME | End: 2021-10-19
Attending: FAMILY MEDICINE
Payer: MEDICARE

## 2021-10-19 ENCOUNTER — OFFICE VISIT (OUTPATIENT)
Dept: INTERNAL MEDICINE | Facility: CLINIC | Age: 76
End: 2021-10-19
Payer: MEDICARE

## 2021-10-19 VITALS
SYSTOLIC BLOOD PRESSURE: 114 MMHG | DIASTOLIC BLOOD PRESSURE: 68 MMHG | OXYGEN SATURATION: 100 % | HEIGHT: 65 IN | BODY MASS INDEX: 29.34 KG/M2 | HEART RATE: 58 BPM | WEIGHT: 176.13 LBS

## 2021-10-19 DIAGNOSIS — M47.26 OSTEOARTHRITIS OF SPINE WITH RADICULOPATHY, LUMBAR REGION: ICD-10-CM

## 2021-10-19 DIAGNOSIS — M25.551 RIGHT HIP PAIN: ICD-10-CM

## 2021-10-19 DIAGNOSIS — M54.9 DORSALGIA, UNSPECIFIED: ICD-10-CM

## 2021-10-19 DIAGNOSIS — M54.41 CHRONIC MIDLINE LOW BACK PAIN WITH RIGHT-SIDED SCIATICA: ICD-10-CM

## 2021-10-19 DIAGNOSIS — M47.26 OSTEOARTHRITIS OF SPINE WITH RADICULOPATHY, LUMBAR REGION: Primary | ICD-10-CM

## 2021-10-19 DIAGNOSIS — G89.29 CHRONIC MIDLINE LOW BACK PAIN WITH RIGHT-SIDED SCIATICA: ICD-10-CM

## 2021-10-19 PROCEDURE — 1125F AMNT PAIN NOTED PAIN PRSNT: CPT | Mod: HCNC,CPTII,S$GLB, | Performed by: FAMILY MEDICINE

## 2021-10-19 PROCEDURE — 3074F PR MOST RECENT SYSTOLIC BLOOD PRESSURE < 130 MM HG: ICD-10-PCS | Mod: HCNC,CPTII,S$GLB, | Performed by: FAMILY MEDICINE

## 2021-10-19 PROCEDURE — 1159F MED LIST DOCD IN RCRD: CPT | Mod: HCNC,CPTII,S$GLB, | Performed by: FAMILY MEDICINE

## 2021-10-19 PROCEDURE — 73502 XR HIP WITH PELVIS WHEN PERFORMED, 2 OR 3  VIEWS RIGHT: ICD-10-PCS | Mod: 26,HCNC,RT, | Performed by: RADIOLOGY

## 2021-10-19 PROCEDURE — 3078F DIAST BP <80 MM HG: CPT | Mod: HCNC,CPTII,S$GLB, | Performed by: FAMILY MEDICINE

## 2021-10-19 PROCEDURE — 1101F PT FALLS ASSESS-DOCD LE1/YR: CPT | Mod: HCNC,CPTII,S$GLB, | Performed by: FAMILY MEDICINE

## 2021-10-19 PROCEDURE — 3288F PR FALLS RISK ASSESSMENT DOCUMENTED: ICD-10-PCS | Mod: HCNC,CPTII,S$GLB, | Performed by: FAMILY MEDICINE

## 2021-10-19 PROCEDURE — 3288F FALL RISK ASSESSMENT DOCD: CPT | Mod: HCNC,CPTII,S$GLB, | Performed by: FAMILY MEDICINE

## 2021-10-19 PROCEDURE — 72100 XR LUMBAR SPINE AP AND LATERAL: ICD-10-PCS | Mod: 26,HCNC,, | Performed by: RADIOLOGY

## 2021-10-19 PROCEDURE — 99214 OFFICE O/P EST MOD 30 MIN: CPT | Mod: HCNC,S$GLB,, | Performed by: FAMILY MEDICINE

## 2021-10-19 PROCEDURE — 99214 PR OFFICE/OUTPT VISIT, EST, LEVL IV, 30-39 MIN: ICD-10-PCS | Mod: HCNC,S$GLB,, | Performed by: FAMILY MEDICINE

## 2021-10-19 PROCEDURE — 1125F PR PAIN SEVERITY QUANTIFIED, PAIN PRESENT: ICD-10-PCS | Mod: HCNC,CPTII,S$GLB, | Performed by: FAMILY MEDICINE

## 2021-10-19 PROCEDURE — 73502 X-RAY EXAM HIP UNI 2-3 VIEWS: CPT | Mod: 26,HCNC,RT, | Performed by: RADIOLOGY

## 2021-10-19 PROCEDURE — 72100 X-RAY EXAM L-S SPINE 2/3 VWS: CPT | Mod: 26,HCNC,, | Performed by: RADIOLOGY

## 2021-10-19 PROCEDURE — 73502 X-RAY EXAM HIP UNI 2-3 VIEWS: CPT | Mod: TC,HCNC,RT

## 2021-10-19 PROCEDURE — 3078F PR MOST RECENT DIASTOLIC BLOOD PRESSURE < 80 MM HG: ICD-10-PCS | Mod: HCNC,CPTII,S$GLB, | Performed by: FAMILY MEDICINE

## 2021-10-19 PROCEDURE — 1159F PR MEDICATION LIST DOCUMENTED IN MEDICAL RECORD: ICD-10-PCS | Mod: HCNC,CPTII,S$GLB, | Performed by: FAMILY MEDICINE

## 2021-10-19 PROCEDURE — 99999 PR PBB SHADOW E&M-EST. PATIENT-LVL IV: CPT | Mod: PBBFAC,HCNC,, | Performed by: FAMILY MEDICINE

## 2021-10-19 PROCEDURE — 1160F PR REVIEW ALL MEDS BY PRESCRIBER/CLIN PHARMACIST DOCUMENTED: ICD-10-PCS | Mod: HCNC,CPTII,S$GLB, | Performed by: FAMILY MEDICINE

## 2021-10-19 PROCEDURE — 1101F PR PT FALLS ASSESS DOC 0-1 FALLS W/OUT INJ PAST YR: ICD-10-PCS | Mod: HCNC,CPTII,S$GLB, | Performed by: FAMILY MEDICINE

## 2021-10-19 PROCEDURE — 1160F RVW MEDS BY RX/DR IN RCRD: CPT | Mod: HCNC,CPTII,S$GLB, | Performed by: FAMILY MEDICINE

## 2021-10-19 PROCEDURE — 3074F SYST BP LT 130 MM HG: CPT | Mod: HCNC,CPTII,S$GLB, | Performed by: FAMILY MEDICINE

## 2021-10-19 PROCEDURE — 72100 X-RAY EXAM L-S SPINE 2/3 VWS: CPT | Mod: TC,HCNC

## 2021-10-19 PROCEDURE — 99999 PR PBB SHADOW E&M-EST. PATIENT-LVL IV: ICD-10-PCS | Mod: PBBFAC,HCNC,, | Performed by: FAMILY MEDICINE

## 2021-10-19 RX ORDER — CYCLOBENZAPRINE HCL 5 MG
5 TABLET ORAL 3 TIMES DAILY PRN
Qty: 30 TABLET | Refills: 2 | OUTPATIENT
Start: 2021-10-19 | End: 2021-10-25

## 2021-10-20 ENCOUNTER — CLINICAL SUPPORT (OUTPATIENT)
Dept: REHABILITATION | Facility: HOSPITAL | Age: 76
End: 2021-10-20
Attending: INTERNAL MEDICINE
Payer: MEDICARE

## 2021-10-20 DIAGNOSIS — M62.81 WEAKNESS OF TRUNK MUSCULATURE: ICD-10-CM

## 2021-10-20 PROCEDURE — 97110 THERAPEUTIC EXERCISES: CPT | Mod: KX,HCNC

## 2021-10-24 ENCOUNTER — PATIENT MESSAGE (OUTPATIENT)
Dept: RHEUMATOLOGY | Facility: CLINIC | Age: 76
End: 2021-10-24
Payer: MEDICARE

## 2021-10-25 ENCOUNTER — HOSPITAL ENCOUNTER (EMERGENCY)
Facility: HOSPITAL | Age: 76
Discharge: HOME | End: 2021-10-25
Attending: EMERGENCY MEDICINE
Payer: MEDICARE

## 2021-10-25 VITALS
DIASTOLIC BLOOD PRESSURE: 78 MMHG | OXYGEN SATURATION: 99 % | HEART RATE: 88 BPM | SYSTOLIC BLOOD PRESSURE: 145 MMHG | RESPIRATION RATE: 18 BRPM | HEIGHT: 65 IN | WEIGHT: 176 LBS | TEMPERATURE: 98 F | BODY MASS INDEX: 29.32 KG/M2

## 2021-10-25 DIAGNOSIS — M19.90 ARTHRITIS: Primary | ICD-10-CM

## 2021-10-25 PROCEDURE — 99284 EMERGENCY DEPT VISIT MOD MDM: CPT | Mod: HCNC,,, | Performed by: EMERGENCY MEDICINE

## 2021-10-25 PROCEDURE — 99283 EMERGENCY DEPT VISIT LOW MDM: CPT | Mod: HCNC

## 2021-10-25 PROCEDURE — 99284 PR EMERGENCY DEPT VISIT,LEVEL IV: ICD-10-PCS | Mod: HCNC,,, | Performed by: EMERGENCY MEDICINE

## 2021-10-25 RX ORDER — METHYLPREDNISOLONE 4 MG/1
TABLET ORAL
Qty: 1 EACH | Refills: 0 | Status: SHIPPED | OUTPATIENT
Start: 2021-10-25 | End: 2021-11-15

## 2021-11-01 ENCOUNTER — DOCUMENTATION ONLY (OUTPATIENT)
Dept: REHABILITATION | Facility: HOSPITAL | Age: 76
End: 2021-11-01

## 2021-11-02 ENCOUNTER — OFFICE VISIT (OUTPATIENT)
Dept: ORTHOPEDICS | Facility: CLINIC | Age: 76
End: 2021-11-02
Payer: MEDICARE

## 2021-11-02 VITALS — WEIGHT: 178.13 LBS | HEIGHT: 64 IN | BODY MASS INDEX: 30.41 KG/M2

## 2021-11-02 DIAGNOSIS — M54.41 ACUTE RIGHT-SIDED LOW BACK PAIN WITH RIGHT-SIDED SCIATICA: Primary | ICD-10-CM

## 2021-11-02 DIAGNOSIS — M19.90 ARTHRITIS: ICD-10-CM

## 2021-11-02 DIAGNOSIS — M70.61 GREATER TROCHANTERIC BURSITIS OF RIGHT HIP: ICD-10-CM

## 2021-11-02 PROCEDURE — 3288F PR FALLS RISK ASSESSMENT DOCUMENTED: ICD-10-PCS | Mod: HCNC,CPTII,S$GLB, | Performed by: ORTHOPAEDIC SURGERY

## 2021-11-02 PROCEDURE — 99999 PR PBB SHADOW E&M-EST. PATIENT-LVL III: ICD-10-PCS | Mod: PBBFAC,HCNC,, | Performed by: ORTHOPAEDIC SURGERY

## 2021-11-02 PROCEDURE — 1101F PR PT FALLS ASSESS DOC 0-1 FALLS W/OUT INJ PAST YR: ICD-10-PCS | Mod: HCNC,CPTII,S$GLB, | Performed by: ORTHOPAEDIC SURGERY

## 2021-11-02 PROCEDURE — 1101F PT FALLS ASSESS-DOCD LE1/YR: CPT | Mod: HCNC,CPTII,S$GLB, | Performed by: ORTHOPAEDIC SURGERY

## 2021-11-02 PROCEDURE — 99204 PR OFFICE/OUTPT VISIT, NEW, LEVL IV, 45-59 MIN: ICD-10-PCS | Mod: HCNC,S$GLB,, | Performed by: ORTHOPAEDIC SURGERY

## 2021-11-02 PROCEDURE — 99204 OFFICE O/P NEW MOD 45 MIN: CPT | Mod: HCNC,S$GLB,, | Performed by: ORTHOPAEDIC SURGERY

## 2021-11-02 PROCEDURE — 99999 PR PBB SHADOW E&M-EST. PATIENT-LVL III: CPT | Mod: PBBFAC,HCNC,, | Performed by: ORTHOPAEDIC SURGERY

## 2021-11-02 PROCEDURE — 3288F FALL RISK ASSESSMENT DOCD: CPT | Mod: HCNC,CPTII,S$GLB, | Performed by: ORTHOPAEDIC SURGERY

## 2021-11-02 PROCEDURE — 1125F PR PAIN SEVERITY QUANTIFIED, PAIN PRESENT: ICD-10-PCS | Mod: HCNC,CPTII,S$GLB, | Performed by: ORTHOPAEDIC SURGERY

## 2021-11-02 PROCEDURE — 1125F AMNT PAIN NOTED PAIN PRSNT: CPT | Mod: HCNC,CPTII,S$GLB, | Performed by: ORTHOPAEDIC SURGERY

## 2021-11-02 NOTE — PROGRESS NOTES
Subjective:     HPI:   Odessa Vaughn is a 76 y.o. female who presents for evaluation of right hip pain    She was in the emergency room on October 25th complaining of radicular right leg pain and was given Medrol Dosepak.    She has had about a month and half a symptoms started in mid September no injury no change in activities.  She says it started in her low back and buttock progress down her posterior lateral buttock and she has some radicular symptoms down the leg to the ankle with some numbness and burning.  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 The patient stated that their  and daughter would be able to help take care of them if they were to have surgery.        ROS:  The updated medical history is in the chart and has been reviewed. A review of systems is updated and there is no reported vision changes, ear/nose/mouth/throat complaints,  chest pain, shortness of breath, abdominal pain, urological complaints, fevers or chills, psychiatric complaints. Musculoskeletal and neurologcial symptoms are as documented. All other systems are negative.      Objective:   Exam:  There were no vitals filed for this visit.  Body mass index is 30.58 kg/m².    Physical examination included assessment of the patient's general appearance with particular attention to development, nutrition, body habitus, attention to grooming, and any  evidence of distress.  Constitutional: The patient is a well-developed, well-nourished patient in no acute distress.   Cardiovascular: Vascular examination included warmth and capillary refill as well inspection for edema and assessment of pedal pulses. Pulses are palpable and regular.  Musculoskeletal: Gait was assessed as to whether it was steady, non-antalgic, and/or required the use of an assist device. The patient was also asked to walk independently and get onto the examination table.  Skin: The skin was examined for any obvious rashes or lesions in the extremity.  Neurologic: Sensation is intact to light touch in the extremity. The patient has good coordination without hyperreflexia and is alert and oriented to person, place and time and has normal mood and affect.     All of the above were examined and found to be within normal limits except for the following pertinent clinical findings:    Slight limp slight antalgic gait favoring the right side.  She has some mild tenderness over the greater trochanter but says this is not where her pain was.  No groin pain with active straight leg raise 0-110 hip flexion 40 abduction 30 adduction 50 external 20 internal rotation without any pain.  She has no pain on passive range of motion of her knee joint nontender palpation mediolateral patellofemoral joint lines      Imaging:    X-rays show bilateral symmetric mild degenerative changes in both hips with preserved femoral acetabular joint spaces there is some enthesophyte changes at both greater trochanters consistent with chronic abductor tendinopathy      Assessment:       ICD-10-CM ICD-9-CM   1. Acute right-sided low back pain with right-sided sciatica  M54.41 724.2     724.3   2. Arthritis  M19.90 716.90   3. Greater trochanteric bursitis of right hip  M70.61 726.5      BR CA - clear  L hip bursitis 2016  cLBP  osteopenia     Plan:       The above findings were discussed with patient length. At this point I do  believe that the patients discomfort is mostly related to a lower back pain exacerbation.  The treatment of lower back pain was discussed with the patient and it includes a course of anti-inflammatory medications, rest, and physical therapy for lower back stretching and strengthening.  All of the patients questions were answered.    At this point I feel the patient has a diagnosis of trochanteric bursitis.  We discussed all the treatment options including but not limited to, the use of non-steroidal anti-inflammatory drugs (NSAIDs), physical therapy visits for stretching/strengthening and modalities treatment, as well as a consistent stretching program at home.  We further discussed the role of corticosteroid injections into the trochanteric bursa.  I explained that this is a short-term solution, however it does often allow for return to physical therapy and stretching and the ability to alleviate the acute pain.      While she does have some underlying trochanteric bursitis this is not her main pain generator.  She could see sports medicine for non operative treatment for this if symptoms persist or get worse for ultrasound-guided injections therapy etc..    Her main issue is right-sided sciatica for which we will refer her to the back and spine clinic for further evaluation and management.  She does not have any symptoms that localize to her hip joint today    Orders Placed This Encounter   Procedures    Ambulatory referral/consult to Back & Spine Clinic     Standing Status:   Future     Number of Occurrences:   1     Standing Expiration Date:   12/2/2022     Referral Priority:   Routine     Referral Type:   Consultation     Referral Reason:   Specialty Services Required     Number of Visits Requested:   1             Past Medical History:   Diagnosis Date    Arthritis     hands, legs    Breast cancer 12/2012    Right breast invasive ductal carcinoma, ER positive, CT and Her2 negative    Bursitis of left hip  2016    resolved    Chronic midline low back pain with left-sided sciatica 6/5/2017    Essential hypertension 9/21/2015    Hyperlipidemia 9/15/2014       Past Surgical History:   Procedure Laterality Date    BREAST BIOPSY  12/19/2012    Right breast- [facility]    BREAST SURGERY Right 1/2013    right mastectomy, SN biopsy     COLONOSCOPY N/A 9/7/2017    Procedure: COLONOSCOPY;  Surgeon: Syed Shah MD;  [facility]  Service: Endoscopy;  Laterality: N/A;    DILATION AND CURETTAGE OF UTERUS      MASTECTOMY  1/13    TRANSFORAMINAL EPIDURAL INJECTION OF STEROID Right 11/11/2021    Procedure: Injection,steroid,epidural,transforaminal Right L4/L5 and L5/S1 Direct Referral;  Surgeon: Elza Thomas MD;  Location: [facility] PAIN [facility]GT;  Service: Pain Management;  Laterality: Right;    TRANSFORAMINAL EPIDURAL INJECTION OF STEROID Right 12/9/2021    Procedure: Injection,steroid,epidural,transforaminal approach RIGHT L4-L5 AND L5-S1 DIRECT REFERRAL;  Surgeon: Elza Thomas MD;  Location: [facility];  Service: Pain Management;  Laterality: Right;       Family History   Problem Relation Age of Onset    Cancer Father         Pancreatic CA    Cataracts Father     Breast cancer Cousin 58    Breast cancer Cousin 58    No Known Problems Mother     Hypertension Sister     Arthritis Brother     No Known Problems Daughter     COPD Sister     No Known Problems Brother     No Known Problems Brother     No Known Problems Brother     No Known Problems Daughter     Breast cancer Paternal Aunt 55    Cancer Paternal Aunt         Breast Ca and Lung CA    No Known Problems Maternal Aunt     No Known Problems Maternal Uncle     No Known Problems Paternal Uncle     No Known Problems Maternal Grandmother     No Known Problems Maternal Grandfather     No Known Problems Paternal Grandmother     No Known Problems Paternal Grandfather     Ovarian cancer Neg Hx     Amblyopia Neg Hx     Blindness  Neg Hx     Diabetes Neg Hx     Glaucoma Neg Hx     Macular degeneration Neg Hx     Retinal detachment Neg Hx     Strabismus Neg Hx     Stroke Neg Hx     Thyroid disease Neg Hx     Osteoarthritis Neg Hx     Rheum arthritis Neg Hx        Social History     Socioeconomic History    Marital status:    Tobacco Use    Smoking status: Never Smoker    Smokeless tobacco: Never Used    Tobacco comment: Retired;    Substance and Sexual Activity    Alcohol use: Yes     Alcohol/week: 0.0 standard drinks     Comment: holiday - occasional wine    Drug use: No    Sexual activity: Yes     Partners: Male

## 2021-11-05 ENCOUNTER — HOSPITAL ENCOUNTER (OUTPATIENT)
Dept: RADIOLOGY | Facility: HOSPITAL | Age: 76
Discharge: HOME | End: 2021-11-05
Attending: ORTHOPAEDIC SURGERY
Payer: MEDICARE

## 2021-11-05 ENCOUNTER — OFFICE VISIT (OUTPATIENT)
Dept: ORTHOPEDICS | Facility: CLINIC | Age: 76
End: 2021-11-05
Payer: MEDICARE

## 2021-11-05 DIAGNOSIS — M54.41 ACUTE RIGHT-SIDED LOW BACK PAIN WITH RIGHT-SIDED SCIATICA: ICD-10-CM

## 2021-11-05 DIAGNOSIS — M51.36 DDD (DEGENERATIVE DISC DISEASE), LUMBAR: ICD-10-CM

## 2021-11-05 PROCEDURE — 3288F PR FALLS RISK ASSESSMENT DOCUMENTED: ICD-10-PCS | Mod: HCNC,CPTII,S$GLB, | Performed by: ORTHOPAEDIC SURGERY

## 2021-11-05 PROCEDURE — 99204 OFFICE O/P NEW MOD 45 MIN: CPT | Mod: HCNC,S$GLB,, | Performed by: ORTHOPAEDIC SURGERY

## 2021-11-05 PROCEDURE — 99999 PR PBB SHADOW E&M-EST. PATIENT-LVL II: ICD-10-PCS | Mod: PBBFAC,HCNC,, | Performed by: ORTHOPAEDIC SURGERY

## 2021-11-05 PROCEDURE — 99204 PR OFFICE/OUTPT VISIT, NEW, LEVL IV, 45-59 MIN: ICD-10-PCS | Mod: HCNC,S$GLB,, | Performed by: ORTHOPAEDIC SURGERY

## 2021-11-05 PROCEDURE — 1101F PR PT FALLS ASSESS DOC 0-1 FALLS W/OUT INJ PAST YR: ICD-10-PCS | Mod: HCNC,CPTII,S$GLB, | Performed by: ORTHOPAEDIC SURGERY

## 2021-11-05 PROCEDURE — 72120 XR LUMBAR SPINE FLEXION AND EXTENSION ONLY: ICD-10-PCS | Mod: 26,HCNC,, | Performed by: RADIOLOGY

## 2021-11-05 PROCEDURE — 1125F AMNT PAIN NOTED PAIN PRSNT: CPT | Mod: HCNC,CPTII,S$GLB, | Performed by: ORTHOPAEDIC SURGERY

## 2021-11-05 PROCEDURE — 1125F PR PAIN SEVERITY QUANTIFIED, PAIN PRESENT: ICD-10-PCS | Mod: HCNC,CPTII,S$GLB, | Performed by: ORTHOPAEDIC SURGERY

## 2021-11-05 PROCEDURE — 72120 X-RAY BEND ONLY L-S SPINE: CPT | Mod: 26,HCNC,, | Performed by: RADIOLOGY

## 2021-11-05 PROCEDURE — 72120 X-RAY BEND ONLY L-S SPINE: CPT | Mod: TC,HCNC

## 2021-11-05 PROCEDURE — 3288F FALL RISK ASSESSMENT DOCD: CPT | Mod: HCNC,CPTII,S$GLB, | Performed by: ORTHOPAEDIC SURGERY

## 2021-11-05 PROCEDURE — 1101F PT FALLS ASSESS-DOCD LE1/YR: CPT | Mod: HCNC,CPTII,S$GLB, | Performed by: ORTHOPAEDIC SURGERY

## 2021-11-05 PROCEDURE — 99999 PR PBB SHADOW E&M-EST. PATIENT-LVL II: CPT | Mod: PBBFAC,HCNC,, | Performed by: ORTHOPAEDIC SURGERY

## 2021-11-06 ENCOUNTER — HOSPITAL ENCOUNTER (OUTPATIENT)
Dept: RADIOLOGY | Facility: HOSPITAL | Age: 76
Discharge: HOME | End: 2021-11-06
Attending: ORTHOPAEDIC SURGERY
Payer: MEDICARE

## 2021-11-06 DIAGNOSIS — M54.41 ACUTE RIGHT-SIDED LOW BACK PAIN WITH RIGHT-SIDED SCIATICA: ICD-10-CM

## 2021-11-06 PROCEDURE — 72148 MRI LUMBAR SPINE W/O DYE: CPT | Mod: TC,HCNC

## 2021-11-06 PROCEDURE — 72148 MRI LUMBAR SPINE WITHOUT CONTRAST: ICD-10-PCS | Mod: 26,HCNC,, | Performed by: INTERNAL MEDICINE

## 2021-11-06 PROCEDURE — 72148 MRI LUMBAR SPINE W/O DYE: CPT | Mod: 26,HCNC,, | Performed by: INTERNAL MEDICINE

## 2021-11-08 ENCOUNTER — PATIENT MESSAGE (OUTPATIENT)
Dept: ORTHOPEDICS | Facility: CLINIC | Age: 76
End: 2021-11-08

## 2021-11-08 ENCOUNTER — OFFICE VISIT (OUTPATIENT)
Dept: ORTHOPEDICS | Facility: CLINIC | Age: 76
End: 2021-11-08
Payer: MEDICARE

## 2021-11-08 DIAGNOSIS — M54.41 ACUTE RIGHT-SIDED LOW BACK PAIN WITH RIGHT-SIDED SCIATICA: Primary | ICD-10-CM

## 2021-11-08 PROCEDURE — 1160F PR REVIEW ALL MEDS BY PRESCRIBER/CLIN PHARMACIST DOCUMENTED: ICD-10-PCS | Mod: HCNC,CPTII,95, | Performed by: ORTHOPAEDIC SURGERY

## 2021-11-08 PROCEDURE — 1160F RVW MEDS BY RX/DR IN RCRD: CPT | Mod: HCNC,CPTII,95, | Performed by: ORTHOPAEDIC SURGERY

## 2021-11-08 PROCEDURE — 99441 PR PHYSICIAN TELEPHONE EVALUATION 5-10 MIN: CPT | Mod: HCNC,95,, | Performed by: ORTHOPAEDIC SURGERY

## 2021-11-08 PROCEDURE — 99441 PR PHYSICIAN TELEPHONE EVALUATION 5-10 MIN: ICD-10-PCS | Mod: HCNC,95,, | Performed by: ORTHOPAEDIC SURGERY

## 2021-11-08 PROCEDURE — 1159F PR MEDICATION LIST DOCUMENTED IN MEDICAL RECORD: ICD-10-PCS | Mod: HCNC,CPTII,95, | Performed by: ORTHOPAEDIC SURGERY

## 2021-11-08 PROCEDURE — 1159F MED LIST DOCD IN RCRD: CPT | Mod: HCNC,CPTII,95, | Performed by: ORTHOPAEDIC SURGERY

## 2021-11-09 ENCOUNTER — PATIENT MESSAGE (OUTPATIENT)
Dept: PAIN MEDICINE | Facility: OTHER | Age: 76
End: 2021-11-09
Payer: MEDICARE

## 2021-11-09 DIAGNOSIS — M54.16 CHRONIC LUMBAR RADICULOPATHY: ICD-10-CM

## 2021-11-09 DIAGNOSIS — M54.41 ACUTE BACK PAIN WITH SCIATICA, RIGHT: Primary | ICD-10-CM

## 2021-11-11 ENCOUNTER — HOSPITAL ENCOUNTER (OUTPATIENT)
Facility: OTHER | Age: 76
Discharge: HOME | End: 2021-11-11
Attending: ANESTHESIOLOGY | Admitting: ANESTHESIOLOGY
Payer: MEDICARE

## 2021-11-11 VITALS
OXYGEN SATURATION: 99 % | SYSTOLIC BLOOD PRESSURE: 161 MMHG | HEIGHT: 64 IN | HEART RATE: 67 BPM | DIASTOLIC BLOOD PRESSURE: 72 MMHG | RESPIRATION RATE: 16 BRPM | BODY MASS INDEX: 30.05 KG/M2 | TEMPERATURE: 98 F | WEIGHT: 176 LBS

## 2021-11-11 DIAGNOSIS — G89.29 CHRONIC PAIN: ICD-10-CM

## 2021-11-11 DIAGNOSIS — M54.16 LUMBAR RADICULOPATHY: Primary | ICD-10-CM

## 2021-11-11 PROCEDURE — 63600175 PHARM REV CODE 636 W HCPCS: Mod: HCNC | Performed by: ANESTHESIOLOGY

## 2021-11-11 PROCEDURE — 25000003 PHARM REV CODE 250: Mod: HCNC | Performed by: STUDENT IN AN ORGANIZED HEALTH CARE EDUCATION/TRAINING PROGRAM

## 2021-11-11 PROCEDURE — 64483 PR EPIDURAL INJ, ANES/STEROID, TRANSFORAMINAL, LUMB/SACR, SNGL LEVL: ICD-10-PCS | Mod: HCNC,RT,, | Performed by: ANESTHESIOLOGY

## 2021-11-11 PROCEDURE — 25000003 PHARM REV CODE 250: Mod: HCNC | Performed by: ANESTHESIOLOGY

## 2021-11-11 PROCEDURE — 64484 NJX AA&/STRD TFRM EPI L/S EA: CPT | Mod: HCNC,RT,, | Performed by: ANESTHESIOLOGY

## 2021-11-11 PROCEDURE — 64484 NJX AA&/STRD TFRM EPI L/S EA: CPT | Mod: HCNC,RT | Performed by: ANESTHESIOLOGY

## 2021-11-11 PROCEDURE — 64484 PRA INJECT ANES/STEROID FORAMEN LUMBAR/SACRAL W IMG GUIDE ,EA ADD LEVEL: ICD-10-PCS | Mod: HCNC,RT,, | Performed by: ANESTHESIOLOGY

## 2021-11-11 PROCEDURE — 25500020 PHARM REV CODE 255: Mod: HCNC | Performed by: ANESTHESIOLOGY

## 2021-11-11 PROCEDURE — 64483 NJX AA&/STRD TFRM EPI L/S 1: CPT | Mod: HCNC,RT,, | Performed by: ANESTHESIOLOGY

## 2021-11-11 PROCEDURE — 64483 NJX AA&/STRD TFRM EPI L/S 1: CPT | Mod: HCNC,RT | Performed by: ANESTHESIOLOGY

## 2021-11-11 RX ORDER — FENTANYL CITRATE 50 UG/ML
INJECTION, SOLUTION INTRAMUSCULAR; INTRAVENOUS
Status: DISCONTINUED | OUTPATIENT
Start: 2021-11-11 | End: 2021-11-11 | Stop reason: HOSPADM

## 2021-11-11 RX ORDER — LIDOCAINE HYDROCHLORIDE 20 MG/ML
INJECTION, SOLUTION INFILTRATION; PERINEURAL
Status: DISCONTINUED | OUTPATIENT
Start: 2021-11-11 | End: 2021-11-11 | Stop reason: HOSPADM

## 2021-11-11 RX ORDER — MIDAZOLAM HYDROCHLORIDE 1 MG/ML
INJECTION INTRAMUSCULAR; INTRAVENOUS
Status: DISCONTINUED | OUTPATIENT
Start: 2021-11-11 | End: 2021-11-11 | Stop reason: HOSPADM

## 2021-11-11 RX ORDER — SODIUM CHLORIDE 9 MG/ML
INJECTION, SOLUTION INTRAVENOUS CONTINUOUS
Status: DISCONTINUED | OUTPATIENT
Start: 2021-11-11 | End: 2021-11-11 | Stop reason: HOSPADM

## 2021-11-11 RX ORDER — LIDOCAINE HYDROCHLORIDE 10 MG/ML
INJECTION, SOLUTION EPIDURAL; INFILTRATION; INTRACAUDAL; PERINEURAL
Status: DISCONTINUED | OUTPATIENT
Start: 2021-11-11 | End: 2021-11-11 | Stop reason: HOSPADM

## 2021-11-11 RX ORDER — DEXAMETHASONE SODIUM PHOSPHATE 10 MG/ML
INJECTION INTRAMUSCULAR; INTRAVENOUS
Status: DISCONTINUED | OUTPATIENT
Start: 2021-11-11 | End: 2021-11-11 | Stop reason: HOSPADM

## 2021-11-11 RX ADMIN — SODIUM CHLORIDE: 0.9 INJECTION, SOLUTION INTRAVENOUS at 08:11

## 2021-11-22 ENCOUNTER — PATIENT MESSAGE (OUTPATIENT)
Dept: ORTHOPEDICS | Facility: CLINIC | Age: 76
End: 2021-11-22
Payer: MEDICARE

## 2021-11-23 RX ORDER — GABAPENTIN 100 MG/1
100 CAPSULE ORAL 3 TIMES DAILY
Qty: 90 CAPSULE | Refills: 11 | Status: SHIPPED | OUTPATIENT
Start: 2021-11-23 | End: 2021-12-29

## 2021-12-01 ENCOUNTER — PATIENT MESSAGE (OUTPATIENT)
Dept: ORTHOPEDICS | Facility: CLINIC | Age: 76
End: 2021-12-01
Payer: MEDICARE

## 2021-12-02 ENCOUNTER — TELEPHONE (OUTPATIENT)
Dept: PAIN MEDICINE | Facility: OTHER | Age: 76
End: 2021-12-02
Payer: MEDICARE

## 2021-12-02 DIAGNOSIS — M54.41 ACUTE RIGHT-SIDED LOW BACK PAIN WITH RIGHT-SIDED SCIATICA: Primary | ICD-10-CM

## 2021-12-03 ENCOUNTER — PATIENT OUTREACH (OUTPATIENT)
Dept: ADMINISTRATIVE | Facility: OTHER | Age: 76
End: 2021-12-03
Payer: MEDICARE

## 2021-12-06 ENCOUNTER — OFFICE VISIT (OUTPATIENT)
Dept: RHEUMATOLOGY | Facility: CLINIC | Age: 76
End: 2021-12-06
Payer: MEDICARE

## 2021-12-06 VITALS
DIASTOLIC BLOOD PRESSURE: 52 MMHG | HEIGHT: 65 IN | SYSTOLIC BLOOD PRESSURE: 106 MMHG | HEART RATE: 76 BPM | WEIGHT: 177 LBS | BODY MASS INDEX: 29.49 KG/M2

## 2021-12-06 DIAGNOSIS — M19.042 PRIMARY OSTEOARTHRITIS OF BOTH HANDS: ICD-10-CM

## 2021-12-06 DIAGNOSIS — M47.26 OSTEOARTHRITIS OF SPINE WITH RADICULOPATHY, LUMBAR REGION: Primary | ICD-10-CM

## 2021-12-06 DIAGNOSIS — M19.041 PRIMARY OSTEOARTHRITIS OF BOTH HANDS: ICD-10-CM

## 2021-12-06 PROCEDURE — 99214 OFFICE O/P EST MOD 30 MIN: CPT | Mod: HCNC,S$GLB,, | Performed by: INTERNAL MEDICINE

## 2021-12-06 PROCEDURE — 99999 PR PBB SHADOW E&M-EST. PATIENT-LVL III: CPT | Mod: PBBFAC,HCNC,, | Performed by: INTERNAL MEDICINE

## 2021-12-06 PROCEDURE — 99214 PR OFFICE/OUTPT VISIT, EST, LEVL IV, 30-39 MIN: ICD-10-PCS | Mod: HCNC,S$GLB,, | Performed by: INTERNAL MEDICINE

## 2021-12-06 PROCEDURE — 99999 PR PBB SHADOW E&M-EST. PATIENT-LVL III: ICD-10-PCS | Mod: PBBFAC,HCNC,, | Performed by: INTERNAL MEDICINE

## 2021-12-06 ASSESSMENT — ROUTINE ASSESSMENT OF PATIENT INDEX DATA (RAPID3)
PSYCHOLOGICAL DISTRESS SCORE: 2.2
PATIENT GLOBAL ASSESSMENT SCORE: 8.5
TOTAL RAPID3 SCORE: 4.28
PAIN SCORE: 2
FATIGUE SCORE: 2
MDHAQ FUNCTION SCORE: 0.7
AM STIFFNESS SCORE: 1, YES

## 2021-12-09 ENCOUNTER — HOSPITAL ENCOUNTER (OUTPATIENT)
Facility: OTHER | Age: 76
Discharge: HOME | End: 2021-12-09
Attending: ANESTHESIOLOGY | Admitting: ANESTHESIOLOGY
Payer: MEDICARE

## 2021-12-09 VITALS
HEIGHT: 65 IN | OXYGEN SATURATION: 97 % | RESPIRATION RATE: 16 BRPM | BODY MASS INDEX: 29.49 KG/M2 | SYSTOLIC BLOOD PRESSURE: 131 MMHG | HEART RATE: 73 BPM | TEMPERATURE: 99 F | WEIGHT: 177 LBS | DIASTOLIC BLOOD PRESSURE: 73 MMHG

## 2021-12-09 DIAGNOSIS — G89.29 CHRONIC PAIN: ICD-10-CM

## 2021-12-09 DIAGNOSIS — M54.16 LUMBAR RADICULOPATHY: Primary | ICD-10-CM

## 2021-12-09 PROCEDURE — 64483 PR EPIDURAL INJ, ANES/STEROID, TRANSFORAMINAL, LUMB/SACR, SNGL LEVL: ICD-10-PCS | Mod: HCNC,RT,, | Performed by: ANESTHESIOLOGY

## 2021-12-09 PROCEDURE — 64484 NJX AA&/STRD TFRM EPI L/S EA: CPT | Mod: HCNC,RT | Performed by: ANESTHESIOLOGY

## 2021-12-09 PROCEDURE — 64483 NJX AA&/STRD TFRM EPI L/S 1: CPT | Mod: HCNC,RT | Performed by: ANESTHESIOLOGY

## 2021-12-09 PROCEDURE — 64484 PRA INJECT ANES/STEROID FORAMEN LUMBAR/SACRAL W IMG GUIDE ,EA ADD LEVEL: ICD-10-PCS | Mod: HCNC,RT,, | Performed by: ANESTHESIOLOGY

## 2021-12-09 PROCEDURE — 25000003 PHARM REV CODE 250: Mod: HCNC | Performed by: ANESTHESIOLOGY

## 2021-12-09 PROCEDURE — 64484 NJX AA&/STRD TFRM EPI L/S EA: CPT | Mod: HCNC,RT,, | Performed by: ANESTHESIOLOGY

## 2021-12-09 PROCEDURE — 63600175 PHARM REV CODE 636 W HCPCS: Mod: HCNC | Performed by: ANESTHESIOLOGY

## 2021-12-09 PROCEDURE — 64483 NJX AA&/STRD TFRM EPI L/S 1: CPT | Mod: HCNC,RT,, | Performed by: ANESTHESIOLOGY

## 2021-12-09 PROCEDURE — 25500020 PHARM REV CODE 255: Mod: HCNC | Performed by: ANESTHESIOLOGY

## 2021-12-09 PROCEDURE — 25000003 PHARM REV CODE 250: Mod: HCNC | Performed by: STUDENT IN AN ORGANIZED HEALTH CARE EDUCATION/TRAINING PROGRAM

## 2021-12-09 RX ORDER — FENTANYL CITRATE 50 UG/ML
INJECTION, SOLUTION INTRAMUSCULAR; INTRAVENOUS
Status: DISCONTINUED | OUTPATIENT
Start: 2021-12-09 | End: 2021-12-09 | Stop reason: HOSPADM

## 2021-12-09 RX ORDER — DEXAMETHASONE SODIUM PHOSPHATE 10 MG/ML
INJECTION INTRAMUSCULAR; INTRAVENOUS
Status: DISCONTINUED | OUTPATIENT
Start: 2021-12-09 | End: 2021-12-09 | Stop reason: HOSPADM

## 2021-12-09 RX ORDER — LIDOCAINE HYDROCHLORIDE 20 MG/ML
INJECTION, SOLUTION INFILTRATION; PERINEURAL
Status: DISCONTINUED | OUTPATIENT
Start: 2021-12-09 | End: 2021-12-09 | Stop reason: HOSPADM

## 2021-12-09 RX ORDER — SODIUM CHLORIDE 9 MG/ML
INJECTION, SOLUTION INTRAVENOUS CONTINUOUS
Status: DISCONTINUED | OUTPATIENT
Start: 2021-12-09 | End: 2021-12-09 | Stop reason: HOSPADM

## 2021-12-09 RX ORDER — LIDOCAINE HYDROCHLORIDE 10 MG/ML
INJECTION, SOLUTION EPIDURAL; INFILTRATION; INTRACAUDAL; PERINEURAL
Status: DISCONTINUED | OUTPATIENT
Start: 2021-12-09 | End: 2021-12-09 | Stop reason: HOSPADM

## 2021-12-09 RX ADMIN — SODIUM CHLORIDE: 0.9 INJECTION, SOLUTION INTRAVENOUS at 08:12

## 2021-12-10 ENCOUNTER — TELEPHONE (OUTPATIENT)
Dept: HEMATOLOGY/ONCOLOGY | Facility: CLINIC | Age: 76
End: 2021-12-10
Payer: MEDICARE

## 2021-12-13 DIAGNOSIS — Z90.11 S/P RIGHT MASTECTOMY: Primary | ICD-10-CM

## 2021-12-13 DIAGNOSIS — Z90.10 DEFORMITY OF BREAST AFTER MASTECTOMY: ICD-10-CM

## 2021-12-13 DIAGNOSIS — N64.89 DEFORMITY OF BREAST AFTER MASTECTOMY: ICD-10-CM

## 2021-12-13 DIAGNOSIS — Z85.3 HISTORY OF RIGHT BREAST CANCER: ICD-10-CM

## 2021-12-28 ENCOUNTER — PATIENT MESSAGE (OUTPATIENT)
Dept: ORTHOPEDICS | Facility: CLINIC | Age: 76
End: 2021-12-28
Payer: MEDICARE

## 2021-12-29 RX ORDER — GABAPENTIN 300 MG/1
300 CAPSULE ORAL 3 TIMES DAILY
Qty: 90 CAPSULE | Refills: 11 | Status: SHIPPED | OUTPATIENT
Start: 2021-12-29 | End: 2022-05-02

## 2022-01-24 ENCOUNTER — OFFICE VISIT (OUTPATIENT)
Dept: ORTHOPEDICS | Facility: CLINIC | Age: 77
End: 2022-01-24
Payer: MEDICARE

## 2022-01-24 VITALS — HEIGHT: 65 IN | WEIGHT: 177 LBS | BODY MASS INDEX: 29.49 KG/M2

## 2022-01-24 DIAGNOSIS — M79.661 RIGHT CALF PAIN: Primary | ICD-10-CM

## 2022-01-24 PROCEDURE — 1159F MED LIST DOCD IN RCRD: CPT | Mod: HCNC,CPTII,S$GLB, | Performed by: ORTHOPAEDIC SURGERY

## 2022-01-24 PROCEDURE — 1159F PR MEDICATION LIST DOCUMENTED IN MEDICAL RECORD: ICD-10-PCS | Mod: HCNC,CPTII,S$GLB, | Performed by: ORTHOPAEDIC SURGERY

## 2022-01-24 PROCEDURE — 99213 OFFICE O/P EST LOW 20 MIN: CPT | Mod: HCNC,S$GLB,, | Performed by: ORTHOPAEDIC SURGERY

## 2022-01-24 PROCEDURE — 3288F FALL RISK ASSESSMENT DOCD: CPT | Mod: HCNC,CPTII,S$GLB, | Performed by: ORTHOPAEDIC SURGERY

## 2022-01-24 PROCEDURE — 1125F AMNT PAIN NOTED PAIN PRSNT: CPT | Mod: HCNC,CPTII,S$GLB, | Performed by: ORTHOPAEDIC SURGERY

## 2022-01-24 PROCEDURE — 1101F PR PT FALLS ASSESS DOC 0-1 FALLS W/OUT INJ PAST YR: ICD-10-PCS | Mod: HCNC,CPTII,S$GLB, | Performed by: ORTHOPAEDIC SURGERY

## 2022-01-24 PROCEDURE — 99999 PR PBB SHADOW E&M-EST. PATIENT-LVL II: ICD-10-PCS | Mod: PBBFAC,HCNC,, | Performed by: ORTHOPAEDIC SURGERY

## 2022-01-24 PROCEDURE — 99999 PR PBB SHADOW E&M-EST. PATIENT-LVL II: CPT | Mod: PBBFAC,HCNC,, | Performed by: ORTHOPAEDIC SURGERY

## 2022-01-24 PROCEDURE — 99213 PR OFFICE/OUTPT VISIT, EST, LEVL III, 20-29 MIN: ICD-10-PCS | Mod: HCNC,S$GLB,, | Performed by: ORTHOPAEDIC SURGERY

## 2022-01-24 PROCEDURE — 3288F PR FALLS RISK ASSESSMENT DOCUMENTED: ICD-10-PCS | Mod: HCNC,CPTII,S$GLB, | Performed by: ORTHOPAEDIC SURGERY

## 2022-01-24 PROCEDURE — 1101F PT FALLS ASSESS-DOCD LE1/YR: CPT | Mod: HCNC,CPTII,S$GLB, | Performed by: ORTHOPAEDIC SURGERY

## 2022-01-24 PROCEDURE — 1125F PR PAIN SEVERITY QUANTIFIED, PAIN PRESENT: ICD-10-PCS | Mod: HCNC,CPTII,S$GLB, | Performed by: ORTHOPAEDIC SURGERY

## 2022-01-24 NOTE — PROGRESS NOTES
"DATE: 1/24/2022  PATIENT: Odessa Vaughn    Attending Physician: Kamran Douglas MD    HISTORY:  Odessa Vaughn is a 76 y.o. female who returns to me today for follow up.  She was last seen by me 11/8/2021.  Today she is doing well but notes her right leg pain has significantly improved since her second KARLA on 12/9/21. However, she is still having cramping and "a hot feeling" in her right calf and numbness and tingling in her right 4th and 5th toes. She says the pain is tolerable. She is taking gabapentin 300mg TID.    The Patient denies myelopathic symptoms such as handwriting changes or difficulty with buttons/coins/keys. Denies perineal paresthesias, bowel/bladder dysfunction.    PMH/PSH/FamHx/SocHx:  Unchanged from prior visit    ROS:  REVIEW OF SYSTEMS:  Constitution: Negative. Negative for chills, fever and night sweats.   HENT: Negative for congestion and headaches.    Eyes: Negative for blurred vision, left vision loss and right vision loss.   Cardiovascular: Negative for chest pain and syncope.   Respiratory: Negative for cough and shortness of breath.    Endocrine: Negative for polydipsia, polyphagia and polyuria.   Hematologic/Lymphatic: Negative for bleeding problem. Does not bruise/bleed easily.   Skin: Negative for dry skin, itching and rash.   Musculoskeletal: Negative for falls and muscle weakness.   Gastrointestinal: Negative for abdominal pain and bowel incontinence.   Allergic/Immunologic: Negative for hives and persistent infections.  Genitourinary: Negative for urinary retention/incontinence and nocturia.   Neurological: negative for disturbances in coordination, no myelopathic symptoms such as handwriting changes or difficulty with buttons, coins, keys or small objects. No loss of balance and seizures.   Psychiatric/Behavioral: Negative for depression. The patient does not have insomnia.   Denies perineal paresthesias, bowel or bladder incontinence    EXAM:  There were no vitals taken " for this visit.    My physical examination was notable for the following findings:     No swelling or erythema noted in red calf. No TTP.    IMAGING:  No new imaging today.    Today I personally re-reviewed AP, Lat and Flex/Ex  upright L-spine films that demonstrate disc space narrowing at L3-L4 L5-S1 without instability.     MRI lumbar demonstrates multilevel degenerative changes, most advanced at L5-S1 where there is moderate canal stenosis and moderate bilateral foraminal stenosis.    There is no height or weight on file to calculate BMI.    Hemoglobin A1C   Date Value Ref Range Status   09/08/2014 6.1 4.5 - 6.2 % Final         ASSESSMENT/PLAN:    There are no diagnoses linked to this encounter.    Today we discussed at length all of the different treatment options including anti-inflammatories, acetaminophen, rest, ice, heat, physical therapy including strengthening and stretching exercises, home exercises, ROM, aerobic conditioning, aqua therapy, other modalities including ultrasound, massage, and dry needling, epidural steroid injections and finally surgical intervention.      Pt presents with chronic right lumbar radiculopathy. Given persistent calf cramping, will order US to further evaluate. Will continue conservative rx with home exercises and gabapentin.

## 2022-01-25 ENCOUNTER — PATIENT MESSAGE (OUTPATIENT)
Dept: ORTHOPEDICS | Facility: CLINIC | Age: 77
End: 2022-01-25
Payer: MEDICARE

## 2022-01-25 ENCOUNTER — HOSPITAL ENCOUNTER (OUTPATIENT)
Dept: RADIOLOGY | Facility: HOSPITAL | Age: 77
Discharge: HOME | End: 2022-01-25
Attending: ORTHOPAEDIC SURGERY
Payer: MEDICARE

## 2022-01-25 DIAGNOSIS — M79.661 RIGHT CALF PAIN: ICD-10-CM

## 2022-01-25 PROCEDURE — 93971 EXTREMITY STUDY: CPT | Mod: 26,HCNC,RT, | Performed by: RADIOLOGY

## 2022-01-25 PROCEDURE — 93971 US LOWER EXTREMITY VEINS RIGHT: ICD-10-PCS | Mod: 26,HCNC,RT, | Performed by: RADIOLOGY

## 2022-01-25 PROCEDURE — 93971 EXTREMITY STUDY: CPT | Mod: TC,HCNC,RT

## 2022-02-21 ENCOUNTER — PATIENT MESSAGE (OUTPATIENT)
Dept: ORTHOPEDICS | Facility: CLINIC | Age: 77
End: 2022-02-21
Payer: MEDICARE

## 2022-05-02 ENCOUNTER — OFFICE VISIT (OUTPATIENT)
Dept: ORTHOPEDICS | Facility: CLINIC | Age: 77
End: 2022-05-02
Payer: MEDICARE

## 2022-05-02 VITALS — HEIGHT: 65 IN | WEIGHT: 177 LBS | BODY MASS INDEX: 29.49 KG/M2

## 2022-05-02 DIAGNOSIS — M54.41 ACUTE RIGHT-SIDED LOW BACK PAIN WITH RIGHT-SIDED SCIATICA: Primary | ICD-10-CM

## 2022-05-02 PROCEDURE — 99213 PR OFFICE/OUTPT VISIT, EST, LEVL III, 20-29 MIN: ICD-10-PCS | Mod: S$GLB,,, | Performed by: ORTHOPAEDIC SURGERY

## 2022-05-02 PROCEDURE — 99999 PR PBB SHADOW E&M-EST. PATIENT-LVL II: ICD-10-PCS | Mod: PBBFAC,,, | Performed by: ORTHOPAEDIC SURGERY

## 2022-05-02 PROCEDURE — 1159F MED LIST DOCD IN RCRD: CPT | Mod: CPTII,S$GLB,, | Performed by: ORTHOPAEDIC SURGERY

## 2022-05-02 PROCEDURE — 3288F PR FALLS RISK ASSESSMENT DOCUMENTED: ICD-10-PCS | Mod: CPTII,S$GLB,, | Performed by: ORTHOPAEDIC SURGERY

## 2022-05-02 PROCEDURE — 3288F FALL RISK ASSESSMENT DOCD: CPT | Mod: CPTII,S$GLB,, | Performed by: ORTHOPAEDIC SURGERY

## 2022-05-02 PROCEDURE — 1126F AMNT PAIN NOTED NONE PRSNT: CPT | Mod: CPTII,S$GLB,, | Performed by: ORTHOPAEDIC SURGERY

## 2022-05-02 PROCEDURE — 99213 OFFICE O/P EST LOW 20 MIN: CPT | Mod: S$GLB,,, | Performed by: ORTHOPAEDIC SURGERY

## 2022-05-02 PROCEDURE — 99999 PR PBB SHADOW E&M-EST. PATIENT-LVL II: CPT | Mod: PBBFAC,,, | Performed by: ORTHOPAEDIC SURGERY

## 2022-05-02 PROCEDURE — 1101F PT FALLS ASSESS-DOCD LE1/YR: CPT | Mod: CPTII,S$GLB,, | Performed by: ORTHOPAEDIC SURGERY

## 2022-05-02 PROCEDURE — 1126F PR PAIN SEVERITY QUANTIFIED, NO PAIN PRESENT: ICD-10-PCS | Mod: CPTII,S$GLB,, | Performed by: ORTHOPAEDIC SURGERY

## 2022-05-02 PROCEDURE — 1101F PR PT FALLS ASSESS DOC 0-1 FALLS W/OUT INJ PAST YR: ICD-10-PCS | Mod: CPTII,S$GLB,, | Performed by: ORTHOPAEDIC SURGERY

## 2022-05-02 PROCEDURE — 1159F PR MEDICATION LIST DOCUMENTED IN MEDICAL RECORD: ICD-10-PCS | Mod: CPTII,S$GLB,, | Performed by: ORTHOPAEDIC SURGERY

## 2022-05-02 RX ORDER — GABAPENTIN 100 MG/1
100 CAPSULE ORAL NIGHTLY
Qty: 45 CAPSULE | Refills: 0 | Status: SHIPPED | OUTPATIENT
Start: 2022-05-02 | End: 2022-08-15

## 2022-05-02 RX ORDER — PREGABALIN 75 MG/1
75 CAPSULE ORAL 2 TIMES DAILY
Qty: 60 CAPSULE | Refills: 6 | Status: SHIPPED | OUTPATIENT
Start: 2022-05-02 | End: 2023-04-06

## 2022-05-03 NOTE — PROGRESS NOTES
"DATE: 5/3/2022  PATIENT: Odessa Vaughn    Attending Physician: Kamran Douglas MD    HISTORY:  Odessa Vaughn is a 77 y.o. female who returns to me today for follow up.  She was last seen by me 1/24/2022.  Today she is doing well but notes since having 2 ESIs her right leg pain has significantly improved. However, she continues to have numbness and tingling in her 4th and 5th toes. She is taking gabapentin 300mg nightly but says it makes her very sleepy.    The Patient denies myelopathic symptoms such as handwriting changes or difficulty with buttons/coins/keys. Denies perineal paresthesias, bowel/bladder dysfunction.    PMH/PSH/FamHx/SocHx:  Unchanged from prior visit    ROS:  REVIEW OF SYSTEMS:  Constitution: Negative. Negative for chills, fever and night sweats.   HENT: Negative for congestion and headaches.    Eyes: Negative for blurred vision, left vision loss and right vision loss.   Cardiovascular: Negative for chest pain and syncope.   Respiratory: Negative for cough and shortness of breath.    Endocrine: Negative for polydipsia, polyphagia and polyuria.   Hematologic/Lymphatic: Negative for bleeding problem. Does not bruise/bleed easily.   Skin: Negative for dry skin, itching and rash.   Musculoskeletal: Negative for falls and muscle weakness.   Gastrointestinal: Negative for abdominal pain and bowel incontinence.   Allergic/Immunologic: Negative for hives and persistent infections.  Genitourinary: Negative for urinary retention/incontinence and nocturia.   Neurological: negative for disturbances in coordination, no myelopathic symptoms such as handwriting changes or difficulty with buttons, coins, keys or small objects. No loss of balance and seizures.   Psychiatric/Behavioral: Negative for depression. The patient does not have insomnia.   Denies perineal paresthesias, bowel or bladder incontinence    EXAM:  Ht 5' 5" (1.651 m)   Wt 80.3 kg (177 lb 0.5 oz)   BMI 29.46 kg/m²     My physical " examination was notable for the following findings:     Musculoskeletal and neuro exam stable.      IMAGING:  No new imaging today.    Today I personally re- reviewed AP, Lat and Flex/Ex  upright L-spine that demonstrate disc space narrowing at L3-L4 L5-S1 without instability.     MRI lumbar demonstrates multilevel degenerative changes, most advanced at L5-S1 where there is moderate canal stenosis and moderate bilateral foraminal stenosis.    Body mass index is 29.46 kg/m².    Hemoglobin A1C   Date Value Ref Range Status   09/08/2014 6.1 4.5 - 6.2 % Final         ASSESSMENT/PLAN:    There are no diagnoses linked to this encounter.    Today we discussed at length all of the different treatment options including anti-inflammatories, acetaminophen, rest, ice, heat, physical therapy including strengthening and stretching exercises, home exercises, ROM, aerobic conditioning, aqua therapy, other modalities including ultrasound, massage, and dry needling, epidural steroid injections and finally surgical intervention.      Pt presents with chronic right lumbar radiculopathy. Will wean off gabapentin and try lyrica 75mg BID. Pt will fu if pain persists.

## 2022-06-11 ENCOUNTER — IMMUNIZATION (OUTPATIENT)
Dept: INTERNAL MEDICINE | Facility: CLINIC | Age: 77
End: 2022-06-11
Payer: MEDICARE

## 2022-06-11 DIAGNOSIS — Z23 NEED FOR VACCINATION: Primary | ICD-10-CM

## 2022-06-11 PROCEDURE — 91305 COVID-19, MRNA, LNP-S, PF, 30 MCG/0.3 ML DOSE VACCINE (PFIZER): CPT | Mod: PBBFAC

## 2022-06-15 ENCOUNTER — OFFICE VISIT (OUTPATIENT)
Dept: OPTOMETRY | Facility: CLINIC | Age: 77
End: 2022-06-15
Payer: COMMERCIAL

## 2022-06-15 DIAGNOSIS — H52.4 PRESBYOPIA OF BOTH EYES: ICD-10-CM

## 2022-06-15 DIAGNOSIS — H52.01 HYPEROPIA OF RIGHT EYE: ICD-10-CM

## 2022-06-15 DIAGNOSIS — H26.9 CORTICAL CATARACT OF BOTH EYES: ICD-10-CM

## 2022-06-15 DIAGNOSIS — H35.361 RETINAL DRUSEN OF RIGHT EYE: ICD-10-CM

## 2022-06-15 DIAGNOSIS — H43.393 VITREOUS FLOATERS OF BOTH EYES: ICD-10-CM

## 2022-06-15 DIAGNOSIS — H52.202 HYPEROPIA OF LEFT EYE WITH ASTIGMATISM: ICD-10-CM

## 2022-06-15 DIAGNOSIS — H25.13 NUCLEAR SCLEROSIS OF BOTH EYES: Primary | ICD-10-CM

## 2022-06-15 DIAGNOSIS — H52.02 HYPEROPIA OF LEFT EYE WITH ASTIGMATISM: ICD-10-CM

## 2022-06-15 PROCEDURE — 92015 DETERMINE REFRACTIVE STATE: CPT | Mod: S$GLB,,, | Performed by: OPTOMETRIST

## 2022-06-15 PROCEDURE — 99999 PR PBB SHADOW E&M-EST. PATIENT-LVL III: CPT | Mod: PBBFAC,,, | Performed by: OPTOMETRIST

## 2022-06-15 PROCEDURE — 92014 COMPRE OPH EXAM EST PT 1/>: CPT | Mod: S$GLB,,, | Performed by: OPTOMETRIST

## 2022-06-15 PROCEDURE — 99999 PR PBB SHADOW E&M-EST. PATIENT-LVL III: ICD-10-PCS | Mod: PBBFAC,,, | Performed by: OPTOMETRIST

## 2022-06-15 PROCEDURE — 92015 PR REFRACTION: ICD-10-PCS | Mod: S$GLB,,, | Performed by: OPTOMETRIST

## 2022-06-15 PROCEDURE — 92014 PR EYE EXAM, EST PATIENT,COMPREHESV: ICD-10-PCS | Mod: S$GLB,,, | Performed by: OPTOMETRIST

## 2022-06-15 NOTE — PROGRESS NOTES
HPI     eye exam      Additional comments: Annual general eye exam and refraction.  Feels she needs to adjust glasses to see better at near.  Distance VA with glasses seems okay.  Wears glasses full-time               Comments     Patient's age: 77 y.o. female  Occupation: Retired   Approximate date of last eye examination:  03/08/2021  Name of last eye doctor seen:   City/State: [place]  Wears glasses? Yes      If yes, wears  Full-time or part-time?  Full time   Present glasses are: Bifocal, SV Distance, SV Reading?  Progressive lens   Approximate age of present glasses:  1 yrs od   Got new glasses following last exam, or subsequently?:  Yes    Any problem with VA with glasses?  Have to lift glasses up to see better   at near   Wears CLs?:  No   Headaches?  No   Eye pain/discomfort? no                                                                                   Flashes?  No   Floaters?  Yes   Diplopia/Double vision?  No   Patient's Ocular History:         Any eye surgeries? No          Any eye injury?  No          Any treatment for eye disease?  No   Family history of eye disease?  Father: glaucoma   Significant patient medical history:         1. Diabetes? no       If yes, IDDM or NIDDM?  n/a     2. HBP?  No               3. Other (describe):  None reported    ! OTC eyedrops currently using:  AT's prn    ! Prescription eye meds currently using:  No    ! Any history of allergy/adverse reaction to any eye meds used   previously?  No     ! Any history of allergy/adverse reaction to eyedrops used during prior   eye exam(s)? No     ! Any history of allergy/adverse reaction to Novacaine or similar meds?   No    ! Any history of allergy/adverse reaction to Epinephrine or similar meds?   No     ! Patient okay with use of anesthetic eyedrops to check eye pressure?    Yes         ! Patient okay with use of eyedrops to dilate pupils today?  Yes    !  Allergies/Medications/Medical History/Family History  "reviewed today?    Yes       PD =   71/67  Desired reading distance =  14.5"                                                                   Last edited by Adams Weaver, OD on 6/15/2022 11:04 AM. (History)            Assessment /Plan     For exam results, see Encounter Report.    1. Nuclear sclerosis of both eyes     2. Cortical cataract of both eyes     3. Retinal drusen of right eye     4. Vitreous floaters of both eyes     5. Hyperopia of right eye     6. Hyperopia of left eye with astigmatism     7. Presbyopia of both eyes                  Bilateral nuclear sclerosis of lens, consistent with age.  Early peripheral cortical cataract in both eyes, more apparent in the left eye.  So need for cataract surgery in either eye     Peripheral retinal drusen in the right eye.  S/p posterior vitreous detachment in the left eye.  Vitreous floater(s) in both eyes.      Hyperopia in the right eye, and hyperopia with astigmatism in the left eye.  Presbyopia consistent with age.  New spectacle lens Rx issued for full-time wear     Recheck in one year - or prior, if any problems or bothersome changes in vision noted in the interim        "

## 2022-06-15 NOTE — PATIENT INSTRUCTIONS
Bilateral nuclear sclerosis of lens, consistent with age.  Early peripheral cortical cataract in both eyes, more apparent in the left eye.  So need for cataract surgery in either eye     Peripheral retinal drusen in the right eye.  S/p posterior vitreous detachment in the left eye.  Vitreous floater(s) in both eyes.      Hyperopia in the right eye, and hyperopia with astigmatism in the left eye.  Presbyopia consistent with age.  New spectacle lens Rx issued for full-time wear     Recheck in one year - or prior, if any problems or bothersome changes in vision noted in the interim

## 2022-06-23 ENCOUNTER — TELEPHONE (OUTPATIENT)
Dept: HEMATOLOGY/ONCOLOGY | Facility: CLINIC | Age: 77
End: 2022-06-23
Payer: MEDICARE

## 2022-06-23 DIAGNOSIS — Z85.3 HISTORY OF RIGHT BREAST CANCER: Primary | ICD-10-CM

## 2022-06-23 DIAGNOSIS — Z12.31 ENCOUNTER FOR SCREENING MAMMOGRAM FOR MALIGNANT NEOPLASM OF BREAST: ICD-10-CM

## 2022-06-28 NOTE — TELEPHONE ENCOUNTER
Patient informed of labs and x-rays.  Labs show improvement in the inflammatory markers which goes against developing rheumatoid arthritis.  X-ray showed degenerative changes as well as cystic change in the 2nd MCP of the left hand that appear similar from 3 years ago.  Staff to male patient results.   PERRL/EOMI/conjunctiva clear/normal

## 2022-08-01 NOTE — PROGRESS NOTES
Subjective:       Patient ID: Odessa Vaughn is a 77 y.o. female.    Chief Complaint: History of right breast cancer    HPI Mrs. 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     Presents for follow up. Overall she is doing well.  She had sciatic bout in August 2021, she did have therapy, she did have some injection that helped with the pain, but still with some right leg numbness.     She had her mammogram today, read is negative  She has a mole in her left breast that she notes has changed. Occasional left breast stabbing pain.     Per Dr. Ramires's previous note: Breast history:  On December 12 , 2012  mammogram showed a lobular density in the right breast at the 7:00 region   ultrasound showed a 23 mm solid mass.      Core needle biopsy on December 19,2012 showed intermediate grade infiltrating ductal carcinoma (3+2+1) ER 95% positive TN negative and HER-2 negative.      On January 2 she underwent right mastectomy which showed a 3 cm intermediate grade infiltrating ductal carcinoma. 4 [place] lymph nodes were negative with one additional negative lymph nodes.T2N0 Stage 1A.    She started endocrine therapy in February 2013.  That was stopped in 2020.      Review of Systems   Constitutional: Negative for activity change, appetite change, chills, fatigue, fever and unexpected weight change.   Respiratory: Negative for cough and shortness of breath.    Cardiovascular: Negative for chest pain.   Gastrointestinal: Positive for constipation (occasional; eats fiber rich cereal ). Negative for abdominal distention and diarrhea.   Genitourinary: Negative for pelvic pain.   Musculoskeletal: Positive for arthralgias (right shoulder). Negative for back pain and neck pain.        Right leg sciatic pain   Neurological: Positive for weakness (Right leg). Negative for dizziness, light-headedness, numbness and headaches.    Psychiatric/Behavioral: Negative for dysphoric mood. The patient is not nervous/anxious.        Objective:      Physical Exam  Constitutional:       General: She is not in acute distress.     Appearance: She is well-developed.   Eyes:      General: No scleral icterus.  Cardiovascular:      Rate and Rhythm: Normal rate and regular rhythm.      Heart sounds: Normal heart sounds.   Pulmonary:      Effort: Pulmonary effort is normal.      Breath sounds: Normal breath sounds. No wheezing or rales.   Chest:   Breasts:      Right: No axillary adenopathy or supraclavicular adenopathy.      Left: No mass, nipple discharge, skin change, axillary adenopathy or supraclavicular adenopathy.            Comments: Right breast mastectomy   Abdominal:      Palpations: Abdomen is soft. There is no mass.      Tenderness: There is no abdominal tenderness.   Lymphadenopathy:      Cervical: No cervical adenopathy.      Upper Body:      Right upper body: No supraclavicular or axillary adenopathy.      Left upper body: No supraclavicular or axillary adenopathy.   Neurological:      Mental Status: She is alert and oriented to person, place, and time.   Psychiatric:         Behavior: Behavior normal.         Thought Content: Thought content normal.         Assessment:      1. History of right breast cancer    2. S/P right mastectomy    3. Osteopenia, unspecified location        Plan:       1-2. Mammogram today, pending read  - Repeat in 1 year with clinic visit    3. BMD due in 2023      Return to clinic in 1 year with [facility] appointment and imaging.     Patient is in agreement with the proposed treatment plan. All questions were answered to the patient's satisfaction. Patient knows to call clinic for any new or worsening symptoms and if anything is needed before the next clinic visit.          Dina Dominguez, MARCELLUSP-C  Hematology & Medical Oncology   [facility] [place] [address]  ph. [phone]  Fax.  [phone]    Collaborating physician, Dr. Ramires.    Approximately 15 minutes were spent face-to-face with the patient.  Approximately 25 minutes in total were spent on this encounter, which includes face-to-face time and non-face-to-face time preparing to see the patient (e.g., review of tests), obtaining and/or reviewing separately obtained history, documenting clinical information in the electronic or other health record, independently interpreting results (not separately reported) and communicating results to the patient/family/caregiver, or care coordination (not separately reported).     Route Chart for Scheduling    Med Onc Chart Routing      Follow up with physician    Follow up with [facility] 1 year.   Infusion scheduling note    Injection scheduling note    Labs    Imaging Mammogram   Mammo in 1 year   Pharmacy appointment    Other referrals

## 2022-08-15 ENCOUNTER — HOSPITAL ENCOUNTER (OUTPATIENT)
Dept: RADIOLOGY | Facility: HOSPITAL | Age: 77
Discharge: HOME | End: 2022-08-15
Attending: NURSE PRACTITIONER
Payer: MEDICARE

## 2022-08-15 ENCOUNTER — OFFICE VISIT (OUTPATIENT)
Dept: HEMATOLOGY/ONCOLOGY | Facility: CLINIC | Age: 77
End: 2022-08-15
Payer: MEDICARE

## 2022-08-15 VITALS
BODY MASS INDEX: 29.49 KG/M2 | WEIGHT: 178.56 LBS | HEIGHT: 65 IN | OXYGEN SATURATION: 98 % | BODY MASS INDEX: 29.75 KG/M2 | WEIGHT: 177 LBS | SYSTOLIC BLOOD PRESSURE: 125 MMHG | HEART RATE: 75 BPM | RESPIRATION RATE: 16 BRPM | HEIGHT: 65 IN | DIASTOLIC BLOOD PRESSURE: 60 MMHG

## 2022-08-15 DIAGNOSIS — Z85.3 HISTORY OF RIGHT BREAST CANCER: Primary | ICD-10-CM

## 2022-08-15 DIAGNOSIS — Z12.31 ENCOUNTER FOR SCREENING MAMMOGRAM FOR MALIGNANT NEOPLASM OF BREAST: ICD-10-CM

## 2022-08-15 DIAGNOSIS — M85.80 OSTEOPENIA, UNSPECIFIED LOCATION: ICD-10-CM

## 2022-08-15 DIAGNOSIS — Z90.11 S/P RIGHT MASTECTOMY: ICD-10-CM

## 2022-08-15 DIAGNOSIS — Z85.3 HISTORY OF RIGHT BREAST CANCER: ICD-10-CM

## 2022-08-15 PROCEDURE — 3078F PR MOST RECENT DIASTOLIC BLOOD PRESSURE < 80 MM HG: ICD-10-PCS | Mod: CPTII,S$GLB,, | Performed by: NURSE PRACTITIONER

## 2022-08-15 PROCEDURE — 77063 BREAST TOMOSYNTHESIS BI: CPT | Mod: 26,,, | Performed by: RADIOLOGY

## 2022-08-15 PROCEDURE — 1126F AMNT PAIN NOTED NONE PRSNT: CPT | Mod: CPTII,S$GLB,, | Performed by: NURSE PRACTITIONER

## 2022-08-15 PROCEDURE — 1159F MED LIST DOCD IN RCRD: CPT | Mod: CPTII,S$GLB,, | Performed by: NURSE PRACTITIONER

## 2022-08-15 PROCEDURE — 77063 BREAST TOMOSYNTHESIS BI: CPT | Mod: TC

## 2022-08-15 PROCEDURE — 99999 PR PBB SHADOW E&M-EST. PATIENT-LVL III: ICD-10-PCS | Mod: PBBFAC,,, | Performed by: NURSE PRACTITIONER

## 2022-08-15 PROCEDURE — 1160F RVW MEDS BY RX/DR IN RCRD: CPT | Mod: CPTII,S$GLB,, | Performed by: NURSE PRACTITIONER

## 2022-08-15 PROCEDURE — 3288F FALL RISK ASSESSMENT DOCD: CPT | Mod: CPTII,S$GLB,, | Performed by: NURSE PRACTITIONER

## 2022-08-15 PROCEDURE — 99999 PR PBB SHADOW E&M-EST. PATIENT-LVL III: CPT | Mod: PBBFAC,,, | Performed by: NURSE PRACTITIONER

## 2022-08-15 PROCEDURE — 77067 SCR MAMMO BI INCL CAD: CPT | Mod: TC

## 2022-08-15 PROCEDURE — 3288F PR FALLS RISK ASSESSMENT DOCUMENTED: ICD-10-PCS | Mod: CPTII,S$GLB,, | Performed by: NURSE PRACTITIONER

## 2022-08-15 PROCEDURE — 77067 MAMMO DIGITAL SCREENING LEFT WITH TOMO: ICD-10-PCS | Mod: 26,,, | Performed by: RADIOLOGY

## 2022-08-15 PROCEDURE — 99214 OFFICE O/P EST MOD 30 MIN: CPT | Mod: S$GLB,,, | Performed by: NURSE PRACTITIONER

## 2022-08-15 PROCEDURE — 1101F PR PT FALLS ASSESS DOC 0-1 FALLS W/OUT INJ PAST YR: ICD-10-PCS | Mod: CPTII,S$GLB,, | Performed by: NURSE PRACTITIONER

## 2022-08-15 PROCEDURE — 1126F PR PAIN SEVERITY QUANTIFIED, NO PAIN PRESENT: ICD-10-PCS | Mod: CPTII,S$GLB,, | Performed by: NURSE PRACTITIONER

## 2022-08-15 PROCEDURE — 1160F PR REVIEW ALL MEDS BY PRESCRIBER/CLIN PHARMACIST DOCUMENTED: ICD-10-PCS | Mod: CPTII,S$GLB,, | Performed by: NURSE PRACTITIONER

## 2022-08-15 PROCEDURE — 3074F PR MOST RECENT SYSTOLIC BLOOD PRESSURE < 130 MM HG: ICD-10-PCS | Mod: CPTII,S$GLB,, | Performed by: NURSE PRACTITIONER

## 2022-08-15 PROCEDURE — 77067 SCR MAMMO BI INCL CAD: CPT | Mod: 26,,, | Performed by: RADIOLOGY

## 2022-08-15 PROCEDURE — 77063 MAMMO DIGITAL SCREENING LEFT WITH TOMO: ICD-10-PCS | Mod: 26,,, | Performed by: RADIOLOGY

## 2022-08-15 PROCEDURE — 99214 PR OFFICE/OUTPT VISIT, EST, LEVL IV, 30-39 MIN: ICD-10-PCS | Mod: S$GLB,,, | Performed by: NURSE PRACTITIONER

## 2022-08-15 PROCEDURE — 3074F SYST BP LT 130 MM HG: CPT | Mod: CPTII,S$GLB,, | Performed by: NURSE PRACTITIONER

## 2022-08-15 PROCEDURE — 1159F PR MEDICATION LIST DOCUMENTED IN MEDICAL RECORD: ICD-10-PCS | Mod: CPTII,S$GLB,, | Performed by: NURSE PRACTITIONER

## 2022-08-15 PROCEDURE — 1101F PT FALLS ASSESS-DOCD LE1/YR: CPT | Mod: CPTII,S$GLB,, | Performed by: NURSE PRACTITIONER

## 2022-08-15 PROCEDURE — 3078F DIAST BP <80 MM HG: CPT | Mod: CPTII,S$GLB,, | Performed by: NURSE PRACTITIONER

## 2022-10-03 ENCOUNTER — OFFICE VISIT (OUTPATIENT)
Dept: INTERNAL MEDICINE | Facility: CLINIC | Age: 77
End: 2022-10-03
Payer: MEDICARE

## 2022-10-03 VITALS
DIASTOLIC BLOOD PRESSURE: 56 MMHG | SYSTOLIC BLOOD PRESSURE: 104 MMHG | WEIGHT: 174 LBS | HEART RATE: 65 BPM | HEIGHT: 64 IN | BODY MASS INDEX: 29.71 KG/M2

## 2022-10-03 DIAGNOSIS — K63.5 POLYP OF COLON, UNSPECIFIED PART OF COLON, UNSPECIFIED TYPE: ICD-10-CM

## 2022-10-03 DIAGNOSIS — M47.26 OSTEOARTHRITIS OF SPINE WITH RADICULOPATHY, LUMBAR REGION: ICD-10-CM

## 2022-10-03 DIAGNOSIS — M54.30 SCIATICA, UNSPECIFIED LATERALITY: Primary | ICD-10-CM

## 2022-10-03 DIAGNOSIS — M46.96 INFLAMMATORY SPONDYLOPATHY OF LUMBAR REGION: ICD-10-CM

## 2022-10-03 DIAGNOSIS — M54.9 DORSALGIA, UNSPECIFIED: ICD-10-CM

## 2022-10-03 DIAGNOSIS — M54.31 RIGHT SCIATIC NERVE PAIN: Primary | ICD-10-CM

## 2022-10-03 DIAGNOSIS — D47.3 ESSENTIAL (HEMORRHAGIC) THROMBOCYTHEMIA: ICD-10-CM

## 2022-10-03 DIAGNOSIS — I70.0 ATHEROSCLEROSIS OF AORTA: ICD-10-CM

## 2022-10-03 PROCEDURE — 3074F PR MOST RECENT SYSTOLIC BLOOD PRESSURE < 130 MM HG: ICD-10-PCS | Mod: CPTII,S$GLB,, | Performed by: INTERNAL MEDICINE

## 2022-10-03 PROCEDURE — 99999 PR PBB SHADOW E&M-EST. PATIENT-LVL IV: CPT | Mod: PBBFAC,,, | Performed by: INTERNAL MEDICINE

## 2022-10-03 PROCEDURE — 99213 OFFICE O/P EST LOW 20 MIN: CPT | Mod: S$GLB,,, | Performed by: INTERNAL MEDICINE

## 2022-10-03 PROCEDURE — 3074F SYST BP LT 130 MM HG: CPT | Mod: CPTII,S$GLB,, | Performed by: INTERNAL MEDICINE

## 2022-10-03 PROCEDURE — 99499 RISK ADDL DX/OHS AUDIT: ICD-10-PCS | Mod: HCNC,S$GLB,, | Performed by: INTERNAL MEDICINE

## 2022-10-03 PROCEDURE — 3078F DIAST BP <80 MM HG: CPT | Mod: CPTII,S$GLB,, | Performed by: INTERNAL MEDICINE

## 2022-10-03 PROCEDURE — 1126F PR PAIN SEVERITY QUANTIFIED, NO PAIN PRESENT: ICD-10-PCS | Mod: CPTII,S$GLB,, | Performed by: INTERNAL MEDICINE

## 2022-10-03 PROCEDURE — 99499 UNLISTED E&M SERVICE: CPT | Mod: HCNC,S$GLB,, | Performed by: INTERNAL MEDICINE

## 2022-10-03 PROCEDURE — 3078F PR MOST RECENT DIASTOLIC BLOOD PRESSURE < 80 MM HG: ICD-10-PCS | Mod: CPTII,S$GLB,, | Performed by: INTERNAL MEDICINE

## 2022-10-03 PROCEDURE — 99213 PR OFFICE/OUTPT VISIT, EST, LEVL III, 20-29 MIN: ICD-10-PCS | Mod: S$GLB,,, | Performed by: INTERNAL MEDICINE

## 2022-10-03 PROCEDURE — 99999 PR PBB SHADOW E&M-EST. PATIENT-LVL IV: ICD-10-PCS | Mod: PBBFAC,,, | Performed by: INTERNAL MEDICINE

## 2022-10-03 PROCEDURE — 1126F AMNT PAIN NOTED NONE PRSNT: CPT | Mod: CPTII,S$GLB,, | Performed by: INTERNAL MEDICINE

## 2022-10-03 RX ORDER — CYCLOBENZAPRINE HCL 5 MG
5 TABLET ORAL 3 TIMES DAILY PRN
Qty: 30 TABLET | Refills: 2 | Status: SHIPPED | OUTPATIENT
Start: 2022-10-03 | End: 2022-10-13

## 2022-10-03 RX ORDER — AMOXICILLIN 500 MG/1
CAPSULE ORAL
COMMUNITY
Start: 2022-06-27 | End: 2022-12-16

## 2022-10-07 ENCOUNTER — HOSPITAL ENCOUNTER (OUTPATIENT)
Dept: RADIOLOGY | Facility: HOSPITAL | Age: 77
Discharge: HOME | End: 2022-10-07
Attending: ORTHOPAEDIC SURGERY
Payer: MEDICARE

## 2022-10-07 ENCOUNTER — OFFICE VISIT (OUTPATIENT)
Dept: ORTHOPEDICS | Facility: CLINIC | Age: 77
End: 2022-10-07
Payer: MEDICARE

## 2022-10-07 VITALS — HEIGHT: 64 IN | BODY MASS INDEX: 29.74 KG/M2 | WEIGHT: 174.19 LBS

## 2022-10-07 DIAGNOSIS — M54.31 RIGHT SCIATIC NERVE PAIN: ICD-10-CM

## 2022-10-07 DIAGNOSIS — M47.26 OSTEOARTHRITIS OF SPINE WITH RADICULOPATHY, LUMBAR REGION: ICD-10-CM

## 2022-10-07 DIAGNOSIS — M54.30 SCIATICA, UNSPECIFIED LATERALITY: ICD-10-CM

## 2022-10-07 PROCEDURE — 1126F AMNT PAIN NOTED NONE PRSNT: CPT | Mod: CPTII,S$GLB,, | Performed by: ORTHOPAEDIC SURGERY

## 2022-10-07 PROCEDURE — 3288F FALL RISK ASSESSMENT DOCD: CPT | Mod: CPTII,S$GLB,, | Performed by: ORTHOPAEDIC SURGERY

## 2022-10-07 PROCEDURE — 72110 X-RAY EXAM L-2 SPINE 4/>VWS: CPT | Mod: TC

## 2022-10-07 PROCEDURE — 99213 PR OFFICE/OUTPT VISIT, EST, LEVL III, 20-29 MIN: ICD-10-PCS | Mod: S$GLB,,, | Performed by: ORTHOPAEDIC SURGERY

## 2022-10-07 PROCEDURE — 1101F PT FALLS ASSESS-DOCD LE1/YR: CPT | Mod: CPTII,S$GLB,, | Performed by: ORTHOPAEDIC SURGERY

## 2022-10-07 PROCEDURE — 1126F PR PAIN SEVERITY QUANTIFIED, NO PAIN PRESENT: ICD-10-PCS | Mod: CPTII,S$GLB,, | Performed by: ORTHOPAEDIC SURGERY

## 2022-10-07 PROCEDURE — 99213 OFFICE O/P EST LOW 20 MIN: CPT | Mod: S$GLB,,, | Performed by: ORTHOPAEDIC SURGERY

## 2022-10-07 PROCEDURE — 99999 PR PBB SHADOW E&M-EST. PATIENT-LVL II: ICD-10-PCS | Mod: PBBFAC,,, | Performed by: ORTHOPAEDIC SURGERY

## 2022-10-07 PROCEDURE — 72110 XR LUMBAR SPINE AP AND LAT WITH FLEX/EXT: ICD-10-PCS | Mod: 26,,, | Performed by: INTERNAL MEDICINE

## 2022-10-07 PROCEDURE — 1101F PR PT FALLS ASSESS DOC 0-1 FALLS W/OUT INJ PAST YR: ICD-10-PCS | Mod: CPTII,S$GLB,, | Performed by: ORTHOPAEDIC SURGERY

## 2022-10-07 PROCEDURE — 99999 PR PBB SHADOW E&M-EST. PATIENT-LVL II: CPT | Mod: PBBFAC,,, | Performed by: ORTHOPAEDIC SURGERY

## 2022-10-07 PROCEDURE — 72110 X-RAY EXAM L-2 SPINE 4/>VWS: CPT | Mod: 26,,, | Performed by: INTERNAL MEDICINE

## 2022-10-07 PROCEDURE — 3288F PR FALLS RISK ASSESSMENT DOCUMENTED: ICD-10-PCS | Mod: CPTII,S$GLB,, | Performed by: ORTHOPAEDIC SURGERY

## 2022-10-07 NOTE — PROGRESS NOTES
"DATE: 10/7/2022  PATIENT: Odessa Vaughn    Attending Physician: Kamran Douglas MD    HISTORY:  Odessa Vaughn is a 77 y.o. female who returns to me today for follow up.  She was last seen by me 5/2/2022.  Today she is doing well but notes she continues to have significant numbness and tingling in her right ankle and 3rd, 4th, 5th toes. She says her right leg pain has been minimal since her last KARLA on 12/9/21. She takes lyrica 75mg daily but it makes her sleepy.    The Patient denies myelopathic symptoms such as handwriting changes or difficulty with buttons/coins/keys. Denies perineal paresthesias, bowel/bladder dysfunction.    PMH/PSH/FamHx/SocHx:  Unchanged from prior visit    ROS:  REVIEW OF SYSTEMS:  Constitution: Negative. Negative for chills, fever and night sweats.   HENT: Negative for congestion and headaches.    Eyes: Negative for blurred vision, left vision loss and right vision loss.   Cardiovascular: Negative for chest pain and syncope.   Respiratory: Negative for cough and shortness of breath.    Endocrine: Negative for polydipsia, polyphagia and polyuria.   Hematologic/Lymphatic: Negative for bleeding problem. Does not bruise/bleed easily.   Skin: Negative for dry skin, itching and rash.   Musculoskeletal: Negative for falls and muscle weakness.   Gastrointestinal: Negative for abdominal pain and bowel incontinence.   Allergic/Immunologic: Negative for hives and persistent infections.  Genitourinary: Negative for urinary retention/incontinence and nocturia.   Neurological: negative for disturbances in coordination, no myelopathic symptoms such as handwriting changes or difficulty with buttons, coins, keys or small objects. No loss of balance and seizures.   Psychiatric/Behavioral: Negative for depression. The patient does not have insomnia.   Denies perineal paresthesias, bowel or bladder incontinence    EXAM:  Ht 5' 4" (1.626 m)   Wt 79 kg (174 lb 2.6 oz)   BMI 29.90 kg/m²     My physical " examination was notable for the following findings:     Musculoskeletal and neuro exam stable.      IMAGING:    Today I personally re- reviewed AP, Lat and Flex/Ex  upright L-spine that demonstrate disc space narrowing at L3-L4 L5-S1 without instability.     MRI lumbar demonstrates multilevel degenerative changes, most advanced at L5-S1 where there is moderate canal stenosis and moderate bilateral foraminal stenosis.    Body mass index is 29.9 kg/m².    Hemoglobin A1C   Date Value Ref Range Status   09/08/2014 6.1 4.5 - 6.2 % Final         ASSESSMENT/PLAN:    Odessa was seen today for low-back pain.    Diagnoses and all orders for this visit:    Osteoarthritis of spine with radiculopathy, lumbar region  -     Ambulatory referral/consult to Orthopedics    Right sciatic nerve pain  -     Ambulatory referral/consult to Orthopedics      Today we discussed at length all of the different treatment options including anti-inflammatories, acetaminophen, rest, ice, heat, physical therapy including strengthening and stretching exercises, home exercises, ROM, aerobic conditioning, aqua therapy, other modalities including ultrasound, massage, and dry needling, epidural steroid injections and finally surgical intervention.      Pt presents with chronic right lumbar radiculopathy. She is not ready to try another KARLA and is not willing to consider surgical intervention, which is understandable. Pt will fu if pain persists.

## 2022-10-12 ENCOUNTER — OFFICE VISIT (OUTPATIENT)
Dept: OBSTETRICS AND GYNECOLOGY | Facility: CLINIC | Age: 77
End: 2022-10-12
Attending: OBSTETRICS & GYNECOLOGY
Payer: MEDICARE

## 2022-10-12 VITALS — DIASTOLIC BLOOD PRESSURE: 54 MMHG | WEIGHT: 173.5 LBS | BODY MASS INDEX: 29.78 KG/M2 | SYSTOLIC BLOOD PRESSURE: 114 MMHG

## 2022-10-12 DIAGNOSIS — Z01.419 WELL WOMAN EXAM: Primary | ICD-10-CM

## 2022-10-12 PROCEDURE — 99999 PR PBB SHADOW E&M-EST. PATIENT-LVL III: ICD-10-PCS | Mod: PBBFAC,,, | Performed by: OBSTETRICS & GYNECOLOGY

## 2022-10-12 PROCEDURE — G0101 PR CA SCREEN;PELVIC/BREAST EXAM: ICD-10-PCS | Mod: S$GLB,,, | Performed by: OBSTETRICS & GYNECOLOGY

## 2022-10-12 PROCEDURE — 1101F PR PT FALLS ASSESS DOC 0-1 FALLS W/OUT INJ PAST YR: ICD-10-PCS | Mod: CPTII,S$GLB,, | Performed by: OBSTETRICS & GYNECOLOGY

## 2022-10-12 PROCEDURE — 3288F PR FALLS RISK ASSESSMENT DOCUMENTED: ICD-10-PCS | Mod: CPTII,S$GLB,, | Performed by: OBSTETRICS & GYNECOLOGY

## 2022-10-12 PROCEDURE — 3074F PR MOST RECENT SYSTOLIC BLOOD PRESSURE < 130 MM HG: ICD-10-PCS | Mod: CPTII,S$GLB,, | Performed by: OBSTETRICS & GYNECOLOGY

## 2022-10-12 PROCEDURE — 99999 PR PBB SHADOW E&M-EST. PATIENT-LVL III: CPT | Mod: PBBFAC,,, | Performed by: OBSTETRICS & GYNECOLOGY

## 2022-10-12 PROCEDURE — 1160F PR REVIEW ALL MEDS BY PRESCRIBER/CLIN PHARMACIST DOCUMENTED: ICD-10-PCS | Mod: CPTII,S$GLB,, | Performed by: OBSTETRICS & GYNECOLOGY

## 2022-10-12 PROCEDURE — G0101 CA SCREEN;PELVIC/BREAST EXAM: HCPCS | Mod: S$GLB,,, | Performed by: OBSTETRICS & GYNECOLOGY

## 2022-10-12 PROCEDURE — 3078F DIAST BP <80 MM HG: CPT | Mod: CPTII,S$GLB,, | Performed by: OBSTETRICS & GYNECOLOGY

## 2022-10-12 PROCEDURE — 1159F PR MEDICATION LIST DOCUMENTED IN MEDICAL RECORD: ICD-10-PCS | Mod: CPTII,S$GLB,, | Performed by: OBSTETRICS & GYNECOLOGY

## 2022-10-12 PROCEDURE — 3074F SYST BP LT 130 MM HG: CPT | Mod: CPTII,S$GLB,, | Performed by: OBSTETRICS & GYNECOLOGY

## 2022-10-12 PROCEDURE — 1160F RVW MEDS BY RX/DR IN RCRD: CPT | Mod: CPTII,S$GLB,, | Performed by: OBSTETRICS & GYNECOLOGY

## 2022-10-12 PROCEDURE — 1126F AMNT PAIN NOTED NONE PRSNT: CPT | Mod: CPTII,S$GLB,, | Performed by: OBSTETRICS & GYNECOLOGY

## 2022-10-12 PROCEDURE — 1101F PT FALLS ASSESS-DOCD LE1/YR: CPT | Mod: CPTII,S$GLB,, | Performed by: OBSTETRICS & GYNECOLOGY

## 2022-10-12 PROCEDURE — 3288F FALL RISK ASSESSMENT DOCD: CPT | Mod: CPTII,S$GLB,, | Performed by: OBSTETRICS & GYNECOLOGY

## 2022-10-12 PROCEDURE — 1159F MED LIST DOCD IN RCRD: CPT | Mod: CPTII,S$GLB,, | Performed by: OBSTETRICS & GYNECOLOGY

## 2022-10-12 PROCEDURE — 3078F PR MOST RECENT DIASTOLIC BLOOD PRESSURE < 80 MM HG: ICD-10-PCS | Mod: CPTII,S$GLB,, | Performed by: OBSTETRICS & GYNECOLOGY

## 2022-10-12 PROCEDURE — 1126F PR PAIN SEVERITY QUANTIFIED, NO PAIN PRESENT: ICD-10-PCS | Mod: CPTII,S$GLB,, | Performed by: OBSTETRICS & GYNECOLOGY

## 2022-10-12 NOTE — PROGRESS NOTES
CC: Well woman exam    Odessa Vaughn is a 77 y.o. female  presents for well woman exam.  LMP: No LMP recorded. Patient is postmenopausal..  No gyn issues, problems, or complaints.       breast cancer s/p right mastectomy     dexa osteopenia   colonoscopy    Past Medical History:   Diagnosis Date    Arthritis     hands, legs    Breast cancer 2012    Right breast invasive ductal carcinoma, ER positive, PA and Her2 negative    Bursitis of left hip     resolved    Chronic midline low back pain with left-sided sciatica 2017    Essential hypertension 2015    Hyperlipidemia 9/15/2014     Past Surgical History:   Procedure Laterality Date    BREAST BIOPSY Right 2012    Right breast- IDC    BREAST SURGERY Right 2013    right mastectomy, SN biopsy     COLONOSCOPY N/A 2017    Procedure: COLONOSCOPY;  Surgeon: Syed Shah MD;  [facility]  Service: Endoscopy;  Laterality: N/A;    DILATION AND CURETTAGE OF UTERUS      MASTECTOMY  2013    TRANSFORAMINAL EPIDURAL INJECTION OF STEROID Right 2021    Procedure: Injection,steroid,epidural,transforaminal Right L4/L5 and L5/S1 Direct Referral;  Surgeon: Elza Thomas MD;  Location: [facility] PAIN MGT;  Service: Pain Management;  Laterality: Right;    TRANSFORAMINAL EPIDURAL INJECTION OF STEROID Right 2021    Procedure: Injection,steroid,epidural,transforaminal approach RIGHT L4-L5 AND L5-S1 DIRECT REFERRAL;  Surgeon: Elza Thomas MD;  Location: [facility] PAIN MGT;  Service: Pain Management;  Laterality: Right;     Social History     Socioeconomic History    Marital status:    Tobacco Use    Smoking status: Never    Smokeless tobacco: Never    Tobacco comments:     Retired;    Substance and Sexual Activity    Alcohol use: Yes     Alcohol/week: 0.0 standard drinks     Comment: holiday - occasional wine    Drug use: No    Sexual activity: Yes     Partners: Male      Family History   Problem Relation Age of Onset    Cancer Father         Pancreatic CA    Cataracts Father     Breast cancer Cousin 58    Breast cancer Cousin 58    No Known Problems Mother     Hypertension Sister     Arthritis Brother     No Known Problems Daughter     COPD Sister     No Known Problems Brother     No Known Problems Brother     No Known Problems Brother     No Known Problems Daughter     Breast cancer Paternal Aunt 55    Cancer Paternal Aunt         Breast Ca and Lung CA    No Known Problems Maternal Aunt     No Known Problems Maternal Uncle     No Known Problems Paternal Uncle     No Known Problems Maternal Grandmother     No Known Problems Maternal Grandfather     No Known Problems Paternal Grandmother     No Known Problems Paternal Grandfather     Ovarian cancer Neg Hx     Amblyopia Neg Hx     Blindness Neg Hx     Diabetes Neg Hx     Glaucoma Neg Hx     Macular degeneration Neg Hx     Retinal detachment Neg Hx     Strabismus Neg Hx     Stroke Neg Hx     Thyroid disease Neg Hx     Osteoarthritis Neg Hx     Rheum arthritis Neg Hx      OB History          2    Para   2    Term   2            AB        Living             SAB        IAB        Ectopic        Multiple        Live Births                     BP (!) 114/54   Wt 78.7 kg (173 lb 8 oz)   BMI 29.78 kg/m²       ROS:  GENERAL: Denies weight gain or weight loss. Feeling well overall.   SKIN: Denies rash or lesions.   HEAD: Denies head injury or headache.   NODES: Denies enlarged lymph nodes.   CHEST: Denies chest pain or shortness of breath.   CARDIOVASCULAR: Denies palpitations or left sided chest pain.   ABDOMEN: No abdominal pain, constipation, diarrhea, nausea, vomiting or rectal bleeding.   URINARY: No frequency, dysuria, hematuria, or burning on urination.  REPRODUCTIVE: See HPI.   BREASTS: The patient performs breast self-examination and denies pain, lumps, or nipple discharge.   HEMATOLOGIC: No easy bruisability or  excessive bleeding.   MUSCULOSKELETAL: Denies joint pain or swelling.   NEUROLOGIC: Denies syncope or weakness.   PSYCHIATRIC: Denies depression, anxiety or mood swings.    PHYSICAL EXAM:  APPEARANCE: Well nourished, well developed, in no acute distress.  AFFECT: WNL, alert and oriented x 3  SKIN: No acne or hirsutism  NECK: Neck symmetric without masses or thyromegaly  NODES: No inguinal, cervical, axillary, or femoral lymph node enlargement  CHEST: Good respiratory effect  ABDOMEN: Soft.  No tenderness or masses.  No hepatosplenomegaly.  No hernias.  BREASTS: Symmetrical, no skin changes or visible lesions.  No palpable masses, nipple discharge bilaterally.  PELVIC: Normal external genitalia without lesions.  Normal hair distribution.  Adequate perineal body, normal urethral meatus.  Vagina moist and well rugated without lesions or discharge.  Cervix pink, without lesions, discharge or tenderness.  No significant cystocele or rectocele.  Bimanual exam shows uterus to be normal size, regular, mobile and nontender.  Adnexa without masses or tenderness.    EXTREMITIES: No edema.      ASSESSMENT & PLAN    ICD-10-CM ICD-9-CM    1. Well woman exam  Z01.419 V72.31              Patient was counseled today on A.C.S. Pap guidelines and recommendations for yearly pelvic exams, mammograms and monthly self breast exams; to see her PCP for other health maintenance.

## 2022-10-19 NOTE — PROGRESS NOTES
Subjective:       Patient ID: Odessa Vaughn is a 77 y.o. female.    Chief Complaint: Leg Pain and Hypertension    Leg Pain   The incident occurred more than 1 week ago. There was no injury mechanism. The pain is present in the right leg. The quality of the pain is described as aching. The pain is moderate. The pain has been Fluctuating since onset. Pertinent negatives include no inability to bear weight.   Hypertension  This is a chronic problem. The problem is unchanged. The problem is controlled.   Review of Systems    Objective:      Physical Exam  Vitals reviewed.   Constitutional:       General: She is not in acute distress.     Appearance: She is well-developed.   HENT:      Head: Normocephalic and atraumatic.   Eyes:      Conjunctiva/sclera: Conjunctivae normal.      Pupils: Pupils are equal, round, and reactive to light.   Cardiovascular:      Rate and Rhythm: Normal rate and regular rhythm.      Heart sounds: Normal heart sounds.   Pulmonary:      Effort: Pulmonary effort is normal.      Breath sounds: Normal breath sounds. No wheezing.   Abdominal:      General: Bowel sounds are normal.      Palpations: Abdomen is soft.      Tenderness: There is no abdominal tenderness.   Musculoskeletal:         General: No tenderness. Normal range of motion.      Cervical back: Normal range of motion and neck supple.   Skin:     Findings: No erythema.   Neurological:      Mental Status: She is alert and oriented to person, place, and time.      Cranial Nerves: No cranial nerve deficit.      Motor: No weakness or atrophy.       Assessment:       1. Right sciatic nerve pain    2. Dorsalgia, unspecified    3. Osteoarthritis of spine with radiculopathy, lumbar region    4. Polyp of colon, unspecified part of colon, unspecified type    5. Inflammatory spondylopathy of lumbar region    6. Atherosclerosis of aorta    7. Essential (hemorrhagic) thrombocythemia          Plan:       Odessa was seen today for leg pain and  hypertension.    Diagnoses and all orders for this visit:    Right sciatic nerve pain  -     Ambulatory referral/consult to Orthopedics; Future    Dorsalgia, unspecified  -     cyclobenzaprine (FLEXERIL) 5 MG tablet; Take 1 tablet (5 mg total) by mouth 3 (three) times daily as needed for Muscle spasms.    Osteoarthritis of spine with radiculopathy, lumbar region  -     cyclobenzaprine (FLEXERIL) 5 MG tablet; Take 1 tablet (5 mg total) by mouth 3 (three) times daily as needed for Muscle spasms.  -     Ambulatory referral/consult to Orthopedics; Future    Polyp of colon, unspecified part of colon, unspecified type  -     Ambulatory referral/consult to Endo Procedure ; Future    Inflammatory spondylopathy of lumbar region  Comments:  component of sciatica?    Atherosclerosis of aorta  Comments:  cont med regimen    Essential (hemorrhagic) thrombocythemia  Comments:  last plt count up marginally      Follow up if symptoms worsen or fail to improve.

## 2022-12-16 ENCOUNTER — HOSPITAL ENCOUNTER (OUTPATIENT)
Dept: RADIOLOGY | Facility: HOSPITAL | Age: 77
Discharge: HOME | End: 2022-12-16
Attending: NURSE PRACTITIONER
Payer: MEDICARE

## 2022-12-16 ENCOUNTER — OFFICE VISIT (OUTPATIENT)
Dept: INTERNAL MEDICINE | Facility: CLINIC | Age: 77
End: 2022-12-16
Payer: MEDICARE

## 2022-12-16 VITALS
WEIGHT: 170.88 LBS | HEART RATE: 78 BPM | DIASTOLIC BLOOD PRESSURE: 64 MMHG | OXYGEN SATURATION: 99 % | SYSTOLIC BLOOD PRESSURE: 118 MMHG | HEIGHT: 64 IN | BODY MASS INDEX: 29.17 KG/M2

## 2022-12-16 DIAGNOSIS — R09.81 NASAL CONGESTION: ICD-10-CM

## 2022-12-16 DIAGNOSIS — R05.9 COUGH, UNSPECIFIED TYPE: ICD-10-CM

## 2022-12-16 DIAGNOSIS — U07.1 COVID: ICD-10-CM

## 2022-12-16 DIAGNOSIS — U07.1 COVID: Primary | ICD-10-CM

## 2022-12-16 LAB
CTP QC/QA: YES
CTP QC/QA: YES
POC MOLECULAR INFLUENZA A AGN: NEGATIVE
POC MOLECULAR INFLUENZA B AGN: NEGATIVE
SARS-COV-2 RDRP RESP QL NAA+PROBE: POSITIVE

## 2022-12-16 PROCEDURE — 87502 INFLUENZA DNA AMP PROBE: CPT | Mod: QW,HCNC,S$GLB, | Performed by: NURSE PRACTITIONER

## 2022-12-16 PROCEDURE — 99214 PR OFFICE/OUTPT VISIT, EST, LEVL IV, 30-39 MIN: ICD-10-PCS | Mod: HCNC,S$GLB,, | Performed by: NURSE PRACTITIONER

## 2022-12-16 PROCEDURE — 1101F PR PT FALLS ASSESS DOC 0-1 FALLS W/OUT INJ PAST YR: ICD-10-PCS | Mod: HCNC,CPTII,S$GLB, | Performed by: NURSE PRACTITIONER

## 2022-12-16 PROCEDURE — 1160F RVW MEDS BY RX/DR IN RCRD: CPT | Mod: HCNC,CPTII,S$GLB, | Performed by: NURSE PRACTITIONER

## 2022-12-16 PROCEDURE — 71046 X-RAY EXAM CHEST 2 VIEWS: CPT | Mod: TC,HCNC

## 2022-12-16 PROCEDURE — 1159F MED LIST DOCD IN RCRD: CPT | Mod: HCNC,CPTII,S$GLB, | Performed by: NURSE PRACTITIONER

## 2022-12-16 PROCEDURE — 87635: ICD-10-PCS | Mod: QW,HCNC,S$GLB, | Performed by: NURSE PRACTITIONER

## 2022-12-16 PROCEDURE — 3078F DIAST BP <80 MM HG: CPT | Mod: HCNC,CPTII,S$GLB, | Performed by: NURSE PRACTITIONER

## 2022-12-16 PROCEDURE — 99999 PR PBB SHADOW E&M-EST. PATIENT-LVL IV: ICD-10-PCS | Mod: PBBFAC,HCNC,, | Performed by: NURSE PRACTITIONER

## 2022-12-16 PROCEDURE — 1160F PR REVIEW ALL MEDS BY PRESCRIBER/CLIN PHARMACIST DOCUMENTED: ICD-10-PCS | Mod: HCNC,CPTII,S$GLB, | Performed by: NURSE PRACTITIONER

## 2022-12-16 PROCEDURE — 3074F PR MOST RECENT SYSTOLIC BLOOD PRESSURE < 130 MM HG: ICD-10-PCS | Mod: HCNC,CPTII,S$GLB, | Performed by: NURSE PRACTITIONER

## 2022-12-16 PROCEDURE — 99999 PR PBB SHADOW E&M-EST. PATIENT-LVL IV: CPT | Mod: PBBFAC,HCNC,, | Performed by: NURSE PRACTITIONER

## 2022-12-16 PROCEDURE — 1126F PR PAIN SEVERITY QUANTIFIED, NO PAIN PRESENT: ICD-10-PCS | Mod: HCNC,CPTII,S$GLB, | Performed by: NURSE PRACTITIONER

## 2022-12-16 PROCEDURE — 3078F PR MOST RECENT DIASTOLIC BLOOD PRESSURE < 80 MM HG: ICD-10-PCS | Mod: HCNC,CPTII,S$GLB, | Performed by: NURSE PRACTITIONER

## 2022-12-16 PROCEDURE — 87502 POCT INFLUENZA A/B MOLECULAR: ICD-10-PCS | Mod: QW,HCNC,S$GLB, | Performed by: NURSE PRACTITIONER

## 2022-12-16 PROCEDURE — 3074F SYST BP LT 130 MM HG: CPT | Mod: HCNC,CPTII,S$GLB, | Performed by: NURSE PRACTITIONER

## 2022-12-16 PROCEDURE — 3288F FALL RISK ASSESSMENT DOCD: CPT | Mod: HCNC,CPTII,S$GLB, | Performed by: NURSE PRACTITIONER

## 2022-12-16 PROCEDURE — 99214 OFFICE O/P EST MOD 30 MIN: CPT | Mod: HCNC,S$GLB,, | Performed by: NURSE PRACTITIONER

## 2022-12-16 PROCEDURE — 1101F PT FALLS ASSESS-DOCD LE1/YR: CPT | Mod: HCNC,CPTII,S$GLB, | Performed by: NURSE PRACTITIONER

## 2022-12-16 PROCEDURE — 71046 XR CHEST PA AND LATERAL: ICD-10-PCS | Mod: 26,HCNC,, | Performed by: RADIOLOGY

## 2022-12-16 PROCEDURE — 87635 SARS-COV-2 COVID-19 AMP PRB: CPT | Mod: QW,HCNC,S$GLB, | Performed by: NURSE PRACTITIONER

## 2022-12-16 PROCEDURE — 1126F AMNT PAIN NOTED NONE PRSNT: CPT | Mod: HCNC,CPTII,S$GLB, | Performed by: NURSE PRACTITIONER

## 2022-12-16 PROCEDURE — 71046 X-RAY EXAM CHEST 2 VIEWS: CPT | Mod: 26,HCNC,, | Performed by: RADIOLOGY

## 2022-12-16 PROCEDURE — 3288F PR FALLS RISK ASSESSMENT DOCUMENTED: ICD-10-PCS | Mod: HCNC,CPTII,S$GLB, | Performed by: NURSE PRACTITIONER

## 2022-12-16 PROCEDURE — 1159F PR MEDICATION LIST DOCUMENTED IN MEDICAL RECORD: ICD-10-PCS | Mod: HCNC,CPTII,S$GLB, | Performed by: NURSE PRACTITIONER

## 2022-12-16 RX ORDER — AZELASTINE 1 MG/ML
1 SPRAY, METERED NASAL 2 TIMES DAILY
Qty: 30 ML | Refills: 2 | Status: CANCELLED | OUTPATIENT
Start: 2022-12-16 | End: 2023-12-16

## 2022-12-16 RX ORDER — AZITHROMYCIN 250 MG/1
TABLET, FILM COATED ORAL
Qty: 6 TABLET | Refills: 0 | Status: SHIPPED | OUTPATIENT
Start: 2022-12-16 | End: 2022-12-21

## 2022-12-16 RX ORDER — PROMETHAZINE HYDROCHLORIDE AND DEXTROMETHORPHAN HYDROBROMIDE 6.25; 15 MG/5ML; MG/5ML
5 SYRUP ORAL EVERY 8 HOURS PRN
Qty: 105 ML | Refills: 0 | Status: CANCELLED | OUTPATIENT
Start: 2022-12-16 | End: 2022-12-23

## 2022-12-16 RX ORDER — BENZONATATE 100 MG/1
100 CAPSULE ORAL 3 TIMES DAILY PRN
Qty: 30 CAPSULE | Refills: 1 | Status: SHIPPED | OUTPATIENT
Start: 2022-12-16 | End: 2023-04-06 | Stop reason: ALTCHOICE

## 2022-12-16 RX ORDER — PROMETHAZINE HYDROCHLORIDE AND DEXTROMETHORPHAN HYDROBROMIDE 6.25; 15 MG/5ML; MG/5ML
5 SYRUP ORAL EVERY 8 HOURS PRN
Qty: 105 ML | Refills: 0 | Status: SHIPPED | OUTPATIENT
Start: 2022-12-16 | End: 2022-12-23

## 2022-12-16 RX ORDER — AZELASTINE 1 MG/ML
1 SPRAY, METERED NASAL 2 TIMES DAILY
Qty: 30 ML | Refills: 2 | Status: SHIPPED | OUTPATIENT
Start: 2022-12-16 | End: 2023-04-06 | Stop reason: ALTCHOICE

## 2022-12-16 RX ORDER — BENZONATATE 100 MG/1
100 CAPSULE ORAL 3 TIMES DAILY PRN
Qty: 30 CAPSULE | Refills: 0 | Status: CANCELLED | OUTPATIENT
Start: 2022-12-16 | End: 2022-12-26

## 2022-12-16 RX ORDER — ALBUTEROL SULFATE 90 UG/1
2 AEROSOL, METERED RESPIRATORY (INHALATION) EVERY 4 HOURS PRN
Qty: 18 G | Refills: 2 | Status: SHIPPED | OUTPATIENT
Start: 2022-12-16 | End: 2023-04-06 | Stop reason: ALTCHOICE

## 2022-12-16 NOTE — PROGRESS NOTES
"INTERNAL MEDICINE CLINIC - SAME DAY APPOINTMENT  Progress Note    PRESENTING HISTORY     PCP: Bryce Perkins MD    Chief Complaint/Reason for Visit:   No chief complaint on file.    History of Present Illness & ROS : Ms. Odessa Vaughn is a 77 y.o. female.    Same Day appt.   Very pleasant lady. Reports that over the past week and 1/2 has been having a "cough, coughing clear secretions, congestion and just not feeling well, tired, but had not slept well and up early". Her  had 'gone out to cut the grass and when returned into the house, did not change the damp clothes, got a cold and sick, was in bed for 2 days, but got over it; don't know what he had'.   She has had no documented fever or chills.   Taking dayquil every 4 hours, but 'cutting back and helped a little'.   Drinking hot tea.  No chest wall discomforts, no headaches, no chills, no ear pain, no sore throat and no SOB.   No GI symptoms.     Review of Systems:  Eyes: denies visual changes at this time denies floaters   ENT: no nasal congestion or sore throat  Respiratory: no cough or shortness of breath  Cardiovascular: no chest pain or palpitations  Gastrointestinal: no nausea or vomiting, no abdominal pain or change in bowel habits  Genitourinary: no hematuria or dysuria; denies frequency  Hematologic/Lymphatic: no easy bruising or lymphadenopathy  Musculoskeletal: no arthralgias or myalgias  Neurological: no seizures or tremors  Endocrine: no heat or cold intolerance    PAST HISTORY:     Past Medical History:   Diagnosis Date    Arthritis     hands, legs    Breast cancer 12/2012    Right breast invasive ductal carcinoma, ER positive, AL and Her2 negative    Bursitis of left hip 2016    resolved    Chronic midline low back pain with left-sided sciatica 6/5/2017    Essential hypertension 9/21/2015    Hyperlipidemia 9/15/2014       Past Surgical History:   Procedure Laterality Date    BREAST BIOPSY Right 12/19/2012    Right breast- IDC    " BREAST SURGERY Right 01/2013    right mastectomy, SN biopsy     COLONOSCOPY N/A 09/07/2017    Procedure: COLONOSCOPY;  Surgeon: Syed Shah MD;  Location: [facility] ENDO (4TH FLR);  Service: Endoscopy;  Laterality: N/A;    DILATION AND CURETTAGE OF UTERUS      MASTECTOMY  01/2013    TRANSFORAMINAL EPIDURAL INJECTION OF STEROID Right 11/11/2021    Procedure: Injection,steroid,epidural,transforaminal Right L4/L5 and L5/S1 Direct Referral;  Surgeon: Elza Thomas MD;  Location: [facility], [place] PAIN MGT;  Service: Pain Management;  Laterality: Right;    TRANSFORAMINAL EPIDURAL INJECTION OF STEROID Right 12/09/2021    Procedure: Injection,steroid,epidural,transforaminal approach RIGHT L4-L5 AND L5-S1 DIRECT REFERRAL;  Surgeon: Elza Thomas MD;  Location: [facility], [place] PAIN MGT;  Service: Pain Management;  Laterality: Right;       Family History   Problem Relation Age of Onset    Cancer Father         Pancreatic CA    Cataracts Father     Breast cancer Cousin 58    Breast cancer Cousin 58    No Known Problems Mother     Hypertension Sister     Arthritis Brother     No Known Problems Daughter     COPD Sister     No Known Problems Brother     No Known Problems Brother     No Known Problems Brother     No Known Problems Daughter     Breast cancer Paternal Aunt 55    Cancer Paternal Aunt         Breast Ca and Lung CA    No Known Problems Maternal Aunt     No Known Problems Maternal Uncle     No Known Problems Paternal Uncle     No Known Problems Maternal Grandmother     No Known Problems Maternal Grandfather     No Known Problems Paternal Grandmother     No Known Problems Paternal Grandfather     Ovarian cancer Neg Hx     Amblyopia Neg Hx     Blindness Neg Hx     Diabetes Neg Hx     Glaucoma Neg Hx     Macular degeneration Neg Hx     Retinal detachment Neg Hx     Strabismus Neg Hx     Stroke Neg Hx     Thyroid disease Neg Hx     Osteoarthritis Neg Hx     Rheum arthritis Neg Hx        Social History     Socioeconomic History     Marital status:    Tobacco Use    Smoking status: Never    Smokeless tobacco: Never    Tobacco comments:     Retired;    Substance and Sexual Activity    Alcohol use: Yes     Alcohol/week: 0.0 standard drinks     Comment: holiday - occasional wine    Drug use: No    Sexual activity: Yes     Partners: Male       MEDICATIONS & ALLERGIES:     Current Outpatient Medications on File Prior to Visit   Medication Sig Dispense Refill    amoxicillin (AMOXIL) 500 MG capsule       aspirin 81 MG Chew Take 81 mg by mouth once daily.      atorvastatin (LIPITOR) 10 MG tablet TAKE 1 TABLET(10 MG) BY MOUTH EVERY DAY 90 tablet 0    multivitamin capsule Take 1 capsule by mouth once daily.      pregabalin (LYRICA) 75 MG capsule Take 1 capsule (75 mg total) by mouth 2 (two) times daily. 60 capsule 6    PROPYLENE GLYCOL//PF (SYSTANE, PF, OPHT) Apply to eye daily as needed.       triamterene-hydrochlorothiazide 37.5-25 mg (DYAZIDE) 37.5-25 mg per capsule TAKE 1 CAPSULE BY MOUTH EVERY DAY 90 capsule 3     No current facility-administered medications on file prior to visit.        Review of patient's allergies indicates:   Allergen Reactions    Vicodin [hydrocodone-acetaminophen] Other (See Comments)     Dizziness       Medications Reconciliation:   I have reconciled the patient's home medications with the patient/family. I have updated all changes.  Refer to After-Visit Medication List.    OBJECTIVE:     Vital Signs:  There were no vitals filed for this visit.  Wt Readings from Last 3 Encounters:   10/12/22 1045 78.7 kg (173 lb 8 oz)   10/07/22 0952 79 kg (174 lb 2.6 oz)   10/03/22 1104 78.9 kg (174 lb)     There is no height or weight on file to calculate BMI.   Wt Readings from Last 3 Encounters:   12/16/22 77.5 kg (170 lb 13.7 oz)   10/12/22 78.7 kg (173 lb 8 oz)   10/07/22 79 kg (174 lb 2.6 oz)     Temp Readings from Last 3 Encounters:   12/09/21 98.7 °F (37.1 °C) (Oral)   11/11/21 98.3 °F (36.8 °C)  (Oral)   10/25/21 98.1 °F (36.7 °C) (Oral)     BP Readings from Last 3 Encounters:   12/16/22 118/64   10/12/22 (!) 114/54   10/03/22 (!) 104/56     Pulse Readings from Last 3 Encounters:   12/16/22 78   10/03/22 65   08/15/22 75     Physical Exam:  (Focused Exam)  General: Well developed, well nourished. No distress.  HEENT: Head is normocephalic, atraumatic  Right TM: unremarkable  Left TM: unremarkable   Posterior Oral Pharynx: unremarkable   Eyes: Clear conjunctiva.  Neck: Supple, symmetrical neck; trachea midline.  Lungs: Clear to auscultation bilaterally and normal respiratory effort.  Cardiovascular: Heart with regular rate and rhythm. No murmurs, gallops or rubs  Extremities: No LE edema. Pulses 2+ and symmetric.   Skin: Skin color, texture, turgor normal. No rashes.  Musculoskeletal: Normal gait.   Neurologic: No focal numbness or weakness.         Laboratory  Lab Results   Component Value Date    WBC 9.39 07/01/2020    HGB 11.9 (L) 07/01/2020    HCT 39.6 07/01/2020     (H) 07/01/2020    CHOL 151 09/10/2021    TRIG 78 09/10/2021    HDL 44 09/10/2021    ALT 20 09/10/2021    AST 22 09/10/2021     09/10/2021    K 3.7 09/10/2021     09/10/2021    CREATININE 0.8 09/10/2021    BUN 19 09/10/2021    CO2 27 09/10/2021    TSH 1.859 08/28/2019    HGBA1C 6.1 09/08/2014     ASSESSMENT & PLAN:     Same day appt.     Sating today: 99% RA  Temp: 97.8 today     COVID  Positive on POCT (offered to have PCR, declined and agree; unremarkable clinical exam)    Cough, unspecified type    Nasal Congestion   -     POCT COVID-19 Rapid Screening (Positive)  -     POCT Influenza A/B Molecular (negative)  -     X-Ray Chest PA And Lateral; Future; Expected date: 12/16/2022    Other orders  -     azelastine (ASTELIN) 137 mcg (0.1 %) nasal spray; 1 spray (137 mcg total) by Nasal route 2 (two) times daily.  Dispense: 30 mL; Refill: 2  -     benzonatate (TESSALON) 100 MG capsule; Take 1 capsule (100 mg total) by mouth 3  (three) times daily as needed for Cough (Mild - Moderate Cough).  Dispense: 30 capsule; Refill: 1  -     promethazine-dextromethorphan (PROMETHAZINE-DM) 6.25-15 mg/5 mL Syrp; Take 5 mLs by mouth every 8 (eight) hours as needed (Severe Cough).  Dispense: 105 mL; Refill: 0  -     albuterol (PROVENTIL HFA) 90 mcg/actuation inhaler; Inhale 2 puffs into the lungs every 4 (four) hours as needed for Wheezing. Rescue  Dispense: 18 g; Refill: 2  -     azithromycin (Z-MATTHIAS) 250 MG tablet; Take 2 tablets by mouth on day 1; Take 1 tablet by mouth on days 2-5  Dispense: 6 tablet; Refill: 0    Future Appointments   Date Time Provider Department Center   8/21/2023  8:00 AM [facility]        Medication List            Accurate as of December 16, 2022  4:47 PM. If you have any questions, ask your nurse or doctor.                START taking these medications      albuterol 90 mcg/actuation inhaler  Commonly known as: PROVENTIL HFA  Inhale 2 puffs into the lungs every 4 (four) hours as needed for Wheezing. Rescue  Started by: PARMINDER Goodson     azelastine 137 mcg (0.1 %) nasal spray  Commonly known as: ASTELIN  1 spray (137 mcg total) by Nasal route 2 (two) times daily.  Started by: PARMINDER Goodson     azithromycin 250 MG tablet  Commonly known as: Z-MATTHIAS  Take 2 tablets by mouth on day 1; Take 1 tablet by mouth on days 2-5  Started by: PARMINDER Goodson     benzonatate 100 MG capsule  Commonly known as: TESSALON  Take 1 capsule (100 mg total) by mouth 3 (three) times daily as needed for Cough (Mild - Moderate Cough).  Started by: PARMINDER Goodson     promethazine-dextromethorphan 6.25-15 mg/5 mL Syrp  Commonly known as: PROMETHAZINE-DM  Take 5 mLs by mouth every 8 (eight) hours as needed (Severe Cough).  Started by: Yasemin A Becky-Carrasquillo, FNP            CONTINUE taking these medications      aspirin 81 MG Chew     atorvastatin 10 MG tablet  Commonly known as:  LIPITOR  TAKE 1 TABLET(10 MG) BY MOUTH EVERY DAY     multivitamin capsule     pregabalin 75 MG capsule  Commonly known as: LYRICA  Take 1 capsule (75 mg total) by mouth 2 (two) times daily.     SYSTANE (PF) OPHT     triamterene-hydrochlorothiazide 37.5-25 mg 37.5-25 mg per capsule  Commonly known as: DYAZIDE  TAKE 1 CAPSULE BY MOUTH EVERY DAY            STOP taking these medications      amoxicillin 500 MG capsule  Commonly known as: AMOXIL  Stopped by: PARMINDER Goodson               Where to Get Your Medications        These medications were sent to [id number] [address] AT [facility] [address]  [address] [facility] [address]      Phone: [phone]   albuterol 90 mcg/actuation inhaler  azelastine 137 mcg (0.1 %) nasal spray  azithromycin 250 MG tablet  benzonatate 100 MG capsule  promethazine-dextromethorphan 6.25-15 mg/5 mL Syrp         Signing Physician:  PARMINDER Goodson

## 2022-12-17 ENCOUNTER — TELEPHONE (OUTPATIENT)
Dept: INTERNAL MEDICINE | Facility: CLINIC | Age: 77
End: 2022-12-17
Payer: MEDICARE

## 2022-12-17 RX ORDER — METHYLPREDNISOLONE 4 MG/1
TABLET ORAL
Qty: 1 EACH | Refills: 0 | Status: SHIPPED | OUTPATIENT
Start: 2022-12-17 | End: 2023-04-06 | Stop reason: ALTCHOICE

## 2023-02-03 ENCOUNTER — CLINICAL SUPPORT (OUTPATIENT)
Dept: ENDOSCOPY | Facility: HOSPITAL | Age: 78
End: 2023-02-03
Attending: INTERNAL MEDICINE
Payer: MEDICARE

## 2023-02-03 VITALS — WEIGHT: 173 LBS | BODY MASS INDEX: 28.82 KG/M2 | HEIGHT: 65 IN

## 2023-02-03 DIAGNOSIS — K63.5 POLYP OF COLON, UNSPECIFIED PART OF COLON, UNSPECIFIED TYPE: ICD-10-CM

## 2023-02-20 ENCOUNTER — PES CALL (OUTPATIENT)
Dept: ADMINISTRATIVE | Facility: CLINIC | Age: 78
End: 2023-02-20
Payer: MEDICARE

## 2023-02-20 ENCOUNTER — PATIENT MESSAGE (OUTPATIENT)
Dept: ENDOSCOPY | Facility: HOSPITAL | Age: 78
End: 2023-02-20
Payer: MEDICARE

## 2023-02-20 DIAGNOSIS — Z12.11 COLON CANCER SCREENING: Primary | ICD-10-CM

## 2023-02-20 RX ORDER — SODIUM, POTASSIUM,MAG SULFATES 17.5-3.13G
SOLUTION, RECONSTITUTED, ORAL ORAL
Qty: 1 KIT | Refills: 0 | Status: SHIPPED | OUTPATIENT
Start: 2023-02-20 | End: 2023-05-02

## 2023-03-09 ENCOUNTER — LAB VISIT (OUTPATIENT)
Dept: LAB | Facility: HOSPITAL | Age: 78
End: 2023-03-09
Attending: INTERNAL MEDICINE
Payer: MEDICARE

## 2023-03-09 ENCOUNTER — OFFICE VISIT (OUTPATIENT)
Dept: INTERNAL MEDICINE | Facility: CLINIC | Age: 78
End: 2023-03-09
Payer: MEDICARE

## 2023-03-09 ENCOUNTER — IMMUNIZATION (OUTPATIENT)
Dept: INTERNAL MEDICINE | Facility: CLINIC | Age: 78
End: 2023-03-09
Payer: MEDICARE

## 2023-03-09 VITALS
HEART RATE: 66 BPM | HEIGHT: 65 IN | BODY MASS INDEX: 28.91 KG/M2 | SYSTOLIC BLOOD PRESSURE: 118 MMHG | OXYGEN SATURATION: 99 % | DIASTOLIC BLOOD PRESSURE: 66 MMHG | WEIGHT: 173.5 LBS

## 2023-03-09 DIAGNOSIS — E78.5 HYPERLIPIDEMIA, UNSPECIFIED HYPERLIPIDEMIA TYPE: ICD-10-CM

## 2023-03-09 DIAGNOSIS — Z13.6 SCREENING FOR ABDOMINAL AORTIC ANEURYSM: ICD-10-CM

## 2023-03-09 DIAGNOSIS — R09.89 RIGHT CAROTID BRUIT: ICD-10-CM

## 2023-03-09 DIAGNOSIS — M54.41 CHRONIC MIDLINE LOW BACK PAIN WITH RIGHT-SIDED SCIATICA: ICD-10-CM

## 2023-03-09 DIAGNOSIS — G89.29 CHRONIC MIDLINE LOW BACK PAIN WITH RIGHT-SIDED SCIATICA: ICD-10-CM

## 2023-03-09 DIAGNOSIS — I10 ESSENTIAL HYPERTENSION: Primary | ICD-10-CM

## 2023-03-09 DIAGNOSIS — Z23 NEED FOR VACCINATION: Primary | ICD-10-CM

## 2023-03-09 DIAGNOSIS — I10 ESSENTIAL HYPERTENSION: ICD-10-CM

## 2023-03-09 LAB
ALBUMIN SERPL BCP-MCNC: 3.8 G/DL (ref 3.5–5.2)
ALP SERPL-CCNC: 98 U/L (ref 55–135)
ALT SERPL W/O P-5'-P-CCNC: 20 U/L (ref 10–44)
ANION GAP SERPL CALC-SCNC: 10 MMOL/L (ref 8–16)
AST SERPL-CCNC: 22 U/L (ref 10–40)
BASOPHILS # BLD AUTO: 0.07 K/UL (ref 0–0.2)
BASOPHILS NFR BLD: 0.8 % (ref 0–1.9)
BILIRUB SERPL-MCNC: 0.4 MG/DL (ref 0.1–1)
BILIRUB UR QL STRIP: NEGATIVE
BUN SERPL-MCNC: 19 MG/DL (ref 8–23)
CALCIUM SERPL-MCNC: 10.1 MG/DL (ref 8.7–10.5)
CHLORIDE SERPL-SCNC: 103 MMOL/L (ref 95–110)
CHOLEST SERPL-MCNC: 150 MG/DL (ref 120–199)
CHOLEST/HDLC SERPL: 3.3 {RATIO} (ref 2–5)
CLARITY UR REFRACT.AUTO: CLEAR
CO2 SERPL-SCNC: 27 MMOL/L (ref 23–29)
COLOR UR AUTO: YELLOW
CREAT SERPL-MCNC: 0.9 MG/DL (ref 0.5–1.4)
DIFFERENTIAL METHOD: ABNORMAL
EOSINOPHIL # BLD AUTO: 0.3 K/UL (ref 0–0.5)
EOSINOPHIL NFR BLD: 3.4 % (ref 0–8)
ERYTHROCYTE [DISTWIDTH] IN BLOOD BY AUTOMATED COUNT: 14.6 % (ref 11.5–14.5)
EST. GFR  (NO RACE VARIABLE): >60 ML/MIN/1.73 M^2
GLUCOSE SERPL-MCNC: 91 MG/DL (ref 70–110)
GLUCOSE UR QL STRIP: NEGATIVE
HCT VFR BLD AUTO: 41.2 % (ref 37–48.5)
HDLC SERPL-MCNC: 45 MG/DL (ref 40–75)
HDLC SERPL: 30 % (ref 20–50)
HGB BLD-MCNC: 12.5 G/DL (ref 12–16)
HGB UR QL STRIP: NEGATIVE
HYALINE CASTS UR QL AUTO: 1 /LPF
IMM GRANULOCYTES # BLD AUTO: 0.01 K/UL (ref 0–0.04)
IMM GRANULOCYTES NFR BLD AUTO: 0.1 % (ref 0–0.5)
KETONES UR QL STRIP: NEGATIVE
LDLC SERPL CALC-MCNC: 88.4 MG/DL (ref 63–159)
LEUKOCYTE ESTERASE UR QL STRIP: NEGATIVE
LYMPHOCYTES # BLD AUTO: 3.7 K/UL (ref 1–4.8)
LYMPHOCYTES NFR BLD: 42.2 % (ref 18–48)
MCH RBC QN AUTO: 27 PG (ref 27–31)
MCHC RBC AUTO-ENTMCNC: 30.3 G/DL (ref 32–36)
MCV RBC AUTO: 89 FL (ref 82–98)
MICROSCOPIC COMMENT: ABNORMAL
MONOCYTES # BLD AUTO: 0.6 K/UL (ref 0.3–1)
MONOCYTES NFR BLD: 6.7 % (ref 4–15)
NEUTROPHILS # BLD AUTO: 4.1 K/UL (ref 1.8–7.7)
NEUTROPHILS NFR BLD: 46.8 % (ref 38–73)
NITRITE UR QL STRIP: NEGATIVE
NON-SQ EPI CELLS #/AREA URNS AUTO: <1 /HPF
NONHDLC SERPL-MCNC: 105 MG/DL
NRBC BLD-RTO: 0 /100 WBC
PH UR STRIP: 6 [PH] (ref 5–8)
PLATELET # BLD AUTO: 404 K/UL (ref 150–450)
PMV BLD AUTO: 8.7 FL (ref 9.2–12.9)
POTASSIUM SERPL-SCNC: 4.2 MMOL/L (ref 3.5–5.1)
PROT SERPL-MCNC: 8.1 G/DL (ref 6–8.4)
PROT UR QL STRIP: NEGATIVE
RBC # BLD AUTO: 4.63 M/UL (ref 4–5.4)
RBC #/AREA URNS AUTO: 61 /HPF (ref 0–4)
SODIUM SERPL-SCNC: 140 MMOL/L (ref 136–145)
SP GR UR STRIP: 1.02 (ref 1–1.03)
SQUAMOUS #/AREA URNS AUTO: 3 /HPF
TRIGL SERPL-MCNC: 83 MG/DL (ref 30–150)
URN SPEC COLLECT METH UR: NORMAL
WBC # BLD AUTO: 8.8 K/UL (ref 3.9–12.7)

## 2023-03-09 PROCEDURE — 1159F PR MEDICATION LIST DOCUMENTED IN MEDICAL RECORD: ICD-10-PCS | Mod: HCNC,CPTII,S$GLB, | Performed by: INTERNAL MEDICINE

## 2023-03-09 PROCEDURE — 99999 PR PBB SHADOW E&M-EST. PATIENT-LVL IV: ICD-10-PCS | Mod: PBBFAC,HCNC,, | Performed by: INTERNAL MEDICINE

## 2023-03-09 PROCEDURE — 1159F MED LIST DOCD IN RCRD: CPT | Mod: HCNC,CPTII,S$GLB, | Performed by: INTERNAL MEDICINE

## 2023-03-09 PROCEDURE — 99214 PR OFFICE/OUTPT VISIT, EST, LEVL IV, 30-39 MIN: ICD-10-PCS | Mod: HCNC,S$GLB,, | Performed by: INTERNAL MEDICINE

## 2023-03-09 PROCEDURE — 1126F PR PAIN SEVERITY QUANTIFIED, NO PAIN PRESENT: ICD-10-PCS | Mod: HCNC,CPTII,S$GLB, | Performed by: INTERNAL MEDICINE

## 2023-03-09 PROCEDURE — 81001 URINALYSIS AUTO W/SCOPE: CPT | Mod: HCNC | Performed by: INTERNAL MEDICINE

## 2023-03-09 PROCEDURE — 3078F DIAST BP <80 MM HG: CPT | Mod: HCNC,CPTII,S$GLB, | Performed by: INTERNAL MEDICINE

## 2023-03-09 PROCEDURE — 85025 COMPLETE CBC W/AUTO DIFF WBC: CPT | Mod: HCNC | Performed by: INTERNAL MEDICINE

## 2023-03-09 PROCEDURE — 1126F AMNT PAIN NOTED NONE PRSNT: CPT | Mod: HCNC,CPTII,S$GLB, | Performed by: INTERNAL MEDICINE

## 2023-03-09 PROCEDURE — 1101F PT FALLS ASSESS-DOCD LE1/YR: CPT | Mod: HCNC,CPTII,S$GLB, | Performed by: INTERNAL MEDICINE

## 2023-03-09 PROCEDURE — 1160F PR REVIEW ALL MEDS BY PRESCRIBER/CLIN PHARMACIST DOCUMENTED: ICD-10-PCS | Mod: HCNC,CPTII,S$GLB, | Performed by: INTERNAL MEDICINE

## 2023-03-09 PROCEDURE — 99999 PR PBB SHADOW E&M-EST. PATIENT-LVL IV: CPT | Mod: PBBFAC,HCNC,, | Performed by: INTERNAL MEDICINE

## 2023-03-09 PROCEDURE — 80061 LIPID PANEL: CPT | Mod: HCNC | Performed by: INTERNAL MEDICINE

## 2023-03-09 PROCEDURE — 3288F FALL RISK ASSESSMENT DOCD: CPT | Mod: HCNC,CPTII,S$GLB, | Performed by: INTERNAL MEDICINE

## 2023-03-09 PROCEDURE — 3288F PR FALLS RISK ASSESSMENT DOCUMENTED: ICD-10-PCS | Mod: HCNC,CPTII,S$GLB, | Performed by: INTERNAL MEDICINE

## 2023-03-09 PROCEDURE — 1160F RVW MEDS BY RX/DR IN RCRD: CPT | Mod: HCNC,CPTII,S$GLB, | Performed by: INTERNAL MEDICINE

## 2023-03-09 PROCEDURE — 3078F PR MOST RECENT DIASTOLIC BLOOD PRESSURE < 80 MM HG: ICD-10-PCS | Mod: HCNC,CPTII,S$GLB, | Performed by: INTERNAL MEDICINE

## 2023-03-09 PROCEDURE — 3074F PR MOST RECENT SYSTOLIC BLOOD PRESSURE < 130 MM HG: ICD-10-PCS | Mod: HCNC,CPTII,S$GLB, | Performed by: INTERNAL MEDICINE

## 2023-03-09 PROCEDURE — 0124A COVID-19, MRNA, LNP-S, BIVALENT BOOSTER, PF, 30 MCG/0.3 ML DOSE: CPT | Mod: HCNC,CV19,PBBFAC | Performed by: INTERNAL MEDICINE

## 2023-03-09 PROCEDURE — 99214 OFFICE O/P EST MOD 30 MIN: CPT | Mod: HCNC,S$GLB,, | Performed by: INTERNAL MEDICINE

## 2023-03-09 PROCEDURE — 1101F PR PT FALLS ASSESS DOC 0-1 FALLS W/OUT INJ PAST YR: ICD-10-PCS | Mod: HCNC,CPTII,S$GLB, | Performed by: INTERNAL MEDICINE

## 2023-03-09 PROCEDURE — 3074F SYST BP LT 130 MM HG: CPT | Mod: HCNC,CPTII,S$GLB, | Performed by: INTERNAL MEDICINE

## 2023-03-09 PROCEDURE — 80053 COMPREHEN METABOLIC PANEL: CPT | Mod: HCNC | Performed by: INTERNAL MEDICINE

## 2023-03-09 PROCEDURE — 91312 COVID-19, MRNA, LNP-S, BIVALENT BOOSTER, PF, 30 MCG/0.3 ML DOSE: CPT | Mod: HCNC,S$GLB,, | Performed by: INTERNAL MEDICINE

## 2023-03-09 PROCEDURE — 91312 COVID-19, MRNA, LNP-S, BIVALENT BOOSTER, PF, 30 MCG/0.3 ML DOSE: ICD-10-PCS | Mod: HCNC,S$GLB,, | Performed by: INTERNAL MEDICINE

## 2023-03-09 PROCEDURE — 36415 COLL VENOUS BLD VENIPUNCTURE: CPT | Mod: HCNC | Performed by: INTERNAL MEDICINE

## 2023-03-09 NOTE — PROGRESS NOTES
CC:  Establishing care     HPI:  The patient is a 69-year-old female with hypertension, hyperlipidemia, right breast cancer status post mastectomy and sciatica involving right leg presents today to establish care.  Patient does have a history of colon polyps and is due for colonoscopy.  Orders have been placed.  The patient's last lipid panel his over a year ago.    ROS:  Patient reports weight is staying the same.  No chest pain.  No shortness of breath.  Does not formally exercise.  No nausea vomiting.  No abdominal pain.  Her last mammogram was in August of 2022.  She had a gyn visit in October 22.  Last bone density was in June of 2021.      Physical exam:   General appearance:  No acute distress   HEENT:  Conjunctiva is clear.  Pupils equal.  Left TM is clear.  Right partially obscured by wax.  Nasal midline without discharge.  Oropharynx without erythema.  Trachea is midline without JVD or thyromegaly.    Pulmonary:  Good inspiratory, expiratory breath sounds are heard.  Lungs are clear auscultation.  Cardiovascular:  S1-S2, rhythm is regular.  2+ carotid pulses; but, the patient had a right carotid bruit.  Extremities without edema.    GI: Abdomen was nontender, nondistended without hepatosplenomegaly    Assessment:  1. Establishing care  2.  Hypertension currently stable  3.  Hyperlipidemia   4.  Right carotid bruit  5.  Right breast cancer status post mastectomy    6.  Colon polyps     Plan:  1. Will schedule a CBC, CMP, lipid panel and UA  2. Will order carotid ultrasound  3. Will order a screening ultrasound the aorta.

## 2023-03-10 ENCOUNTER — HOSPITAL ENCOUNTER (OUTPATIENT)
Dept: RADIOLOGY | Facility: HOSPITAL | Age: 78
Discharge: HOME | End: 2023-03-10
Attending: INTERNAL MEDICINE
Payer: MEDICARE

## 2023-03-10 DIAGNOSIS — Z13.6 SCREENING FOR ABDOMINAL AORTIC ANEURYSM: ICD-10-CM

## 2023-03-10 DIAGNOSIS — R09.89 RIGHT CAROTID BRUIT: ICD-10-CM

## 2023-03-10 PROCEDURE — 76775 US EXAM ABDO BACK WALL LIM: CPT | Mod: TC,HCNC

## 2023-03-10 PROCEDURE — 76775 US EXAM ABDO BACK WALL LIM: CPT | Mod: 26,HCNC,, | Performed by: STUDENT IN AN ORGANIZED HEALTH CARE EDUCATION/TRAINING PROGRAM

## 2023-03-10 PROCEDURE — 93880 EXTRACRANIAL BILAT STUDY: CPT | Mod: 26,HCNC,, | Performed by: STUDENT IN AN ORGANIZED HEALTH CARE EDUCATION/TRAINING PROGRAM

## 2023-03-10 PROCEDURE — 93880 US CAROTID BILATERAL: ICD-10-PCS | Mod: 26,HCNC,, | Performed by: STUDENT IN AN ORGANIZED HEALTH CARE EDUCATION/TRAINING PROGRAM

## 2023-03-10 PROCEDURE — 76775 US ABDOMINAL AORTA: ICD-10-PCS | Mod: 26,HCNC,, | Performed by: STUDENT IN AN ORGANIZED HEALTH CARE EDUCATION/TRAINING PROGRAM

## 2023-03-10 PROCEDURE — 93880 EXTRACRANIAL BILAT STUDY: CPT | Mod: TC,HCNC

## 2023-03-12 DIAGNOSIS — R82.90 ABNORMAL URINE FINDINGS: Primary | ICD-10-CM

## 2023-03-13 ENCOUNTER — TELEPHONE (OUTPATIENT)
Dept: INTERNAL MEDICINE | Facility: CLINIC | Age: 78
End: 2023-03-13
Payer: MEDICARE

## 2023-03-13 NOTE — TELEPHONE ENCOUNTER
----- Message from Minda Gregory sent at 3/13/2023  3:00 PM CDT -----  Contact: self/[phone]  Patient is returning a phone call.  Who left a message for the patient: ma  Does patient know what this is regarding:    Would you like a call back, or a response through your [facility] portal?:   call back  Comments:      Please advise

## 2023-03-13 NOTE — TELEPHONE ENCOUNTER
----- Message from Mitchel Spencer MD sent at 3/12/2023  6:30 AM CDT -----  Please contact patient. I would like to repeat her urine sample. We have conflicting reports. One part was read as normal. The microscopic part came back showing blood. I would like to repeat her urine sample plus do a urine culture. Order are in.

## 2023-03-14 ENCOUNTER — PATIENT MESSAGE (OUTPATIENT)
Dept: INTERNAL MEDICINE | Facility: CLINIC | Age: 78
End: 2023-03-14
Payer: MEDICARE

## 2023-03-14 ENCOUNTER — LAB VISIT (OUTPATIENT)
Dept: LAB | Facility: HOSPITAL | Age: 78
End: 2023-03-14
Attending: INTERNAL MEDICINE
Payer: MEDICARE

## 2023-03-14 DIAGNOSIS — R82.90 ABNORMAL URINE FINDINGS: ICD-10-CM

## 2023-03-14 LAB
BACTERIA #/AREA URNS AUTO: ABNORMAL /HPF
BILIRUB UR QL STRIP: NEGATIVE
CLARITY UR REFRACT.AUTO: CLEAR
COLOR UR AUTO: YELLOW
GLUCOSE UR QL STRIP: NEGATIVE
HGB UR QL STRIP: NEGATIVE
KETONES UR QL STRIP: NEGATIVE
LEUKOCYTE ESTERASE UR QL STRIP: ABNORMAL
MICROSCOPIC COMMENT: ABNORMAL
NITRITE UR QL STRIP: NEGATIVE
PH UR STRIP: 6 [PH] (ref 5–8)
PROT UR QL STRIP: NEGATIVE
RBC #/AREA URNS AUTO: 3 /HPF (ref 0–4)
SP GR UR STRIP: 1.02 (ref 1–1.03)
SQUAMOUS #/AREA URNS AUTO: 7 /HPF
URN SPEC COLLECT METH UR: ABNORMAL
WBC #/AREA URNS AUTO: 29 /HPF (ref 0–5)

## 2023-03-14 PROCEDURE — 81001 URINALYSIS AUTO W/SCOPE: CPT | Mod: HCNC | Performed by: INTERNAL MEDICINE

## 2023-03-14 PROCEDURE — 87086 URINE CULTURE/COLONY COUNT: CPT | Mod: HCNC | Performed by: INTERNAL MEDICINE

## 2023-03-15 LAB
BACTERIA UR CULT: NORMAL
BACTERIA UR CULT: NORMAL

## 2023-03-17 ENCOUNTER — TELEPHONE (OUTPATIENT)
Dept: ENDOSCOPY | Facility: HOSPITAL | Age: 78
End: 2023-03-17
Payer: MEDICARE

## 2023-03-17 NOTE — TELEPHONE ENCOUNTER
----- Message from Christen Granger RN sent at 3/15/2023 11:33 AM CDT -----  Regarding: Colonoscopy on 4/10, needs to switch prep  Contact: @[phone]  Thank you!  ----- Message -----  From: Jan Ramirez  Sent: 3/15/2023  11:26 AM CDT  To: Sharron JULIEN Staff    Pt is calling in stating that the prep solution that was called in is too high over a $100 dollars due to pt insurance not covering it. Pt would like to know if she can get something cheaper called in. Please call to discuss further.

## 2023-04-04 ENCOUNTER — TELEPHONE (OUTPATIENT)
Dept: ENDOSCOPY | Facility: HOSPITAL | Age: 78
End: 2023-04-04

## 2023-04-04 DIAGNOSIS — Z12.11 SPECIAL SCREENING FOR MALIGNANT NEOPLASMS, COLON: Primary | ICD-10-CM

## 2023-04-04 RX ORDER — POLYETHYLENE GLYCOL 3350, SODIUM SULFATE ANHYDROUS, SODIUM BICARBONATE, SODIUM CHLORIDE, POTASSIUM CHLORIDE 236; 22.74; 6.74; 5.86; 2.97 G/4L; G/4L; G/4L; G/4L; G/4L
4 POWDER, FOR SOLUTION ORAL ONCE
Qty: 4000 ML | Refills: 0 | Status: SHIPPED | OUTPATIENT
Start: 2023-04-04 | End: 2023-04-04

## 2023-04-06 ENCOUNTER — OFFICE VISIT (OUTPATIENT)
Dept: INTERNAL MEDICINE | Facility: CLINIC | Age: 78
End: 2023-04-06
Payer: MEDICARE

## 2023-04-06 VITALS
SYSTOLIC BLOOD PRESSURE: 118 MMHG | HEIGHT: 65 IN | OXYGEN SATURATION: 96 % | WEIGHT: 170.63 LBS | DIASTOLIC BLOOD PRESSURE: 64 MMHG | BODY MASS INDEX: 28.43 KG/M2 | HEART RATE: 62 BPM

## 2023-04-06 DIAGNOSIS — E78.49 OTHER HYPERLIPIDEMIA: ICD-10-CM

## 2023-04-06 DIAGNOSIS — D47.3 ESSENTIAL (HEMORRHAGIC) THROMBOCYTHEMIA: ICD-10-CM

## 2023-04-06 DIAGNOSIS — M47.26 OSTEOARTHRITIS OF SPINE WITH RADICULOPATHY, LUMBAR REGION: ICD-10-CM

## 2023-04-06 DIAGNOSIS — I70.0 ATHEROSCLEROSIS OF AORTA: ICD-10-CM

## 2023-04-06 DIAGNOSIS — I10 ESSENTIAL HYPERTENSION: ICD-10-CM

## 2023-04-06 DIAGNOSIS — M46.96 INFLAMMATORY SPONDYLOPATHY OF LUMBAR REGION: ICD-10-CM

## 2023-04-06 DIAGNOSIS — Z85.3 HISTORY OF RIGHT BREAST CANCER: ICD-10-CM

## 2023-04-06 DIAGNOSIS — Z00.00 ENCOUNTER FOR PREVENTIVE HEALTH EXAMINATION: Primary | ICD-10-CM

## 2023-04-06 DIAGNOSIS — M54.41 CHRONIC MIDLINE LOW BACK PAIN WITH RIGHT-SIDED SCIATICA: ICD-10-CM

## 2023-04-06 DIAGNOSIS — Z90.11 S/P RIGHT MASTECTOMY: ICD-10-CM

## 2023-04-06 DIAGNOSIS — M85.80 OSTEOPENIA, UNSPECIFIED LOCATION: ICD-10-CM

## 2023-04-06 DIAGNOSIS — G89.29 CHRONIC MIDLINE LOW BACK PAIN WITH RIGHT-SIDED SCIATICA: ICD-10-CM

## 2023-04-06 PROCEDURE — 99999 PR PBB SHADOW E&M-EST. PATIENT-LVL IV: CPT | Mod: PBBFAC,HCNC,, | Performed by: NURSE PRACTITIONER

## 2023-04-06 PROCEDURE — G0439 PR MEDICARE ANNUAL WELLNESS SUBSEQUENT VISIT: ICD-10-PCS | Mod: HCNC,S$GLB,, | Performed by: NURSE PRACTITIONER

## 2023-04-06 PROCEDURE — 3288F FALL RISK ASSESSMENT DOCD: CPT | Mod: HCNC,CPTII,S$GLB, | Performed by: NURSE PRACTITIONER

## 2023-04-06 PROCEDURE — 3074F PR MOST RECENT SYSTOLIC BLOOD PRESSURE < 130 MM HG: ICD-10-PCS | Mod: HCNC,CPTII,S$GLB, | Performed by: NURSE PRACTITIONER

## 2023-04-06 PROCEDURE — G0439 PPPS, SUBSEQ VISIT: HCPCS | Mod: HCNC,S$GLB,, | Performed by: NURSE PRACTITIONER

## 2023-04-06 PROCEDURE — 1159F MED LIST DOCD IN RCRD: CPT | Mod: HCNC,CPTII,S$GLB, | Performed by: NURSE PRACTITIONER

## 2023-04-06 PROCEDURE — 3074F SYST BP LT 130 MM HG: CPT | Mod: HCNC,CPTII,S$GLB, | Performed by: NURSE PRACTITIONER

## 2023-04-06 PROCEDURE — 1160F RVW MEDS BY RX/DR IN RCRD: CPT | Mod: HCNC,CPTII,S$GLB, | Performed by: NURSE PRACTITIONER

## 2023-04-06 PROCEDURE — 1101F PT FALLS ASSESS-DOCD LE1/YR: CPT | Mod: HCNC,CPTII,S$GLB, | Performed by: NURSE PRACTITIONER

## 2023-04-06 PROCEDURE — 3078F DIAST BP <80 MM HG: CPT | Mod: HCNC,CPTII,S$GLB, | Performed by: NURSE PRACTITIONER

## 2023-04-06 PROCEDURE — 3078F PR MOST RECENT DIASTOLIC BLOOD PRESSURE < 80 MM HG: ICD-10-PCS | Mod: HCNC,CPTII,S$GLB, | Performed by: NURSE PRACTITIONER

## 2023-04-06 PROCEDURE — 3288F PR FALLS RISK ASSESSMENT DOCUMENTED: ICD-10-PCS | Mod: HCNC,CPTII,S$GLB, | Performed by: NURSE PRACTITIONER

## 2023-04-06 PROCEDURE — 1160F PR REVIEW ALL MEDS BY PRESCRIBER/CLIN PHARMACIST DOCUMENTED: ICD-10-PCS | Mod: HCNC,CPTII,S$GLB, | Performed by: NURSE PRACTITIONER

## 2023-04-06 PROCEDURE — 1126F AMNT PAIN NOTED NONE PRSNT: CPT | Mod: HCNC,CPTII,S$GLB, | Performed by: NURSE PRACTITIONER

## 2023-04-06 PROCEDURE — 1126F PR PAIN SEVERITY QUANTIFIED, NO PAIN PRESENT: ICD-10-PCS | Mod: HCNC,CPTII,S$GLB, | Performed by: NURSE PRACTITIONER

## 2023-04-06 PROCEDURE — 99999 PR PBB SHADOW E&M-EST. PATIENT-LVL IV: ICD-10-PCS | Mod: PBBFAC,HCNC,, | Performed by: NURSE PRACTITIONER

## 2023-04-06 PROCEDURE — 1170F PR FUNCTIONAL STATUS ASSESSED: ICD-10-PCS | Mod: HCNC,CPTII,S$GLB, | Performed by: NURSE PRACTITIONER

## 2023-04-06 PROCEDURE — 1170F FXNL STATUS ASSESSED: CPT | Mod: HCNC,CPTII,S$GLB, | Performed by: NURSE PRACTITIONER

## 2023-04-06 PROCEDURE — 1159F PR MEDICATION LIST DOCUMENTED IN MEDICAL RECORD: ICD-10-PCS | Mod: HCNC,CPTII,S$GLB, | Performed by: NURSE PRACTITIONER

## 2023-04-06 PROCEDURE — 1101F PR PT FALLS ASSESS DOC 0-1 FALLS W/OUT INJ PAST YR: ICD-10-PCS | Mod: HCNC,CPTII,S$GLB, | Performed by: NURSE PRACTITIONER

## 2023-04-06 NOTE — PROGRESS NOTES
I offered to discuss advanced care planning, including how to pick a person who would make decisions for you if you were unable to make them for yourself, called a health care power of , and what kind of decisions you might make such as use of life sustaining treatments such as ventilators and tube feeding when faced with a life limiting illness recorded on a living will that they will need to know. (How you want to be cared for as you near the end of your natural life)     X  Patient is unwilling to engage in a discussion regarding advance directives at this time.

## 2023-04-06 NOTE — PROGRESS NOTES
"  Odessa Vaughn presented for a  Medicare AWV and comprehensive Health Risk Assessment today. The following components were reviewed and updated:    Medical history  Family History  Social history  Allergies and Current Medications  Health Risk Assessment  Health Maintenance  Care Team         ** See Completed Assessments for Annual Wellness Visit within the encounter summary.**         The following assessments were completed:  Living Situation  CAGE  Depression Screening  Timed Get Up and Go  Whisper Test  Cognitive Function Screening      Nutrition Screening  ADL Screening  PAQ Screening  OPIOID Screening: Patient does not have a prescription for narcotics. Patient does not use substance         Vitals:    04/06/23 0906   BP: 118/64   BP Location: Left arm   Pulse: 62   SpO2: 96%   Weight: 77.4 kg (170 lb 10.2 oz)   Height: 5' 5" (1.651 m)     Body mass index is 28.4 kg/m².  Physical Exam  Vitals and nursing note reviewed.   Constitutional:       Appearance: She is well-developed.   HENT:      Head: Normocephalic.   Cardiovascular:      Rate and Rhythm: Normal rate and regular rhythm.      Heart sounds: Normal heart sounds. No murmur heard.  Pulmonary:      Effort: Pulmonary effort is normal.      Breath sounds: Normal breath sounds.   Abdominal:      General: Bowel sounds are normal.      Palpations: Abdomen is soft.   Musculoskeletal:         General: Normal range of motion.   Skin:     General: Skin is warm and dry.   Neurological:      Mental Status: She is alert and oriented to person, place, and time.      Motor: No abnormal muscle tone.   Psychiatric:         Mood and Affect: Mood normal.             Diagnoses and health risks identified today and associated recommendations/orders:    1. Encounter for preventive health examination  Here for Health Risk Assessment/Annual Wellness Visit.  Health maintenance reviewed and updated. Follow up in one year.     2. Essential hypertension  Chronic, stable on " current medications. Followed by PCP.     3. Atherosclerosis of aorta  Chronic, stable on current medications. Noted CXR 6/05/17. Followed by PCP.     4. Other hyperlipidemia  Chronic, stable on current medication. Followed by PCP.     5. Essential (hemorrhagic) thrombocythemia  Chronic, stable on current medications. Followed by PCP.     6. History of right breast cancer  Followed by PCP, Oncology.    7. S/P right mastectomy  Followed by Oncology.    8. Osteoarthritis of spine with radiculopathy, lumbar region  Chronic, stable on current medications. Followed by PCP, Pain Management.    9. Chronic midline low back pain with right-sided sciatica  Chronic, stable on current medications. Followed by PCP, Pain Management.    10. Inflammatory spondylopathy of lumbar region  Chronic, stable on current medications. Followed by PCP, Pain Management.    11. Osteopenia, unspecified location  Chronic, stable on current medications. Followed by PCP.       Provided Odessa with a 5-10 year written screening schedule and personal prevention plan. Recommendations were developed using the USPSTF age appropriate recommendations. Education, counseling, and referrals were provided as needed. After Visit Summary printed and given to patient which includes a list of additional screenings\tests needed.    Follow up in about 11 months (around 3/9/2024).with PCP    Shayy Graf NP

## 2023-04-06 NOTE — PATIENT INSTRUCTIONS
Counseling and Referral of Other Preventative  (Italic type indicates deductible and co-insurance are waived)    Patient Name: Odessa Vaughn  Today's Date: 4/6/2023    Health Maintenance       Date Due Completion Date    Hepatitis C Screening Never done ---    Colonoscopy 09/07/2020 9/7/2017    Mammogram 08/15/2023 8/15/2022    Lipid Panel 03/09/2024 3/9/2023    Override on 9/4/2018: Done    DEXA Scan 06/11/2024 6/11/2021    Override on 10/5/2010: Not Clinically Appropriate (DUE IN 2014)    TETANUS VACCINE 10/11/2028 10/11/2018        No orders of the defined types were placed in this encounter.    The following information is provided to all patients.  This information is to help you find resources for any of the problems found today that may be affecting your health:                Living healthy guide: www.[facility].[place].gov      Understanding Diabetes: www.diabetes.org      Eating healthy: www.cdc.gov/healthyweight      Formerly named [facility] home safety checklist: www.cdc.gov/steadi/patient.html      Agency on Aging: www.goea.[place].[facility]      Alcoholics anonymous (AA): www.aa.org      Physical Activity: www.helena.nih.gov/ei0odby      Tobacco use: www.quitwithusla.org

## 2023-04-10 ENCOUNTER — ANESTHESIA (OUTPATIENT)
Dept: ENDOSCOPY | Facility: HOSPITAL | Age: 78
End: 2023-04-10
Payer: MEDICARE

## 2023-04-10 ENCOUNTER — HOSPITAL ENCOUNTER (OUTPATIENT)
Facility: HOSPITAL | Age: 78
Discharge: HOME | End: 2023-04-10
Attending: COLON & RECTAL SURGERY | Admitting: COLON & RECTAL SURGERY
Payer: MEDICARE

## 2023-04-10 ENCOUNTER — ANESTHESIA EVENT (OUTPATIENT)
Dept: ENDOSCOPY | Facility: HOSPITAL | Age: 78
End: 2023-04-10
Payer: MEDICARE

## 2023-04-10 VITALS
TEMPERATURE: 98 F | RESPIRATION RATE: 18 BRPM | HEART RATE: 66 BPM | WEIGHT: 171 LBS | HEIGHT: 64 IN | BODY MASS INDEX: 29.19 KG/M2 | DIASTOLIC BLOOD PRESSURE: 57 MMHG | SYSTOLIC BLOOD PRESSURE: 118 MMHG | OXYGEN SATURATION: 99 %

## 2023-04-10 DIAGNOSIS — Z12.11 ENCOUNTER FOR SCREENING COLONOSCOPY: ICD-10-CM

## 2023-04-10 PROCEDURE — 88305 TISSUE EXAM BY PATHOLOGIST: CPT | Mod: 26,HCNC,, | Performed by: PATHOLOGY

## 2023-04-10 PROCEDURE — 88305 TISSUE EXAM BY PATHOLOGIST: ICD-10-PCS | Mod: 26,HCNC,, | Performed by: PATHOLOGY

## 2023-04-10 PROCEDURE — 37000008 HC ANESTHESIA 1ST 15 MINUTES: Mod: HCNC | Performed by: COLON & RECTAL SURGERY

## 2023-04-10 PROCEDURE — 27201089 HC SNARE, DISP (ANY): Mod: HCNC | Performed by: COLON & RECTAL SURGERY

## 2023-04-10 PROCEDURE — 25000003 PHARM REV CODE 250: Mod: HCNC | Performed by: NURSE ANESTHETIST, CERTIFIED REGISTERED

## 2023-04-10 PROCEDURE — E9220 PRA ENDO ANESTHESIA: ICD-10-PCS | Mod: HCNC,PT,, | Performed by: NURSE ANESTHETIST, CERTIFIED REGISTERED

## 2023-04-10 PROCEDURE — 63600175 PHARM REV CODE 636 W HCPCS: Mod: HCNC | Performed by: NURSE ANESTHETIST, CERTIFIED REGISTERED

## 2023-04-10 PROCEDURE — 45385 COLONOSCOPY W/LESION REMOVAL: CPT | Mod: PT,HCNC,, | Performed by: COLON & RECTAL SURGERY

## 2023-04-10 PROCEDURE — 37000009 HC ANESTHESIA EA ADD 15 MINS: Mod: HCNC | Performed by: COLON & RECTAL SURGERY

## 2023-04-10 PROCEDURE — E9220 PRA ENDO ANESTHESIA: HCPCS | Mod: HCNC,PT,, | Performed by: NURSE ANESTHETIST, CERTIFIED REGISTERED

## 2023-04-10 PROCEDURE — 45385 COLONOSCOPY W/LESION REMOVAL: CPT | Mod: PT,HCNC | Performed by: COLON & RECTAL SURGERY

## 2023-04-10 PROCEDURE — 88305 TISSUE EXAM BY PATHOLOGIST: CPT | Mod: 59,HCNC | Performed by: PATHOLOGY

## 2023-04-10 PROCEDURE — 45385 PR COLONOSCOPY,REMV LESN,SNARE: ICD-10-PCS | Mod: PT,HCNC,, | Performed by: COLON & RECTAL SURGERY

## 2023-04-10 RX ORDER — PROPOFOL 10 MG/ML
VIAL (ML) INTRAVENOUS CONTINUOUS PRN
Status: DISCONTINUED | OUTPATIENT
Start: 2023-04-10 | End: 2023-04-10

## 2023-04-10 RX ORDER — LIDOCAINE HYDROCHLORIDE 20 MG/ML
INJECTION INTRAVENOUS
Status: DISCONTINUED | OUTPATIENT
Start: 2023-04-10 | End: 2023-04-10

## 2023-04-10 RX ORDER — SODIUM CHLORIDE 9 MG/ML
INJECTION, SOLUTION INTRAVENOUS CONTINUOUS
Status: DISCONTINUED | OUTPATIENT
Start: 2023-04-10 | End: 2023-04-10 | Stop reason: HOSPADM

## 2023-04-10 RX ORDER — PROPOFOL 10 MG/ML
VIAL (ML) INTRAVENOUS
Status: DISCONTINUED | OUTPATIENT
Start: 2023-04-10 | End: 2023-04-10

## 2023-04-10 RX ADMIN — LIDOCAINE HYDROCHLORIDE 30 MG: 20 INJECTION INTRAVENOUS at 07:04

## 2023-04-10 RX ADMIN — Medication 50 MG: at 07:04

## 2023-04-10 RX ADMIN — SODIUM CHLORIDE: 9 INJECTION, SOLUTION INTRAVENOUS at 06:04

## 2023-04-10 RX ADMIN — PROPOFOL 150 MCG/KG/MIN: 10 INJECTION, EMULSION INTRAVENOUS at 07:04

## 2023-04-10 NOTE — H&P
COLONOSCOPY HISTORY AND PE    Procedure : Colonoscopy    INDICATIONS: asymptomatic screening exam, personal history of colon polyps, and most recent endoscopic exam 5 years ago    Denies family history of CRC.    Colonoscopy (9/7/17, complete):  Findings:        A 5 mm polyp was found in the ascending colon. The polyp was        pedunculated. The polyp was removed with a cold snare. Resection and        retrieval were complete. TVA       A 3 mm polyp was found in the ascending colon. The polyp was        sessile. The polyp was removed with a cold biopsy forceps. Resection        and retrieval were complete. TA    Past Medical History:   Diagnosis Date    Arthritis     hands, legs    Breast cancer 12/2012    Right breast invasive ductal carcinoma, ER positive, [place] and Her2 negative    Bursitis of left hip 2016    resolved    Chronic midline low back pain with left-sided sciatica 6/5/2017    Essential hypertension 9/21/2015    Hyperlipidemia 9/15/2014       Past Surgical History:   Procedure Laterality Date    BREAST BIOPSY Right 12/19/2012    Right breast- [facility]    BREAST SURGERY Right 01/2013    right mastectomy, SN biopsy     COLONOSCOPY N/A 09/07/2017    Procedure: COLONOSCOPY;  Surgeon: Syed Shah MD;  Location: [address]);  Service: Endoscopy;  Laterality: N/A;    DILATION AND CURETTAGE OF UTERUS      MASTECTOMY  01/2013    TRANSFORAMINAL EPIDURAL INJECTION OF STEROID Right 11/11/2021    Procedure: Injection,steroid,epidural,transforaminal Right L4/L5 and L5/S1 Direct Referral;  Surgeon: Elza Thomas MD;  Location: [facility];  Service: Pain Management;  Laterality: Right;    TRANSFORAMINAL EPIDURAL INJECTION OF STEROID Right 12/09/2021    Procedure: Injection,steroid,epidural,transforaminal approach RIGHT L4-L5 AND L5-S1 DIRECT REFERRAL;  Surgeon: Elza Thomas MD;  Location: [facility];  Service: Pain Management;  Laterality: Right;       Review of patient's allergies indicates:    Allergen Reactions    Vicodin [hydrocodone-acetaminophen] Other (See Comments)     Dizziness       No current facility-administered medications on file prior to encounter.     Current Outpatient Medications on File Prior to Encounter   Medication Sig Dispense Refill    aspirin 81 MG Chew Take 81 mg by mouth once daily.      multivitamin capsule Take 1 capsule by mouth once daily.      PROPYLENE GLYCOL//PF (SYSTANE, PF, OPHT) Apply to eye daily as needed.       triamterene-hydrochlorothiazide 37.5-25 mg (DYAZIDE) 37.5-25 mg per capsule TAKE 1 CAPSULE BY MOUTH EVERY DAY 90 capsule 3       Family History   Problem Relation Age of Onset    Cancer Father         Pancreatic CA    Cataracts Father     Breast cancer Cousin 58    Breast cancer Cousin 58    No Known Problems Mother     Hypertension Sister     Arthritis Brother     No Known Problems Daughter     COPD Sister     No Known Problems Brother     No Known Problems Brother     No Known Problems Brother     No Known Problems Daughter     Breast cancer Paternal Aunt 55    Cancer Paternal Aunt         Breast Ca and Lung CA    No Known Problems Maternal Aunt     No Known Problems Maternal Uncle     No Known Problems Paternal Uncle     No Known Problems Maternal Grandmother     No Known Problems Maternal Grandfather     No Known Problems Paternal Grandmother     No Known Problems Paternal Grandfather     Ovarian cancer Neg Hx     Amblyopia Neg Hx     Blindness Neg Hx     Diabetes Neg Hx     Glaucoma Neg Hx     Macular degeneration Neg Hx     Retinal detachment Neg Hx     Strabismus Neg Hx     Stroke Neg Hx     Thyroid disease Neg Hx     Osteoarthritis Neg Hx     Rheum arthritis Neg Hx        Social History     Socioeconomic History    Marital status:    Tobacco Use    Smoking status: Never    Smokeless tobacco: Never    Tobacco comments:     Retired;    Substance and Sexual Activity    Alcohol use: Not Currently     Comment: holiday -  occasional wine    Drug use: No    Sexual activity: Yes     Partners: Male     Social Determinants of Health     Financial Resource Strain: Low Risk     Difficulty of Paying Living Expenses: Not hard at all   Food Insecurity: No Food Insecurity    Worried About Running Out of Food in the Last Year: Never true    Ran Out of Food in the Last Year: Never true   Transportation Needs: No Transportation Needs    Lack of Transportation (Medical): No    Lack of Transportation (Non-Medical): No   Physical Activity: Sufficiently Active    Days of Exercise per Week: 7 days    Minutes of Exercise per Session: 120 min   Stress: No Stress Concern Present    Feeling of Stress : Not at all   Social Connections: Moderately Integrated    Frequency of Communication with Friends and Family: More than three times a week    Frequency of Social Gatherings with Friends and Family: Twice a week    Attends [employer] Services: More than 4 times per year    Active Member of Clubs or Organizations: No    Attends Club or Organization Meetings: Never    Marital Status:    Housing Stability: Unknown    Unable to Pay for Housing in the Last Year: No    Unstable Housing in the Last Year: No       Review of Systems -    Respiratory : no cough, shortness of breath, or wheezing  Cardiovascular  no chest pain or dyspnea on exertion  Gastrointestinal no abdominal pain, change in bowel habits, or black or bloody stools  Musculoskeletal no deformities, swelling  Neurological no TIA or stroke symptoms    Physical Exam:  General: NAD  AT NC EOMI  Mallampati Score   Neck supple, trachea midline  Lungs: nl excursions, no retractions.  Breathing comfortably  Abdomen ND soft NT.  No masses  Extremities: No CCE.      ASA:  II    PLAN  COLONOSCOPY.  The details of the procedure, the possible need for biopsy or polypectomy and the potential risks including bleeding, perforation, missed polyps were discussed in detail.    Mika Perera MD  Colon and  Rectal Surgery Fellow

## 2023-04-10 NOTE — ANESTHESIA PREPROCEDURE EVALUATION
04/10/2023  Odessa Vaughn is a 77 y.o., female.      Patient Name: Odessa Vaughn  YOB: 1945  MRN: [id number]  CSN: [id number]      Code Status: No Order   Date of Procedure: 4/10/2023  Anesthesia: Choice Procedure: Procedure(s) (LRB):  COLONOSCOPY (N/A)  Pre-Operative Diagnosis: Polyp of colon, unspecified part of colon, unspecified type [K63.5]  Proceduralist: Surgeon(s) and Role:     * Kamran Mcdermott MD - Primary        SUBJECTIVE:   Odessa Vaughn is a 77 y.o. female who  has a past medical history of Arthritis, Breast cancer (12/2012), Bursitis of left hip (2016), Chronic midline low back pain with left-sided sciatica (6/5/2017), Essential hypertension (9/21/2015), and Hyperlipidemia (9/15/2014). No notes on file    ALLERGIES:     Review of patient's allergies indicates:   Allergen Reactions    Vicodin [hydrocodone-acetaminophen] Other (See Comments)     Dizziness     MEDICATIONS:     Current Facility-Administered Medications   Medication Dose Route Frequency Provider Last Rate Last Admin    0.9%  NaCl infusion   Intravenous Continuous Kamran Mcdermott MD              History:     Patient Active Problem List   Diagnosis    S/P right mastectomy    Other hyperlipidemia    Osteopenia    Primary osteoarthritis of hand    Chronic midline low back pain with right-sided sciatica    Osteoarthritis of spine with radiculopathy, lumbar region    Sacroiliac dysfunction    Atherosclerosis of aorta    Essential (hemorrhagic) thrombocythemia    Lumbar facet arthropathy    Essential hypertension    History of right breast cancer    Inflammatory spondylopathy of lumbar region    Hx of folliculitis    Weakness of trunk musculature    Deformity of breast after mastectomy     Surgical History:    has a past surgical history that includes Dilation and curettage of  uterus; Breast biopsy (Right, 12/19/2012); Mastectomy (01/2013); Breast surgery (Right, 01/2013); Colonoscopy (N/A, 09/07/2017); Transforaminal epidural injection of steroid (Right, 11/11/2021); and Transforaminal epidural injection of steroid (Right, 12/09/2021).   Social History:    reports being sexually active and has had partner(s) who are male.  reports that she has never smoked. She has never used smokeless tobacco. She reports that she does not currently use alcohol. She reports that she does not use drugs.       Pre-op Assessment    I have reviewed the Patient Summary Reports.     I have reviewed the Nursing Notes. I have reviewed the NPO Status.   I have reviewed the Medications.     Review of Systems  Anesthesia Hx:  No problems with previous Anesthesia  Denies Family Hx of Anesthesia complications.   Denies Personal Hx of Anesthesia complications.   Hematology/Oncology:  Hematology Normal       -- Cancer in past history:    Cardiovascular:   Hypertension    Hepatic/GI:   Bowel Prep.    Musculoskeletal:  Musculoskeletal Normal    Neurological:  Neurology Normal    Dermatological:  Skin Normal        Physical Exam  General: Cooperative, Alert and Oriented    Airway:  Mallampati: II   Mouth Opening: Normal  TM Distance: Normal  Tongue: Normal  Neck ROM: Normal ROM    Dental:  Intact        Anesthesia Plan  Type of Anesthesia, risks & benefits discussed:    Anesthesia Type: Gen Natural Airway  Intra-op Monitoring Plan: Standard ASA Monitors  Induction:  IV  Informed Consent: Informed consent signed with the Patient and all parties understand the risks and agree with anesthesia plan.  All questions answered.   ASA Score: 2  Day of Surgery Review of History & Physical: H&P Update referred to the surgeon/provider.I have interviewed and examined the patient. I have reviewed the patient's H&P dated: There are no significant changes.     Ready For Surgery From Anesthesia Perspective.     .

## 2023-04-10 NOTE — ANESTHESIA POSTPROCEDURE EVALUATION
Anesthesia Post Evaluation    Patient: Odessa Vaughn    Procedure(s) Performed: Procedure(s) (LRB):  COLONOSCOPY (N/A)    Final Anesthesia Type: general      Patient location during evaluation: PACU  Patient participation: Yes- Able to Participate  Level of consciousness: awake and alert  Post-procedure vital signs: reviewed and stable  Pain management: adequate  Airway patency: patent    PONV status at discharge: No PONV  Anesthetic complications: no      Cardiovascular status: stable  Respiratory status: spontaneous ventilation and face mask  Hydration status: euvolemic  Follow-up not needed.          Vitals Value Taken Time   /57 04/10/23 0812   Temp 36.4 °C (97.5 °F) 04/10/23 0742   Pulse 66 04/10/23 0812   Resp 18 04/10/23 0812   SpO2 99 % 04/10/23 0812         Event Time   Out of Recovery 08:14:11         Pain/Zari Score: Zari Score: 10 (4/10/2023  7:43 AM)

## 2023-04-10 NOTE — PROVATION PATIENT INSTRUCTIONS
Discharge Summary/Instructions after an Endoscopic Procedure  Patient Name: Odessa Vaughn  Patient MRN: [id number]  Patient YOB: 1945  Monday, April 10, 2023  Kamran Mcdermott MD  Dear patient,  As a result of recent federal legislation (The Federal Cures Act), you may   receive lab or pathology results from your procedure in your [facility]   account before your physician is able to contact you. Your physician or   their representative will relay the results to you with their   recommendations at their soonest availability.  Thank you,  RESTRICTIONS:  During your procedure today, you received medications for sedation.  These   medications may affect your judgment, balance and coordination.  Therefore,   for 24 hours, you have the following restrictions:   - DO NOT drive a car, operate machinery, make legal/financial decisions,   sign important papers or drink alcohol.    ACTIVITY:  Today: no heavy lifting, straining or running due to procedural   sedation/anesthesia.  The following day: return to full activity including work.  DIET:  Eat and drink normally unless instructed otherwise.     TREATMENT FOR COMMON SIDE EFFECTS:  - Mild abdominal pain, nausea, belching, bloating or excessive gas:  rest,   eat lightly and use a heating pad.  - Sore Throat: treat with throat lozenges and/or gargle with warm salt   water.  - Because air was used during the procedure, expelling large amounts of air   from your rectum or belching is normal.  - If a bowel prep was taken, you may not have a bowel movement for 1-3 days.    This is normal.  SYMPTOMS TO WATCH FOR AND REPORT TO YOUR PHYSICIAN:  1. Abdominal pain or bloating, other than gas cramps.  2. Chest pain.  3. Back pain.  4. Signs of infection such as: chills or fever occurring within 24 hours   after the procedure.  5. Rectal bleeding, which would show as bright red, maroon, or black stools.   (A tablespoon of blood from the rectum is not serious, especially if    hemorrhoids are present.)  6. Vomiting.  7. Weakness or dizziness.  GO DIRECTLY TO THE NEAREST EMERGENCY ROOM IF YOU HAVE ANY OF THE FOLLOWING:      Difficulty breathing              Chills and/or fever over 101 F   Persistent vomiting and/or vomiting blood   Severe abdominal pain   Severe chest pain   Black, tarry stools   Bleeding- more than one tablespoon   Any other symptom or condition that you feel may need urgent attention  Your doctor recommends these additional instructions:  If any biopsies were taken, your doctors clinic will contact you in 1 to 2   weeks with any results.  - Discharge patient to home (ambulatory).   - Patient has a contact number available for emergencies.  The signs and   symptoms of potential delayed complications were discussed with the   patient.  Return to normal activities tomorrow.  Written discharge   instructions were provided to the patient.   - Resume previous diet.   - Continue present medications.   - Return to primary care physician as previously scheduled.   - Repeat colonoscopy date to be determined after pending pathology results   are reviewed for surveillance based on pathology results.  For questions, problems or results please call your physician - Kamran Mcdermott MD at Work:  [phone].  [facility] [place], EMERGENCY ROOM PHONE NUMBER: [phone]  IF A COMPLICATION OR EMERGENCY SITUATION ARISES AND YOU ARE UNABLE TO REACH   YOUR PHYSICIAN - GO DIRECTLY TO THE EMERGENCY ROOM.  Kamran Mcdermott MD  4/10/2023 7:43:34 AM  This report has been verified and signed electronically.  Dear patient,  As a result of recent federal legislation (The Federal Cures Act), you may   receive lab or pathology results from your procedure in your [facility]   account before your physician is able to contact you. Your physician or   their representative will relay the results to you with their   recommendations at their soonest availability.  Thank you,  [employer]ATION

## 2023-04-10 NOTE — TRANSFER OF CARE
"Anesthesia Transfer of Care Note    Patient: Odessa Vaughn    Procedure(s) Performed: Procedure(s) (LRB):  COLONOSCOPY (N/A)    Patient location: PACU    Anesthesia Type: general    Transport from OR: Transported from OR on room air with adequate spontaneous ventilation    Post pain: adequate analgesia    Post assessment: no apparent anesthetic complications and tolerated procedure well    Post vital signs: stable    Level of consciousness: awake, alert and oriented    Nausea/Vomiting: no nausea/vomiting    Complications: none    Transfer of care protocol was followed      Last vitals:   Visit Vitals  /56(BP Location: Left arm, Patient Position: Sitting)   Pulse 71   Temp 97.5   Resp 15   Ht 5' 4" (1.626 m)   Wt 77.6 kg (171 lb)   SpO2 99%   Breastfeeding No   BMI 29.35 kg/m²     "

## 2023-04-20 LAB
FINAL PATHOLOGIC DIAGNOSIS: NORMAL
GROSS: NORMAL
Lab: NORMAL

## 2023-05-01 NOTE — PROGRESS NOTES
Subjective     Patient ID: Odessa Vaughn is a 78 y.o. female.    Chief Complaint: leg numbness (Right -- down to toes (abt 3 years))    Pt of Dr. Spencer here for constant numbness in right lower leg. Pt with history of right leg sciatica. Seen in the past by Pain medicine and orthopedics. Had 2 epidural injections in 2021. Saw Ortho PA in May and October of 2022. Was on gabapentin 300mg at night, made her too sleepy in May, changed to lyrica 75mg BID. Saw  her again in October of 2022. Plan of care was the following:  ASSESSMENT/PLAN:     Odessa was seen today for low-back pain.     Diagnoses and all orders for this visit:     Osteoarthritis of spine with radiculopathy, lumbar region  -     Ambulatory referral/consult to Orthopedics     Right sciatic nerve pain  -     Ambulatory referral/consult to Orthopedics     Today we discussed at length all of the different treatment options including anti-inflammatories, acetaminophen, rest, ice, heat, physical therapy including strengthening and stretching exercises, home exercises, ROM, aerobic conditioning, aqua therapy, other modalities including ultrasound, massage, and dry needling, epidural steroid injections and finally surgical intervention.       Pt presents with chronic right lumbar radiculopathy. She is not ready to try another KARLA and is not willing to consider surgical intervention, which is understandable. Pt will fu if pain persists.    Review of Systems   Constitutional:  Positive for activity change. Negative for fatigue.   Respiratory:  Negative for chest tightness.    Cardiovascular:  Negative for chest pain.   Musculoskeletal:  Positive for gait problem and leg pain. Negative for joint swelling, myalgias and joint deformity.   Neurological:  Positive for numbness. Negative for weakness, coordination difficulties and coordination difficulties.     Review of patient's allergies indicates:   Allergen Reactions    Vicodin [hydrocodone-acetaminophen]  Other (See Comments)     Dizziness      Current Outpatient Medications:     aspirin 81 MG Chew, Take 81 mg by mouth once daily., Disp: , Rfl:     atorvastatin (LIPITOR) 10 MG tablet, TAKE 1 TABLET(10 MG) BY MOUTH EVERY DAY, Disp: 90 tablet, Rfl: 0    cyclobenzaprine (FLEXERIL) 5 MG tablet, , Disp: , Rfl:     multivitamin capsule, Take 1 capsule by mouth once daily., Disp: , Rfl:     PROPYLENE GLYCOL//PF (SYSTANE, PF, OPHT), Apply to eye daily as needed. , Disp: , Rfl:     triamterene-hydrochlorothiazide 37.5-25 mg (DYAZIDE) 37.5-25 mg per capsule, TAKE 1 CAPSULE BY MOUTH EVERY DAY, Disp: 90 capsule, Rfl: 3    Patient Active Problem List   Diagnosis    S/P right mastectomy    Other hyperlipidemia    Osteopenia    Primary osteoarthritis of hand    Chronic midline low back pain with right-sided sciatica    Osteoarthritis of spine with radiculopathy, lumbar region    Sacroiliac dysfunction    Atherosclerosis of aorta    Essential (hemorrhagic) thrombocythemia    Lumbar facet arthropathy    Essential hypertension    History of right breast cancer    Inflammatory spondylopathy of lumbar region    Hx of folliculitis    Weakness of trunk musculature    Deformity of breast after mastectomy      Past Medical History:   Diagnosis Date    Arthritis     hands, legs    Breast cancer 12/2012    Right breast invasive ductal carcinoma, ER positive, [place] and Her2 negative    Bursitis of left hip 2016    resolved    Chronic midline low back pain with left-sided sciatica 6/5/2017    Essential hypertension 9/21/2015    Hyperlipidemia 9/15/2014      Past Surgical History:   Procedure Laterality Date    BREAST BIOPSY Right 12/19/2012    Right breast- IDC    BREAST SURGERY Right 01/2013    right mastectomy, SN biopsy     COLONOSCOPY N/A 09/07/2017    Procedure: COLONOSCOPY;  Surgeon: Syed Shah MD;  Location: [address]);  Service: Endoscopy;  Laterality: N/A;    COLONOSCOPY N/A 4/10/2023    Procedure: COLONOSCOPY;   Surgeon: Kamran Mcdermott MD;  Location: [facility] ENDO (4TH FLR);  Service: Endoscopy;  Laterality: N/A;  inst portal-RB  4/3/23-procedure confirmed with precall, patient requesting prep be changed as current prep ordered is not covered by insurance. Patient requesting a call back. Notified hallway . HECTOR Dudley    DILATION AND CURETTAGE OF UTERUS      MASTECTOMY  01/2013    TRANSFORAMINAL EPIDURAL INJECTION OF STEROID Right 11/11/2021    Procedure: Injection,steroid,epidural,transforaminal Right L4/L5 and L5/S1 Direct Referral;  Surgeon: Elza Thomas MD;  Location: [facility]IN [facility];  Service: Pain Management;  Laterality: Right;    TRANSFORAMINAL EPIDURAL INJECTION OF STEROID Right 12/09/2021    Procedure: Injection,steroid,epidural,transforaminal approach RIGHT L4-L5 AND L5-S1 DIRECT REFERRAL;  Surgeon: Elza Thomas MD;  Location: [facility] PAIN MGT;  Service: Pain Management;  Laterality: Right;      Social History     Socioeconomic History    Marital status:    Tobacco Use    Smoking status: Never    Smokeless tobacco: Never    Tobacco comments:     Retired;    Substance and Sexual Activity    Alcohol use: Not Currently     Comment: holiday - occasional wine    Drug use: No    Sexual activity: Yes     Partners: Male     Social Determinants of Health     Financial Resource Strain: Low Risk     Difficulty of Paying Living Expenses: Not hard at all   Food Insecurity: No Food Insecurity    Worried About Running Out of Food in the Last Year: Never true    Ran Out of Food in the Last Year: Never true   Transportation Needs: No Transportation Needs    Lack of Transportation (Medical): No    Lack of Transportation (Non-Medical): No   Physical Activity: Sufficiently Active    Days of Exercise per Week: 7 days    Minutes of Exercise per Session: 120 min   Stress: No Stress Concern Present    Feeling of Stress : Not at all   Social Connections: Moderately Integrated    Frequency of  "Communication with Friends and Family: More than three times a week    Frequency of Social Gatherings with Friends and Family: Twice a week    Attends [employer] Services: More than 4 times per year    Active Member of Clubs or Organizations: No    Attends Club or Organization Meetings: Never    Marital Status:    Housing Stability: Unknown    Unable to Pay for Housing in the Last Year: No    Unstable Housing in the Last Year: No      Family History   Problem Relation Age of Onset    Cancer Father         Pancreatic CA    Cataracts Father     Breast cancer Cousin 58    Breast cancer Cousin 58    No Known Problems Mother     Hypertension Sister     Arthritis Brother     No Known Problems Daughter     COPD Sister     No Known Problems Brother     No Known Problems Brother     No Known Problems Brother     No Known Problems Daughter     Breast cancer Paternal Aunt 55    Cancer Paternal Aunt         Breast Ca and Lung CA    No Known Problems Maternal Aunt     No Known Problems Maternal Uncle     No Known Problems Paternal Uncle     No Known Problems Maternal Grandmother     No Known Problems Maternal Grandfather     No Known Problems Paternal Grandmother     No Known Problems Paternal Grandfather     Ovarian cancer Neg Hx     Amblyopia Neg Hx     Blindness Neg Hx     Diabetes Neg Hx     Glaucoma Neg Hx     Macular degeneration Neg Hx     Retinal detachment Neg Hx     Strabismus Neg Hx     Stroke Neg Hx     Thyroid disease Neg Hx     Osteoarthritis Neg Hx     Rheum arthritis Neg Hx          Objective     Vitals:    05/02/23 0936   BP: (!) 116/52   Pulse: 70   Resp: 18   SpO2: 98%   Weight: 78.1 kg (172 lb 2.9 oz)   Height: 5' 4" (1.626 m)   PainSc: 0-No pain      Body mass index is 29.55 kg/m².     Physical Exam  Constitutional:       Appearance: Normal appearance.   HENT:      Head: Normocephalic.      Right Ear: External ear normal.      Left Ear: External ear normal.      Nose: Nose normal.   Eyes:      " Conjunctiva/sclera: Conjunctivae normal.   Cardiovascular:      Rate and Rhythm: Normal rate and regular rhythm.      Pulses: Normal pulses.      Heart sounds: Normal heart sounds.   Pulmonary:      Effort: Pulmonary effort is normal.      Breath sounds: Normal breath sounds.   Musculoskeletal:         General: Normal range of motion.      Cervical back: Normal range of motion.      Comments: Pt walks with a limp   Skin:     General: Skin is warm and dry.      Capillary Refill: Capillary refill takes less than 2 seconds.   Neurological:      General: No focal deficit present.      Mental Status: She is alert and oriented to person, place, and time.   Psychiatric:         Mood and Affect: Mood normal.         Behavior: Behavior normal.         Thought Content: Thought content normal.         Judgment: Judgment normal.        Assessment and Plan     Problem List Items Addressed This Visit          Orthopedic    Chronic midline low back pain with right-sided sciatica - Primary    Relevant Medications    gabapentin (NEURONTIN) 100 MG capsule     Other Visit Diagnoses       Right leg numbness        Relevant Medications    gabapentin (NEURONTIN) 100 MG capsule    BMI 29.0-29.9,adult        Overweight (BMI 25.0-29.9)                Odessa was seen today for leg numbness.    Diagnoses and all orders for this visit:    Chronic midline low back pain with right-sided sciatica  -    gabapentin (NEURONTIN) 100 MG capsule; Take 1 capsule (100 mg total) by mouth 2 (two) times daily.        -    Follow-up with Orthopedics if symptoms do not improve        -    Issued education paperwork on exercises for sciatica        -    Continue Flexeril as needed    Right leg numbness       -     gabapentin (NEURONTIN) 100 MG capsule; Take 1 capsule (100 mg total) by mouth 2 (two) times daily.       -     Follow-up with Orthopedics if symptoms do not improve       -     Continue Flexeril as needed    BMI 29.0-29.9,adult  BMI  reviewed    Overweight (BMI 25.0-29.9)    Start gabapentin 100mg twice a day    Continue as needed use of flexeril    Sciatica exercises per paperwork    If no improvement may need to see [facility] who you saw last year again     Self care instructions provided in AVS    Follow up if symptoms worsen or fail to improve.

## 2023-05-02 ENCOUNTER — PATIENT MESSAGE (OUTPATIENT)
Dept: INTERNAL MEDICINE | Facility: CLINIC | Age: 78
End: 2023-05-02

## 2023-05-02 ENCOUNTER — OFFICE VISIT (OUTPATIENT)
Dept: INTERNAL MEDICINE | Facility: CLINIC | Age: 78
End: 2023-05-02
Payer: MEDICARE

## 2023-05-02 VITALS
DIASTOLIC BLOOD PRESSURE: 52 MMHG | HEIGHT: 64 IN | HEART RATE: 70 BPM | WEIGHT: 172.19 LBS | RESPIRATION RATE: 18 BRPM | OXYGEN SATURATION: 98 % | BODY MASS INDEX: 29.4 KG/M2 | SYSTOLIC BLOOD PRESSURE: 116 MMHG

## 2023-05-02 DIAGNOSIS — G89.29 CHRONIC MIDLINE LOW BACK PAIN WITH RIGHT-SIDED SCIATICA: Primary | ICD-10-CM

## 2023-05-02 DIAGNOSIS — E66.3 OVERWEIGHT (BMI 25.0-29.9): ICD-10-CM

## 2023-05-02 DIAGNOSIS — M54.41 CHRONIC MIDLINE LOW BACK PAIN WITH RIGHT-SIDED SCIATICA: Primary | ICD-10-CM

## 2023-05-02 DIAGNOSIS — R20.0 RIGHT LEG NUMBNESS: ICD-10-CM

## 2023-05-02 PROCEDURE — 3074F SYST BP LT 130 MM HG: CPT | Mod: CPTII,S$GLB,, | Performed by: NURSE PRACTITIONER

## 2023-05-02 PROCEDURE — 1159F PR MEDICATION LIST DOCUMENTED IN MEDICAL RECORD: ICD-10-PCS | Mod: CPTII,S$GLB,, | Performed by: NURSE PRACTITIONER

## 2023-05-02 PROCEDURE — 3074F PR MOST RECENT SYSTOLIC BLOOD PRESSURE < 130 MM HG: ICD-10-PCS | Mod: CPTII,S$GLB,, | Performed by: NURSE PRACTITIONER

## 2023-05-02 PROCEDURE — 1159F MED LIST DOCD IN RCRD: CPT | Mod: CPTII,S$GLB,, | Performed by: NURSE PRACTITIONER

## 2023-05-02 PROCEDURE — 3288F PR FALLS RISK ASSESSMENT DOCUMENTED: ICD-10-PCS | Mod: CPTII,S$GLB,, | Performed by: NURSE PRACTITIONER

## 2023-05-02 PROCEDURE — 3078F PR MOST RECENT DIASTOLIC BLOOD PRESSURE < 80 MM HG: ICD-10-PCS | Mod: CPTII,S$GLB,, | Performed by: NURSE PRACTITIONER

## 2023-05-02 PROCEDURE — 99999 PR PBB SHADOW E&M-EST. PATIENT-LVL IV: ICD-10-PCS | Mod: PBBFAC,,, | Performed by: NURSE PRACTITIONER

## 2023-05-02 PROCEDURE — 3078F DIAST BP <80 MM HG: CPT | Mod: CPTII,S$GLB,, | Performed by: NURSE PRACTITIONER

## 2023-05-02 PROCEDURE — 1126F AMNT PAIN NOTED NONE PRSNT: CPT | Mod: CPTII,S$GLB,, | Performed by: NURSE PRACTITIONER

## 2023-05-02 PROCEDURE — 1126F PR PAIN SEVERITY QUANTIFIED, NO PAIN PRESENT: ICD-10-PCS | Mod: CPTII,S$GLB,, | Performed by: NURSE PRACTITIONER

## 2023-05-02 PROCEDURE — 1101F PT FALLS ASSESS-DOCD LE1/YR: CPT | Mod: CPTII,S$GLB,, | Performed by: NURSE PRACTITIONER

## 2023-05-02 PROCEDURE — 1160F PR REVIEW ALL MEDS BY PRESCRIBER/CLIN PHARMACIST DOCUMENTED: ICD-10-PCS | Mod: CPTII,S$GLB,, | Performed by: NURSE PRACTITIONER

## 2023-05-02 PROCEDURE — 99214 PR OFFICE/OUTPT VISIT, EST, LEVL IV, 30-39 MIN: ICD-10-PCS | Mod: S$GLB,,, | Performed by: NURSE PRACTITIONER

## 2023-05-02 PROCEDURE — 1160F RVW MEDS BY RX/DR IN RCRD: CPT | Mod: CPTII,S$GLB,, | Performed by: NURSE PRACTITIONER

## 2023-05-02 PROCEDURE — 99214 OFFICE O/P EST MOD 30 MIN: CPT | Mod: S$GLB,,, | Performed by: NURSE PRACTITIONER

## 2023-05-02 PROCEDURE — 99999 PR PBB SHADOW E&M-EST. PATIENT-LVL IV: CPT | Mod: PBBFAC,,, | Performed by: NURSE PRACTITIONER

## 2023-05-02 PROCEDURE — 3288F FALL RISK ASSESSMENT DOCD: CPT | Mod: CPTII,S$GLB,, | Performed by: NURSE PRACTITIONER

## 2023-05-02 PROCEDURE — 1101F PR PT FALLS ASSESS DOC 0-1 FALLS W/OUT INJ PAST YR: ICD-10-PCS | Mod: CPTII,S$GLB,, | Performed by: NURSE PRACTITIONER

## 2023-05-02 RX ORDER — GABAPENTIN 100 MG/1
100 CAPSULE ORAL 2 TIMES DAILY
Qty: 60 CAPSULE | Refills: 1 | Status: SHIPPED | OUTPATIENT
Start: 2023-05-02 | End: 2023-06-19 | Stop reason: SDUPTHER

## 2023-05-02 RX ORDER — CYCLOBENZAPRINE HCL 5 MG
TABLET ORAL
COMMUNITY
Start: 2022-12-13 | End: 2023-11-29

## 2023-05-02 NOTE — PATIENT INSTRUCTIONS
Start gabapentin 100mg twice a day    Continue as needed use of flexeril    Sciatica exercises per paperwork    If no improvement may need to see [facility] who you saw last year again

## 2023-05-03 DIAGNOSIS — M54.30 SCIATICA, UNSPECIFIED LATERALITY: Primary | ICD-10-CM

## 2023-05-03 RX ORDER — MELOXICAM 15 MG/1
15 TABLET ORAL DAILY
Qty: 20 TABLET | Refills: 0 | Status: SHIPPED | OUTPATIENT
Start: 2023-05-03 | End: 2023-07-11

## 2023-06-06 DIAGNOSIS — E78.5 HYPERLIPIDEMIA, UNSPECIFIED HYPERLIPIDEMIA TYPE: ICD-10-CM

## 2023-06-06 RX ORDER — ATORVASTATIN CALCIUM 10 MG/1
TABLET, FILM COATED ORAL
Qty: 90 TABLET | Refills: 0 | Status: SHIPPED | OUTPATIENT
Start: 2023-06-06 | End: 2023-09-04

## 2023-06-06 NOTE — TELEPHONE ENCOUNTER
No care due was identified.  [facility] Embedded Care Due Messages. Reference number: [id number].   6/06/2023 10:30:22 AM CDT

## 2023-06-06 NOTE — TELEPHONE ENCOUNTER
Refill Routing Note   Medication(s) are not appropriate for processing by [facility] for the following reason(s):      No active prescription written by PCP    [facility] action(s):  Defer None identified          Appointments  past 12m or future 3m with PCP    Date Provider   Last Visit   3/9/2023 Mitchel Spencer MD   Next Visit   Visit date not found Mitchel Spencer MD   ED visits in past 90 days: 0        Note composed:10:41 AM 06/06/2023

## 2023-06-19 ENCOUNTER — OFFICE VISIT (OUTPATIENT)
Dept: ORTHOPEDICS | Facility: CLINIC | Age: 78
End: 2023-06-19
Payer: MEDICARE

## 2023-06-19 VITALS — HEIGHT: 64 IN | BODY MASS INDEX: 29.4 KG/M2 | WEIGHT: 172.19 LBS

## 2023-06-19 DIAGNOSIS — R20.0 RIGHT LEG NUMBNESS: ICD-10-CM

## 2023-06-19 DIAGNOSIS — M54.41 CHRONIC MIDLINE LOW BACK PAIN WITH RIGHT-SIDED SCIATICA: ICD-10-CM

## 2023-06-19 DIAGNOSIS — G89.29 CHRONIC MIDLINE LOW BACK PAIN WITH RIGHT-SIDED SCIATICA: ICD-10-CM

## 2023-06-19 PROCEDURE — 99999 PR PBB SHADOW E&M-EST. PATIENT-LVL II: ICD-10-PCS | Mod: PBBFAC,,, | Performed by: ORTHOPAEDIC SURGERY

## 2023-06-19 PROCEDURE — 1126F PR PAIN SEVERITY QUANTIFIED, NO PAIN PRESENT: ICD-10-PCS | Mod: CPTII,S$GLB,, | Performed by: ORTHOPAEDIC SURGERY

## 2023-06-19 PROCEDURE — 99999 PR PBB SHADOW E&M-EST. PATIENT-LVL II: CPT | Mod: PBBFAC,,, | Performed by: ORTHOPAEDIC SURGERY

## 2023-06-19 PROCEDURE — 1126F AMNT PAIN NOTED NONE PRSNT: CPT | Mod: CPTII,S$GLB,, | Performed by: ORTHOPAEDIC SURGERY

## 2023-06-19 PROCEDURE — 99213 OFFICE O/P EST LOW 20 MIN: CPT | Mod: S$GLB,,, | Performed by: ORTHOPAEDIC SURGERY

## 2023-06-19 PROCEDURE — 1159F PR MEDICATION LIST DOCUMENTED IN MEDICAL RECORD: ICD-10-PCS | Mod: CPTII,S$GLB,, | Performed by: ORTHOPAEDIC SURGERY

## 2023-06-19 PROCEDURE — 1101F PR PT FALLS ASSESS DOC 0-1 FALLS W/OUT INJ PAST YR: ICD-10-PCS | Mod: CPTII,S$GLB,, | Performed by: ORTHOPAEDIC SURGERY

## 2023-06-19 PROCEDURE — 3288F FALL RISK ASSESSMENT DOCD: CPT | Mod: CPTII,S$GLB,, | Performed by: ORTHOPAEDIC SURGERY

## 2023-06-19 PROCEDURE — 99213 PR OFFICE/OUTPT VISIT, EST, LEVL III, 20-29 MIN: ICD-10-PCS | Mod: S$GLB,,, | Performed by: ORTHOPAEDIC SURGERY

## 2023-06-19 PROCEDURE — 1159F MED LIST DOCD IN RCRD: CPT | Mod: CPTII,S$GLB,, | Performed by: ORTHOPAEDIC SURGERY

## 2023-06-19 PROCEDURE — 3288F PR FALLS RISK ASSESSMENT DOCUMENTED: ICD-10-PCS | Mod: CPTII,S$GLB,, | Performed by: ORTHOPAEDIC SURGERY

## 2023-06-19 PROCEDURE — 1101F PT FALLS ASSESS-DOCD LE1/YR: CPT | Mod: CPTII,S$GLB,, | Performed by: ORTHOPAEDIC SURGERY

## 2023-06-19 RX ORDER — GABAPENTIN 100 MG/1
100 CAPSULE ORAL 2 TIMES DAILY
Qty: 60 CAPSULE | Refills: 3 | Status: SHIPPED | OUTPATIENT
Start: 2023-06-19 | End: 2023-11-29

## 2023-06-19 NOTE — PROGRESS NOTES
DATE: 6/19/2023  PATIENT: Odessa Vaughn    Attending Physician: Kamran Douglas MD    HISTORY:  Odessa Vaughn is a 78 y.o. female who returns to me today for follow up.  She was last seen by me 10/7/2022.  Today she is doing well but notes she continues to have low back and right buttock pain. She has continued home exercises. I provided the patient with a home exercise program. It is the OS spine conditioning program. Exercises include head rolls, kneeling back extension, sitting rotation stretch, modified seated side straddle, knee to chest, bird dog, plank, modified seated plank, hip bridges, abdominal bracing, and abdominal crunch. Pt will completes each exercise daily for 1 hour with worsening of pain.      The Patient denies myelopathic symptoms such as handwriting changes or difficulty with buttons/coins/keys. Denies perineal paresthesias, bowel/bladder dysfunction.    PMH/PSH/FamHx/SocHx:  Unchanged from prior visit    ROS:  REVIEW OF SYSTEMS:  Constitution: Negative. Negative for chills, fever and night sweats.   HENT: Negative for congestion and headaches.    Eyes: Negative for blurred vision, left vision loss and right vision loss.   Cardiovascular: Negative for chest pain and syncope.   Respiratory: Negative for cough and shortness of breath.    Endocrine: Negative for polydipsia, polyphagia and polyuria.   Hematologic/Lymphatic: Negative for bleeding problem. Does not bruise/bleed easily.   Skin: Negative for dry skin, itching and rash.   Musculoskeletal: Negative for falls and muscle weakness.   Gastrointestinal: Negative for abdominal pain and bowel incontinence.   Allergic/Immunologic: Negative for hives and persistent infections.  Genitourinary: Negative for urinary retention/incontinence and nocturia.   Neurological: negative for disturbances in coordination, no myelopathic symptoms such as handwriting changes or difficulty with buttons, coins, keys or small objects. No loss of balance  "and seizures.   Psychiatric/Behavioral: Negative for depression. The patient does not have insomnia.   Denies perineal paresthesias, bowel or bladder incontinence    EXAM:  Ht 5' 4" (1.626 m)   Wt 78.1 kg (172 lb 2.9 oz)   BMI 29.55 kg/m²     My physical examination was notable for the following findings:     Musculoskeletal and neuro exam stable      IMAGING:  No new imaging today.    Today I personally re- reviewed AP, Lat and Flex/Ex  upright L-spine that demonstrate disc space narrowing at L3-L4 L5-S1 without instability.     MRI lumbar demonstrates multilevel degenerative changes, most advanced at L5-S1 where there is moderate canal stenosis and moderate bilateral foraminal stenosis.       Body mass index is 29.55 kg/m².    Hemoglobin A1C   Date Value Ref Range Status   09/08/2014 6.1 4.5 - 6.2 % Final         ASSESSMENT/PLAN:    Odessa was seen today for low-back pain.    Diagnoses and all orders for this visit:    Chronic midline low back pain with right-sided sciatica  The following orders have not been finalized:  -     gabapentin (NEURONTIN) 100 MG capsule    Right leg numbness  The following orders have not been finalized:  -     gabapentin (NEURONTIN) 100 MG capsule        Today we discussed at length all of the different treatment options including anti-inflammatories, acetaminophen, rest, ice, heat, physical therapy including strengthening and stretching exercises, home exercises, ROM, aerobic conditioning, aqua therapy, other modalities including ultrasound, massage, and dry needling, epidural steroid injections and finally surgical intervention.      Pt presents with chronic low back pain related to spinal stenosis. She is not interested in surgery at this time. Will order IL L5-S1 KARLA. Pt will fu if pain persists.      "

## 2023-06-20 ENCOUNTER — PATIENT MESSAGE (OUTPATIENT)
Dept: PAIN MEDICINE | Facility: OTHER | Age: 78
End: 2023-06-20
Payer: MEDICARE

## 2023-06-20 DIAGNOSIS — M54.41 CHRONIC MIDLINE LOW BACK PAIN WITH RIGHT-SIDED SCIATICA: Primary | ICD-10-CM

## 2023-06-20 DIAGNOSIS — G89.29 CHRONIC MIDLINE LOW BACK PAIN WITH RIGHT-SIDED SCIATICA: Primary | ICD-10-CM

## 2023-07-09 DIAGNOSIS — M54.30 SCIATICA, UNSPECIFIED LATERALITY: ICD-10-CM

## 2023-07-10 ENCOUNTER — PATIENT MESSAGE (OUTPATIENT)
Dept: INTERNAL MEDICINE | Facility: CLINIC | Age: 78
End: 2023-07-10
Payer: MEDICARE

## 2023-07-11 RX ORDER — MELOXICAM 15 MG/1
TABLET ORAL
Qty: 20 TABLET | Refills: 0 | Status: SHIPPED | OUTPATIENT
Start: 2023-07-11 | End: 2023-10-09

## 2023-07-11 NOTE — TELEPHONE ENCOUNTER
Pt requesting a refill of meloxicam for pain, it was prescribed on 05/03/2023 for 20 pills but pt requesting 30 day supply. Meloxicam medication not on current medication list to refill.    Please review and respond.

## 2023-07-25 ENCOUNTER — HOSPITAL ENCOUNTER (OUTPATIENT)
Facility: OTHER | Age: 78
Discharge: HOME | End: 2023-07-25
Attending: ANESTHESIOLOGY | Admitting: ANESTHESIOLOGY
Payer: MEDICARE

## 2023-07-25 VITALS
RESPIRATION RATE: 17 BRPM | DIASTOLIC BLOOD PRESSURE: 55 MMHG | OXYGEN SATURATION: 100 % | TEMPERATURE: 98 F | SYSTOLIC BLOOD PRESSURE: 115 MMHG | HEIGHT: 65 IN | HEART RATE: 56 BPM | BODY MASS INDEX: 28.32 KG/M2 | WEIGHT: 170 LBS

## 2023-07-25 DIAGNOSIS — G89.29 CHRONIC PAIN: ICD-10-CM

## 2023-07-25 DIAGNOSIS — M51.36 DDD (DEGENERATIVE DISC DISEASE), LUMBAR: ICD-10-CM

## 2023-07-25 DIAGNOSIS — G89.29 CHRONIC MIDLINE LOW BACK PAIN WITH RIGHT-SIDED SCIATICA: Primary | ICD-10-CM

## 2023-07-25 DIAGNOSIS — M46.96 INFLAMMATORY SPONDYLOPATHY OF LUMBAR REGION: ICD-10-CM

## 2023-07-25 DIAGNOSIS — M54.41 CHRONIC MIDLINE LOW BACK PAIN WITH RIGHT-SIDED SCIATICA: Primary | ICD-10-CM

## 2023-07-25 PROCEDURE — 25500020 PHARM REV CODE 255: Mod: HCNC | Performed by: ANESTHESIOLOGY

## 2023-07-25 PROCEDURE — 25000003 PHARM REV CODE 250: Mod: HCNC | Performed by: ANESTHESIOLOGY

## 2023-07-25 PROCEDURE — 62323 PR INJ LUMBAR/SACRAL, W/IMAGING GUIDANCE: ICD-10-PCS | Mod: HCNC,,, | Performed by: ANESTHESIOLOGY

## 2023-07-25 PROCEDURE — 25000003 PHARM REV CODE 250: Mod: HCNC | Performed by: STUDENT IN AN ORGANIZED HEALTH CARE EDUCATION/TRAINING PROGRAM

## 2023-07-25 PROCEDURE — 63600175 PHARM REV CODE 636 W HCPCS: Mod: HCNC | Performed by: ANESTHESIOLOGY

## 2023-07-25 PROCEDURE — 62323 NJX INTERLAMINAR LMBR/SAC: CPT | Mod: HCNC,,, | Performed by: ANESTHESIOLOGY

## 2023-07-25 PROCEDURE — 62323 NJX INTERLAMINAR LMBR/SAC: CPT | Mod: HCNC | Performed by: ANESTHESIOLOGY

## 2023-07-25 RX ORDER — FENTANYL CITRATE 50 UG/ML
INJECTION, SOLUTION INTRAMUSCULAR; INTRAVENOUS
Status: DISCONTINUED | OUTPATIENT
Start: 2023-07-25 | End: 2023-07-25 | Stop reason: HOSPADM

## 2023-07-25 RX ORDER — MIDAZOLAM HYDROCHLORIDE 1 MG/ML
INJECTION INTRAMUSCULAR; INTRAVENOUS
Status: DISCONTINUED | OUTPATIENT
Start: 2023-07-25 | End: 2023-07-25 | Stop reason: HOSPADM

## 2023-07-25 RX ORDER — SODIUM CHLORIDE 9 MG/ML
INJECTION, SOLUTION INTRAVENOUS CONTINUOUS
Status: DISCONTINUED | OUTPATIENT
Start: 2023-07-25 | End: 2023-07-25 | Stop reason: HOSPADM

## 2023-07-25 RX ORDER — LIDOCAINE HYDROCHLORIDE 20 MG/ML
INJECTION, SOLUTION INFILTRATION; PERINEURAL
Status: DISCONTINUED | OUTPATIENT
Start: 2023-07-25 | End: 2023-07-25 | Stop reason: HOSPADM

## 2023-07-25 RX ORDER — LIDOCAINE HYDROCHLORIDE 10 MG/ML
INJECTION, SOLUTION EPIDURAL; INFILTRATION; INTRACAUDAL; PERINEURAL
Status: DISCONTINUED | OUTPATIENT
Start: 2023-07-25 | End: 2023-07-25 | Stop reason: HOSPADM

## 2023-07-25 RX ORDER — DEXAMETHASONE SODIUM PHOSPHATE 10 MG/ML
INJECTION INTRAMUSCULAR; INTRAVENOUS
Status: DISCONTINUED | OUTPATIENT
Start: 2023-07-25 | End: 2023-07-25 | Stop reason: HOSPADM

## 2023-07-25 NOTE — DISCHARGE INSTRUCTIONS

## 2023-07-25 NOTE — OP NOTE
Lumbar Interlaminar Epidural Steroid Injection under Fluoroscopic Guidance    The procedure, risks, benefits, and options were discussed with the patient. There are no contraindications to the procedure. The patent expressed understanding and agreed to the procedure. Informed written consent was obtained prior to the start of the procedure and can be found in the patient's chart.    PATIENT NAME: Odessa Vaughn   MRN: [id number]     DATE OF PROCEDURE: 07/25/2023    PROCEDURE: Lumbar Interlaminar Epidural Steroid Injection L5/S1 under Fluoroscopic Guidance    PRE-OP DIAGNOSIS: Chronic midline low back pain with right-sided sciatica [M54.41, G89.29] Lumbar radiculopathy [M54.16]    POST-OP DIAGNOSIS: Same    PHYSICIAN: Elza Thomas MD    ASSISTANTS: Tanvir Richmond MD     MEDICATIONS INJECTED: Preservative-free Decadron 10mg with 4cc of Lidocaine 1% MPF and preservative free normal saline    LOCAL ANESTHETIC INJECTED: Xylocaine 2%     SEDATION: Versed 0.5mg and Fentanyl 25mcg                                                                                                                                                                                     Conscious sedation ordered by M.D. Patient re-evaluation prior to administration of conscious sedation. No changes noted in patient's status from initial evaluation. The patient's vital signs were monitored by RN and patient remained hemodynamically stable throughout the procedure.    Event Time In   Sedation Start 0848   Sedation End 0858       ESTIMATED BLOOD LOSS: None    COMPLICATIONS: None    TECHNIQUE: Time-out was performed to identify the patient and procedure to be performed. With the patient laying in a prone position, the surgical area was prepped and draped in the usual sterile fashion using ChloraPrep and a fenestrated drape. The level was determined under fluoroscopy guidance. Skin anesthesia was achieved by injecting Lidocaine 2% over the injection site.  The interlaminar space was then approached with a 20 gauge,  3.5 inch Tuohy needle that was introduced under fluoroscopic guidance in the AP, lateral and/or contralateral oblique imaging. Once the Ligamentum flavum was encountered loss of resistance to air was used to enter the epidural space. With positive loss of resistance and negative aspiration for CSF or Blood, contrast dye Omnipaque (300mg/mL) was injected to confirm placement and there was no vascular runoff. 5 mL of the medication mixture listed above was injected slowly. Displacement of the radio opaque contrast after injection of the medication confirmed that the medication went into the area of the epidural space. The needles were removed and bleeding was nil. A sterile dressing was applied. No specimens collected. The patient tolerated the procedure well.       The patient was monitored after the procedure in the recovery area. They were given post-procedure and discharge instructions to follow at home. The patient was discharged in a stable condition.    Tanvir Richmond MD    I reviewed and edited the fellow's note. I conducted my own interview and physical examination. I agree with the findings. I was present and supervising all critical portions of the procedure.

## 2023-07-25 NOTE — DISCHARGE SUMMARY
Discharge Note  Short Stay      SUMMARY     Admit Date: 7/25/2023    Attending Physician: Tanvir Richmond      Discharge Physician: Tanvir Richmond      Discharge Date: 7/25/2023 9:02 AM    Procedure(s) (LRB):  LUMBAR L5/S1 IL KARLA (AIM TO RIGHT) DIRECT REFERRAL (N/A)    Final Diagnosis: Chronic midline low back pain with right-sided sciatica [M54.41, G89.29]    Disposition: Home or self care    Patient Instructions:   Current Discharge Medication List        CONTINUE these medications which have NOT CHANGED    Details   aspirin 81 MG Chew Take 81 mg by mouth once daily.      atorvastatin (LIPITOR) 10 MG tablet TAKE 1 TABLET(10 MG) BY MOUTH EVERY DAY  Qty: 90 tablet, Refills: 0    Associated Diagnoses: Hyperlipidemia, unspecified hyperlipidemia type      cyclobenzaprine (FLEXERIL) 5 MG tablet       gabapentin (NEURONTIN) 100 MG capsule Take 1 capsule (100 mg total) by mouth 2 (two) times daily.  Qty: 60 capsule, Refills: 3    Associated Diagnoses: Chronic midline low back pain with right-sided sciatica; Right leg numbness      meloxicam (MOBIC) 15 MG tablet TAKE 1 TABLET(15 MG) BY MOUTH EVERY DAY FOR 20 DAYS  Qty: 20 tablet, Refills: 0    Associated Diagnoses: Sciatica, unspecified laterality      multivitamin capsule Take 1 capsule by mouth once daily.      PROPYLENE GLYCOL//PF (SYSTANE, PF, OPHT) Apply to eye daily as needed.       triamterene-hydrochlorothiazide 37.5-25 mg (DYAZIDE) 37.5-25 mg per capsule TAKE 1 CAPSULE BY MOUTH EVERY DAY  Qty: 90 capsule, Refills: 3    Associated Diagnoses: Essential hypertension                 Discharge Diagnosis: Chronic midline low back pain with right-sided sciatica [M54.41, G89.29]  Condition on Discharge: Stable with no complications to procedure   Diet on Discharge: Same as before.  Activity: as per instruction sheet.  Discharge to: Home with a responsible adult.  Follow up: 2-4 weeks       Please call my office or pager at [phone] if experienced any weakness or  loss of sensation, fever > 101.5, pain uncontrolled with oral medications, persistent nausea/vomiting/or diarrhea, redness or drainage from the incisions, or any other worrisome concerns. If physician on call was not reached or could not communicate with our office for any reason please go to the nearest emergency department

## 2023-07-25 NOTE — H&P
HPI  Patient presenting for Procedure(s) (LRB):  LUMBAR L5/S1 IL KARLA (AIM TO RIGHT) DIRECT REFERRAL (N/A)     Patient on Anti-coagulation Yes, ASA    No health changes since previous encounter    Past Medical History:   Diagnosis Date    Arthritis     hands, legs    Breast cancer 12/2012    Right breast invasive ductal carcinoma, ER positive, [place] and Her2 negative    Bursitis of left hip 2016    resolved    Chronic midline low back pain with left-sided sciatica 6/5/2017    Essential hypertension 9/21/2015    Hyperlipidemia 9/15/2014     Past Surgical History:   Procedure Laterality Date    BREAST BIOPSY Right 12/19/2012    Right breast- IDC    BREAST SURGERY Right 01/2013    right mastectomy, SN biopsy     COLONOSCOPY N/A 09/07/2017    Procedure: COLONOSCOPY;  Surgeon: Syed Shah MD;  Location: [facility] ENDO (4TH FLR);  Service: Endoscopy;  Laterality: N/A;    COLONOSCOPY N/A 4/10/2023    Procedure: COLONOSCOPY;  Surgeon: Kamran Mcdermott MD;  [facility]  Service: Endoscopy;  Laterality: N/A;  inst portal-RB  4/3/23-procedure confirmed with precall, patient requesting prep be changed as current prep ordered is not covered by insurance. Patient requesting a call back. Notified [facility] . Octavia RN    DILATION AND CURETTAGE OF UTERUS      MASTECTOMY  01/2013    TRANSFORAMINAL EPIDURAL INJECTION OF STEROID Right 11/11/2021    Procedure: Injection,steroid,epidural,transforaminal Right L4/L5 and L5/S1 Direct Referral;  Surgeon: Elza Thomas MD;  Location: [facility] PAIN MGT;  Service: Pain Management;  Laterality: Right;    TRANSFORAMINAL EPIDURAL INJECTION OF STEROID Right 12/09/2021    Procedure: Injection,steroid,epidural,transforaminal approach RIGHT L4-L5 AND L5-S1 DIRECT REFERRAL;  Surgeon: Elza Thomas MD;  Location: [facility] PAIN MGT;  Service: Pain Management;  Laterality: Right;     Review of patient's allergies indicates:   Allergen Reactions    Vicodin  [hydrocodone-acetaminophen] Other (See Comments)     Dizziness      No current facility-administered medications for this encounter.       PMHx, PSHx, Allergies, Medications reviewed in epic    ROS negative except pain complaints in HPI    OBJECTIVE:    There were no vitals taken for this visit.    PHYSICAL EXAMINATION:    GENERAL: Well appearing, in no acute distress, alert and oriented x3.  PSYCH:  Mood and affect appropriate.  SKIN: Skin color, texture, turgor normal, no rashes or lesions which will impact the procedure.  CV: RRR with palpation of the radial artery.  PULM: No evidence of respiratory difficulty, symmetric chest rise. Clear to auscultation.  NEURO: Cranial nerves grossly intact.    Plan:    Proceed with procedure as planned Procedure(s) (LRB):  LUMBAR L5/S1 IL KARLA (AIM TO RIGHT) DIRECT REFERRAL (N/A)    Elias Logan  07/25/2023

## 2023-07-28 ENCOUNTER — TELEPHONE (OUTPATIENT)
Dept: OPTOMETRY | Facility: CLINIC | Age: 78
End: 2023-07-28
Payer: MEDICARE

## 2023-08-21 ENCOUNTER — HOSPITAL ENCOUNTER (OUTPATIENT)
Dept: RADIOLOGY | Facility: HOSPITAL | Age: 78
Discharge: HOME | End: 2023-08-21
Attending: NURSE PRACTITIONER
Payer: MEDICARE

## 2023-08-21 DIAGNOSIS — Z12.31 ENCOUNTER FOR SCREENING MAMMOGRAM FOR MALIGNANT NEOPLASM OF BREAST: ICD-10-CM

## 2023-08-21 DIAGNOSIS — Z85.3 HISTORY OF RIGHT BREAST CANCER: ICD-10-CM

## 2023-08-21 PROCEDURE — 77067 SCR MAMMO BI INCL CAD: CPT | Mod: TC,52,HCNC

## 2023-08-21 PROCEDURE — 77063 BREAST TOMOSYNTHESIS BI: CPT | Mod: 26,52,HCNC, | Performed by: RADIOLOGY

## 2023-08-21 PROCEDURE — 77067 SCR MAMMO BI INCL CAD: CPT | Mod: 26,52,HCNC, | Performed by: RADIOLOGY

## 2023-08-21 PROCEDURE — 77067 MAMMO DIGITAL SCREENING LEFT WITH TOMO: ICD-10-PCS | Mod: 26,52,HCNC, | Performed by: RADIOLOGY

## 2023-08-21 PROCEDURE — 77063 MAMMO DIGITAL SCREENING LEFT WITH TOMO: ICD-10-PCS | Mod: 26,52,HCNC, | Performed by: RADIOLOGY

## 2023-08-29 NOTE — PROGRESS NOTES
Subjective:       Patient ID: Odessa Vaughn is a 78 y.o. female.    Chief Complaint: History of right breast cancer    HPI Mrs. Vaughn is a 78-year-old female seen in follow-up for carcinoma of the right breast, T2 N0 ER +, HER - 2 neg, intermediate grade.     Presents for follow up. Overall she is doing well.  She does have sciatic in the right leg, she did have an injection which left her toes numb. This is where her pain is.   Otherwise she is doing well. Appetite is good.   Mammogram is negative from August.       Per Dr. Ramires's previous note: Breast history:  On December 12 , 2012  mammogram showed a lobular density in the right breast at the 7:00 region   ultrasound showed a 23 mm solid mass.      Core needle biopsy on December 19,2012 showed intermediate grade infiltrating ductal carcinoma (3+2+1) ER 95% positive OK negative and HER-2 negative.      On January 2 she underwent right mastectomy which showed a 3 cm intermediate grade infiltrating ductal carcinoma. 4 [place] lymph nodes were negative with one additional negative lymph nodes.T2N0 Stage 1A.    She started endocrine therapy in February 2013.  That was stopped in 2020.      Review of Systems   Constitutional:  Negative for activity change, appetite change, chills, fatigue, fever and unexpected weight change.   Respiratory:  Negative for cough and shortness of breath.    Cardiovascular:  Negative for chest pain.   Gastrointestinal:  Positive for constipation (occasional; eats fiber rich cereal ). Negative for abdominal distention and diarrhea.   Genitourinary:  Negative for pelvic pain.   Musculoskeletal:  Positive for arthralgias (right shoulder). Negative for back pain and neck pain.        Right leg sciatic pain   Neurological:  Negative for dizziness, weakness, light-headedness, numbness and headaches.   Psychiatric/Behavioral:  Negative for dysphoric mood. The patient is not nervous/anxious.        Objective:      Physical  Exam  Constitutional:       General: She is not in acute distress.     Appearance: She is well-developed.   Eyes:      General: No scleral icterus.  Cardiovascular:      Rate and Rhythm: Normal rate and regular rhythm.      Heart sounds: Normal heart sounds.   Pulmonary:      Effort: Pulmonary effort is normal.      Breath sounds: Normal breath sounds. No wheezing or rales.   Chest:   Breasts:     Left: No mass, nipple discharge or skin change.          Comments: Right breast mastectomy   Abdominal:      Palpations: Abdomen is soft. There is no mass.      Tenderness: There is no abdominal tenderness.   Lymphadenopathy:      Cervical: No cervical adenopathy.      Upper Body:      Right upper body: No supraclavicular or axillary adenopathy.      Left upper body: No supraclavicular or axillary adenopathy.   Neurological:      Mental Status: She is alert and oriented to person, place, and time.   Psychiatric:         Behavior: Behavior normal.         Thought Content: Thought content normal.       Assessment:      1. History of right breast cancer    2. Essential hypertension    3. Other hyperlipidemia    4. Atherosclerosis of aorta    5. History of aromatase inhibitor therapy    6. Age-related osteoporosis without current pathological fracture    7. Encounter for screening mammogram for malignant neoplasm of breast        Plan:       1,7. Mammogram today, pending read  - Repeat in 1 year with clinic visit    2. Monitored today; continue current medication and follow up with PCP     3. Continue current medication and follow up with PCP    4. Follow up with PCP yearly    5-6. BMD due -ordered  - She is taking calcium and VIitamin D       Return to clinic in 1 year with [facility] appointment and imaging.     Patient is in agreement with the proposed treatment plan. All questions were answered to the patient's satisfaction. Patient knows to call clinic for any new or worsening symptoms and if anything is needed before the next  clinic visit.          Dina Dominguez, FNP-C  Hematology & Medical Oncology   [address] [place] [address]  ph. [phone]  Fax. [phone]    Collaborating physician, Dr. Ramires.    Approximately 15 minutes were spent face-to-face with the patient.  Approximately 25 minutes in total were spent on this encounter, which includes face-to-face time and non-face-to-face time preparing to see the patient (e.g., review of tests), obtaining and/or reviewing separately obtained history, documenting clinical information in the electronic or other health record, independently interpreting results (not separately reported) and communicating results to the patient/family/caregiver, or care coordination (not separately reported).     Route Chart for Scheduling    Med Onc Chart Routing      Follow up with physician    Follow up with [facility] 1 year.   Infusion scheduling note    Injection scheduling note    Labs    Imaging DXA scan and mammogram   DXA due now, mammo due in August 2024   Pharmacy appointment    Other referrals

## 2023-09-04 DIAGNOSIS — I10 ESSENTIAL HYPERTENSION: ICD-10-CM

## 2023-09-04 DIAGNOSIS — E78.5 HYPERLIPIDEMIA, UNSPECIFIED HYPERLIPIDEMIA TYPE: ICD-10-CM

## 2023-09-04 RX ORDER — ATORVASTATIN CALCIUM 10 MG/1
TABLET, FILM COATED ORAL
Qty: 90 TABLET | Refills: 1 | Status: SHIPPED | OUTPATIENT
Start: 2023-09-04

## 2023-09-04 NOTE — TELEPHONE ENCOUNTER
Refill Decision Note   Odessa Vaughn  is requesting a refill authorization.  Brief Assessment and Rationale for Refill:  Approve     Medication Therapy Plan:         Comments:     Note composed:9:08 AM 09/04/2023

## 2023-09-04 NOTE — TELEPHONE ENCOUNTER
No care due was identified.  [facility] Embedded Care Due Messages. Reference number: [id number].   9/04/2023 5:53:32 AM CDT

## 2023-09-06 RX ORDER — TRIAMTERENE AND HYDROCHLOROTHIAZIDE 37.5; 25 MG/1; MG/1
CAPSULE ORAL
Qty: 90 CAPSULE | Refills: 3 | Status: SHIPPED | OUTPATIENT
Start: 2023-09-06

## 2023-09-12 ENCOUNTER — OFFICE VISIT (OUTPATIENT)
Dept: HEMATOLOGY/ONCOLOGY | Facility: CLINIC | Age: 78
End: 2023-09-12
Payer: MEDICARE

## 2023-09-12 VITALS
RESPIRATION RATE: 16 BRPM | SYSTOLIC BLOOD PRESSURE: 132 MMHG | WEIGHT: 173.06 LBS | OXYGEN SATURATION: 98 % | HEART RATE: 71 BPM | BODY MASS INDEX: 28.8 KG/M2 | TEMPERATURE: 98 F | DIASTOLIC BLOOD PRESSURE: 62 MMHG

## 2023-09-12 DIAGNOSIS — Z85.3 HISTORY OF RIGHT BREAST CANCER: Primary | ICD-10-CM

## 2023-09-12 DIAGNOSIS — Z12.31 ENCOUNTER FOR SCREENING MAMMOGRAM FOR MALIGNANT NEOPLASM OF BREAST: ICD-10-CM

## 2023-09-12 DIAGNOSIS — E78.49 OTHER HYPERLIPIDEMIA: ICD-10-CM

## 2023-09-12 DIAGNOSIS — I70.0 ATHEROSCLEROSIS OF AORTA: ICD-10-CM

## 2023-09-12 DIAGNOSIS — I10 ESSENTIAL HYPERTENSION: ICD-10-CM

## 2023-09-12 DIAGNOSIS — M81.0 AGE-RELATED OSTEOPOROSIS WITHOUT CURRENT PATHOLOGICAL FRACTURE: ICD-10-CM

## 2023-09-12 DIAGNOSIS — Z92.21 HISTORY OF AROMATASE INHIBITOR THERAPY: ICD-10-CM

## 2023-09-12 PROCEDURE — 1101F PR PT FALLS ASSESS DOC 0-1 FALLS W/OUT INJ PAST YR: ICD-10-PCS | Mod: HCNC,CPTII,S$GLB, | Performed by: NURSE PRACTITIONER

## 2023-09-12 PROCEDURE — 3078F DIAST BP <80 MM HG: CPT | Mod: HCNC,CPTII,S$GLB, | Performed by: NURSE PRACTITIONER

## 2023-09-12 PROCEDURE — 1159F PR MEDICATION LIST DOCUMENTED IN MEDICAL RECORD: ICD-10-PCS | Mod: HCNC,CPTII,S$GLB, | Performed by: NURSE PRACTITIONER

## 2023-09-12 PROCEDURE — 99214 PR OFFICE/OUTPT VISIT, EST, LEVL IV, 30-39 MIN: ICD-10-PCS | Mod: HCNC,S$GLB,, | Performed by: NURSE PRACTITIONER

## 2023-09-12 PROCEDURE — 1125F PR PAIN SEVERITY QUANTIFIED, PAIN PRESENT: ICD-10-PCS | Mod: HCNC,CPTII,S$GLB, | Performed by: NURSE PRACTITIONER

## 2023-09-12 PROCEDURE — 1160F PR REVIEW ALL MEDS BY PRESCRIBER/CLIN PHARMACIST DOCUMENTED: ICD-10-PCS | Mod: HCNC,CPTII,S$GLB, | Performed by: NURSE PRACTITIONER

## 2023-09-12 PROCEDURE — 3075F PR MOST RECENT SYSTOLIC BLOOD PRESS GE 130-139MM HG: ICD-10-PCS | Mod: HCNC,CPTII,S$GLB, | Performed by: NURSE PRACTITIONER

## 2023-09-12 PROCEDURE — 1159F MED LIST DOCD IN RCRD: CPT | Mod: HCNC,CPTII,S$GLB, | Performed by: NURSE PRACTITIONER

## 2023-09-12 PROCEDURE — 99214 OFFICE O/P EST MOD 30 MIN: CPT | Mod: HCNC,S$GLB,, | Performed by: NURSE PRACTITIONER

## 2023-09-12 PROCEDURE — 3078F PR MOST RECENT DIASTOLIC BLOOD PRESSURE < 80 MM HG: ICD-10-PCS | Mod: HCNC,CPTII,S$GLB, | Performed by: NURSE PRACTITIONER

## 2023-09-12 PROCEDURE — 1101F PT FALLS ASSESS-DOCD LE1/YR: CPT | Mod: HCNC,CPTII,S$GLB, | Performed by: NURSE PRACTITIONER

## 2023-09-12 PROCEDURE — 3288F FALL RISK ASSESSMENT DOCD: CPT | Mod: HCNC,CPTII,S$GLB, | Performed by: NURSE PRACTITIONER

## 2023-09-12 PROCEDURE — 99999 PR PBB SHADOW E&M-EST. PATIENT-LVL IV: CPT | Mod: PBBFAC,HCNC,, | Performed by: NURSE PRACTITIONER

## 2023-09-12 PROCEDURE — 3075F SYST BP GE 130 - 139MM HG: CPT | Mod: HCNC,CPTII,S$GLB, | Performed by: NURSE PRACTITIONER

## 2023-09-12 PROCEDURE — 3288F PR FALLS RISK ASSESSMENT DOCUMENTED: ICD-10-PCS | Mod: HCNC,CPTII,S$GLB, | Performed by: NURSE PRACTITIONER

## 2023-09-12 PROCEDURE — 1125F AMNT PAIN NOTED PAIN PRSNT: CPT | Mod: HCNC,CPTII,S$GLB, | Performed by: NURSE PRACTITIONER

## 2023-09-12 PROCEDURE — 99999 PR PBB SHADOW E&M-EST. PATIENT-LVL IV: ICD-10-PCS | Mod: PBBFAC,HCNC,, | Performed by: NURSE PRACTITIONER

## 2023-09-12 PROCEDURE — 1160F RVW MEDS BY RX/DR IN RCRD: CPT | Mod: HCNC,CPTII,S$GLB, | Performed by: NURSE PRACTITIONER

## 2023-10-05 ENCOUNTER — HOSPITAL ENCOUNTER (OUTPATIENT)
Dept: RADIOLOGY | Facility: CLINIC | Age: 78
Discharge: HOME | End: 2023-10-05
Attending: NURSE PRACTITIONER
Payer: MEDICARE

## 2023-10-05 DIAGNOSIS — Z92.21 HISTORY OF AROMATASE INHIBITOR THERAPY: ICD-10-CM

## 2023-10-05 DIAGNOSIS — M81.0 AGE-RELATED OSTEOPOROSIS WITHOUT CURRENT PATHOLOGICAL FRACTURE: ICD-10-CM

## 2023-10-05 PROCEDURE — 77080 DXA BONE DENSITY AXIAL: CPT | Mod: TC,HCNC

## 2023-10-05 PROCEDURE — 77080 DXA BONE DENSITY AXIAL SKELETON 1 OR MORE SITES: ICD-10-PCS | Mod: 26,HCNC,, | Performed by: INTERNAL MEDICINE

## 2023-10-05 PROCEDURE — 77080 DXA BONE DENSITY AXIAL: CPT | Mod: 26,HCNC,, | Performed by: INTERNAL MEDICINE

## 2023-11-16 ENCOUNTER — OFFICE VISIT (OUTPATIENT)
Dept: OPTOMETRY | Facility: CLINIC | Age: 78
End: 2023-11-16
Payer: COMMERCIAL

## 2023-11-16 DIAGNOSIS — H26.9 CORTICAL CATARACT OF BOTH EYES: ICD-10-CM

## 2023-11-16 DIAGNOSIS — H35.361 RETINAL DRUSEN OF RIGHT EYE: ICD-10-CM

## 2023-11-16 DIAGNOSIS — H43.393 VITREOUS FLOATERS OF BOTH EYES: ICD-10-CM

## 2023-11-16 DIAGNOSIS — H52.02 HYPEROPIA OF LEFT EYE WITH ASTIGMATISM: ICD-10-CM

## 2023-11-16 DIAGNOSIS — H25.13 NUCLEAR SCLEROSIS OF BOTH EYES: ICD-10-CM

## 2023-11-16 DIAGNOSIS — H52.202 HYPEROPIA OF LEFT EYE WITH ASTIGMATISM: ICD-10-CM

## 2023-11-16 DIAGNOSIS — H52.4 PRESBYOPIA OF BOTH EYES: ICD-10-CM

## 2023-11-16 DIAGNOSIS — Z01.00 EXAMINATION OF EYES AND VISION: Primary | ICD-10-CM

## 2023-11-16 DIAGNOSIS — H52.01 HYPEROPIA OF RIGHT EYE: ICD-10-CM

## 2023-11-16 PROCEDURE — 92014 PR EYE EXAM, EST PATIENT,COMPREHESV: ICD-10-PCS | Mod: S$GLB,,, | Performed by: OPTOMETRIST

## 2023-11-16 PROCEDURE — 92014 COMPRE OPH EXAM EST PT 1/>: CPT | Mod: S$GLB,,, | Performed by: OPTOMETRIST

## 2023-11-16 PROCEDURE — 92015 PR REFRACTION: ICD-10-PCS | Mod: S$GLB,,, | Performed by: OPTOMETRIST

## 2023-11-16 PROCEDURE — 99999 PR PBB SHADOW E&M-EST. PATIENT-LVL III: ICD-10-PCS | Mod: PBBFAC,,, | Performed by: OPTOMETRIST

## 2023-11-16 PROCEDURE — 99999 PR PBB SHADOW E&M-EST. PATIENT-LVL III: CPT | Mod: PBBFAC,,, | Performed by: OPTOMETRIST

## 2023-11-16 PROCEDURE — 92015 DETERMINE REFRACTIVE STATE: CPT | Mod: S$GLB,,, | Performed by: OPTOMETRIST

## 2023-11-16 NOTE — PATIENT INSTRUCTIONS
Bilateral nuclear sclerosis of lens, consistent with age.  Early peripheral cortical cataract in both eyes, more apparent in the left eye.  So need for cataract surgery in either eye     Peripheral retinal drusen in the right eye.  S/p posterior vitreous detachment in the left eye.  Vitreous floater(s) in both eyes.      Hyperopia in the right eye, and hyperopia with astigmatism in the left eye.  Best-corrected VA of 20/25+4 in the right eye or 20/25+3 in the lef ey  Presbyopia consistent with age.  New spectacle lens Rx issued for full-time wear     Recheck in one year - or prior, if any problems or bothersome changes in vision noted in the interim       Epiphora on right side. Comes and goes.  No evidence of punctal eversion or ectropion.  Fabiola Johana declines canalicular probing and irrigation at this time.  Monitor. Return if symptoms become more bothersome.    Otherwise, recheck in one year

## 2023-11-16 NOTE — PROGRESS NOTES
HPI     eye exam            Comments: General eye exam and refraction.  Feels vision getting worse since last visit.  Wears glasses full-time.  Lenses appear smudgy          Comments    Patient's age: 78 y.o. female  Occupation: Retired   Approximate date of last eye examination:  06/15/2022  Name of last eye doctor seen:   City/State: [place]  Wears glasses? Yes      If yes, wears  Full-time or part-time?  Full time   Present glasses are: Bifocal, SV Distance, SV Reading?  Progressive lens   Approximate age of present glasses:  1 yrs old  Got new glasses following last exam, or subsequently?:  Yes    Any problem with VA with glasses? Can't read and see from a distance in   her glasses.  Wears CLs?:  No   Headaches?  No   Eye pain/discomfort? no                                                                                   Flashes?  No   Floaters?  Yes   Diplopia/Double vision?  No   Patient's Ocular History:         Any eye surgeries? No          Any eye injury?  No          Any treatment for eye disease?  No   Family history of eye disease?  Father: glaucoma   Significant patient medical history:         1. Diabetes? no       If yes, IDDM or NIDDM?  n/a     2. HBP?  No               3. Other (describe):  None reported    ! OTC eyedrops currently using:  AT's prn    ! Prescription eye meds currently using:  No    ! Any history of allergy/adverse reaction to any eye meds used   previously?  No     ! Any history of allergy/adverse reaction to eyedrops used during prior   eye exam(s)? No     ! Any history of allergy/adverse reaction to Novacaine or similar meds?   No    ! Any history of allergy/adverse reaction to Epinephrine or similar meds?   No     ! Patient okay with use of anesthetic eyedrops to check eye pressure?    Yes         ! Patient okay with use of eyedrops to dilate pupils today?  Yes    !  Allergies/Medications/Medical History/Family History reviewed today?    Yes       PD =    "71/67  Desired reading distance =  14.5"                                                                   Last edited by Adams Weaver, OD on 11/16/2023 10:13 AM.            Assessment /Plan     For exam results, see Encounter Report.    1. Examination of eyes and vision        2. Nuclear sclerosis of both eyes        3. Cortical cataract of both eyes        4. Retinal drusen of right eye        5. Vitreous floaters of both eyes        6. Hyperopia of right eye        7. Hyperopia of left eye with astigmatism        8. Presbyopia of both eyes                     Bilateral nuclear sclerosis of lens, consistent with age.  Early peripheral cortical cataract in both eyes, more apparent in the left eye.  So need for cataract surgery in either eye     Peripheral retinal drusen in the right eye.  S/p posterior vitreous detachment in the left eye.  Vitreous floater(s) in both eyes.      Hyperopia in the right eye, and hyperopia with astigmatism in the left eye.  Best-corrected VA of 20/25+4 in the right eye or 20/25+3 in the lef ey  Presbyopia consistent with age.  New spectacle lens Rx issued for full-time wear     Recheck in one year - or prior, if any problems or bothersome changes in vision noted in the interim       Epiphora on right side. Comes and goes.  No evidence of punctal eversion or ectropion.  Fabiola Johana declines canalicular probing and irrigation at this time.  Monitor. Return if symptoms become more bothersome.    Otherwise, recheck in one year        "

## 2023-11-24 DIAGNOSIS — M54.30 SCIATICA, UNSPECIFIED LATERALITY: ICD-10-CM

## 2023-11-24 RX ORDER — MELOXICAM 15 MG/1
15 TABLET ORAL DAILY
Qty: 30 TABLET | Refills: 6 | Status: SHIPPED | OUTPATIENT
Start: 2023-11-24

## 2023-11-24 NOTE — TELEPHONE ENCOUNTER
No care due was identified.  [facility] Embedded Care Due Messages. Reference number: [id number].   11/24/2023 10:18:20 AM CST

## 2023-11-28 NOTE — PROGRESS NOTES
"[facility] - NEUROLOGY [address]TH [address] [facility], [place]    Date: 11/29/23  Patient Name: Odessa Vaughn   MRN: [id number]   PCP: Mitchel Specner  Referring Provider: Self, Aaareferral    Chief Complaint: Numbness and Tingling   Subjective:       HPI:   Ms. Odessa Vaughn is a 78 y.o. female with lumbar facet arthropathy and moderate bilateral foraminal stenosis at L3-4 and L5-S1 (s/p KARLA x 3 and  most recent on 7/25/2023) , HLD, HTN, atherosclerosis of the aorta, thromboyctopenia, breast CA, and osteopenia presenting for evaluation of numbness and tingling. She is currently managed with gabapentin 100mg BID and mobic 15mg daily.     She notes that she initially began to have difficulty with her sciatica nerve pain in 2020. She notes that following her 2nd injection for sciatica she began to experience numbness in the R toe. She also reports that prior to the injection she was having bilateral stinging/burning sensation on the bilateral feet. She notes that it seems as if her symptoms in the feet have gradually gotten worse since onset. She found that the KARLA was relieved with the 2nd injection but she had the numbness. She notes that the third injection did not provide relief for as long. Her pain "stinging" sensation in the feet is located in the ball of the feet and is present constantly throughout the day. She finds that staying off of her feet somewhat helps her symptoms. She finds that prolonged standing can irritate her symptoms. She rates the pain in her feet as 5/10. She finds that the sharp stinging does not last long but the numbness is constant. She does not find that the gabapentin is particularly helpful it makes her very somnolent. She denies any gait changes, falls, or weakness. She denies any changes in bladder or bowel habits.       She went to PT for sciatica x1 month and found that she was not having significant relief. She notes that the shooting pain down " her leg no longer seems to be present but finds that the numbness in the R foot and bilateral foot tingling that are still present. She does note that prolonged standing seems to irritate her back.     She reports that she also has some R wrist pain that does not extend to the fingers. She describes this as more of an ache that can occasionally have some sharpness. This has been going on x 1 month. She denies any aggravating or alleviating factors.       Of note the patient has been evaluated by orthopedics and pain management for this same problem.     PAST MEDICAL HISTORY:  Past Medical History:   Diagnosis Date    Arthritis     hands, legs    Breast cancer 12/2012    Right breast invasive ductal carcinoma, ER positive, VT and Her2 negative    Bursitis of left hip 2016    resolved    Chronic midline low back pain with left-sided sciatica 6/5/2017    Essential hypertension 9/21/2015    Hyperlipidemia 9/15/2014       PAST SURGICAL HISTORY:  Past Surgical History:   Procedure Laterality Date    BREAST BIOPSY Right 12/19/2012    Right breast- [facility]    BREAST SURGERY Right 01/2013    right mastectomy, SN biopsy     COLONOSCOPY N/A 09/07/2017    Procedure: COLONOSCOPY;  Surgeon: Syed Shah MD;  Location: [address]);  Service: Endoscopy;  Laterality: N/A;    COLONOSCOPY N/A 4/10/2023    Procedure: COLONOSCOPY;  Surgeon: Kamran Mcdermott MD;  Location: [address]);  Service: Endoscopy;  Laterality: N/A;  [facility]-  4/3/23-procedure confirmed with precall, patient requesting prep be changed as current prep ordered is not covered by insurance. Patient requesting a call back. Notified hallway . HECTOR Dudley    DILATION AND CURETTAGE OF UTERUS      EPIDURAL STEROID INJECTION N/A 7/25/2023    Procedure: LUMBAR L5/S1 IL KARLA (AIM TO RIGHT) DIRECT REFERRAL;  Surgeon: Elza Thomas MD;  Location: [facility] PAIN MGT;  Service: Pain Management;  Laterality: N/A;    MASTECTOMY  01/2013     TRANSFORAMINAL EPIDURAL INJECTION OF STEROID Right 11/11/2021    Procedure: Injection,steroid,epidural,transforaminal Right L4/L5 and L5/S1 Direct Referral;  Surgeon: lEza Thomas MD;  Location: [facility], [place], operated [facility]IN [facility]GT;  Service: Pain Management;  Laterality: Right;    TRANSFORAMINAL EPIDURAL INJECTION OF STEROID Right 12/09/2021    Procedure: Injection,steroid,epidural,transforaminal approach RIGHT L4-L5 AND L5-S1 DIRECT REFERRAL;  Surgeon: Elza Thomas MD;  Location: [facility], [place], operated by [facility] PAIN [facility]GT;  Service: Pain Management;  Laterality: Right;       CURRENT MEDS:  Current Outpatient Medications   Medication Sig Dispense Refill    ascorbic acid, vitamin C, (VITAMIN C) 500 MG tablet Take 500 mg by mouth once daily.      aspirin 81 MG Chew Take 81 mg by mouth once daily.      atorvastatin (LIPITOR) 10 MG tablet TAKE 1 TABLET(10 MG) BY MOUTH EVERY DAY 90 tablet 1    meloxicam (MOBIC) 15 MG tablet Take 1 tablet (15 mg total) by mouth once daily. 30 tablet 6    multivitamin capsule Take 1 capsule by mouth once daily.      PROPYLENE GLYCOL//PF (SYSTANE, PF, OPHT) Apply to eye daily as needed.       triamterene-hydrochlorothiazide 37.5-25 mg (DYAZIDE) 37.5-25 mg per capsule TAKE 1 CAPSULE BY MOUTH EVERY DAY 90 capsule 3    vitamin D (VITAMIN D3) 1000 units Tab Take 1,000 Units by mouth once daily.      pregabalin (LYRICA) 25 MG capsule Take 1 capsule (25 mg total) by mouth 2 (two) times daily. 60 capsule 5     No current facility-administered medications for this visit.       ALLERGIES:  Review of patient's allergies indicates:   Allergen Reactions    Vicodin [hydrocodone-acetaminophen] Other (See Comments)     Dizziness       FAMILY HISTORY:  Family History   Problem Relation Age of Onset    Cancer Father         Pancreatic CA    Cataracts Father     Breast cancer Cousin 58    Breast cancer Cousin 58    No Known Problems Mother     Hypertension Sister     Arthritis Brother     No Known Problems Daughter     COPD Sister     No  Known Problems Brother     No Known Problems Brother     No Known Problems Brother     No Known Problems Daughter     Breast cancer Paternal Aunt 55    Cancer Paternal Aunt         Breast Ca and Lung CA    No Known Problems Maternal Aunt     No Known Problems Maternal Uncle     No Known Problems Paternal Uncle     No Known Problems Maternal Grandmother     No Known Problems Maternal Grandfather     No Known Problems Paternal Grandmother     No Known Problems Paternal Grandfather     Ovarian cancer Neg Hx     Amblyopia Neg Hx     Blindness Neg Hx     Diabetes Neg Hx     Glaucoma Neg Hx     Macular degeneration Neg Hx     Retinal detachment Neg Hx     Strabismus Neg Hx     Stroke Neg Hx     Thyroid disease Neg Hx     Osteoarthritis Neg Hx     Rheum arthritis Neg Hx        SOCIAL HISTORY:  Social History     Tobacco Use    Smoking status: Never    Smokeless tobacco: Never    Tobacco comments:     Retired;    Substance Use Topics    Alcohol use: Not Currently     Comment: holiday - occasional wine    Drug use: No       Review of Systems:  Review of Systems   Constitutional:  Negative for fever, malaise/fatigue and weight loss.   HENT:  Negative for congestion, ear discharge, ear pain, hearing loss, nosebleeds, sinus pain and sore throat.    Eyes:  Negative for blurred vision, double vision, photophobia, pain and discharge.   Respiratory:  Negative for cough, hemoptysis and shortness of breath.    Cardiovascular:  Negative for chest pain, palpitations, orthopnea and leg swelling.   Gastrointestinal:  Negative for abdominal pain, blood in stool, constipation, diarrhea, nausea and vomiting.   Genitourinary:  Negative for dysuria, frequency, hematuria and urgency.   Musculoskeletal:  Positive for back pain. Negative for falls, myalgias and neck pain.   Skin:  Negative for itching and rash.   Neurological:  Positive for tingling. Negative for dizziness, focal weakness, seizures, loss of consciousness,  "weakness and headaches.            Objective:     Vitals:    11/29/23 0815   Weight: 78.6 kg (173 lb 4.5 oz)   Height: 5' 5" (1.651 m)         Lab Results   Component Value Date    WBC 8.80 03/09/2023    HGB 12.5 03/09/2023    HCT 41.2 03/09/2023     03/09/2023    CHOL 150 03/09/2023    TRIG 83 03/09/2023    HDL 45 03/09/2023    ALT 20 03/09/2023    AST 22 03/09/2023     03/09/2023    K 4.2 03/09/2023     03/09/2023    CREATININE 0.9 03/09/2023    BUN 19 03/09/2023    CO2 27 03/09/2023    TSH 3.362 11/29/2023    HGBA1C 6.1 09/08/2014       NEUROLOGICAL EXAMINATION:     MENTAL STATUS   Oriented to person, place, and time.   Level of consciousness: alert    CRANIAL NERVES     CN III, IV, VI   Pupils are equal, round, and reactive to light.  Extraocular motions are normal.     CN V   Facial sensation intact.     CN VII   Facial expression full, symmetric.     CN VIII   CN VIII normal.     CN IX, X   CN IX normal.   CN X normal.     CN XI   CN XI normal.     CN XII   CN XII normal.     SENSORY EXAM   Light touch normal.   Vibration normal.   Pinprick normal.     GAIT AND COORDINATION     Tremor   Resting tremor: absent  Intention tremor: absent  Action tremor: absent       Slight R sided limp 2/2 pain      ? ?    MUSCLE STRENGTH:     Fascics Atrophy RIGHT    LEFT Atrophy Fascics     5 Neck Ext. 5       5 Neck Flex 5       5 Deltoids 5       5 Sh.Ext.Rot. 5       5 Sh.Int.Rot. 5       5 Biceps 5       5 Triceps 5       5 Forearm.Pr. 5       5 Wrist Ext. 5       5 Wrist Flex 5       5 Finger Ext. 5       5 Finger Flex 5       5 FPL 5       5 Inteross. 5                         5 Iliopsoas 5       5 Hip Abduct 5       5 Hip Adduct 5       5 Quads 5       5 Hams 5       5 Dorsiflex 5       5 Plantar Flex 5       5 Ankle Jacob 5       5 Ankle Invert 5       5 Toe Ext. 5       5 Toe Flex 5                         REFLEXES:    RIGHT Reflex   LEFT   2+ Biceps 2+   2+ Brachiorad. 2+   2+ Triceps 2+    " Pectoralis     Jaw Jerk     Beck's         2+ Patellar 2+   trace Ankle trace   absent Suprapatellar absent             Down PLANTAR Down       Assessment:   Odessa Vaughn is a 78 y.o. female presenting for evaluation of bilateral feet numbness/tingling.     Plan:   Discussion with the patient that some of her symptoms are likely 2/2 her back and lumbar spine problems however will do a thorough lab work up to see if there are any other contributing factors from a metabolic standpoint.       Discussed EMG/NCS with the patient and given that she does not report as much shooting pain and more peripheral pain at this time can consider EMG/NCS to see if there is a more peripheral cause of her discomfort. She would like to proceed with this and will order today.         Problem List Items Addressed This Visit          Neuro    Osteoarthritis of spine with radiculopathy, lumbar region - Primary    Current Assessment & Plan     Known to have bilateral moderate foraminal stenosis at L3-L4 and L5-S1 has previously received KARLA on 7/25/23 patient with known lumbar spine difficulty as likely the cause of her pain and discomfort.             Cardiac/Vascular    Other hyperlipidemia    Current Assessment & Plan     Patients hyperlipidemia is currently stable and well managed by the PCP. Discussed with the patient the associated stroke risk.            Essential hypertension    Current Assessment & Plan     Discussed with patient importance of management of hypertension due to secondary stroke risk. Patient is on appropriate therapy currently managed by PCP.             Other Visit Diagnoses       Neuropathy, peripheral, idiopathic        No hx of chemotherapy   Will do a trial of lyrica as patient did not tolerate Gabapentin   Discussed we could do a trial of Nortriptyline or Cymbalta if fail    Relevant Medications    pregabalin (LYRICA) 25 MG capsule    Other Relevant Orders    Vitamin B6    Vitamin B12 Deficiency  Panel    TSH (Completed)    T4 (Completed)    Neuropathy        Relevant Medications    pregabalin (LYRICA) 25 MG capsule    Other Relevant Orders    Heavy Metals Screen, Blood (Quantitative)    Hepatitis C RNA, Quantitative, PCR    Methylmalonic Acid, Serum    Phosphatidylethanol (PETH)    RPR    Vitamin B6    Vitamin B2    Vitamin B12 Deficiency Panel    Vitamin B1    TSH (Completed)    T4 (Completed)    Sedimentation rate (Completed)    EMG W/ ULTRASOUND AND NERVE CONDUCTION TEST 2 Extremities    Other specified polyneuropathies        Relevant Orders    EMG W/ ULTRASOUND AND NERVE CONDUCTION TEST 2 Extremities              I spent a total of 45 minutes on the day of the visit. This includes face to face time and non-face to face time preparing to see the patient (eg, review of tests), obtaining and/or reviewing separately obtained history, documenting clinical information in the electronic or other health record, independently interpreting results and communicating results to the patient/family/caregiver, or care coordinator.    Yeni Ascencio PA-C  Supervising physician Arnaldo Wise MD was available for all questions during this exam.  [facility]

## 2023-11-29 ENCOUNTER — LAB VISIT (OUTPATIENT)
Dept: LAB | Facility: HOSPITAL | Age: 78
End: 2023-11-29
Payer: MEDICARE

## 2023-11-29 ENCOUNTER — OFFICE VISIT (OUTPATIENT)
Dept: NEUROLOGY | Facility: CLINIC | Age: 78
End: 2023-11-29
Payer: MEDICARE

## 2023-11-29 VITALS — HEIGHT: 65 IN | BODY MASS INDEX: 28.87 KG/M2 | WEIGHT: 173.31 LBS

## 2023-11-29 DIAGNOSIS — G60.9 NEUROPATHY, PERIPHERAL, IDIOPATHIC: ICD-10-CM

## 2023-11-29 DIAGNOSIS — G62.9 NEUROPATHY: ICD-10-CM

## 2023-11-29 DIAGNOSIS — G62.89 OTHER SPECIFIED POLYNEUROPATHIES: ICD-10-CM

## 2023-11-29 DIAGNOSIS — E78.49 OTHER HYPERLIPIDEMIA: ICD-10-CM

## 2023-11-29 DIAGNOSIS — M47.26 OSTEOARTHRITIS OF SPINE WITH RADICULOPATHY, LUMBAR REGION: Primary | ICD-10-CM

## 2023-11-29 DIAGNOSIS — I10 ESSENTIAL HYPERTENSION: ICD-10-CM

## 2023-11-29 LAB
ERYTHROCYTE [SEDIMENTATION RATE] IN BLOOD BY PHOTOMETRIC METHOD: 38 MM/HR (ref 0–36)
T4 SERPL-MCNC: 6.8 UG/DL (ref 4.5–11.5)
TSH SERPL DL<=0.005 MIU/L-ACNC: 3.36 UIU/ML (ref 0.4–4)

## 2023-11-29 PROCEDURE — 84207 ASSAY OF VITAMIN B-6: CPT | Mod: HCNC | Performed by: PHYSICIAN ASSISTANT

## 2023-11-29 PROCEDURE — 86592 SYPHILIS TEST NON-TREP QUAL: CPT | Mod: HCNC | Performed by: PHYSICIAN ASSISTANT

## 2023-11-29 PROCEDURE — 1101F PR PT FALLS ASSESS DOC 0-1 FALLS W/OUT INJ PAST YR: ICD-10-PCS | Mod: HCNC,CPTII,S$GLB, | Performed by: PHYSICIAN ASSISTANT

## 2023-11-29 PROCEDURE — 1160F PR REVIEW ALL MEDS BY PRESCRIBER/CLIN PHARMACIST DOCUMENTED: ICD-10-PCS | Mod: HCNC,CPTII,S$GLB, | Performed by: PHYSICIAN ASSISTANT

## 2023-11-29 PROCEDURE — 1159F PR MEDICATION LIST DOCUMENTED IN MEDICAL RECORD: ICD-10-PCS | Mod: HCNC,CPTII,S$GLB, | Performed by: PHYSICIAN ASSISTANT

## 2023-11-29 PROCEDURE — 99215 OFFICE O/P EST HI 40 MIN: CPT | Mod: HCNC,S$GLB,, | Performed by: PHYSICIAN ASSISTANT

## 2023-11-29 PROCEDURE — 1125F AMNT PAIN NOTED PAIN PRSNT: CPT | Mod: HCNC,CPTII,S$GLB, | Performed by: PHYSICIAN ASSISTANT

## 2023-11-29 PROCEDURE — 84425 ASSAY OF VITAMIN B-1: CPT | Mod: HCNC | Performed by: PHYSICIAN ASSISTANT

## 2023-11-29 PROCEDURE — 1160F RVW MEDS BY RX/DR IN RCRD: CPT | Mod: HCNC,CPTII,S$GLB, | Performed by: PHYSICIAN ASSISTANT

## 2023-11-29 PROCEDURE — 3288F PR FALLS RISK ASSESSMENT DOCUMENTED: ICD-10-PCS | Mod: HCNC,CPTII,S$GLB, | Performed by: PHYSICIAN ASSISTANT

## 2023-11-29 PROCEDURE — 99999 PR PBB SHADOW E&M-EST. PATIENT-LVL III: ICD-10-PCS | Mod: PBBFAC,HCNC,, | Performed by: PHYSICIAN ASSISTANT

## 2023-11-29 PROCEDURE — 99999 PR PBB SHADOW E&M-EST. PATIENT-LVL III: CPT | Mod: PBBFAC,HCNC,, | Performed by: PHYSICIAN ASSISTANT

## 2023-11-29 PROCEDURE — 86340 INTRINSIC FACTOR ANTIBODY: CPT | Mod: HCNC | Performed by: PHYSICIAN ASSISTANT

## 2023-11-29 PROCEDURE — 85652 RBC SED RATE AUTOMATED: CPT | Mod: HCNC | Performed by: PHYSICIAN ASSISTANT

## 2023-11-29 PROCEDURE — 3288F FALL RISK ASSESSMENT DOCD: CPT | Mod: HCNC,CPTII,S$GLB, | Performed by: PHYSICIAN ASSISTANT

## 2023-11-29 PROCEDURE — 87522 HEPATITIS C REVRS TRNSCRPJ: CPT | Mod: HCNC | Performed by: PHYSICIAN ASSISTANT

## 2023-11-29 PROCEDURE — 82607 VITAMIN B-12: CPT | Mod: HCNC | Performed by: PHYSICIAN ASSISTANT

## 2023-11-29 PROCEDURE — 84252 ASSAY OF VITAMIN B-2: CPT | Mod: HCNC | Performed by: PHYSICIAN ASSISTANT

## 2023-11-29 PROCEDURE — 1159F MED LIST DOCD IN RCRD: CPT | Mod: HCNC,CPTII,S$GLB, | Performed by: PHYSICIAN ASSISTANT

## 2023-11-29 PROCEDURE — 1101F PT FALLS ASSESS-DOCD LE1/YR: CPT | Mod: HCNC,CPTII,S$GLB, | Performed by: PHYSICIAN ASSISTANT

## 2023-11-29 PROCEDURE — 82300 ASSAY OF CADMIUM: CPT | Mod: HCNC | Performed by: PHYSICIAN ASSISTANT

## 2023-11-29 PROCEDURE — 83921 ORGANIC ACID SINGLE QUANT: CPT | Mod: HCNC | Performed by: PHYSICIAN ASSISTANT

## 2023-11-29 PROCEDURE — 1125F PR PAIN SEVERITY QUANTIFIED, PAIN PRESENT: ICD-10-PCS | Mod: HCNC,CPTII,S$GLB, | Performed by: PHYSICIAN ASSISTANT

## 2023-11-29 PROCEDURE — 84436 ASSAY OF TOTAL THYROXINE: CPT | Mod: HCNC | Performed by: PHYSICIAN ASSISTANT

## 2023-11-29 PROCEDURE — 36415 COLL VENOUS BLD VENIPUNCTURE: CPT | Mod: HCNC | Performed by: PHYSICIAN ASSISTANT

## 2023-11-29 PROCEDURE — 80321 ALCOHOLS BIOMARKERS 1OR 2: CPT | Mod: HCNC | Performed by: PHYSICIAN ASSISTANT

## 2023-11-29 PROCEDURE — 99215 PR OFFICE/OUTPT VISIT, EST, LEVL V, 40-54 MIN: ICD-10-PCS | Mod: HCNC,S$GLB,, | Performed by: PHYSICIAN ASSISTANT

## 2023-11-29 PROCEDURE — 84443 ASSAY THYROID STIM HORMONE: CPT | Mod: HCNC | Performed by: PHYSICIAN ASSISTANT

## 2023-11-29 RX ORDER — ASCORBIC ACID 500 MG
500 TABLET ORAL DAILY
COMMUNITY

## 2023-11-29 RX ORDER — ACETAMINOPHEN 500 MG
2000 TABLET ORAL DAILY
COMMUNITY

## 2023-11-29 RX ORDER — PREGABALIN 25 MG/1
25 CAPSULE ORAL 2 TIMES DAILY
Qty: 60 CAPSULE | Refills: 5 | Status: SHIPPED | OUTPATIENT
Start: 2023-11-29 | End: 2024-05-29

## 2023-11-29 NOTE — ASSESSMENT & PLAN NOTE
Known to have bilateral moderate foraminal stenosis at L3-L4 and L5-S1 has previously received KARLA on 7/25/23 patient with known lumbar spine difficulty as likely the cause of her pain and discomfort.

## 2023-11-29 NOTE — PATIENT INSTRUCTIONS
Will order EMG/NCS -- they will call you to schedule     Complete lab work today 2nd floor same elevators you came up go to the L there is a check in desk just give them your name    Trial of Lyrica twice a day if this makes you sleepy send me a message and we can switch you to something else    I would like to see you back in 3 months but if you need me sooner reach out

## 2023-11-30 DIAGNOSIS — E53.8 B12 DEFICIENCY: Primary | ICD-10-CM

## 2023-11-30 LAB
ANNOTATION COMMENT IMP: NORMAL
ARSENIC BLD-MCNC: 1 NG/ML
CADMIUM BLD-MCNC: 0.3 NG/ML
CITY: NORMAL
COUNTY: NORMAL
GUARDIAN FIRST NAME: NORMAL
GUARDIAN LAST NAME: NORMAL
HCV RNA SERPL QL NAA+PROBE: NOT DETECTED
HCV RNA SPEC NAA+PROBE-ACNC: NOT DETECTED IU/ML
HOME PHONE: NORMAL
IF BLOCK AB SER QL: POSITIVE
LEAD BLD-MCNC: <1 MCG/DL
MERCURY BLD-MCNC: <1 NG/ML
RACE: NORMAL
RPR SER QL: NORMAL
STATE: NORMAL
STREET ADDRESS: NORMAL
VENOUS/CAPILLARY: NORMAL
VIT B12 SERPL-MCNC: 138 NG/L (ref 180–914)
ZIP: NORMAL

## 2023-11-30 RX ORDER — CYANOCOBALAMIN, ISOPROPYL ALCOHOL 1000MCG/ML
1 KIT INJECTION
Qty: 2 KIT | Refills: 5 | Status: SHIPPED | OUTPATIENT
Start: 2023-11-30

## 2023-12-01 LAB
CLINICAL BIOCHEMIST REVIEW: NORMAL
PLPETH BLD-MCNC: <10 NG/ML
POPETH BLD-MCNC: <10 NG/ML

## 2023-12-02 LAB
METHYLMALONATE SERPL-SCNC: 0.46 UMOL/L
VIT B2 SERPL-MCNC: 7 MCG/L (ref 1–19)

## 2023-12-03 LAB — VIT B1 BLD-MCNC: 32 UG/L (ref 38–122)

## 2023-12-04 LAB — PYRIDOXAL SERPL-MCNC: 13 UG/L (ref 5–50)

## 2023-12-15 ENCOUNTER — TELEPHONE (OUTPATIENT)
Dept: NEUROLOGY | Facility: CLINIC | Age: 78
End: 2023-12-15
Payer: MEDICARE

## 2023-12-15 NOTE — TELEPHONE ENCOUNTER
Hey pt was just seen on 11/29/23 wed . Do you want me to schedule pt another appt with you next week?

## 2023-12-22 ENCOUNTER — OFFICE VISIT (OUTPATIENT)
Dept: CARDIOLOGY | Facility: CLINIC | Age: 78
End: 2023-12-22
Payer: MEDICARE

## 2023-12-22 VITALS
OXYGEN SATURATION: 99 % | DIASTOLIC BLOOD PRESSURE: 66 MMHG | HEART RATE: 64 BPM | SYSTOLIC BLOOD PRESSURE: 130 MMHG | WEIGHT: 171 LBS | BODY MASS INDEX: 28.46 KG/M2

## 2023-12-22 DIAGNOSIS — I73.9 CLAUDICATION: Primary | ICD-10-CM

## 2023-12-22 PROCEDURE — 99999 PR PBB SHADOW E&M-EST. PATIENT-LVL III: CPT | Mod: PBBFAC,HCNC,, | Performed by: INTERNAL MEDICINE

## 2023-12-22 PROCEDURE — 1101F PR PT FALLS ASSESS DOC 0-1 FALLS W/OUT INJ PAST YR: ICD-10-PCS | Mod: HCNC,CPTII,S$GLB, | Performed by: INTERNAL MEDICINE

## 2023-12-22 PROCEDURE — 99999 PR PBB SHADOW E&M-EST. PATIENT-LVL III: ICD-10-PCS | Mod: PBBFAC,HCNC,, | Performed by: INTERNAL MEDICINE

## 2023-12-22 PROCEDURE — 1160F PR REVIEW ALL MEDS BY PRESCRIBER/CLIN PHARMACIST DOCUMENTED: ICD-10-PCS | Mod: HCNC,CPTII,S$GLB, | Performed by: INTERNAL MEDICINE

## 2023-12-22 PROCEDURE — 3075F SYST BP GE 130 - 139MM HG: CPT | Mod: HCNC,CPTII,S$GLB, | Performed by: INTERNAL MEDICINE

## 2023-12-22 PROCEDURE — 3288F FALL RISK ASSESSMENT DOCD: CPT | Mod: HCNC,CPTII,S$GLB, | Performed by: INTERNAL MEDICINE

## 2023-12-22 PROCEDURE — 3078F DIAST BP <80 MM HG: CPT | Mod: HCNC,CPTII,S$GLB, | Performed by: INTERNAL MEDICINE

## 2023-12-22 PROCEDURE — 99204 OFFICE O/P NEW MOD 45 MIN: CPT | Mod: HCNC,S$GLB,, | Performed by: INTERNAL MEDICINE

## 2023-12-22 PROCEDURE — 1159F PR MEDICATION LIST DOCUMENTED IN MEDICAL RECORD: ICD-10-PCS | Mod: HCNC,CPTII,S$GLB, | Performed by: INTERNAL MEDICINE

## 2023-12-22 PROCEDURE — 1125F AMNT PAIN NOTED PAIN PRSNT: CPT | Mod: HCNC,CPTII,S$GLB, | Performed by: INTERNAL MEDICINE

## 2023-12-22 PROCEDURE — 3075F PR MOST RECENT SYSTOLIC BLOOD PRESS GE 130-139MM HG: ICD-10-PCS | Mod: HCNC,CPTII,S$GLB, | Performed by: INTERNAL MEDICINE

## 2023-12-22 PROCEDURE — 1160F RVW MEDS BY RX/DR IN RCRD: CPT | Mod: HCNC,CPTII,S$GLB, | Performed by: INTERNAL MEDICINE

## 2023-12-22 PROCEDURE — 1159F MED LIST DOCD IN RCRD: CPT | Mod: HCNC,CPTII,S$GLB, | Performed by: INTERNAL MEDICINE

## 2023-12-22 PROCEDURE — 99204 PR OFFICE/OUTPT VISIT, NEW, LEVL IV, 45-59 MIN: ICD-10-PCS | Mod: HCNC,S$GLB,, | Performed by: INTERNAL MEDICINE

## 2023-12-22 PROCEDURE — 1125F PR PAIN SEVERITY QUANTIFIED, PAIN PRESENT: ICD-10-PCS | Mod: HCNC,CPTII,S$GLB, | Performed by: INTERNAL MEDICINE

## 2023-12-22 PROCEDURE — 1101F PT FALLS ASSESS-DOCD LE1/YR: CPT | Mod: HCNC,CPTII,S$GLB, | Performed by: INTERNAL MEDICINE

## 2023-12-22 PROCEDURE — 3078F PR MOST RECENT DIASTOLIC BLOOD PRESSURE < 80 MM HG: ICD-10-PCS | Mod: HCNC,CPTII,S$GLB, | Performed by: INTERNAL MEDICINE

## 2023-12-22 PROCEDURE — 3288F PR FALLS RISK ASSESSMENT DOCUMENTED: ICD-10-PCS | Mod: HCNC,CPTII,S$GLB, | Performed by: INTERNAL MEDICINE

## 2023-12-22 NOTE — PROGRESS NOTES
[facility]    Chief Complaint  Chief Complaint   Patient presents with    Leg Pain       HPI:    She is here because of concerns about lower extremity circulation causing her leg pain.  Is followed by pain management and neurology for sciatica.  She describes the pain which starts at her right hip and radiates down the lateral aspect of her right leg.  She has numbness of her 4th and 5th digits.  No color changes.  No nonhealing ulcers or infections.  Pain and weakness will occur if she is standing for a long time.  Has to sit down for relief.    Medications  Current Outpatient Medications   Medication Sig Dispense Refill    ascorbic acid, vitamin C, (VITAMIN C) 500 MG tablet Take 500 mg by mouth once daily.      aspirin 81 MG Chew Take 81 mg by mouth once daily.      atorvastatin (LIPITOR) 10 MG tablet TAKE 1 TABLET(10 MG) BY MOUTH EVERY DAY 90 tablet 1    cyanocobalamin, vitamin B-12, (B-12 COMPLIANCE) 1,000 mcg/mL Kit Inject 1 mL as directed every 14 (fourteen) days. 2 kit 5    meloxicam (MOBIC) 15 MG tablet Take 1 tablet (15 mg total) by mouth once daily. 30 tablet 6    multivitamin capsule Take 1 capsule by mouth once daily.      pregabalin (LYRICA) 25 MG capsule Take 1 capsule (25 mg total) by mouth 2 (two) times daily. 60 capsule 5    PROPYLENE GLYCOL//PF (SYSTANE, PF, OPHT) Apply to eye daily as needed.       triamterene-hydrochlorothiazide 37.5-25 mg (DYAZIDE) 37.5-25 mg per capsule TAKE 1 CAPSULE BY MOUTH EVERY DAY 90 capsule 3    vitamin D (VITAMIN D3) 1000 units Tab Take 1,000 Units by mouth once daily.       No current facility-administered medications for this visit.        History  Past Medical History:   Diagnosis Date    Arthritis     hands, legs    Breast cancer 12/2012    Right breast invasive ductal carcinoma, ER positive, NM and Her2 negative    Bursitis of left hip 2016    resolved    Chronic midline low back pain with left-sided sciatica 6/5/2017    Essential  hypertension 9/21/2015    Hyperlipidemia 9/15/2014     Past Surgical History:   Procedure Laterality Date    BREAST BIOPSY Right 12/19/2012    Right breast- ID[facility]    BREAST SURGERY Right 01/2013    right mastectomy, SN biopsy     COLONOSCOPY N/A 09/07/2017    Procedure: COLONOSCOPY;  Surgeon: Syed Shah MD;  Location: [facility] ENDO (4TH FLR);  Service: Endoscopy;  Laterality: N/A;    COLONOSCOPY N/A 4/10/2023    Procedure: COLONOSCOPY;  Surgeon: Kamran Mcdermott MD;  Location: [facility] ENDO (4TH FLR);  Service: Endoscopy;  Laterality: N/A;  inst portal-RB  4/3/23-procedure confirmed with precall, patient requesting prep be changed as current prep ordered is not covered by insurance. Patient requesting a call back. Notified hallway . HECTOR Dudley    DILATION AND CURETTAGE OF UTERUS      EPIDURAL STEROID INJECTION N/A 7/25/2023    Procedure: LUMBAR L5/S1 IL KARLA (AIM TO RIGHT) DIRECT REFERRAL;  Surgeon: Elza Thomas MD;  [facility];  Service: Pain Management;  Laterality: N/A;    MASTECTOMY  01/2013    TRANSFORAMINAL EPIDURAL INJECTION OF STEROID Right 11/11/2021    Procedure: Injection,steroid,epidural,transforaminal Right L4/L5 and L5/S1 Direct Referral;  Surgeon: Elza Thomas MD;  [facility] [place] [facility];  Service: Pain Management;  Laterality: Right;    TRANSFORAMINAL EPIDURAL INJECTION OF STEROID Right 12/09/2021    Procedure: Injection,steroid,epidural,transforaminal approach RIGHT L4-L5 AND L5-S1 DIRECT REFERRAL;  Surgeon: Elza Thomas MD;  [facility] [place] [facility];  Service: Pain Management;  Laterality: Right;     Social History     Socioeconomic History    Marital status:    Tobacco Use    Smoking status: Never    Smokeless tobacco: Never    Tobacco comments:     Retired;    Substance and Sexual Activity    Alcohol use: Not Currently     Comment: holiday - occasional wine    Drug use: No    Sexual activity: Yes     Partners: Male     Social  Determinants of Health     Financial Resource Strain: Low Risk  (4/6/2023)    Overall Financial Resource Strain (CARDIA)     Difficulty of Paying Living Expenses: Not hard at all   Food Insecurity: No Food Insecurity (4/6/2023)    Hunger Vital Sign     Worried About Running Out of Food in the Last Year: Never true     Ran Out of Food in the Last Year: Never true   Transportation Needs: No Transportation Needs (4/6/2023)    PRAPARE - Transportation     Lack of Transportation (Medical): No     Lack of Transportation (Non-Medical): No   Physical Activity: Sufficiently Active (4/6/2023)    Exercise Vital Sign     Days of Exercise per Week: 7 days     Minutes of Exercise per Session: 120 min   Stress: No Stress Concern Present (4/6/2023)    [employer] [place] [employer] - Occupational Stress Questionnaire     Feeling of Stress : Not at all   Social Connections: Moderately Integrated (4/6/2023)    Social Connection and Isolation Panel [NHANES]     Frequency of Communication with Friends and Family: More than three times a week     Frequency of Social Gatherings with Friends and Family: Twice a week     Attends [employer] Services: More than 4 times per year     Active Member of Clubs or Organizations: No     Attends Club or Organization Meetings: Never     Marital Status:    Housing Stability: Unknown (4/6/2023)    Housing Stability Vital Sign     Unable to Pay for Housing in the Last Year: No     Unstable Housing in the Last Year: No     Family History   Problem Relation Age of Onset    Cancer Father         Pancreatic CA    Cataracts Father     Breast cancer Cousin 58    Breast cancer Cousin 58    No Known Problems Mother     Hypertension Sister     Arthritis Brother     No Known Problems Daughter     COPD Sister     No Known Problems Brother     No Known Problems Brother     No Known Problems Brother     No Known Problems Daughter     Breast cancer Paternal Aunt 55    Cancer Paternal Aunt          Breast Ca and Lung CA    No Known Problems Maternal Aunt     No Known Problems Maternal Uncle     No Known Problems Paternal Uncle     No Known Problems Maternal Grandmother     No Known Problems Maternal Grandfather     No Known Problems Paternal Grandmother     No Known Problems Paternal Grandfather     Ovarian cancer Neg Hx     Amblyopia Neg Hx     Blindness Neg Hx     Diabetes Neg Hx     Glaucoma Neg Hx     Macular degeneration Neg Hx     Retinal detachment Neg Hx     Strabismus Neg Hx     Stroke Neg Hx     Thyroid disease Neg Hx     Osteoarthritis Neg Hx     Rheum arthritis Neg Hx         Allergies  Review of patient's allergies indicates:   Allergen Reactions    Vicodin [hydrocodone-acetaminophen] Other (See Comments)     Dizziness       Review of Systems   Review of Systems   Constitutional: Negative for malaise/fatigue, weight gain and weight loss.   Eyes:  Negative for visual disturbance.   Cardiovascular:  Negative for chest pain, claudication, cyanosis, dyspnea on exertion, irregular heartbeat, leg swelling, near-syncope, orthopnea, palpitations, paroxysmal nocturnal dyspnea and syncope.   Respiratory:  Negative for cough, hemoptysis, shortness of breath, sleep disturbances due to breathing and wheezing.    Hematologic/Lymphatic: Negative for bleeding problem. Does not bruise/bleed easily.   Skin:  Negative for poor wound healing.   Musculoskeletal:  Positive for back pain. Negative for muscle cramps and myalgias.   Gastrointestinal:  Negative for abdominal pain, anorexia, diarrhea, heartburn, hematemesis, hematochezia, melena, nausea and vomiting.   Genitourinary:  Negative for hematuria and nocturia.   Neurological:  Negative for excessive daytime sleepiness, dizziness, focal weakness, light-headedness and weakness.       Physical Exam  Vitals:    12/22/23 0936   BP: 130/66   Pulse: 64     Wt Readings from Last 1 Encounters:   12/22/23 77.6 kg (171 lb)     Physical Exam  Vitals and nursing note  reviewed.   Constitutional:       General: She is not in acute distress.     Appearance: She is not toxic-appearing or diaphoretic.   HENT:      Head: Normocephalic and atraumatic.      Mouth/Throat:      Lips: Pink.      Mouth: Mucous membranes are moist.   Eyes:      General: No scleral icterus.     Conjunctiva/sclera: Conjunctivae normal.   Neck:      Thyroid: No thyromegaly.      Vascular: No carotid bruit, hepatojugular reflux or JVD.      Trachea: Trachea normal.   Cardiovascular:      Rate and Rhythm: Normal rate and regular rhythm. No extrasystoles are present.     Chest Wall: PMI is not displaced.      Pulses:           Carotid pulses are 2+ on the right side and 2+ on the left side.       Radial pulses are 2+ on the right side and 2+ on the left side.        Femoral pulses are 2+ on the right side and 2+ on the left side.       Dorsalis pedis pulses are 2+ on the right side and 2+ on the left side.        Posterior tibial pulses are 2+ on the right side and 2+ on the left side.      Heart sounds: S1 normal and S2 normal. No murmur heard.     No friction rub. No S3 or S4 sounds.   Pulmonary:      Effort: Pulmonary effort is normal. No accessory muscle usage or respiratory distress.      Breath sounds: Normal breath sounds and air entry. No decreased breath sounds, wheezing, rhonchi or rales.   Abdominal:      General: Bowel sounds are normal. There is no distension or abdominal bruit.      Palpations: Abdomen is soft. There is no hepatomegaly, splenomegaly or pulsatile mass.      Tenderness: There is no abdominal tenderness.   Musculoskeletal:         General: No tenderness or deformity.      Right lower leg: No edema.      Left lower leg: No edema.   Skin:     General: Skin is warm and dry.      Capillary Refill: Capillary refill takes less than 2 seconds.      Coloration: Skin is not cyanotic or pale.      Nails: There is no clubbing.   Neurological:      General: No focal deficit present.      Mental  Status: She is alert and oriented to person, place, and time.   Psychiatric:         Attention and Perception: Attention normal.         Mood and Affect: Mood normal.         Speech: Speech normal.         Behavior: Behavior normal. Behavior is cooperative.         Labs  Lab Visit on 11/29/2023   Component Date Value Ref Range Status    Arsenic 11/29/2023 1  <13 ng/mL Final    Comment: -------------------ADDITIONAL INFORMATION-------------------  This test was developed and its performance characteristics   determined by [facility] in a manner consistent with CLIA   requirements. This test has not been cleared or approved by   the U.S. Food and Drug Administration.      Lead 11/29/2023 <1.0  <5.0 mcg/dL Final    Comment: -------------------ADDITIONAL INFORMATION-------------------  Testing performed by Inductively Coupled Plasma-Mass   Spectrometry (ICP-MS).  This test was developed and its performance characteristics   determined by [facility] in a manner consistent with CLIA   requirements. This test has not been cleared or approved by   the U.S. Food and Drug Administration.      Cadmium 11/29/2023 0.3  <5.0 ng/mL Final    Comment: -------------------ADDITIONAL INFORMATION-------------------  This test was developed and its performance characteristics   determined by [facility] in a manner consistent with CLIA   requirements. This test has not been cleared or approved by   the U.S. Food and Drug Administration.      Mercury 11/29/2023 <1  <10 ng/mL Final    Comment: -------------------ADDITIONAL INFORMATION-------------------  This test was developed and its performance characteristics   determined by [facility] in a manner consistent with CLIA   requirements. This test has not been cleared or approved by   the U.S. Food and Drug Administration.    Test Performed by:  [facility] - [address] [address]  : John Avila M.D. Ph.D.;  CLIA# [id number]      Venous/Capillary 11/29/2023 Test Not Performed   Final    Street Address 11/29/2023 Test Not Performed   Final    City 11/29/2023 Test Not Performed   Final    State 11/29/2023 Test Not Performed   Final    Zip 11/29/2023 Test Not Performed   Final    County 11/29/2023 Test Not Performed   Final    Guardian First Name 11/29/2023 Test Not Performed   Final    Guardian Last Name 11/29/2023 Test Not Performed   Final    Home Phone 11/29/2023 Test Not Performed   Final    Race 11/29/2023 Test Not Performed   Final    HCV Quantitative Result 11/29/2023 Not Detected  <12 IU/mL Final    HCV, Qualitative 11/29/2023 Not Detected  Not Detected Final    Comment: This procedure utilizes a real-time polymerase chain reaction test  from [employer]. The amplification target is a conserved region   of the HCV genome. The lower limit of quantitation is <12 IU/mL   (<1.08 Log IU/mL)and the upper limit of quantitation is 30 million   IU/mL (7.48 Log IU/mL).     Specimens reported as Detected but <12 IU/mL contain detectable  levels of Hepatitis C RNA but the viral load is below the limit of   quantitation. A Not Detected result does not rule out HCV infection,   clinical correlation is recommended.      Methlymalonic Acid 11/29/2023 0.46 (H)  <0.40 umol/L Final    Comment: If applicable, any drug confirmation testing reported  here was developed and the performance characteristics  determined by [facility]. This   confirmation testing has not been cleared or approved  by the FDA. The laboratory is regulated under CLIA as  qualified to perform high-complexity testing. This test  is used for patient testing purposes. It should not be  regarded as investigational or for research.    Test performed at [facility],  [address], [place]  [address]     [phone]  Emi Harrington MD, PhD - Medical Director      [facility] 16:0/18.1 (POPEth) 11/29/2023 <10  Cutoff: 10 ng/mL Final     Comment: Phosphatidylethanol (PEth) homologues result interpretation    PEth 16:0/18:1 (POPEth)  Less than 10 ng/mL: Not detected  10 - 19 ng/mL: Abstinence or light alcohol consumption  (<2 drinks per day for several days a week)  20 - 200 ng/mL: Moderate alcohol consumption  (up to 4 drinks per day for several days a week)  Greater than 200 ng/mL: Heavy alcohol consumption or   chronic alcohol use (at least 4 drinks per day several days   a week)    (Reference: EULALIA Brown and VIRIDIANA Cai 2018 J. Forensic Sci)      PEth 16:0/18.2 (PLPEth) 11/29/2023 <10  Cutoff: 10 ng/mL Final    Comment: PEth 16:0/18:2 (PLPEth)  Reference ranges are not well established      PETH INTERPRETATION 11/29/2023 Negative.   Final    Comment: -------------------ADDITIONAL INFORMATION-------------------  This report is intended for use in clinical monitoring and   management of patients.  It is not intended for use in   employment-related testing.  This test was developed and its performance characteristics   determined by [facility] in a manner consistent with CLIA   requirements. This test has not been cleared or approved by   the U.S. Food and Drug Administration.    Test Performed by:  [place] [address]  : John Avila M.D. Ph.D.; CLIA# [id number]      RPR 11/29/2023 Non-reactive  Non-reactive Final    Vitamin B6 11/29/2023 13  5 - 50 ug/L Final    Comment: This test was developed and the performance   characteristics determined by [facility].   It has not been cleared or approved by the FDA.   The laboratory is regulated under CLIA as qualified to   perform high-complexity testing. This test is used for   patient testing purposes. It should not be regarded   as investigational or for research.    Test performed at [facility],  [address] [place]  [address]     [phone]  Emi Harrington MD, PhD -  Medical Director      Vitamin B2 11/29/2023 7  1 - 19 mcg/L Final    Comment: -------------------ADDITIONAL INFORMATION-------------------  This test was developed and its performance characteristics   determined by [facility] in a manner consistent with CLIA   requirements. This test has not been cleared or approved by   the U.S. Food and Drug Administration.    Test Performed by:  [facility] [place] [address]  : John Avila M.D. Ph.D.; CLIA# [id number]      Vitamin B-12 11/29/2023 138 (L)  180 - 914 ng/L Final    Comment: Low B12; Intrinsic Factor Blocking Antibody   test was performed.    Test Performed by:  [facility] [place] [address]  : John Avila M.D. Ph.D.; CLIA# [id number]      Thiamine 11/29/2023 32 (L)  38 - 122 ug/L Final    Comment: This test was developed and the performance   characteristics determined by [facility].   It has not been cleared or approved by the FDA.   The laboratory is regulated under CLIA as qualified to   perform high-complexity testing. This test is used for   patient testing purposes. It should not be regarded   as investigational or for research.    Test performed at [facility],  [address] [place]  [address]     [phone]  Emi Harrington MD, PhD - Medical Director      TSH 11/29/2023 3.362  0.400 - 4.000 uIU/mL Final    T4, Total 11/29/2023 6.8  4.5 - 11.5 ug/dL Final    Sed Rate 11/29/2023 38 (H)  0 - 36 mm/Hr Final    Intrinsic Factor 11/29/2023 Positive  Negative Final    Consistent with pernicious anemia.    AIF Comment 11/29/2023 SEE BELOW   Final    Comment: Free B12 resulting from recent B12 injection can cause   false elevation of Intrinsic Factor Blocking Antibody   (IFBA) in this assay. If this is suspected, the Intrinsic   Factor Blocking Antibody (IFBA)  test should be repeated   after at least two weeks from cessation of B12 injections   in order to confirm the elevated IFBA result.    Test Performed by:  [facility]  [address] [facility], [place] [address]  : John Avila M.D. Ph.D.; CLIA# [id number]         Imaging  No results found.    Assessment  1. Claudication  Sounds like pseudoclaudication.  - CV Ultrasound doppler arterial legs bilat; Future      Plan and Discussion    Her history is more consistent with a neurologic issue.  Discussed that her vascular risk factors seem well managed.  Will check a lower extremity arterial Doppler.  If normal, continue with present management plan.    The 10-year ASCVD risk score (Susannah DK, et al., 2019) is: 13%    Values used to calculate the score:      Age: 78 years      Sex: Female      Is Non- : Yes      Diabetic: No      Tobacco smoker: No      Systolic Blood Pressure: 130 mmHg      Is BP treated: Yes      HDL Cholesterol: 45 mg/dL      Total Cholesterol: 150 mg/dL     Follow Up  Follow up for test results.      Kleber Storey MD

## 2024-01-04 ENCOUNTER — HOSPITAL ENCOUNTER (OUTPATIENT)
Dept: CARDIOLOGY | Facility: HOSPITAL | Age: 79
Discharge: HOME | End: 2024-01-04
Attending: INTERNAL MEDICINE
Payer: MEDICARE

## 2024-01-04 DIAGNOSIS — I73.9 CLAUDICATION: ICD-10-CM

## 2024-01-04 DIAGNOSIS — Z90.11 S/P RIGHT MASTECTOMY: Primary | ICD-10-CM

## 2024-01-04 LAB
LEFT ANT TIBIAL SYS PSV: 62 CM/S
LEFT CFA PSV: 84 CM/S
LEFT EXTERNAL ILIAC PSV: 97 CM/S
LEFT PERONEAL SYS PSV: 72 CM/S
LEFT POPLITEAL PSV: 47 CM/S
LEFT POST TIBIAL SYS PSV: 74 CM/S
LEFT PROFUNDA SYS PSV: 53 CM/S
LEFT SUPER FEMORAL DIST SYS PSV: 88 CM/S
LEFT SUPER FEMORAL MID SYS PSV: 72 CM/S
LEFT SUPER FEMORAL OSTIAL SYS PSV: 63 CM/S
LEFT SUPER FEMORAL PROX SYS PSV: 75 CM/S
LEFT TIB/PER TRUNK SYS PSV: 69 CM/S
OHS CV LEFT LOWER EXTREMITY ABI (NO CALC): 1.23
OHS CV RIGHT ABI LOWER EXTREMITY (NO CALC): 1.19
RIGHT ANT TIBIAL SYS PSV: 112 CM/S
RIGHT CFA PSV: 105 CM/S
RIGHT EXTERNAL ILLIAC PSV: 107 CM/S
RIGHT PERONEAL SYS PSV: 106 CM/S
RIGHT POPLITEAL PSV: 56 CM/S
RIGHT POST TIBIAL SYS PSV: 88 CM/S
RIGHT PROFUNDA SYS PSV: 53 CM/S
RIGHT SUPER FEMORAL DIST SYS PSV: 56 CM/S
RIGHT SUPER FEMORAL MID SYS PSV: 86 CM/S
RIGHT SUPER FEMORAL OSTIAL SYS PSV: 65 CM/S
RIGHT SUPER FEMORAL PROX SYS PSV: 93 CM/S
RIGHT TIB/PER TRUNK SYS PSV: 66 CM/S

## 2024-01-04 PROCEDURE — 93925 LOWER EXTREMITY STUDY: CPT | Mod: HCNC

## 2024-01-04 PROCEDURE — 93925 LOWER EXTREMITY STUDY: CPT | Mod: 26,HCNC,, | Performed by: INTERNAL MEDICINE

## 2024-01-11 DIAGNOSIS — Z00.00 ENCOUNTER FOR MEDICARE ANNUAL WELLNESS EXAM: ICD-10-CM

## 2024-02-19 ENCOUNTER — OFFICE VISIT (OUTPATIENT)
Dept: INTERNAL MEDICINE | Facility: CLINIC | Age: 79
End: 2024-02-19
Payer: MEDICARE

## 2024-02-19 VITALS
BODY MASS INDEX: 28.65 KG/M2 | DIASTOLIC BLOOD PRESSURE: 68 MMHG | SYSTOLIC BLOOD PRESSURE: 120 MMHG | WEIGHT: 171.94 LBS | RESPIRATION RATE: 95 BRPM | HEIGHT: 65 IN | HEART RATE: 61 BPM

## 2024-02-19 DIAGNOSIS — I70.0 ATHEROSCLEROSIS OF AORTA: ICD-10-CM

## 2024-02-19 DIAGNOSIS — M54.41 CHRONIC MIDLINE LOW BACK PAIN WITH RIGHT-SIDED SCIATICA: ICD-10-CM

## 2024-02-19 DIAGNOSIS — G89.29 CHRONIC MIDLINE LOW BACK PAIN WITH RIGHT-SIDED SCIATICA: ICD-10-CM

## 2024-02-19 DIAGNOSIS — Z00.00 ENCOUNTER FOR PREVENTIVE HEALTH EXAMINATION: Primary | ICD-10-CM

## 2024-02-19 DIAGNOSIS — Z85.3 HISTORY OF RIGHT BREAST CANCER: ICD-10-CM

## 2024-02-19 DIAGNOSIS — D47.3 ESSENTIAL (HEMORRHAGIC) THROMBOCYTHEMIA: ICD-10-CM

## 2024-02-19 DIAGNOSIS — M47.26 OSTEOARTHRITIS OF SPINE WITH RADICULOPATHY, LUMBAR REGION: ICD-10-CM

## 2024-02-19 DIAGNOSIS — E78.49 OTHER HYPERLIPIDEMIA: ICD-10-CM

## 2024-02-19 DIAGNOSIS — M46.96 INFLAMMATORY SPONDYLOPATHY OF LUMBAR REGION: ICD-10-CM

## 2024-02-19 DIAGNOSIS — I10 ESSENTIAL HYPERTENSION: ICD-10-CM

## 2024-02-19 PROBLEM — M62.81 WEAKNESS OF TRUNK MUSCULATURE: Status: RESOLVED | Noted: 2021-10-04 | Resolved: 2024-02-19

## 2024-02-19 PROCEDURE — 1101F PT FALLS ASSESS-DOCD LE1/YR: CPT | Mod: HCNC,CPTII,S$GLB, | Performed by: NURSE PRACTITIONER

## 2024-02-19 PROCEDURE — 3078F DIAST BP <80 MM HG: CPT | Mod: HCNC,CPTII,S$GLB, | Performed by: NURSE PRACTITIONER

## 2024-02-19 PROCEDURE — G0439 PPPS, SUBSEQ VISIT: HCPCS | Mod: HCNC,S$GLB,, | Performed by: NURSE PRACTITIONER

## 2024-02-19 PROCEDURE — 1159F MED LIST DOCD IN RCRD: CPT | Mod: HCNC,CPTII,S$GLB, | Performed by: NURSE PRACTITIONER

## 2024-02-19 PROCEDURE — 1160F RVW MEDS BY RX/DR IN RCRD: CPT | Mod: HCNC,CPTII,S$GLB, | Performed by: NURSE PRACTITIONER

## 2024-02-19 PROCEDURE — 3074F SYST BP LT 130 MM HG: CPT | Mod: HCNC,CPTII,S$GLB, | Performed by: NURSE PRACTITIONER

## 2024-02-19 PROCEDURE — 1170F FXNL STATUS ASSESSED: CPT | Mod: HCNC,CPTII,S$GLB, | Performed by: NURSE PRACTITIONER

## 2024-02-19 PROCEDURE — 3288F FALL RISK ASSESSMENT DOCD: CPT | Mod: HCNC,CPTII,S$GLB, | Performed by: NURSE PRACTITIONER

## 2024-02-19 PROCEDURE — 99999 PR PBB SHADOW E&M-EST. PATIENT-LVL IV: CPT | Mod: PBBFAC,HCNC,, | Performed by: NURSE PRACTITIONER

## 2024-02-19 PROCEDURE — 1125F AMNT PAIN NOTED PAIN PRSNT: CPT | Mod: HCNC,CPTII,S$GLB, | Performed by: NURSE PRACTITIONER

## 2024-02-19 RX ORDER — LANOLIN ALCOHOL/MO/W.PET/CERES
100 CREAM (GRAM) TOPICAL DAILY
COMMUNITY

## 2024-02-19 NOTE — PATIENT INSTRUCTIONS
Counseling and Referral of Other Preventative  (Italic type indicates deductible and co-insurance are waived)    Patient Name: Odessa Vaughn  Today's Date: 2/19/2024    Health Maintenance       Date Due Completion Date    RSV Vaccine (Age 60+ and Pregnant patients) (1 - 1-dose 60+ series) Never done ---    Influenza Vaccine (1) 09/01/2023 10/15/2022    Lipid Panel 03/09/2024 3/9/2023    Override on 9/4/2018: Done    Mammogram 08/21/2024 8/21/2023    Colonoscopy 04/10/2026 4/10/2023    DEXA Scan 10/05/2026 10/5/2023    Override on 10/5/2010: Not Clinically Appropriate (DUE IN 2014)    TETANUS VACCINE 10/11/2028 10/11/2018        No orders of the defined types were placed in this encounter.    The following information is provided to all patients.  This information is to help you find resources for any of the problems found today that may be affecting your health:                  Living healthy guide: www.[facility] [place] [facility].[place].gov      Understanding Diabetes: www.diabetes.org      Eating healthy: www.cdc.gov/healthyweight      CDC home safety checklist: www.cdc.gov/steadi/patient.html      Agency on Aging: www.goea.[place].gov      Alcoholics anonymous (AA): www.aa.org      Physical Activity: www.helena.nih.gov/bx3plip      Tobacco use: www.quitwithusla.org

## 2024-02-19 NOTE — PROGRESS NOTES
"  Odessa Vaughn presented for a  Medicare AWV and comprehensive Health Risk Assessment today. The following components were reviewed and updated:    Medical history  Family History  Social history  Allergies and Current Medications  Health Risk Assessment  Health Maintenance  Care Team         ** See Completed Assessments for Annual Wellness Visit within the encounter summary.**         The following assessments were completed:  Living Situation  CAGE  Depression Screening  Timed Get Up and Go  Whisper Test  Cognitive Function Screening    Nutrition Screening  ADL Screening  PAQ Screening  OPIOID Screening: Patient does not have a prescription for narcotics. Patient does not use substance         Vitals:    02/19/24 1104 02/19/24 1132   BP:  120/68   BP Location:  Left arm   Pulse:  61   Resp:  (!) 95   Weight: 78 kg (171 lb 15.3 oz)    Height: 5' 5" (1.651 m)      Body mass index is 28.62 kg/m².  Physical Exam  Vitals and nursing note reviewed.   Constitutional:       Appearance: She is well-developed.   HENT:      Head: Normocephalic.   Cardiovascular:      Rate and Rhythm: Normal rate and regular rhythm.      Heart sounds: Normal heart sounds. No murmur heard.  Pulmonary:      Effort: Pulmonary effort is normal.      Breath sounds: Normal breath sounds.   Abdominal:      General: Bowel sounds are normal.      Palpations: Abdomen is soft. There is no mass.   Musculoskeletal:         General: Normal range of motion.   Skin:     General: Skin is warm and dry.   Neurological:      General: No focal deficit present.      Mental Status: She is alert and oriented to person, place, and time.      Motor: No abnormal muscle tone.               Diagnoses and health risks identified today and associated recommendations/orders:    1. Encounter for preventive health examination  Here for Health Risk Assessment/Annual Wellness Visit.  Health maintenance reviewed and updated. Follow up in one year.   Reports she obtained " influenza vaccine at outside provider  Discussed RSV vaccine.    2. Essential hypertension  Chronic, stable on current medication. Followed by PCP.     3. Atherosclerosis of aorta  Chronic, stable on current medications. Noted CXR 6/05/17. Followed by PCP.     4. Other hyperlipidemia  Chronic, stable on current medication. Followed by PCP.     5. History of right breast cancer  Followed by Oncology, PCP.    6. Essential (hemorrhagic) thrombocythemia  Chronic, stable. Followed by PCP, Oncology.    7. Chronic midline low back pain with right-sided sciatica  Chronic, stable on current medications. Followed by PCP, Orthopedics.    8. Inflammatory spondylopathy of lumbar region  Chronic, stable on current medications. Followed by PCP, Orthopedics.    9. Osteoarthritis of spine with radiculopathy, lumbar region  Chronic, stable on current medications. Followed by PCP, Orthopedics.      Provided Odessa with a 5-10 year written screening schedule and personal prevention plan. Recommendations were developed using the USPSTF age appropriate recommendations. Education, counseling, and referrals were provided as needed. After Visit Summary printed and given to patient which includes a list of additional screenings\tests needed.    Follow up in 1 month (on 3/19/2024).with PCP    Shayy Graf NP

## 2024-03-18 ENCOUNTER — TELEPHONE (OUTPATIENT)
Dept: OPHTHALMOLOGY | Facility: CLINIC | Age: 79
End: 2024-03-18
Payer: MEDICARE

## 2024-03-18 ENCOUNTER — OFFICE VISIT (OUTPATIENT)
Dept: OBSTETRICS AND GYNECOLOGY | Facility: CLINIC | Age: 79
End: 2024-03-18
Payer: MEDICARE

## 2024-03-18 VITALS
BODY MASS INDEX: 27.92 KG/M2 | DIASTOLIC BLOOD PRESSURE: 68 MMHG | WEIGHT: 167.56 LBS | HEIGHT: 65 IN | SYSTOLIC BLOOD PRESSURE: 147 MMHG

## 2024-03-18 DIAGNOSIS — Z85.3 PERSONAL HISTORY OF BREAST CANCER: ICD-10-CM

## 2024-03-18 DIAGNOSIS — Z01.419 ENCOUNTER FOR GYNECOLOGICAL EXAMINATION WITHOUT ABNORMAL FINDING: Primary | ICD-10-CM

## 2024-03-18 PROCEDURE — 1126F AMNT PAIN NOTED NONE PRSNT: CPT | Mod: HCNC,CPTII,S$GLB, | Performed by: OBSTETRICS & GYNECOLOGY

## 2024-03-18 PROCEDURE — G0101 CA SCREEN;PELVIC/BREAST EXAM: HCPCS | Mod: HCNC,GZ,S$GLB, | Performed by: OBSTETRICS & GYNECOLOGY

## 2024-03-18 PROCEDURE — 3077F SYST BP >= 140 MM HG: CPT | Mod: HCNC,CPTII,S$GLB, | Performed by: OBSTETRICS & GYNECOLOGY

## 2024-03-18 PROCEDURE — 99999 PR PBB SHADOW E&M-EST. PATIENT-LVL II: CPT | Mod: PBBFAC,HCNC,, | Performed by: OBSTETRICS & GYNECOLOGY

## 2024-03-18 PROCEDURE — 1101F PT FALLS ASSESS-DOCD LE1/YR: CPT | Mod: HCNC,CPTII,S$GLB, | Performed by: OBSTETRICS & GYNECOLOGY

## 2024-03-18 PROCEDURE — 3078F DIAST BP <80 MM HG: CPT | Mod: HCNC,CPTII,S$GLB, | Performed by: OBSTETRICS & GYNECOLOGY

## 2024-03-18 PROCEDURE — 3288F FALL RISK ASSESSMENT DOCD: CPT | Mod: HCNC,CPTII,S$GLB, | Performed by: OBSTETRICS & GYNECOLOGY

## 2024-03-18 NOTE — PROGRESS NOTES
Subjective:       Patient ID: Odessa Vaughn is a 78 y.o. female.    Chief Complaint:  Annual Exam and Gynecologic Exam      History of Present Illness  HPI    Odessa Vaughn is a 78 y.o. female  new to me(prior patient of Dr. Zavaleta)  here for her annual GYN exam.  She uses her stationary bike for exercise about 30 minutes 5x.week.  She is menopausal since her late 50's and is not on HRT. She is a breast cancer survivor, underwent Right Mastectomy in .  She reports intermittent upper abdominal pain just beneath left rib cage (of note, had Diverticulosis on colonoscopy, we discussed that if pain is severe or more persistent, to contact PCP for further evaluation).   denies break through bleeding.   denies vaginal itching or irritation.  Denies vaginal discharge.  She is not currently sexually active. She has had 1 partner for 59 years .     History of abnormal pap: No  Last Pap: patient does not recall when last pap was  Last MMG: normal--routine follow-up in 12 months  Last Colonoscopy:  colonoscopy 1 years ago with abnormalities.(Had polyp, and has diverticulosis)  denies domestic violence. She does feel safe at home.     Past Medical History:   Diagnosis Date    Arthritis     hands, legs    Breast cancer 2012    Right breast invasive ductal carcinoma, ER positive, GA and Her2 negative    Bursitis of left hip     resolved    Chronic midline low back pain with left-sided sciatica 2017    Essential hypertension 2015    Hyperlipidemia 09/15/2014     Past Surgical History:   Procedure Laterality Date    BREAST BIOPSY Right 2012    Right breast- IDC    BREAST SURGERY Right 2013    right mastectomy, SN biopsy     COLONOSCOPY N/A 2017    Procedure: COLONOSCOPY;  Surgeon: Syed Shah MD;  Location: [facility] ([address] [place] [address]);  Service: Endoscopy;  Laterality: N/A;    COLONOSCOPY N/A 04/10/2023    Procedure: COLONOSCOPY;  Surgeon: Kamran Mcdermott MD;  Location:  [facility] (4TH FLR);  Service: Endoscopy;  Laterality: N/A;  inst portal-RB  4/3/23-procedure confirmed with precall, patient requesting prep be changed as current prep ordered is not covered by insurance. Patient requesting a call back. Notified hallway . Octavia RN    DILATION AND CURETTAGE OF UTERUS      EPIDURAL STEROID INJECTION N/A 07/25/2023    Procedure: LUMBAR L5/S1 IL KARLA (AIM TO RIGHT) DIRECT REFERRAL;  Surgeon: Elza Thomas MD;  Location: [facility] PAIN MGT;  Service: Pain Management;  Laterality: N/A;    MASTECTOMY  01/2013    TRANSFORAMINAL EPIDURAL INJECTION OF STEROID Right 11/11/2021    Procedure: Injection,steroid,epidural,transforaminal Right L4/L5 and L5/S1 Direct Referral;  Surgeon: Elza Thomas MD;  Location: [facility] PAIN MGT;  Service: Pain Management;  Laterality: Right;    TRANSFORAMINAL EPIDURAL INJECTION OF STEROID Right 12/09/2021    Procedure: Injection,steroid,epidural,transforaminal approach RIGHT L4-L5 AND L5-S1 DIRECT REFERRAL;  Surgeon: Elza Thomas MD;  Location: [facility] PAIN [facility]GT;  Service: Pain Management;  Laterality: Right;     Social History     Socioeconomic History    Marital status:    Tobacco Use    Smoking status: Never    Smokeless tobacco: Never    Tobacco comments:     Retired;    Substance and Sexual Activity    Alcohol use: Not Currently     Comment: holiday - occasional wine    Drug use: No    Sexual activity: Not Currently     Partners: Male     Comment: , since 1965     Social Determinants of Health     Financial Resource Strain: Low Risk  (4/6/2023)    Overall Financial Resource Strain (CARDIA)     Difficulty of Paying Living Expenses: Not hard at all   Food Insecurity: No Food Insecurity (4/6/2023)    Hunger Vital Sign     Worried About Running Out of Food in the Last Year: Never true     Ran Out of Food in the Last Year: Never true   Transportation Needs: No Transportation Needs (4/6/2023)    PRAPARE -  Transportation     Lack of Transportation (Medical): No     Lack of Transportation (Non-Medical): No   Physical Activity: Sufficiently Active (4/6/2023)    Exercise Vital Sign     Days of Exercise per Week: 7 days     Minutes of Exercise per Session: 120 min   Stress: No Stress Concern Present (4/6/2023)    [employer] - Occupational Stress Questionnaire     Feeling of Stress : Not at all   Social Connections: Moderately Integrated (4/6/2023)    Social Connection and Isolation Panel [NHANES]     Frequency of Communication with Friends and Family: More than three times a week     Frequency of Social Gatherings with Friends and Family: Twice a week     Attends Jain Services: More than 4 times per year     Active Member of Clubs or Organizations: No     Attends Club or Organization Meetings: Never     Marital Status:    Housing Stability: Unknown (4/6/2023)    Housing Stability Vital Sign     Unable to Pay for Housing in the Last Year: No     Unstable Housing in the Last Year: No     Family History   Problem Relation Age of Onset    Cancer Father         Pancreatic CA    Cataracts Father     Breast cancer Cousin 58    Breast cancer Cousin 58    No Known Problems Mother     Hypertension Sister     Arthritis Brother     No Known Problems Daughter     COPD Sister     No Known Problems Brother     No Known Problems Brother     No Known Problems Brother     No Known Problems Daughter     Breast cancer Paternal Aunt 55    Cancer Paternal Aunt         Breast Ca and Lung CA    No Known Problems Maternal Aunt     No Known Problems Maternal Uncle     No Known Problems Paternal Uncle     No Known Problems Maternal Grandmother     No Known Problems Maternal Grandfather     No Known Problems Paternal Grandmother     No Known Problems Paternal Grandfather     Ovarian cancer Neg Hx     Amblyopia Neg Hx     Blindness Neg Hx     Diabetes Neg Hx     Glaucoma Neg Hx     Macular degeneration Neg Hx  "    Retinal detachment Neg Hx     Strabismus Neg Hx     Stroke Neg Hx     Thyroid disease Neg Hx     Osteoarthritis Neg Hx     Rheum arthritis Neg Hx      OB History          2    Para   2    Term   2            AB        Living   2         SAB        IAB        Ectopic        Multiple        Live Births   2                 BP (!) 147/68   Ht 5' 5" (1.651 m)   Wt 76 kg (167 lb 8.8 oz)   LMP 2003 (Approximate)   BMI 27.88 kg/m²         GYN & OB History  Patient's last menstrual period was 2003 (approximate).   Date of Last Pap: No result found    OB History    Para Term  AB Living   2 2 2     2   SAB IAB Ectopic Multiple Live Births           2      # Outcome Date GA Lbr Von/2nd Weight Sex Delivery Anes PTL Lv   2 Term      Vag-Spont   GIOVANNA   1 Term      Vag-Spont   GIOVANNA       Review of Systems  Review of Systems   Constitutional:  Negative for activity change, appetite change, fatigue and unexpected weight change.   HENT: Negative.     Eyes:  Negative for visual disturbance.   Respiratory:  Negative for shortness of breath and wheezing.    Cardiovascular:  Negative for chest pain, palpitations and leg swelling.   Gastrointestinal:  Positive for abdominal pain. Negative for bloating and blood in stool.   Endocrine: Negative for diabetes and hair loss.   Genitourinary:  Negative for hot flashes and vaginal bleeding.   Musculoskeletal:  Negative for back pain and joint swelling.   Integumentary:  Negative for acne, hair changes and nipple discharge.   Neurological:  Negative for headaches.   Hematological:  Does not bruise/bleed easily.   Psychiatric/Behavioral:  Negative for depression and sleep disturbance. The patient is not nervous/anxious.    Breast: Negative for mastodynia and nipple discharge          Objective:      Physical Exam:   Constitutional: She is oriented to person, place, and time. She appears well-developed and well-nourished.    HENT:   Head: Normocephalic " and atraumatic.    Eyes: Pupils are equal, round, and reactive to light. EOM are normal.     Cardiovascular:  Normal rate and regular rhythm.             Pulmonary/Chest: Effort normal and breath sounds normal.   BREASTS: Right  Mastectomy.  Left breast exhibits no inverted nipple, no mass, no nipple discharge, no skin change, no tenderness, no bleeding and no swelling..              Abdominal: Soft. Bowel sounds are normal.     Genitourinary:    Pelvic exam was performed with patient supine.      Genitourinary Comments: PELVIC: Normal external genitalia without lesions.  Normal hair distribution.  Adequate perineal body, normal urethral meatus.  Vagina  Dry and poorly rugated, atrophic, without lesions or discharge.  Cervix pink, without lesions, discharge or tenderness.  No significant cystocele or rectocele.  Bimanual exam shows uterus to be normal size, regular, mobile and nontender.  Adnexa without masses or tenderness.    RECTAL:Deferred                 Musculoskeletal: Normal range of motion and moves all extremeties.       Neurological: She is alert and oriented to person, place, and time.    Skin: Skin is warm and dry.    Psychiatric: She has a normal mood and affect.              Assessment:        1. Encounter for gynecological examination without abnormal finding    2. Personal history of breast cancer                Plan:        Problem List Items Addressed This Visit    None  Visit Diagnoses       Encounter for gynecological examination without abnormal finding    -  Primary    Personal history of breast cancer                 Follow up in about 1 year (around 3/18/2025).

## 2024-03-18 NOTE — HIM RECORD RETIREMENT NOTE
[facility] of Incomplete Medical Record    3/18/24    Patient Name: Odessa Vaughn  Contact Serial # (CSN): [id number]  Patient Medical Record # (MRN): [id number]  Date of Service: Clinical Support on 2/3/2023  Physician Name: Bryce Perkins     This record has been reviewed and is being retired as incomplete by the approval of the  Medical Staff Operating Committee (MSOC)     On 3/8/2023., due to:  Unavailability of Provider     Missing Information/Comments:  []    Discharge Summary   []    DC Note/Short Stay Summary   []    ED Provider Note   []    Delivery Note   []    History & Physical   []   Operative Note   []     Procedure Note   []     Physician Order   [x]     Verbal Order   []       Other, specify:

## 2024-03-19 ENCOUNTER — OFFICE VISIT (OUTPATIENT)
Dept: INTERNAL MEDICINE | Facility: CLINIC | Age: 79
End: 2024-03-19
Payer: MEDICARE

## 2024-03-19 ENCOUNTER — LAB VISIT (OUTPATIENT)
Dept: LAB | Facility: HOSPITAL | Age: 79
End: 2024-03-19
Attending: INTERNAL MEDICINE
Payer: MEDICARE

## 2024-03-19 VITALS
SYSTOLIC BLOOD PRESSURE: 130 MMHG | DIASTOLIC BLOOD PRESSURE: 62 MMHG | BODY MASS INDEX: 28.06 KG/M2 | WEIGHT: 168.44 LBS | HEIGHT: 65 IN | OXYGEN SATURATION: 96 % | HEART RATE: 70 BPM

## 2024-03-19 DIAGNOSIS — I10 ESSENTIAL HYPERTENSION: ICD-10-CM

## 2024-03-19 DIAGNOSIS — Z00.00 ANNUAL PHYSICAL EXAM: ICD-10-CM

## 2024-03-19 DIAGNOSIS — R82.90 ABNORMAL URINE: Primary | ICD-10-CM

## 2024-03-19 DIAGNOSIS — M79.10 MUSCLE PAIN: ICD-10-CM

## 2024-03-19 DIAGNOSIS — M54.41 CHRONIC MIDLINE LOW BACK PAIN WITH RIGHT-SIDED SCIATICA: ICD-10-CM

## 2024-03-19 DIAGNOSIS — R10.9 LEFT SIDED ABDOMINAL PAIN: ICD-10-CM

## 2024-03-19 DIAGNOSIS — Z00.00 ANNUAL PHYSICAL EXAM: Primary | ICD-10-CM

## 2024-03-19 DIAGNOSIS — Z85.3 HISTORY OF RIGHT BREAST CANCER: ICD-10-CM

## 2024-03-19 DIAGNOSIS — G89.29 CHRONIC MIDLINE LOW BACK PAIN WITH RIGHT-SIDED SCIATICA: ICD-10-CM

## 2024-03-19 LAB
ALBUMIN SERPL BCP-MCNC: 3.8 G/DL (ref 3.5–5.2)
ALP SERPL-CCNC: 111 U/L (ref 55–135)
ALT SERPL W/O P-5'-P-CCNC: 22 U/L (ref 10–44)
ANION GAP SERPL CALC-SCNC: 4 MMOL/L (ref 8–16)
AST SERPL-CCNC: 23 U/L (ref 10–40)
BACTERIA #/AREA URNS AUTO: ABNORMAL /HPF
BASOPHILS # BLD AUTO: 0.04 K/UL (ref 0–0.2)
BASOPHILS NFR BLD: 0.5 % (ref 0–1.9)
BILIRUB SERPL-MCNC: 0.4 MG/DL (ref 0.1–1)
BILIRUB UR QL STRIP: NEGATIVE
BUN SERPL-MCNC: 18 MG/DL (ref 8–23)
CALCIUM SERPL-MCNC: 9.9 MG/DL (ref 8.7–10.5)
CHLORIDE SERPL-SCNC: 106 MMOL/L (ref 95–110)
CHOLEST SERPL-MCNC: 143 MG/DL (ref 120–199)
CHOLEST/HDLC SERPL: 3.2 {RATIO} (ref 2–5)
CLARITY UR REFRACT.AUTO: ABNORMAL
CO2 SERPL-SCNC: 29 MMOL/L (ref 23–29)
COLOR UR AUTO: YELLOW
CREAT SERPL-MCNC: 0.8 MG/DL (ref 0.5–1.4)
DIFFERENTIAL METHOD BLD: ABNORMAL
EOSINOPHIL # BLD AUTO: 0.2 K/UL (ref 0–0.5)
EOSINOPHIL NFR BLD: 3 % (ref 0–8)
ERYTHROCYTE [DISTWIDTH] IN BLOOD BY AUTOMATED COUNT: 13.5 % (ref 11.5–14.5)
EST. GFR  (NO RACE VARIABLE): >60 ML/MIN/1.73 M^2
GLUCOSE SERPL-MCNC: 93 MG/DL (ref 70–110)
GLUCOSE UR QL STRIP: NEGATIVE
HCT VFR BLD AUTO: 41.2 % (ref 37–48.5)
HDLC SERPL-MCNC: 45 MG/DL (ref 40–75)
HDLC SERPL: 31.5 % (ref 20–50)
HGB BLD-MCNC: 12.4 G/DL (ref 12–16)
HGB UR QL STRIP: NEGATIVE
IMM GRANULOCYTES # BLD AUTO: 0.01 K/UL (ref 0–0.04)
IMM GRANULOCYTES NFR BLD AUTO: 0.1 % (ref 0–0.5)
KETONES UR QL STRIP: NEGATIVE
LDLC SERPL CALC-MCNC: 87.6 MG/DL (ref 63–159)
LEUKOCYTE ESTERASE UR QL STRIP: ABNORMAL
LYMPHOCYTES # BLD AUTO: 3 K/UL (ref 1–4.8)
LYMPHOCYTES NFR BLD: 38 % (ref 18–48)
MAGNESIUM SERPL-MCNC: 2 MG/DL (ref 1.6–2.6)
MCH RBC QN AUTO: 27.4 PG (ref 27–31)
MCHC RBC AUTO-ENTMCNC: 30.1 G/DL (ref 32–36)
MCV RBC AUTO: 91 FL (ref 82–98)
MICROSCOPIC COMMENT: ABNORMAL
MONOCYTES # BLD AUTO: 0.5 K/UL (ref 0.3–1)
MONOCYTES NFR BLD: 6.4 % (ref 4–15)
NEUTROPHILS # BLD AUTO: 4.1 K/UL (ref 1.8–7.7)
NEUTROPHILS NFR BLD: 52 % (ref 38–73)
NITRITE UR QL STRIP: NEGATIVE
NONHDLC SERPL-MCNC: 98 MG/DL
NRBC BLD-RTO: 0 /100 WBC
PH UR STRIP: 6 [PH] (ref 5–8)
PLATELET # BLD AUTO: 358 K/UL (ref 150–450)
PMV BLD AUTO: 9.4 FL (ref 9.2–12.9)
POTASSIUM SERPL-SCNC: 4.8 MMOL/L (ref 3.5–5.1)
PROT SERPL-MCNC: 7.6 G/DL (ref 6–8.4)
PROT UR QL STRIP: NEGATIVE
RBC # BLD AUTO: 4.53 M/UL (ref 4–5.4)
RBC #/AREA URNS AUTO: 1 /HPF (ref 0–4)
SODIUM SERPL-SCNC: 139 MMOL/L (ref 136–145)
SP GR UR STRIP: 1.02 (ref 1–1.03)
SQUAMOUS #/AREA URNS AUTO: 3 /HPF
TRIGL SERPL-MCNC: 52 MG/DL (ref 30–150)
URN SPEC COLLECT METH UR: ABNORMAL
WBC # BLD AUTO: 7.87 K/UL (ref 3.9–12.7)
WBC #/AREA URNS AUTO: 11 /HPF (ref 0–5)

## 2024-03-19 PROCEDURE — 83735 ASSAY OF MAGNESIUM: CPT | Mod: HCNC | Performed by: INTERNAL MEDICINE

## 2024-03-19 PROCEDURE — 1159F MED LIST DOCD IN RCRD: CPT | Mod: HCNC,CPTII,S$GLB, | Performed by: INTERNAL MEDICINE

## 2024-03-19 PROCEDURE — 1125F AMNT PAIN NOTED PAIN PRSNT: CPT | Mod: HCNC,CPTII,S$GLB, | Performed by: INTERNAL MEDICINE

## 2024-03-19 PROCEDURE — 3288F FALL RISK ASSESSMENT DOCD: CPT | Mod: HCNC,CPTII,S$GLB, | Performed by: INTERNAL MEDICINE

## 2024-03-19 PROCEDURE — 99397 PER PM REEVAL EST PAT 65+ YR: CPT | Mod: HCNC,S$GLB,, | Performed by: INTERNAL MEDICINE

## 2024-03-19 PROCEDURE — 85025 COMPLETE CBC W/AUTO DIFF WBC: CPT | Mod: HCNC | Performed by: INTERNAL MEDICINE

## 2024-03-19 PROCEDURE — 36415 COLL VENOUS BLD VENIPUNCTURE: CPT | Mod: HCNC | Performed by: INTERNAL MEDICINE

## 2024-03-19 PROCEDURE — 81001 URINALYSIS AUTO W/SCOPE: CPT | Mod: HCNC | Performed by: INTERNAL MEDICINE

## 2024-03-19 PROCEDURE — 1101F PT FALLS ASSESS-DOCD LE1/YR: CPT | Mod: HCNC,CPTII,S$GLB, | Performed by: INTERNAL MEDICINE

## 2024-03-19 PROCEDURE — 80053 COMPREHEN METABOLIC PANEL: CPT | Mod: HCNC | Performed by: INTERNAL MEDICINE

## 2024-03-19 PROCEDURE — 3078F DIAST BP <80 MM HG: CPT | Mod: HCNC,CPTII,S$GLB, | Performed by: INTERNAL MEDICINE

## 2024-03-19 PROCEDURE — 3075F SYST BP GE 130 - 139MM HG: CPT | Mod: HCNC,CPTII,S$GLB, | Performed by: INTERNAL MEDICINE

## 2024-03-19 PROCEDURE — 99999 PR PBB SHADOW E&M-EST. PATIENT-LVL IV: CPT | Mod: PBBFAC,HCNC,, | Performed by: INTERNAL MEDICINE

## 2024-03-19 PROCEDURE — 80061 LIPID PANEL: CPT | Mod: HCNC | Performed by: INTERNAL MEDICINE

## 2024-03-19 NOTE — PROGRESS NOTES
CC:  Physical exam     HPI:  The patient is a 78-year-old female with hypertension, hyperlipidemia right breast cancer status post mastectomy and sciatica involving right leg who presents today for annual exam.  The patient does have a couple of concerns.  She does get a jabbing type pain on her left side.  This happens 2 to 3 times a week.  It lasts just a few moments.  This started about 2 months ago.  The patient continues to have some right lower back pain with radiation to the right leg.  She does state that toes in her right foot do not faint open.  She would seen neurology who put her on B12.    ROS:  Patient reports losing about 1-2 lb.  She has an appointment for her eyes to be checked on the 28th of this month.  No auditory changes.  No dysphagia.  No chest pain.  No shortness of breath.  The patient does ride a stationary bike for about 30 minutes Monday through Friday.  No nausea vomiting.  No abdominal pain.  Has a bowel movement daily.  Sometimes he was 2 bowel movements a day.  No trouble urinating.  Does report some leg swelling in the morning.  Numbness in the right foot which is not new.  Her last colonoscopy was in April 23.  Last bone density was in October 23.    Physical exam:   General appearance:  No acute distress   HEENT: Conjunctiva is clear.  He is bilateral cataracts.  The right TM is partially obscured by wax.  The left is clear.  Nasal septum is midline without discharge.  Oropharynx without erythema.  Trachea is midline without JVD or thyromegaly.  Pulmonary:  Good inspiratory, expiratory breath sounds are heard.  Lungs are clear to auscultation.    Cardiovascular:  S1-S2, the patient does have a right carotid bruit.  An ultrasound was done in January which showed no obstruction.  Extremities with trace edema.    GI:  Abdomen was nontender, nondistended without hepatosplenomegaly    Assessment:  1.  Annual exam  2. Right breast cancer status post mastectomy  3.  Chronic low back  pain with right-sided sciatica  4.  Hypertension   5. Left-sided abdominal discomfort  6. History of right carotid bruit the negative ultrasound     Plan:  1. Will order an ultrasound of the abdomen   2.  Will order a CBC, CMP, lipid panel, UA and magnesium level.

## 2024-03-20 ENCOUNTER — TELEPHONE (OUTPATIENT)
Dept: INTERNAL MEDICINE | Facility: CLINIC | Age: 79
End: 2024-03-20
Payer: MEDICARE

## 2024-03-20 ENCOUNTER — LAB VISIT (OUTPATIENT)
Dept: LAB | Facility: HOSPITAL | Age: 79
End: 2024-03-20
Payer: MEDICARE

## 2024-03-20 ENCOUNTER — PATIENT MESSAGE (OUTPATIENT)
Dept: INTERNAL MEDICINE | Facility: CLINIC | Age: 79
End: 2024-03-20
Payer: MEDICARE

## 2024-03-20 DIAGNOSIS — R82.90 ABNORMAL URINE: ICD-10-CM

## 2024-03-20 LAB
BACTERIA #/AREA URNS AUTO: ABNORMAL /HPF
BILIRUB UR QL STRIP: NEGATIVE
CLARITY UR REFRACT.AUTO: ABNORMAL
COLOR UR AUTO: YELLOW
GLUCOSE UR QL STRIP: NEGATIVE
HGB UR QL STRIP: NEGATIVE
KETONES UR QL STRIP: NEGATIVE
LEUKOCYTE ESTERASE UR QL STRIP: ABNORMAL
MICROSCOPIC COMMENT: ABNORMAL
NITRITE UR QL STRIP: NEGATIVE
PH UR STRIP: 7 [PH] (ref 5–8)
PROT UR QL STRIP: ABNORMAL
RBC #/AREA URNS AUTO: 1 /HPF (ref 0–4)
SP GR UR STRIP: 1.02 (ref 1–1.03)
SQUAMOUS #/AREA URNS AUTO: 15 /HPF
URN SPEC COLLECT METH UR: ABNORMAL
WBC #/AREA URNS AUTO: 12 /HPF (ref 0–5)

## 2024-03-20 PROCEDURE — 87086 URINE CULTURE/COLONY COUNT: CPT | Mod: HCNC | Performed by: INTERNAL MEDICINE

## 2024-03-20 PROCEDURE — 81001 URINALYSIS AUTO W/SCOPE: CPT | Mod: HCNC | Performed by: INTERNAL MEDICINE

## 2024-03-20 NOTE — TELEPHONE ENCOUNTER
----- Message from Mitchel Spencer MD sent at 3/19/2024  7:40 PM CDT -----  Her urine sample showed a few more white blood cells than normal. Please let me know if she is having any urinary symptoms. I would like to repeat a urine sample plus do a urine culture. This is not a fasting test.

## 2024-03-21 LAB
BACTERIA UR CULT: NORMAL
BACTERIA UR CULT: NORMAL

## 2024-03-23 ENCOUNTER — HOSPITAL ENCOUNTER (OUTPATIENT)
Dept: RADIOLOGY | Facility: HOSPITAL | Age: 79
Discharge: HOME | End: 2024-03-23
Attending: INTERNAL MEDICINE
Payer: MEDICARE

## 2024-03-23 DIAGNOSIS — R10.9 LEFT SIDED ABDOMINAL PAIN: ICD-10-CM

## 2024-03-23 PROCEDURE — 76700 US EXAM ABDOM COMPLETE: CPT | Mod: 26,HCNC,, | Performed by: RADIOLOGY

## 2024-03-23 PROCEDURE — 76700 US EXAM ABDOM COMPLETE: CPT | Mod: TC,HCNC

## 2024-03-24 DIAGNOSIS — Z85.3 HISTORY OF RIGHT BREAST CANCER: ICD-10-CM

## 2024-03-24 DIAGNOSIS — J90 PLEURAL EFFUSION ON RIGHT: Primary | ICD-10-CM

## 2024-03-25 ENCOUNTER — TELEPHONE (OUTPATIENT)
Dept: INTERNAL MEDICINE | Facility: CLINIC | Age: 79
End: 2024-03-25
Payer: MEDICARE

## 2024-03-25 ENCOUNTER — PATIENT MESSAGE (OUTPATIENT)
Dept: INTERNAL MEDICINE | Facility: CLINIC | Age: 79
End: 2024-03-25
Payer: MEDICARE

## 2024-03-25 ENCOUNTER — HOSPITAL ENCOUNTER (OUTPATIENT)
Dept: RADIOLOGY | Facility: HOSPITAL | Age: 79
Discharge: HOME | End: 2024-03-25
Attending: INTERNAL MEDICINE
Payer: MEDICARE

## 2024-03-25 DIAGNOSIS — Z85.3 HISTORY OF RIGHT BREAST CANCER: ICD-10-CM

## 2024-03-25 DIAGNOSIS — J90 PLEURAL EFFUSION ON RIGHT: ICD-10-CM

## 2024-03-25 DIAGNOSIS — R91.8 ABNORMAL CT SCAN OF LUNG: Primary | ICD-10-CM

## 2024-03-25 PROCEDURE — 71250 CT THORAX DX C-: CPT | Mod: TC,HCNC

## 2024-03-25 PROCEDURE — 71250 CT THORAX DX C-: CPT | Mod: 26,HCNC,, | Performed by: RADIOLOGY

## 2024-03-25 NOTE — TELEPHONE ENCOUNTER
----- Message from Mitchel Spencer MD sent at 3/22/2024  5:33 PM CDT -----  Please contact patient.  Her urine sample showed a few white blood cells; but, her culture did not grow out any specific bacteria.  Please see if she was having any symptoms.  If not, I will not treat this.

## 2024-03-25 NOTE — TELEPHONE ENCOUNTER
----- Message from Mitchel Spencer MD sent at 3/24/2024  3:51 PM CDT -----  Please contact patient. Her ultrasound showed two small cysts of the kidneys. These are benign. It did show some fluid around her right lung. I would like a cat scan of her chest. A stat scan is ordered.

## 2024-03-26 ENCOUNTER — PATIENT MESSAGE (OUTPATIENT)
Dept: INTERNAL MEDICINE | Facility: CLINIC | Age: 79
End: 2024-03-26
Payer: MEDICARE

## 2024-03-26 ENCOUNTER — TELEPHONE (OUTPATIENT)
Dept: INTERNAL MEDICINE | Facility: CLINIC | Age: 79
End: 2024-03-26
Payer: MEDICARE

## 2024-03-26 ENCOUNTER — TELEPHONE (OUTPATIENT)
Dept: PULMONOLOGY | Facility: CLINIC | Age: 79
End: 2024-03-26
Payer: MEDICARE

## 2024-03-26 NOTE — TELEPHONE ENCOUNTER
Call returned patient, patient states she would like to schedule appointment with pulmonary to have another CT to look at her lungs following her CT scan yesterday. Explained to the patient that I can get her scheduled with a pulmonary provider but no additional imaging would be done for 3-6 months. Patients states she will call back after speaking to her daughter.

## 2024-03-26 NOTE — TELEPHONE ENCOUNTER
----- Message from Vik Deluca sent at 3/26/2024  9:00 AM CDT -----  Type:  Sooner Apoointment Request    Caller is requesting a sooner appointment.  Caller declined first available appointment listed below.  Caller will not accept being placed on the waitlist and is requesting a message be sent to doctor.  Name of Caller:pt  When is the first available appointment?none  Symptoms:pleural effusion  Would the patient rather a call back or a response via [facility]? Call  Best Call Back Number: [phone]  Additional Information: pt states she would like to schedule w/ provider asap. Thank you

## 2024-03-26 NOTE — TELEPHONE ENCOUNTER
----- Message from Mitchel Spencer MD sent at 3/25/2024  5:52 PM CDT -----  Please contact patient.  Her CT scan showed a small area of nodularity in the right middle lobe.  This has nonspecific.  This could be something old or new.  A repeat CT scan in 3 months was recommended to see if this resolves.  Order is in.

## 2024-03-27 ENCOUNTER — OFFICE VISIT (OUTPATIENT)
Dept: PULMONOLOGY | Facility: CLINIC | Age: 79
End: 2024-03-27
Payer: MEDICARE

## 2024-03-27 VITALS
OXYGEN SATURATION: 97 % | DIASTOLIC BLOOD PRESSURE: 72 MMHG | WEIGHT: 171.31 LBS | HEIGHT: 65 IN | SYSTOLIC BLOOD PRESSURE: 124 MMHG | HEART RATE: 75 BPM | BODY MASS INDEX: 28.54 KG/M2

## 2024-03-27 DIAGNOSIS — R91.1 LUNG NODULE: Primary | ICD-10-CM

## 2024-03-27 PROCEDURE — 3074F SYST BP LT 130 MM HG: CPT | Mod: HCNC,CPTII,S$GLB, | Performed by: INTERNAL MEDICINE

## 2024-03-27 PROCEDURE — 1159F MED LIST DOCD IN RCRD: CPT | Mod: HCNC,CPTII,S$GLB, | Performed by: INTERNAL MEDICINE

## 2024-03-27 PROCEDURE — 99999 PR PBB SHADOW E&M-EST. PATIENT-LVL III: CPT | Mod: PBBFAC,HCNC,, | Performed by: INTERNAL MEDICINE

## 2024-03-27 PROCEDURE — 3078F DIAST BP <80 MM HG: CPT | Mod: HCNC,CPTII,S$GLB, | Performed by: INTERNAL MEDICINE

## 2024-03-27 PROCEDURE — 99203 OFFICE O/P NEW LOW 30 MIN: CPT | Mod: HCNC,S$GLB,, | Performed by: INTERNAL MEDICINE

## 2024-03-27 PROCEDURE — 1101F PT FALLS ASSESS-DOCD LE1/YR: CPT | Mod: HCNC,CPTII,S$GLB, | Performed by: INTERNAL MEDICINE

## 2024-03-27 PROCEDURE — 3288F FALL RISK ASSESSMENT DOCD: CPT | Mod: HCNC,CPTII,S$GLB, | Performed by: INTERNAL MEDICINE

## 2024-03-27 NOTE — PROGRESS NOTES
Subjective:      Patient ID: Odessa Vaughn is a 78 y.o. female.    Chief Complaint: Abnormal Ct Scan    HPI  Odessa Vaughn 78 y.o. female    has a past medical history of Arthritis, Breast cancer (12/2012), Bursitis of left hip (2016), Chronic midline low back pain with left-sided sciatica (06/05/2017), Essential hypertension (09/21/2015), and Hyperlipidemia (09/15/2014).    has a past surgical history that includes Dilation and curettage of uterus; Breast biopsy (Right, 12/19/2012); Mastectomy (01/2013); Breast surgery (Right, 01/2013); Colonoscopy (N/A, 09/07/2017); Transforaminal epidural injection of steroid (Right, 11/11/2021); Transforaminal epidural injection of steroid (Right, 12/09/2021); Colonoscopy (N/A, 04/10/2023); and Epidural steroid injection (N/A, 07/25/2023).   reports that she has never smoked. She has never used smokeless tobacco. She reports that she does not currently use alcohol. She reports that she does not use drugs.  Referred by: Aaareferral Self  Who had concerns including Abnormal Ct Scan.  The patient's last visit with me was on Visit date not found.  LAST VISIT    Interval History    78-year-old lady with a past medical history of breast cancer in 2012 status post surgery no chemotherapy hypertension who presents for an abnormal CT of the chest.  Patient had an ultrasound that showed possibly some fluid around the lungs and therefore had a CT.  CT showed a multiple pulmonary nodule area with some mild infiltrates, no discrete solid lesions.  Patient is lifetime nonsmoker however she had significant secondhand smoke from her father and also her  who was a heavy smoker.  Her last exposure was in 1971.  She feels well otherwise except for some right hip pain no shortness of breath no cough no infections no sputum production no fever.  Her family history is significant for an aunt with breast cancer and then a 2nd primary lung cancer.  That aunt was a former smoker no  pets although she does have exposure to dogs her daughter has a Labrador.  Does not garden does not have any asbestos exposure and no other risk factors    No fever chills, ns, wt changes, nausea, vomiting, diarrhea, constipation, chest pain, tightness, pressure. Remainder of review of systems is negative.  Otherwise asymptomatic from pulmonary standpoint.      Review of Systems  Objective:     Physical Exam   Constitutional: She is oriented to person, place, and time. She appears well-developed and well-nourished. She appears not cachectic. No distress.   HENT:   Head: Normocephalic.   Right Ear: External ear normal.   Left Ear: External ear normal.   Neck: No thyromegaly present.   Cardiovascular: Normal rate, regular rhythm, normal heart sounds and intact distal pulses. Exam reveals no gallop and no friction rub.   No murmur heard.  Pulmonary/Chest: Normal expansion, symmetric chest wall expansion, effort normal and breath sounds normal. No stridor. No respiratory distress. She has no decreased breath sounds. She has no wheezes. She has no rhonchi. She has no rales. Chest wall is not dull to percussion. She exhibits no tenderness. Negative for egophony. Negative for tactile fremitus.   Abdominal: She exhibits no distension.   Musculoskeletal:         General: No tenderness, deformity or edema.      Cervical back: Normal range of motion.   Lymphadenopathy:     She has no cervical adenopathy.   Neurological: She is alert and oriented to person, place, and time. No cranial nerve deficit. Gait normal.   Skin: Skin is warm and dry. No rash noted. She is not diaphoretic. No cyanosis or erythema. No pallor. Nails show no clubbing.   Psychiatric: She has a normal mood and affect. Her behavior is normal. Judgment and thought content normal.     Personal Diagnostic Review        3/27/2024     3:03 PM 3/19/2024     8:25 AM 3/18/2024     9:35 AM 2/19/2024    11:04 AM 12/22/2023     9:36 AM 11/29/2023     8:15 AM 9/12/2023     " 8:56 AM   Pulmonary Function Tests   SpO2 97 % 96 %   99 %  98 %   Height 5' 5" (1.651 m) 5' 5" (1.651 m) 5' 5" (1.651 m) 5' 5" (1.651 m)  5' 5" (1.651 m)    Weight 77.7 kg (171 lb 4.8 oz) 76.4 kg (168 lb 6.9 oz) 76 kg (167 lb 8.8 oz) 78 kg (171 lb 15.3 oz) 77.6 kg (171 lb) 78.6 kg (173 lb 4.5 oz) 78.5 kg (173 lb 1 oz)   BMI (Calculated) 28.5 28 27.9 28.6  28.8         Assessment:     1. Lung nodule         Outpatient Encounter Medications as of 3/27/2024   Medication Sig Dispense Refill    ascorbic acid, vitamin C, (VITAMIN C) 500 MG tablet Take 500 mg by mouth once daily.      aspirin 81 MG Chew Take 81 mg by mouth once daily.      atorvastatin (LIPITOR) 10 MG tablet TAKE 1 TABLET(10 MG) BY MOUTH EVERY DAY 90 tablet 1    cholecalciferol, vitamin D3, (VITAMIN D3) 50 mcg (2,000 unit) Cap capsule Take 2,000 Units by mouth once daily.      cyanocobalamin, vitamin B-12, (B-12 COMPLIANCE) 1,000 mcg/mL Kit Inject 1 mL as directed every 14 (fourteen) days. 2 kit 5    meloxicam (MOBIC) 15 MG tablet Take 1 tablet (15 mg total) by mouth once daily. (Patient taking differently: Take 15 mg by mouth daily as needed.) 30 tablet 6    multivitamin capsule Take 1 capsule by mouth once daily.      pregabalin (LYRICA) 25 MG capsule Take 1 capsule (25 mg total) by mouth 2 (two) times daily. 60 capsule 5    PROPYLENE GLYCOL//PF (SYSTANE, PF, OPHT) Apply to eye daily as needed.       thiamine (VITAMIN B-1) 100 MG tablet Take 100 mg by mouth once daily.      triamterene-hydrochlorothiazide 37.5-25 mg (DYAZIDE) 37.5-25 mg per capsule TAKE 1 CAPSULE BY MOUTH EVERY DAY 90 capsule 3     No facility-administered encounter medications on file as of 3/27/2024.     No orders of the defined types were placed in this encounter.      Plan:          Assessment:  Odessa was seen today for abnormal ct scan.    Diagnoses and all orders for this visit:    Lung nodule        Plan:  Problem List Items Addressed This Visit       Lung nodule - " Primary    Overview     Patient is a 78-year-old lady who is a lifetime nonsmoker however had significant secondhand smoke exposure who presents for evaluation of abnormal CT, specifically reviewed with the patient cluster of some micro nodules and some ground-glass opacities.  Patient has no pulmonary symptoms no cough fever shortness of breath.  She would COVID at the end of last year.  Plan to monitor her with a repeat CT in 3 months              Follow up in about 3 months (around 6/27/2024).    There are no Patient Instructions on file for this visit.    Immunization History   Administered Date(s) Administered    COVID-19, MRNA, LN-S, PF (Pfizer) (Gray Cap) 06/11/2022    COVID-19, MRNA, LN-S, PF (Pfizer) (Purple Cap) 01/05/2021, 01/26/2021, 10/05/2021    COVID-19, mRNA, LNP-S, PF (Moderna 2023)Ages 12+ 12/11/2023    COVID-19, mRNA, LNP-S, bivalent booster, PF (PFIZER OMICRON) 03/09/2023    Influenza (FLUAD) - Quadrivalent - Adjuvanted - PF *Preferred* (65+) 09/17/2020, 10/19/2021    Influenza - High Dose - PF (65 years and older) 10/05/2010, 10/07/2011, 01/10/2013, 10/10/2013, 09/25/2014, 09/21/2015, 09/18/2017, 10/11/2018, 09/24/2019    Influenza - Quadrivalent - High Dose - PF (65 years and older) 10/15/2022    Influenza - Trivalent (ADULT) 10/09/2007, 09/23/2009    Influenza Split 10/09/2007, 09/23/2009, 09/25/2014    Pneumococcal Conjugate - 13 Valent 06/01/2017    Pneumococcal Conjugate - 7 Valent 09/25/2014    Pneumococcal Polysaccharide - 23 Valent 09/25/2014    Tdap 10/11/2018    Zoster 09/25/2014    Zoster Recombinant 09/24/2019, 06/22/2020       I spent a total of 30 minutes on the day of the visit.  This includes face to face time and non-face to face time preparing to see the patient (eg, review of tests), obtaining and/or reviewing separately obtained history, documenting clinical information in the electronic or other health record, independently interpreting results and communicating results to  the patient/family/caregiver, or care coordinator.

## 2024-03-28 ENCOUNTER — OFFICE VISIT (OUTPATIENT)
Dept: OPTOMETRY | Facility: CLINIC | Age: 79
End: 2024-03-28
Payer: MEDICARE

## 2024-03-28 DIAGNOSIS — H52.7 REFRACTIVE ERRORS: ICD-10-CM

## 2024-03-28 DIAGNOSIS — H53.8 BLURRED VISION: Primary | ICD-10-CM

## 2024-03-28 PROCEDURE — 99499 UNLISTED E&M SERVICE: CPT | Mod: HCNC,S$GLB,, | Performed by: OPTOMETRIST

## 2024-03-28 PROCEDURE — 99999 PR PBB SHADOW E&M-EST. PATIENT-LVL II: CPT | Mod: PBBFAC,HCNC,, | Performed by: OPTOMETRIST

## 2024-03-28 NOTE — PROGRESS NOTES
"HPI     Eye Problem            Comments: Ms. Vaughn in for recheck.  Got new glasses following last visitin 11/2023.  But she finds she sees better ("for some things") if she closes her right   eye.  Notes no improvement in VA in OD when she closes the left eye.   NO DIPLOPIA REPORTED.,  Not diabetic.          Last edited by Adams Weaver, OD on 3/28/2024  8:10 AM.            Assessment /Plan     For exam results, see Encounter Report.    1. Blurred vision        2. Refractive errors                       Bilateral nuclear sclerosis of lens, consistent with age.  Early peripheral cortical cataract in both eyes, more apparent in the left eye.  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  S/p posterior vitreous detachment in the left eye.  Vitreous floater(s) in both eyes.    New spectacle lens Rx issued at the last visit.  Mrs. Vaughn in today with report of problem with VA in the right eye (as compared to the left eye) with the new lenses.    Rechecked refractrion     Hyperopia in the right eye, and hyperopia with astigmatism in the left eye.  Note change in the refraction of the right eye.    Refraction stable in the left eye.  Presbyopia consistent with age.  New spectacle lens Rx issued for full-time wear  Advised Ms. Vaughn to return to the [facility] optical department for remake of the right lens.    Call/return if problems persist with the new right lens.     Otherwise, recheck in November, 2023 - or prior, if any problems or bothersome changes in vision noted in the interim       Epiphora on right side. Comes and goes.  No evidence of punctal eversion or ectropion.  Fabiola Vaughn declines canalicular probing and irrigation at this time.  Monitor. Return if symptoms become more bothersome.     Otherwise, recheck in one      "

## 2024-03-28 NOTE — PATIENT INSTRUCTIONS
Bilateral nuclear sclerosis of lens, consistent with age.  Early peripheral cortical cataract in both eyes, more apparent in the left eye.  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  S/p posterior vitreous detachment in the left eye.  Vitreous floater(s) in both eyes.    New spectacle lens Rx issued at the last visit.  Mrs. Vaughn in today with report of problem with VA in the right eye (as compared to the left eye) with the new lenses.    Rechecked refractrion     Hyperopia in the right eye, and hyperopia with astigmatism in the left eye.  Note change in the refraction of the right eye.    Refraction stable in the left eye.  Presbyopia consistent with age.  New spectacle lens Rx issued for full-time wear  Advised Ms. Vaughn to return to the [facility] optical department for remake of the right lens.    Call/return if problems persist with the new right lens.     Otherwise, recheck in November, 2023 - or prior, if any problems or bothersome changes in vision noted in the interim       Epiphora on right side. Comes and goes.  No evidence of punctal eversion or ectropion.  Fabiola Vaughn declines canalicular probing and irrigation at this time.  Monitor. Return if symptoms become more bothersome.     Otherwise, recheck in one

## 2024-04-20 DIAGNOSIS — E78.5 HYPERLIPIDEMIA, UNSPECIFIED HYPERLIPIDEMIA TYPE: ICD-10-CM

## 2024-04-20 NOTE — TELEPHONE ENCOUNTER
No care due was identified.  Health [facility] Embedded Care Due Messages. Reference number: [id number].   4/20/2024 9:45:49 AM CDT

## 2024-04-21 RX ORDER — ATORVASTATIN CALCIUM 10 MG/1
TABLET, FILM COATED ORAL
Qty: 90 TABLET | Refills: 3 | Status: SHIPPED | OUTPATIENT
Start: 2024-04-21

## 2024-04-21 NOTE — TELEPHONE ENCOUNTER
Refill Decision Note   Odessa Vaughn  is requesting a refill authorization.  Brief Assessment and Rationale for Refill:  Approve     Medication Therapy Plan:        Comments:     Note composed:2:37 PM 04/21/2024

## 2024-06-06 DIAGNOSIS — E53.8 B12 DEFICIENCY: ICD-10-CM

## 2024-06-06 RX ORDER — CYANOCOBALAMIN 1000 UG/ML
INJECTION, SOLUTION INTRAMUSCULAR; SUBCUTANEOUS
Qty: 2 ML | Refills: 2 | Status: SHIPPED | OUTPATIENT
Start: 2024-06-06

## 2024-06-10 ENCOUNTER — PATIENT MESSAGE (OUTPATIENT)
Dept: INTERNAL MEDICINE | Facility: CLINIC | Age: 79
End: 2024-06-10
Payer: MEDICARE

## 2024-07-01 ENCOUNTER — OFFICE VISIT (OUTPATIENT)
Dept: ORTHOPEDICS | Facility: CLINIC | Age: 79
End: 2024-07-01
Payer: MEDICARE

## 2024-07-01 ENCOUNTER — HOSPITAL ENCOUNTER (OUTPATIENT)
Dept: RADIOLOGY | Facility: HOSPITAL | Age: 79
Discharge: HOME | End: 2024-07-01
Payer: MEDICARE

## 2024-07-01 VITALS — HEIGHT: 65 IN | BODY MASS INDEX: 28.5 KG/M2 | WEIGHT: 171.06 LBS

## 2024-07-01 DIAGNOSIS — G89.29 CHRONIC FOOT PAIN, RIGHT: ICD-10-CM

## 2024-07-01 DIAGNOSIS — M47.26 OSTEOARTHRITIS OF SPINE WITH RADICULOPATHY, LUMBAR REGION: ICD-10-CM

## 2024-07-01 DIAGNOSIS — M47.26 OSTEOARTHRITIS OF SPINE WITH RADICULOPATHY, LUMBAR REGION: Primary | ICD-10-CM

## 2024-07-01 DIAGNOSIS — M79.671 CHRONIC FOOT PAIN, RIGHT: ICD-10-CM

## 2024-07-01 PROCEDURE — 72110 X-RAY EXAM L-2 SPINE 4/>VWS: CPT | Mod: 26,HCNC,, | Performed by: RADIOLOGY

## 2024-07-01 PROCEDURE — 73630 X-RAY EXAM OF FOOT: CPT | Mod: 26,HCNC,RT, | Performed by: RADIOLOGY

## 2024-07-01 PROCEDURE — 99999 PR PBB SHADOW E&M-EST. PATIENT-LVL III: CPT | Mod: PBBFAC,HCNC,,

## 2024-07-01 PROCEDURE — 1125F AMNT PAIN NOTED PAIN PRSNT: CPT | Mod: HCNC,CPTII,S$GLB,

## 2024-07-01 PROCEDURE — 73610 X-RAY EXAM OF ANKLE: CPT | Mod: 26,HCNC,RT, | Performed by: RADIOLOGY

## 2024-07-01 PROCEDURE — 1159F MED LIST DOCD IN RCRD: CPT | Mod: HCNC,CPTII,S$GLB,

## 2024-07-01 PROCEDURE — 72110 X-RAY EXAM L-2 SPINE 4/>VWS: CPT | Mod: TC,HCNC

## 2024-07-01 PROCEDURE — 73610 X-RAY EXAM OF ANKLE: CPT | Mod: TC,HCNC,RT

## 2024-07-01 PROCEDURE — 99214 OFFICE O/P EST MOD 30 MIN: CPT | Mod: HCNC,S$GLB,,

## 2024-07-01 PROCEDURE — 73630 X-RAY EXAM OF FOOT: CPT | Mod: TC,HCNC,RT

## 2024-07-01 PROCEDURE — 1160F RVW MEDS BY RX/DR IN RCRD: CPT | Mod: HCNC,CPTII,S$GLB,

## 2024-07-02 NOTE — PROGRESS NOTES
SUBJECTIVE:     Chief Complaint & History of Present Illness:  Odessa Vaughn is a 79 y.o. female with a past medical history of lumbar osteoarthritis who is seen here today with a complaint of  right lower back pain. The pain has been present for about 3 years. The patient describes the pain as a moderate sharp pain located in the right lower back with radiation to the foot. The pain is worse with walking and improved by alternating meloxicam and Tylenol 650 mg. The patient notes mild numbness of her 3rd 4th and 5th toes of the right foot but no associated weakness in the right leg, burning pain in the right leg, urinary incontinence, and bowel incontinence.  She has previously been treated by pain management with L5-S1 IL KARLA on 07/25/2023 with mild-to-moderate relief.    Patient was unable to dissociate whether the right foot pain is associated with the lower back pain.  She notes that the foot pain begins in the right shin and radiates to the the dorsal aspect of the foot.  She denies any injury or weakness of the right foot.      Past Medical History:   Diagnosis Date    Arthritis     hands, legs    Breast cancer 12/2012    Right breast invasive ductal carcinoma, ER positive, PA and Her2 negative    Bursitis of left hip 2016    resolved    Chronic midline low back pain with left-sided sciatica 06/05/2017    Essential hypertension 09/21/2015    Hyperlipidemia 09/15/2014       Past Surgical History:   Procedure Laterality Date    BREAST BIOPSY Right 12/19/2012    Right breast- [facility]    BREAST SURGERY Right 01/2013    right mastectomy, SN biopsy     COLONOSCOPY N/A 09/07/2017    Procedure: COLONOSCOPY;  Surgeon: Syed Shah MD;  Location: [address]);  Service: Endoscopy;  Laterality: N/A;    COLONOSCOPY N/A 04/10/2023    Procedure: COLONOSCOPY;  Surgeon: Kamran Mcdermott MD;  [facility]  Service: Endoscopy;  Laterality: N/A;  inst portal-RB  4/3/23-procedure confirmed  with precall, patient requesting prep be changed as current prep ordered is not covered by insurance. Patient requesting a call back. Notified hallway . Octavia RN    DILATION AND CURETTAGE OF UTERUS      EPIDURAL STEROID INJECTION N/A 07/25/2023    Procedure: LUMBAR L5/S1 IL KARLA (AIM TO RIGHT) DIRECT REFERRAL;  Surgeon: Elza Thomas MD;  Location: [facility] PAIN MGT;  Service: Pain Management;  Laterality: N/A;    MASTECTOMY  01/2013    TRANSFORAMINAL EPIDURAL INJECTION OF STEROID Right 11/11/2021    Procedure: Injection,steroid,epidural,transforaminal Right L4/L5 and L5/S1 Direct Referral;  Surgeon: Elza Thomas MD;  Location: [facility] PAIN MGT;  Service: Pain Management;  Laterality: Right;    TRANSFORAMINAL EPIDURAL INJECTION OF STEROID Right 12/09/2021    Procedure: Injection,steroid,epidural,transforaminal approach RIGHT L4-L5 AND L5-S1 DIRECT REFERRAL;  Surgeon: Elza Thomas MD;  Location: [facility] PAIN MGT;  Service: Pain Management;  Laterality: Right;       Family History   Problem Relation Name Age of Onset    Cancer Father          Pancreatic CA    Cataracts Father      Breast cancer Cousin Danna (pat cousin) 58    Breast cancer Cousin Lizeth (pat cousin) 58    No Known Problems Mother      Hypertension Sister      Arthritis Brother      No Known Problems Daughter Roxi     COPD Sister      No Known Problems Brother      No Known Problems Brother      No Known Problems Brother      No Known Problems Daughter Susi     Breast cancer Paternal Aunt  55    Cancer Paternal Aunt          Breast Ca and Lung CA    No Known Problems Maternal Aunt      No Known Problems Maternal Uncle      No Known Problems Paternal Uncle      No Known Problems Maternal Grandmother      No Known Problems Maternal Grandfather      No Known Problems Paternal Grandmother      No Known Problems Paternal Grandfather      Ovarian cancer Neg Hx      Amblyopia Neg Hx      Blindness Neg Hx      Diabetes Neg Hx       Glaucoma Neg Hx      Macular degeneration Neg Hx      Retinal detachment Neg Hx      Strabismus Neg Hx      Stroke Neg Hx      Thyroid disease Neg Hx      Osteoarthritis Neg Hx      Rheum arthritis Neg Hx         Review of patient's allergies indicates:   Allergen Reactions    Vicodin [hydrocodone-acetaminophen] Other (See Comments)     Dizziness           Current Outpatient Medications:     ascorbic acid, vitamin C, (VITAMIN C) 500 MG tablet, Take 500 mg by mouth once daily., Disp: , Rfl:     aspirin 81 MG Chew, Take 81 mg by mouth once daily., Disp: , Rfl:     atorvastatin (LIPITOR) 10 MG tablet, TAKE 1 TABLET(10 MG) BY MOUTH EVERY DAY, Disp: 90 tablet, Rfl: 3    cholecalciferol, vitamin D3, (VITAMIN D3) 50 mcg (2,000 unit) Cap capsule, Take 2,000 Units by mouth once daily., Disp: , Rfl:     cyanocobalamin 1,000 mcg/mL injection, INJECT 1ML INTO THE MUSCLE EVERY 14 DAYS AS DIRECTED, Disp: 2 mL, Rfl: 2    meloxicam (MOBIC) 15 MG tablet, Take 1 tablet (15 mg total) by mouth once daily. (Patient taking differently: Take 15 mg by mouth daily as needed.), Disp: 30 tablet, Rfl: 6    multivitamin capsule, Take 1 capsule by mouth once daily., Disp: , Rfl:     PROPYLENE GLYCOL//PF (SYSTANE, PF, OPHT), Apply to eye daily as needed. , Disp: , Rfl:     thiamine (VITAMIN B-1) 100 MG tablet, Take 100 mg by mouth once daily., Disp: , Rfl:     triamterene-hydrochlorothiazide 37.5-25 mg (DYAZIDE) 37.5-25 mg per capsule, TAKE 1 CAPSULE BY MOUTH EVERY DAY, Disp: 90 capsule, Rfl: 3    pregabalin (LYRICA) 25 MG capsule, Take 1 capsule (25 mg total) by mouth 2 (two) times daily., Disp: 60 capsule, Rfl: 5      Review of Systems:  ROS:  The updated medical history is in the chart and has been reviewed. A review of systems is updated and there is no reported vision changes, ear/nose/mouth/throat complaints, chest pain, shortness of breath, abdominal pain, urological complaints, fevers or chills, psychiatric complaints.  "Musculoskeletal and neurologcial symptoms are as documented. All other systems are negative.      OBJECTIVE:     PHYSICAL EXAM:  Ht 5' 5" (1.651 m)   Wt 77.6 kg (171 lb 1.2 oz)   LMP 02/01/2003 (Approximate)   BMI 28.47 kg/m²   General: Pleasant, cooperative, NAD.  HEENT: NCAT, sclera nonicteric.  Lungs: Respirations are equal and unlabored.   Abdomen: Soft and non-tender.  CV: 2+ bilateral upper and lower extremity pulses.  Neuro: Sensation intact to light touch.  Skin: Intact throughout with no rashes, erythema, or lesions.  Extremities: No LE edema, NVI lower extremities. antalgic gait.    Back Exam:    Tenderness: L4-L5   Swelling:  None       Range of Motion:      Flexion: 40     Extension: 10     Lateral Bend:   Right 5                              Left 5     Rotation:          Right 5                              Left 5       SLR:                  Right Positive                             Left Negative       Muscle Strength      Abductor: 5/5     Adductor: 5/5     Quadriceps: 5/5     Hamstrings: 5/5     Gastrocnemius: 5/5     EHL: 5/5       Reflexes     Patellar: Normal    Achilles: Normal       Sensation: Normal       right Hip Exam:  TTP: None  80 degrees flexion  10 degrees extension   10 degrees internal rotation  30 degrees external rotation  30 degrees abduction  20 degrees adduction   0 flexion contracture   negative VERONA   negative FADIR  negative Stinchfield    right Foot/Ankle:  Skin: normal  Swelling: none  Warmth: no warmth  Tenderness:  Mild tenderness of the inferior aspect of the lateral malleolus  ROM:  Full range of motion  Strength: normal  Stability: stable to testing  Neurological Exam: normal  Vascular Exam: pulse present    RADIOGRAPHS:  X-rays of the lumbar spine taken today personally reviewed. Imaging reveals severe disc space narrowing with vacuum phenomenon at L3-S1 with moderate disc space narrowing at L2-L3.    X-rays of the right ankle taken today personally reviewed. " Imaging reveals hypertrophic DJD change tibia-fibular syndesmosis .    X-rays of the right foot taken today personally reviewed. Imaging reveals flexion contraction PIP joints 2nd and 3rd digits, mild DJD 1st MTP joint, chronic hypertrophic change involve shaft and base of proximal phalanges 3rd 4th and 5th digits.    ASSESSMENT/PLAN:       ICD-10-CM ICD-9-CM   1. Osteoarthritis of spine with radiculopathy, lumbar region  M47.26 721.3   2. Chronic foot pain, right  M79.671 729.5    G89.29 338.29       ASSESSMENT/PLAN:     We discussed with the patient at length all the different treatment options available including anti-inflammatories, acetaminophen, rest, ice, physical therapy, strengthening and range of motion exercise, occasional cortisone injections for temporary relief, and finally possible surgical interventions.    Patient has previously been followed by Neelam Owens her last visit on 06/19/23, in which she was sent to pain management for an injection.  She requested a follow up appointment with her to explore further treatment options.    Patient instructed to contact myself if she would like a referral to the foot and ankle team for evaluation of her right foot pain.

## 2024-07-31 NOTE — PROGRESS NOTES
DATE: 8/1/2024  PATIENT: Odessa Vaughn    Attending Physician: Kamran Douglas M.D.    HISTORY:  Odessa Vaughn is a 79 y.o. female who returns to me today for follow up.  She was last seen by me 6/19/2023.  Today she is doing well but notes she had a right L5-S1 KARLA on 7/25/23 with minimal relief. She continues to have pain that shoots down the back of her right leg with numbness and tingling. She has tried medication, PT, and multiple ESIs.     The Patient denies myelopathic symptoms such as handwriting changes or difficulty with buttons/coins/keys. Denies perineal paresthesias, bowel/bladder dysfunction.      EXAM:  LMP 02/01/2003 (Approximate)     My physical examination was notable for the following findings:     Musculoskeletal and neuro exam stable      IMAGING:    Today I personally reviewed AP, Lat and Flex/Ex  upright L-spine films that demonstrate disc space narrowing at L3-L4 L5-S1 without instability.     MRI lumbar (2021) demonstrates multilevel degenerative changes, most advanced at L5-S1 where there is moderate canal stenosis and moderate bilateral foraminal stenosis.    There is no height or weight on file to calculate BMI.    Hemoglobin A1C   Date Value Ref Range Status   09/08/2014 6.1 4.5 - 6.2 % Final         ASSESSMENT/PLAN:    There are no diagnoses linked to this encounter.    Today we discussed at length all of the different treatment options including anti-inflammatories, acetaminophen, rest, ice, heat, physical therapy including strengthening and stretching exercises, home exercises, ROM, aerobic conditioning, aqua therapy, other modalities including ultrasound, massage, and dry needling, epidural steroid injections and finally surgical intervention.      Pt presents with chronic lumbar radiculopathy. Failure of conservative rx. Will obtain new lumbar MRI and consider surgical intervention.

## 2024-08-01 ENCOUNTER — OFFICE VISIT (OUTPATIENT)
Dept: ORTHOPEDICS | Facility: CLINIC | Age: 79
End: 2024-08-01
Payer: MEDICARE

## 2024-08-01 VITALS — WEIGHT: 174.5 LBS | HEIGHT: 65 IN | BODY MASS INDEX: 29.07 KG/M2

## 2024-08-01 DIAGNOSIS — M54.16 LUMBAR RADICULOPATHY, CHRONIC: Primary | ICD-10-CM

## 2024-08-01 PROCEDURE — 1125F AMNT PAIN NOTED PAIN PRSNT: CPT | Mod: CPTII,S$GLB,, | Performed by: ORTHOPAEDIC SURGERY

## 2024-08-01 PROCEDURE — 99999 PR PBB SHADOW E&M-EST. PATIENT-LVL III: CPT | Mod: PBBFAC,HCNC,, | Performed by: ORTHOPAEDIC SURGERY

## 2024-08-01 PROCEDURE — 1159F MED LIST DOCD IN RCRD: CPT | Mod: CPTII,S$GLB,, | Performed by: ORTHOPAEDIC SURGERY

## 2024-08-01 PROCEDURE — 99213 OFFICE O/P EST LOW 20 MIN: CPT | Mod: S$GLB,,, | Performed by: ORTHOPAEDIC SURGERY

## 2024-08-01 PROCEDURE — 1101F PT FALLS ASSESS-DOCD LE1/YR: CPT | Mod: CPTII,S$GLB,, | Performed by: ORTHOPAEDIC SURGERY

## 2024-08-01 PROCEDURE — 3288F FALL RISK ASSESSMENT DOCD: CPT | Mod: CPTII,S$GLB,, | Performed by: ORTHOPAEDIC SURGERY

## 2024-08-04 ENCOUNTER — HOSPITAL ENCOUNTER (OUTPATIENT)
Dept: RADIOLOGY | Facility: HOSPITAL | Age: 79
Discharge: HOME | End: 2024-08-04
Attending: ORTHOPAEDIC SURGERY
Payer: MEDICARE

## 2024-08-04 DIAGNOSIS — M54.16 LUMBAR RADICULOPATHY, CHRONIC: ICD-10-CM

## 2024-08-04 PROCEDURE — 72148 MRI LUMBAR SPINE W/O DYE: CPT | Mod: 26,,, | Performed by: RADIOLOGY

## 2024-08-04 PROCEDURE — 72148 MRI LUMBAR SPINE W/O DYE: CPT | Mod: TC

## 2024-08-14 ENCOUNTER — PATIENT MESSAGE (OUTPATIENT)
Dept: ORTHOPEDICS | Facility: CLINIC | Age: 79
End: 2024-08-14
Payer: MEDICARE

## 2024-08-15 DIAGNOSIS — M54.16 LUMBAR RADICULOPATHY, CHRONIC: Primary | ICD-10-CM

## 2024-08-15 NOTE — PROGRESS NOTES
Date of Surgery - 11/5/24  Patient class - Inpatient  Provider - Kamran Douglas  Procedure requested - Transforaminal Lumbar Interbody Fusion (TLIF)   Levels: L5-S1 (R)  Plan of Care: 2 MIDNIGHTS  Instrumentation - Globus excelsius (TLIF)  Medical Necessity - not urgent  CPT codes: 32272, 78518, and 04018 and 33624  post procedural disposition - med/surg  estimated length of stay - 2 midnights    Patient first seen:  11/5/21  Patient's most recent visit: 8/1/24  Patient imaging: xrays, MRI.  Results: Right foraminal disc extrusion at L5-S1, possibly impinging upon the exiting right L5 nerve root. Additional lumbar spondylosis, contributing to moderate spinal canal stenosis and moderate/ severe neural foraminal narrowing  Treatment failed: the patient has tried and failed conservative treatment including medications, physical therapy and ESIs.    Medications tried: gabapentin, lyrica, and mobic  Activity Modification: decreased activity level  Physical therapy (dates, duration, efficacy): Yes. No relief  Home exercises: Yes.  No relief  Epidural steroid injections: Yes.  Dates: 11/11/21, 12/9/21, 7/25/23  Functional impairment of ADLs: The patient's severe pain is affecting their quality of life and limiting their daily life, including working and ability to provide self care.       Pertinent physical exam findings: positive straight leg raise    *please upload patient clinicals including all notes from Yeni Terrell PA-C, Neelam Owens PA-C, M. Susan Alamo NP, Samuel Lamar MD, and/or Kamran Douglas MD.  Please also include any and all physical therapy and pain management notes.

## 2024-08-17 DIAGNOSIS — M54.30 SCIATICA, UNSPECIFIED LATERALITY: ICD-10-CM

## 2024-08-17 NOTE — TELEPHONE ENCOUNTER
No care due was identified.  [facility] Embedded Care Due Messages. Reference number: [id number].   8/17/2024 3:38:54 PM CDT

## 2024-08-19 NOTE — TELEPHONE ENCOUNTER
Refill Routing Note   Medication(s) are not appropriate for processing by [facility] for the following reason(s):        Outside of protocol    [facility] action(s):  Route             Appointments  past 12m or future 3m with PCP    Date Provider   Last Visit   3/19/2024 Mitchel Spencer MD   Next Visit   Visit date not found Mitchel Spencer MD   ED visits in past 90 days: 0        Note composed:7:44 AM 08/19/2024

## 2024-08-21 RX ORDER — MELOXICAM 15 MG/1
15 TABLET ORAL
Qty: 30 TABLET | Refills: 0 | Status: SHIPPED | OUTPATIENT
Start: 2024-08-21

## 2024-08-22 ENCOUNTER — HOSPITAL ENCOUNTER (OUTPATIENT)
Dept: RADIOLOGY | Facility: HOSPITAL | Age: 79
Discharge: HOME | End: 2024-08-22
Attending: NURSE PRACTITIONER
Payer: MEDICARE

## 2024-08-22 DIAGNOSIS — Z85.3 HISTORY OF RIGHT BREAST CANCER: ICD-10-CM

## 2024-08-22 DIAGNOSIS — Z12.31 ENCOUNTER FOR SCREENING MAMMOGRAM FOR MALIGNANT NEOPLASM OF BREAST: ICD-10-CM

## 2024-08-22 PROCEDURE — 77067 SCR MAMMO BI INCL CAD: CPT | Mod: TC,52

## 2024-08-27 NOTE — PROGRESS NOTES
Subjective:       Patient ID: Odessa Vaughn is a 79 y.o. female.    Chief Complaint: History of right breast cancer    HPI Mrs. Vaughn is a 79-year-old female seen in follow-up for carcinoma of the right breast, T2 N0 ER +, HER - 2 neg, intermediate grade.     Overall she is doing well. She does have back pain from sciatic and is having surgery in November for a protruding disc.   Appetite has been good. She does have some constipation and controlled it.   Mammogram is negative from August.   Mild headache yesterday.       Per Dr. Ramires's previous note: Breast history:  On December 12 , 2012  mammogram showed a lobular density in the right breast at the 7:00 region   ultrasound showed a 23 mm solid mass.      Core needle biopsy on December 19,2012 showed intermediate grade infiltrating ductal carcinoma (3+2+1) ER 95% positive LA negative and HER-2 negative.      On January 2 she underwent right mastectomy which showed a 3 cm intermediate grade infiltrating ductal carcinoma. 4 [place] lymph nodes were negative with one additional negative lymph nodes.T2N0 Stage 1A.    She started endocrine therapy in February 2013.  That was stopped in 2020.      Review of Systems   Constitutional:  Negative for activity change, appetite change, chills, fatigue, fever and unexpected weight change.   Respiratory:  Negative for cough and shortness of breath.    Cardiovascular:  Negative for chest pain.   Gastrointestinal:  Positive for constipation (occasional; eats fiber rich cereal ). Negative for abdominal distention and diarrhea.   Genitourinary:  Negative for pelvic pain.   Musculoskeletal:  Positive for arthralgias (right shoulder). Negative for back pain and neck pain.        Right leg sciatic pain   Neurological:  Negative for dizziness, weakness, light-headedness, numbness and headaches.   Psychiatric/Behavioral:  Negative for dysphoric mood. The patient is not nervous/anxious.        Objective:      Physical  Exam  Constitutional:       General: She is not in acute distress.     Appearance: She is well-developed.   Eyes:      General: No scleral icterus.  Cardiovascular:      Rate and Rhythm: Normal rate and regular rhythm.      Heart sounds: Normal heart sounds.   Pulmonary:      Effort: Pulmonary effort is normal.      Breath sounds: Normal breath sounds. No wheezing or rales.   Chest:   Breasts:     Left: No mass, nipple discharge or skin change.          Comments: Right breast mastectomy   Abdominal:      Palpations: Abdomen is soft. There is no mass.      Tenderness: There is no abdominal tenderness.   Lymphadenopathy:      Cervical: No cervical adenopathy.      Upper Body:      Right upper body: No supraclavicular or axillary adenopathy.      Left upper body: No supraclavicular or axillary adenopathy.   Neurological:      Mental Status: She is alert and oriented to person, place, and time.   Psychiatric:         Behavior: Behavior normal.         Thought Content: Thought content normal.       Assessment:      1. History of right breast cancer    2. S/P right mastectomy    3. Essential hypertension    4. Atherosclerosis of aorta        Plan:       1,7. Mammogram negative from August. Repeat in 1 year  - Repeat in 1 year with clinic visit    2. Monitored today; continue current medication and follow up with PCP     3. Continue current medication and follow up with PCP    4. Follow up with PCP yearly    5-6. BMD due -ordered  - She is taking calcium and VIitamin D       Return to clinic in 1 year with [facility] appointment and imaging.     Patient is in agreement with the proposed treatment plan. All questions were answered to the patient's satisfaction. Patient knows to call clinic for any new or worsening symptoms and if anything is needed before the next clinic visit.          Dina Dominguez, MARCELLUSP-C  Hematology & Medical Oncology   [facility] [place] [address]  ph. [phone]  Fax.  [phone]    Collaborating physician, Dr. Ramires.    Approximately 15 minutes were spent face-to-face with the patient.  Approximately 25 minutes in total were spent on this encounter, which includes face-to-face time and non-face-to-face time preparing to see the patient (e.g., review of tests), obtaining and/or reviewing separately obtained history, documenting clinical information in the electronic or other health record, independently interpreting results (not separately reported) and communicating results to the patient/family/caregiver, or care coordination (not separately reported).     Route Chart for Scheduling    Med Onc Chart Routing      Follow up with physician    Follow up with [facility] 1 year.   Infusion scheduling note    Injection scheduling note    Labs    Imaging Mammogram   in August   Pharmacy appointment    Other referrals

## 2024-09-10 ENCOUNTER — OFFICE VISIT (OUTPATIENT)
Dept: HEMATOLOGY/ONCOLOGY | Facility: CLINIC | Age: 79
End: 2024-09-10
Payer: MEDICARE

## 2024-09-10 VITALS
OXYGEN SATURATION: 94 % | DIASTOLIC BLOOD PRESSURE: 60 MMHG | TEMPERATURE: 98 F | WEIGHT: 177.69 LBS | HEART RATE: 70 BPM | BODY MASS INDEX: 29.61 KG/M2 | HEIGHT: 65 IN | SYSTOLIC BLOOD PRESSURE: 134 MMHG

## 2024-09-10 DIAGNOSIS — I10 ESSENTIAL HYPERTENSION: ICD-10-CM

## 2024-09-10 DIAGNOSIS — Z85.3 HISTORY OF RIGHT BREAST CANCER: Primary | ICD-10-CM

## 2024-09-10 DIAGNOSIS — Z90.11 S/P RIGHT MASTECTOMY: ICD-10-CM

## 2024-09-10 DIAGNOSIS — Z12.31 ENCOUNTER FOR SCREENING MAMMOGRAM FOR MALIGNANT NEOPLASM OF BREAST: ICD-10-CM

## 2024-09-10 DIAGNOSIS — I70.0 ATHEROSCLEROSIS OF AORTA: ICD-10-CM

## 2024-09-10 PROCEDURE — 99214 OFFICE O/P EST MOD 30 MIN: CPT | Mod: HCNC,S$GLB,, | Performed by: NURSE PRACTITIONER

## 2024-09-10 PROCEDURE — 3075F SYST BP GE 130 - 139MM HG: CPT | Mod: HCNC,CPTII,S$GLB, | Performed by: NURSE PRACTITIONER

## 2024-09-10 PROCEDURE — 1160F RVW MEDS BY RX/DR IN RCRD: CPT | Mod: HCNC,CPTII,S$GLB, | Performed by: NURSE PRACTITIONER

## 2024-09-10 PROCEDURE — 1125F AMNT PAIN NOTED PAIN PRSNT: CPT | Mod: HCNC,CPTII,S$GLB, | Performed by: NURSE PRACTITIONER

## 2024-09-10 PROCEDURE — 1101F PT FALLS ASSESS-DOCD LE1/YR: CPT | Mod: HCNC,CPTII,S$GLB, | Performed by: NURSE PRACTITIONER

## 2024-09-10 PROCEDURE — 3078F DIAST BP <80 MM HG: CPT | Mod: HCNC,CPTII,S$GLB, | Performed by: NURSE PRACTITIONER

## 2024-09-10 PROCEDURE — 3288F FALL RISK ASSESSMENT DOCD: CPT | Mod: HCNC,CPTII,S$GLB, | Performed by: NURSE PRACTITIONER

## 2024-09-10 PROCEDURE — G2211 COMPLEX E/M VISIT ADD ON: HCPCS | Mod: HCNC,S$GLB,, | Performed by: NURSE PRACTITIONER

## 2024-09-10 PROCEDURE — 1159F MED LIST DOCD IN RCRD: CPT | Mod: HCNC,CPTII,S$GLB, | Performed by: NURSE PRACTITIONER

## 2024-09-10 PROCEDURE — 99999 PR PBB SHADOW E&M-EST. PATIENT-LVL IV: CPT | Mod: PBBFAC,HCNC,, | Performed by: NURSE PRACTITIONER

## 2024-10-02 ENCOUNTER — TELEPHONE (OUTPATIENT)
Dept: INTERNAL MEDICINE | Facility: CLINIC | Age: 79
End: 2024-10-02
Payer: MEDICARE

## 2024-10-02 ENCOUNTER — TELEPHONE (OUTPATIENT)
Dept: PREADMISSION TESTING | Facility: HOSPITAL | Age: 79
End: 2024-10-02
Payer: MEDICARE

## 2024-10-02 DIAGNOSIS — M79.609 PAIN IN EXTREMITY, UNSPECIFIED EXTREMITY: ICD-10-CM

## 2024-10-02 DIAGNOSIS — Z01.818 PREOPERATIVE TESTING: Primary | ICD-10-CM

## 2024-10-02 NOTE — PRE-PROCEDURE INSTRUCTIONS
Patient stated has not had any problem with anesthesia in the past. Will need medical clearance from your PCP, Dr Mitchel Spencer. She will make an appt. Will need  labs, ua, and EKG. Our  will call to set up these appts.  She said she takes ASA 81 for prevention.       Preop instructions given. Hold aspirin, aspirin containing products, nsaids( Aleve, Advil, Motrin, Ibuprofen, Naprosyn, Naproxen, Voltaren, Diclofenac, Mobic, Meloxicam, Celebrex, Celecoxib), vitamins ( C, D3, B12, Multivitamin, B1- Thiamine) and supplements one week prior to surgery.     May take Tylenol.     Medication instructions given:    Disp Refills Start End   ascorbic acid, vitamin C, (VITAMIN C) 500 MG tablet -- --  --   Sig: Take 500 mg by mouth once daily.   Class: Historical Med   Route: Oral       Raven Lopez RN 10/2/2024 11:00 AM  HOLD ONE WEEK PRIOR TO SURGERY.            aspirin 81 MG Chew -- --  --   Sig: Take 81 mg by mouth once daily.   Class: Historical Med   Route: Raven Torres RN 10/2/2024 11:01 AM  HOLD ONE WEEK PRIOR TO SURGERY. (PREVENTION)              atorvastatin (LIPITOR) 10 MG tablet 90 tablet 3 4/21/2024 --   Sig: TAKE 1 TABLET(10 MG) BY MOUTH EVERY DAY       Raven Lopez RN 10/2/2024 11:02 AM  TAKE IN AM OF SURGERY.              cholecalciferol, vitamin D3, (VITAMIN D3) 50 mcg (2,000 unit) Cap capsule -- --  --   Sig: Take 2,000 Units by mouth once daily.   Class: Historical Med   Route: Oral       Raven Lopez RN 10/2/2024 11:01 AM  HOLD ONE WEEK PRIOR TO SURGERY.            cyanocobalamin 1,000 mcg/mL injection 2 mL 2 6/6/2024 --   Sig: INJECT 1ML INTO THE MUSCLE EVERY 14 DAYS AS DIRECTED       Raven Lopez RN 10/2/2024 11:01 AM  HOLD ONE WEEK PRIOR TO SURGERY.            meloxicam (MOBIC) 15 MG tablet 30 tablet 0 8/21/2024 --   Sig: TAKE 1 TABLET(15 MG) BY MOUTH EVERY DAY   Route: Oral   Renewals    Renewal provider: Uzair Fisher MD Prieur, Gaye, RN 10/2/2024 11:01 AM  HOLD ONE  WEEK PRIOR TO SURGERY.            multivitamin capsule -- --  --   Sig: Take 1 capsule by mouth once daily.   Class: Historical Med   Route: Oral       Raven Lopez RN 10/2/2024 11:01 AM  HOLD ONE WEEK PRIOR TO SURGERY.            PROPYLENE GLYCOL//PF (SYSTANE, PF, OPHT) -- --  --   Sig: Apply to eye daily as needed.   Class: Historical Med   Route: Ophthalmic       Raven Lopez RN 10/2/2024 11:03 AM  TAKE IF NEEDED            thiamine (VITAMIN B-1) 100 MG tablet -- --  --   Sig: Take 100 mg by mouth once daily.   Class: Historical Med   Route: Oral       Raven Lopez RN 10/2/2024 11:02 AM  HOLD ONE WEEK PRIOR TO SURGERY.            triamterene-hydrochlorothiazide 37.5-25 mg (DYAZIDE) 37.5-25 mg per capsule 90 capsule 3 9/6/2023 --   Sig: TAKE 1 CAPSULE BY MOUTH EVERY DAY       Raven Lopez RN 10/2/2024 11:04 AM  HOLD IN AM OF SURGERY.            Your surgery has been scheduled for:_Tuesday 11/5/2024_________________________________________  [facility] ( [place]) [phone]  You should report to:  ____[facility], located on the [place] side of the first floor of the           [facility] ([phone])  __x__[facility], located on the [facility] side of the            Second floor of the [facility] ([phone])  ____3rd Floor [facility] located on the [facility] side of the [facility] [phone]  Please Note   Tell your doctor if you take Aspirin, products containing Aspirin, herbal medications  or blood thinners, such as Coumadin, Ticlid, or Plavix.  (Consult your provider regarding holding or stopping before surgery).  Arrange for someone to drive you home following surgery.  You will not be allowed to leave the surgical facility alone or drive yourself home following sedation and anesthesia.  Before Surgery  Stop taking all vitamins/ herbal medications 14 days prior to surgery  No Motrin/Advil  (Ibuprofen) 7 days before surgery  No Aleve (Naproxen) 7 days before surgery  Stop Taking Asprin, products containing Asprin __7___days before surgery  Stop taking blood thinners_______days before surgery  No Goody's/BC  Powder 7 days before surgery  Refrain from drinking alcoholic beverages for 24hours before and after surgery  Stop or limit smoking _________days before surgery(nonsmoker)  You may take Tylenol for pain  Night before Surgery  Nothing to eat or drink after midnight.  Take a shower or bath (shower is recommended).  Bathe with Hibiclens soap or an antibacterial soap from the neck down.  If not supplied by your surgeon, hibiclens soap will need to be purchased over the counter in pharmacy.  Rinse soap off thoroughly.  Shampoo your hair with your regular shampoo  The Day of Surgery  NOTHING TO  DRINK 2 hours before arrival time. If you are told to take medication on the morning of surgery, it may be taken with a sip of water.   Take another bath or shower with hibiclens or any antibacterial soap, to reduce the chance of infection.  Take heart and blood pressure medications with a small sip of water, as advised by the perioperative team.  Do not take fluid pills  You may brush your teeth and rinse your mouth, but do not swall any additional water.   Do not apply perfumes, powder, body lotions or deodorant on the day of surgery.  Nail polish should be removed.  Do not wear makeup or moisturizer  Wear comfortable clothes, such as a button front shirt and loose fitting pants.  Leave all jewelry, including body piercings, and valuables at home.    Bring any devices you will neeed after surgery such as crutches or canes.  If you have sleep apnea, please bring your CPAP machine  In the event that your physical condition changes including the onset of a cold or respiratory illness, or if you have to delay or cancel your surgery, please notify your surgeon.   Stated knows where the surgery center is located. Also  sent preop and med instructions to [facility] portal.Verbalizes understanding.

## 2024-10-02 NOTE — ANESTHESIA PAT ROS NOTE
10/02/2024  Odessa Vaughn is a 79 y.o., female.      Pre-op Assessment          Review of Systems  Anesthesia Hx:  No problems with previous Anesthesia             Denies Family Hx of Anesthesia complications.    Denies Personal Hx of Anesthesia complications.                    Social:  Non-Smoker, Social Alcohol Use       Hematology/Oncology:  Hematology Normal                       --  Cancer in past history (BREAST CANCER RIGHT): s/p surgery                     EENT/Dental:  EENT/Dental Normal           Cardiovascular:            Denies Angina.          Functional Capacity 4 METS, ABLE TO CLIMB 2 FLIGHTS OF STAIRS                   Hypertension  , Recent typical clinic B/P of 134/60       Pulmonary:  Pulmonary Normal      Denies Shortness of breath.  Denies Recent URI.                 Renal/:  Renal/ Normal                 Hepatic/GI:  Hepatic/GI Normal                 Musculoskeletal:   Musculoskeletal General/Symptoms:  Functional capacity is ambulatory without assistance.   Joint Disease:  Arthritis, Osteoarthritis AND HAND       Lumbar Spine Disorders, Lumbar Disc Disease, Radiculopathy   Neurological:   Neuro Symptoms of pain OF LOWER BACK   Osteoarthritis                           Endocrine:  Endocrine Normal            Psych:  Psychiatric Normal                         Anesthesia Assessment: Preoperative EQUATION    Planned Procedure: Procedure(s) (LRB):  FUSION, SPINE, LUMBAR, TLIF, POSTERIOR APPROACH, USING PEDICLE SCREW L5-S1 (R) GLOBUS ROBOT SNS: SSEP/EMG (N/A)  Requested Anesthesia Type:General  Surgeon: Kamran Douglas MD  Service: Neurosurgery  Known or anticipated Date of Surgery:11/5/2024    Surgeon notes: reviewed    Electronic QUestionnaire Assessment completed via nurse interview with patient.        Triage considerations:     The patient has no apparent active cardiac  condition (No unstable coronary Syndrome such as severe unstable angina or recent [<1 month] myocardial infarction, decompensated CHF, severe valvular   disease or significant arrhythmia)    Previous anesthesia records:MAC and No problems  4/10/2023 COLONOSCOPY   Airway:  Mallampati: II   Mouth Opening: Normal  TM Distance: Normal  Tongue: Normal  Neck ROM: Normal ROM    Last PCP note: 6-12 months ago , within [facility]   Subspecialty notes: Cardiology: General, Hematology/Oncology, Neurology, Ortho, Pulmonary, OB/GYN, OPTOMETRY    Other important co-morbidities: HLD, HTN, and LUMBAR RADICULOPATHY       Tests already available:  Available tests,  3-6 months ago , within [facility] .             Instructions given. (See in Nurse's note)    Optimization:  Anesthesia Preop Clinic Assessment  Indicated    Medical Opinion Indicated       Sub-specialist consult indicated:   TBD       Plan:    Testing:  BMP, EKG, Hematology Profile, PT/INR, PTT, T&S, and UA     Consultation:Patient's PCP for re-evaluation     Patient  has previously scheduled Medical Appointment:10/23 DR SAMUELS    Navigation: Tests Scheduled. TBD             Consults scheduled.TBD             Results will be tracked by Preop Clinic.  10/17 CMP,CBC, UA, and EKG resulted and noted by Dr. Mitchel Spencer. Medical clearance given by Dr.James Spencer on 10/16:    Preoperative clearance   2.  Hypertension currently stable  3.  Hyperlipidemia   4.  Right breast cancer status post mastectomy  5.  Right carotid bruit  6.  Muscle spasms  7.  B12 deficient  Plan:   1.  Will schedule a carotid ultrasound   2.  Will schedule a CBC, CMP, UA and chest x-ray     Addendum: the patient's CBC, CMP, chest x-ray and EKG were reviewed.  A carotid ultrasound was ordered for a right carotid bruit.  The patient has no significant stenosis; but, mild tardus parvus changes involving the right vertebral artery were noted suggesting proximal stenosis.  This has not noted on ultrasound from March of  last year.  10/24 PT/INR, PTT & T&S  resulted and noted by Dr.Stuart Francisco.  Raven Lopez RN BSN

## 2024-10-02 NOTE — TELEPHONE ENCOUNTER
----- Message from Nurse Carbajal sent at 10/2/2024 11:29 AM CDT -----  Patient is scheduled for TLIF L5-S1 on 11/5 with Dr. Douglas.( Approximately 230  minutes of general anesthesia)  She will need medical clearance. Please schedule a preop clearance appt.   Thanks!

## 2024-10-16 ENCOUNTER — HOSPITAL ENCOUNTER (OUTPATIENT)
Dept: RADIOLOGY | Facility: HOSPITAL | Age: 79
Discharge: HOME | End: 2024-10-16
Attending: INTERNAL MEDICINE
Payer: MEDICARE

## 2024-10-16 ENCOUNTER — OFFICE VISIT (OUTPATIENT)
Dept: INTERNAL MEDICINE | Facility: CLINIC | Age: 79
End: 2024-10-16
Payer: MEDICARE

## 2024-10-16 VITALS
SYSTOLIC BLOOD PRESSURE: 130 MMHG | BODY MASS INDEX: 28.87 KG/M2 | HEIGHT: 65 IN | HEART RATE: 75 BPM | OXYGEN SATURATION: 99 % | DIASTOLIC BLOOD PRESSURE: 60 MMHG | WEIGHT: 173.31 LBS

## 2024-10-16 DIAGNOSIS — M54.30 SCIATICA, UNSPECIFIED LATERALITY: ICD-10-CM

## 2024-10-16 DIAGNOSIS — R09.89 RIGHT CAROTID BRUIT: ICD-10-CM

## 2024-10-16 DIAGNOSIS — Z90.11 S/P RIGHT MASTECTOMY: ICD-10-CM

## 2024-10-16 DIAGNOSIS — Z85.3 HISTORY OF RIGHT BREAST CANCER: ICD-10-CM

## 2024-10-16 DIAGNOSIS — Z01.818 PRE-OP EXAM: ICD-10-CM

## 2024-10-16 DIAGNOSIS — I10 ESSENTIAL HYPERTENSION: ICD-10-CM

## 2024-10-16 DIAGNOSIS — R91.8 ABNORMAL CT SCAN OF LUNG: ICD-10-CM

## 2024-10-16 DIAGNOSIS — M62.838 MUSCLE SPASMS OF LOWER EXTREMITY: ICD-10-CM

## 2024-10-16 DIAGNOSIS — Z01.818 PRE-OP EXAM: Primary | ICD-10-CM

## 2024-10-16 DIAGNOSIS — E53.8 B12 DEFICIENCY: ICD-10-CM

## 2024-10-16 LAB
OHS QRS DURATION: 100 MS
OHS QTC CALCULATION: 453 MS

## 2024-10-16 PROCEDURE — 93010 ELECTROCARDIOGRAM REPORT: CPT | Mod: HCNC,S$GLB,, | Performed by: INTERNAL MEDICINE

## 2024-10-16 PROCEDURE — 99999 PR PBB SHADOW E&M-EST. PATIENT-LVL IV: CPT | Mod: PBBFAC,HCNC,, | Performed by: INTERNAL MEDICINE

## 2024-10-16 PROCEDURE — 93880 EXTRACRANIAL BILAT STUDY: CPT | Mod: TC,HCNC

## 2024-10-16 PROCEDURE — 93880 EXTRACRANIAL BILAT STUDY: CPT | Mod: 26,HCNC,, | Performed by: RADIOLOGY

## 2024-10-16 PROCEDURE — 71046 X-RAY EXAM CHEST 2 VIEWS: CPT | Mod: TC,HCNC

## 2024-10-16 PROCEDURE — 71046 X-RAY EXAM CHEST 2 VIEWS: CPT | Mod: 26,HCNC,, | Performed by: RADIOLOGY

## 2024-10-16 RX ORDER — CYCLOBENZAPRINE HCL 5 MG
5 TABLET ORAL 3 TIMES DAILY PRN
Qty: 30 TABLET | Refills: 0 | Status: SHIPPED | OUTPATIENT
Start: 2024-10-16 | End: 2024-10-26

## 2024-10-16 RX ORDER — MELOXICAM 15 MG/1
15 TABLET ORAL DAILY PRN
Qty: 30 TABLET | Refills: 0 | Status: SHIPPED | OUTPATIENT
Start: 2024-10-16

## 2024-10-16 RX ORDER — CYANOCOBALAMIN 1000 UG/ML
1000 INJECTION, SOLUTION INTRAMUSCULAR; SUBCUTANEOUS
Qty: 2 ML | Refills: 3 | Status: SHIPPED | OUTPATIENT
Start: 2024-10-16

## 2024-10-16 NOTE — PROGRESS NOTES
CC:  Preoperative clearance     HPI:  The patient is a 79-year-old female with hypertension, hyperlipidemia, right breast cancer status post mastectomy, abnormal CT of the chest showing micro nodules/ground-glass opacities, B12 deficiency and sciatica involving the right leg who presents today for preoperative clearance.  The patient is to have a TLIF on November 5th.  She reports having general anesthesia in the past without any problems.    ROS: Patient reports weight is stable.  Does go up and down.  No fever chills.  No visual changes except the need for glasses.  No auditory changes.  No chest pain.  No shortness a breath.  She does ride an exercise bike for 30 minutes 3 times a week.  No nausea vomiting.  No abdominal pain.  No bowel changes.  No bladder changes.  No weakness in arms or legs.  He does report that the right leg feels numb. The patient was started on B12 injection as well as thiamine by neurology.  The patient has been on Flexeril 5 mg for muscle spasms.  She was this medication sparingly.  She was asking for refill.    Physical exam:   General appearance: No acute distress   HEENT: Conjunctiva is clear.  Pupils equal.  She has bilateral cataracts.  Right TMs obscured by wax.  Nasal septum is midline without discharge.  Oropharynx is without erythema.  Trachea is midline without JVD or thyromegaly.    Pulmonary:  Good inspiratory, expiratory breath sounds are heard.  Her lungs are clear to auscultation.    Cardiovascular:  S1-S2, rhythm is regular.  Extremities with trace edema.  2+ carotid pulses.  The patient had a right carotid bruit.  She did have a carotid ultrasound in 2013 which was unremarkable.  GI: Abdomen is nontender, nondistended without hepatosplenomegaly    Assessment:    Preoperative clearance   2.  Hypertension currently stable  3.  Hyperlipidemia   4.  Right breast cancer status post mastectomy  5.  Right carotid bruit  6.  Muscle spasms  7.  B12 deficient  Plan:   1.  Will  schedule a carotid ultrasound   2.  Will schedule a CBC, CMP, UA and chest x-ray    Addendum: the patient's CBC, CMP, chest x-ray and EKG were reviewed.  A carotid ultrasound was ordered for a right carotid bruit.  The patient has no significant stenosis; but, mild tardus parvus changes involving the right vertebral artery were noted suggesting proximal stenosis.  This has not noted on ultrasound from March of last year.

## 2024-10-20 ENCOUNTER — PATIENT MESSAGE (OUTPATIENT)
Dept: INTERNAL MEDICINE | Facility: CLINIC | Age: 79
End: 2024-10-20
Payer: MEDICARE

## 2024-10-21 ENCOUNTER — TELEPHONE (OUTPATIENT)
Dept: INTERNAL MEDICINE | Facility: CLINIC | Age: 79
End: 2024-10-21
Payer: MEDICARE

## 2024-10-21 DIAGNOSIS — R91.8 ABNORMAL CT SCAN OF LUNG: Primary | ICD-10-CM

## 2024-10-21 DIAGNOSIS — I65.01 STENOSIS OF RIGHT VERTEBRAL ARTERY: ICD-10-CM

## 2024-10-21 NOTE — TELEPHONE ENCOUNTER
----- Message from Mitchel Spencer MD sent at 10/21/2024  5:58 AM CDT -----    Please contact patient.  Her carotid ultrasound suggested decreased blood flow involving the right vertebral artery.  I put in orders for a CTA of the head and neck.  Please verify she does not have an allergy to iodine.  I would like to get this done as soon as possible.  I also put in for CT of the chest.  This is a follow-up CT.

## 2024-10-23 ENCOUNTER — OFFICE VISIT (OUTPATIENT)
Dept: ORTHOPEDICS | Facility: CLINIC | Age: 79
End: 2024-10-23
Payer: MEDICARE

## 2024-10-23 ENCOUNTER — HOSPITAL ENCOUNTER (OUTPATIENT)
Dept: RADIOLOGY | Facility: HOSPITAL | Age: 79
Discharge: HOME | End: 2024-10-23
Attending: ORTHOPAEDIC SURGERY
Payer: MEDICARE

## 2024-10-23 DIAGNOSIS — M48.07 SPINAL STENOSIS, LUMBOSACRAL REGION: ICD-10-CM

## 2024-10-23 DIAGNOSIS — M54.16 LUMBAR RADICULOPATHY: ICD-10-CM

## 2024-10-23 DIAGNOSIS — M51.362 DEGENERATION OF INTERVERTEBRAL DISC OF LUMBAR REGION WITH DISCOGENIC BACK PAIN AND LOWER EXTREMITY PAIN: ICD-10-CM

## 2024-10-23 DIAGNOSIS — M47.816 LUMBAR SPONDYLOSIS: Primary | ICD-10-CM

## 2024-10-23 PROCEDURE — 72131 CT LUMBAR SPINE W/O DYE: CPT | Mod: TC,HCNC

## 2024-10-23 PROCEDURE — 72131 CT LUMBAR SPINE W/O DYE: CPT | Mod: 26,HCNC,, | Performed by: RADIOLOGY

## 2024-10-23 PROCEDURE — 99999 PR PBB SHADOW E&M-EST. PATIENT-LVL III: CPT | Mod: PBBFAC,HCNC,, | Performed by: ORTHOPAEDIC SURGERY

## 2024-10-23 NOTE — PROGRESS NOTES
DATE: 10/23/2024  PATIENT: Odessa Vaughn    Attending Physician: Kamran Douglas M.D.    CHIEF COMPLAINT: LBP and RLE pain    HISTORY:  Odessa Vaughn is a 79 y.o. female presents for initial evaluation of low back and right leg pain (Back - 5, Leg - 5). The pain has been present for years. The patient describes the pain as dull and it radiates posterolaterally down RLE to the foot.  The pain is worse with activity and improved by rest. There is R foot associated numbness and tingling. There is RLE subjective weakness. Prior treatments have included meds (mobic and flexeril), PT, KARLA, but no surgery.    The Patient denies myelopathic symptoms such as handwriting changes or difficulty with buttons/coins/keys. Denies perineal paresthesias, bowel/bladder dysfunction.    The patient does not smoke, have DM or endorse IVDU. The patient is not on any blood thinners and does not take chronic narcotics. She is a retired .    PAST MEDICAL/SURGICAL HISTORY:  Past Medical History:   Diagnosis Date    Arthritis     hands, legs    Breast cancer 12/2012    Right breast invasive ductal carcinoma, ER positive, HI and Her2 negative    Bursitis of left hip 2016    resolved    Chronic midline low back pain with left-sided sciatica 06/05/2017    Essential hypertension 09/21/2015    Hyperlipidemia 09/15/2014     Past Surgical History:   Procedure Laterality Date    BREAST BIOPSY Right 12/19/2012    Right breast- [facility]    BREAST SURGERY Right 01/2013    right mastectomy, SN biopsy     COLONOSCOPY N/A 09/07/2017    Procedure: COLONOSCOPY;  Surgeon: Syed Shah MD;  Location: [facility] [facility] ([address] [place] [address]);  Service: Endoscopy;  Laterality: N/A;    COLONOSCOPY N/A 04/10/2023    Procedure: COLONOSCOPY;  Surgeon: Kamran Mcdermott MD;  Location: [facility] ENDO ([address] [place] [address]);  Service: Endoscopy;  Laterality: N/A;  inst portal-RB  4/3/23-procedure confirmed with precall, patient requesting prep be changed as current prep  ordered is not covered by insurance. Patient requesting a call back. Notified [place] . HECTOR [place]    DILATION AND CURETTAGE OF UTERUS      EPIDURAL STEROID INJECTION N/A 07/25/2023    Procedure: LUMBAR L5/S1 IL KARLA (AIM TO RIGHT) DIRECT REFERRAL;  Surgeon: Elza Thomas MD;  Location: [facility]IN [facility]GT;  Service: Pain Management;  Laterality: N/A;    MASTECTOMY  01/2013    TRANSFORAMINAL EPIDURAL INJECTION OF STEROID Right 11/11/2021    Procedure: Injection,steroid,epidural,transforaminal Right L4/L5 and L5/S1 Direct Referral;  Surgeon: Elza Thomas MD;  Location: [facility];  Service: Pain Management;  Laterality: Right;    TRANSFORAMINAL EPIDURAL INJECTION OF STEROID Right 12/09/2021    Procedure: Injection,steroid,epidural,transforaminal approach RIGHT L4-L5 AND L5-S1 DIRECT REFERRAL;  Surgeon: Elza Thomas MD;  Location: [facility];  Service: Pain Management;  Laterality: Right;       Current Medications:   Current Outpatient Medications:     ascorbic acid, vitamin C, (VITAMIN C) 500 MG tablet, Take 500 mg by mouth once daily., Disp: , Rfl:     aspirin 81 MG Chew, Take 81 mg by mouth once daily., Disp: , Rfl:     atorvastatin (LIPITOR) 10 MG tablet, TAKE 1 TABLET(10 MG) BY MOUTH EVERY DAY, Disp: 90 tablet, Rfl: 3    cholecalciferol, vitamin D3, (VITAMIN D3) 50 mcg (2,000 unit) Cap capsule, Take 2,000 Units by mouth once daily., Disp: , Rfl:     cyanocobalamin 1,000 mcg/mL injection, Inject 1 mL (1,000 mcg total) into the muscle every 30 days., Disp: 2 mL, Rfl: 3    cyclobenzaprine (FLEXERIL) 5 MG tablet, Take 1 tablet (5 mg total) by mouth 3 (three) times daily as needed for Muscle spasms., Disp: 30 tablet, Rfl: 0    meloxicam (MOBIC) 15 MG tablet, Take 1 tablet (15 mg total) by mouth daily as needed for Pain., Disp: 30 tablet, Rfl: 0    multivitamin capsule, Take 1 capsule by mouth once daily., Disp: , Rfl:     PROPYLENE GLYCOL//PF (SYSTANE, PF, OPHT), Apply to eye daily as  needed. , Disp: , Rfl:     thiamine (VITAMIN B-1) 100 MG tablet, Take 100 mg by mouth once daily., Disp: , Rfl:     triamterene-hydrochlorothiazide 37.5-25 mg (DYAZIDE) 37.5-25 mg per capsule, TAKE 1 CAPSULE BY MOUTH EVERY DAY, Disp: 90 capsule, Rfl: 3    Social History:   Social History     Socioeconomic History    Marital status:    Tobacco Use    Smoking status: Never    Smokeless tobacco: Never    Tobacco comments:     Retired;    Substance and Sexual Activity    Alcohol use: Not Currently     Comment: holiday - occasional wine    Drug use: No    Sexual activity: Not Currently     Partners: Male     Comment: , since 1965     Social Drivers of Health     Financial Resource Strain: Low Risk  (4/6/2023)    Overall Financial Resource Strain (CARDIA)     Difficulty of Paying Living Expenses: Not hard at all   Food Insecurity: No Food Insecurity (4/6/2023)    Hunger Vital Sign     Worried About Running Out of Food in the Last Year: Never true     Ran Out of Food in the Last Year: Never true   Transportation Needs: No Transportation Needs (4/6/2023)    PRAPARE - Transportation     Lack of Transportation (Medical): No     Lack of Transportation (Non-Medical): No   Physical Activity: Sufficiently Active (4/6/2023)    Exercise Vital Sign     Days of Exercise per Week: 7 days     Minutes of Exercise per Session: 120 min   Stress: No Stress Concern Present (4/6/2023)    [employer] - Occupational Stress Questionnaire     Feeling of Stress : Not at all   Housing Stability: Unknown (4/6/2023)    Housing Stability Vital Sign     Unable to Pay for Housing in the Last Year: No     Unstable Housing in the Last Year: No       REVIEW OF SYSTEMS:  Constitution: Negative. Negative for chills, fever and night sweats.   Cardiovascular: Negative for chest pain and syncope.   Respiratory: Negative for cough and shortness of breath.   Gastrointestinal: See HPI. Negative for  nausea/vomiting. Negative for abdominal pain.  Genitourinary: See HPI. Negative for discoloration or dysuria.  Hematologic/Lymphatic: negative for bleeding/clotting disorders.   Musculoskeletal: Negative for falls and muscle weakness.   Neurological: See HPI. no history of seizures. no history of cranial surgery or shunts.  Neurological: See HPI. No seizures.   Endocrine: Negative for polydipsia, polyphagia and polyuria.   Allergic/Immunologic: Negative for hives and persistent infections.     EXAM:  LMP 02/01/2003 (Approximate)     PHYSICAL EXAMINATION:    General: The patient is a 79 y.o. female in no apparent distress, the patient is orientatied to person, place and time.  Psych: Normal mood and affect  HEENT: Vision grossly intact, hearing intact to the spoken word.  Lungs: Respirations unlabored.  Gait: Normal station and gait, no difficulty with toe or heel walk.   Skin: Dorsal lumbar skin negative for rashes, lesions, hairy patches and surgical scars. There is lower lumbar tenderness to palpation.  Range of motion: Lumbar range of motion is acceptable.  Spinal Balance: Global saggital and coronal spinal balance acceptable, no significant for scoliosis and kyphosis.  Musculoskeletal: No pain with the range of motion of the bilateral hips. No trochanteric tenderness to palpation.  Vascular: Bilateral lower extremities warm and well perfused, Dorsalis pedis pulses 2+ bilaterally.  Neurological: Normal strength and tone in all major motor groups in the bilateral lower extremities. Normal sensation to light touch in the L2-S1 dermatomes bilaterally.  Deep tendon reflexes symmetric 2+ in the bilateral lower extremities.  Negative Babinski bilaterally. Straight leg raise negative bilaterally.    IMAGING:   Today I independently reviewed the following images and my interpretations are as follows:    AP, Lat and Flex/Ex  upright L-spine demonstrate spondylosis and DDD.    Lumbar MRI showed L3-5 mod central stenosis.  L5-S1 severe central and R foraminal stenosis.    DEXA in 2023 showed lumbar T-score of 0.9 but worst T-score of -0.2 (FN).      There is no height or weight on file to calculate BMI.  Hemoglobin A1C   Date Value Ref Range Status   09/08/2014 6.1 4.5 - 6.2 % Final       ASSESSMENT/PLAN:    Odessa was seen today for follow-up.    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    Patient has lumbar spondylosis and stenosis with RLE radiculopathy. I discussed the natural history of their diagnoses as well as surgical and nonsurgical treatment options. I educated the patient on the importance of core/back strengthening, correct posture, bending/lifting ergonomics, and low-impact aerobic exercises (walking, elliptical, and aquatherapy). Continue medications. Patient has failed conservative management and is a candidate for L5-S1 PLDF/TLIF (R). I educated her that in order to treat all her pathology, she would need a L3-S1 PLDF/TLIF. I think she is most symptomatic from L5-S1 and she wants the least surgery possible. She is at risk of needing future surgeries; she understood that and wanted to proceed. I ordered CT lumbar for preop planning. She will need preop clearance.    I had a sit down discussion with the patient and  regarding a L5-S1 PLDF/TLIF (R). We specifically discussed the risks, benefits, and alternatives to surgery. We discussed the surgical procedure including the skin incision, nerve decompression, bone fusion, allograft, iliac crest bone graft, and surgical implants including pedicle screws and interbody devices as indicated: they understand the risks include but are not limited to death, paralysis, blindness, bleeding, infection, damage  to arteries, veins and nerves, spinal fluid leak, continued or worsening pain, no improvement in symptoms, non-union, and the possible need for more surgery in the future, the possible need for perioperative blood transfusion, as well as the possibility other unforseen and unknown complications. We talked about expected hospital stay and recovery period. All questions were answered; they understand and wish to proceed.     Kamran Douglas MD  Orthopaedic Spine Surgeon  Department of Orthopaedic Surgery  [phone]

## 2024-10-23 NOTE — H&P (VIEW-ONLY)
DATE: 10/23/2024  PATIENT: Odessa Vaughn    Attending Physician: Kamran Douglas M.D.    CHIEF COMPLAINT: LBP and RLE pain    HISTORY:  Odessa Vaughn is a 79 y.o. female presents for initial evaluation of low back and right leg pain (Back - 5, Leg - 5). The pain has been present for years. The patient describes the pain as dull and it radiates posterolaterally down RLE to the foot.  The pain is worse with activity and improved by rest. There is R foot associated numbness and tingling. There is RLE subjective weakness. Prior treatments have included meds (mobic and flexeril), PT, KARLA, but no surgery.    The Patient denies myelopathic symptoms such as handwriting changes or difficulty with buttons/coins/keys. Denies perineal paresthesias, bowel/bladder dysfunction.    The patient does not smoke, have DM or endorse IVDU. The patient is not on any blood thinners and does not take chronic narcotics. She is a retired .    PAST MEDICAL/SURGICAL HISTORY:  Past Medical History:   Diagnosis Date    Arthritis     hands, legs    Breast cancer 12/2012    Right breast invasive ductal carcinoma, ER positive, FL and Her2 negative    Bursitis of left hip 2016    resolved    Chronic midline low back pain with left-sided sciatica 06/05/2017    Essential hypertension 09/21/2015    Hyperlipidemia 09/15/2014     Past Surgical History:   Procedure Laterality Date    BREAST BIOPSY Right 12/19/2012    Right breast- [facility]    BREAST SURGERY Right 01/2013    right mastectomy, SN biopsy     COLONOSCOPY N/A 09/07/2017    Procedure: COLONOSCOPY;  Surgeon: Syed Shah MD;  Location: [facility] ([address] [place] [address]);  Service: Endoscopy;  Laterality: N/A;    COLONOSCOPY N/A 04/10/2023    Procedure: COLONOSCOPY;  Surgeon: Kamran Mcdermott MD;  Location: [facility] ENDO ([address] [place] [address]);  Service: Endoscopy;  Laterality: N/A;  inst portal-RB  4/3/23-procedure confirmed with precall, patient requesting prep be changed as current prep  ordered is not covered by insurance. Patient requesting a call back. Notified [facility] . HECTOR [place]    DILATION AND CURETTAGE OF UTERUS      EPIDURAL STEROID INJECTION N/A 07/25/2023    Procedure: LUMBAR L5/S1 IL KARLA (AIM TO RIGHT) DIRECT REFERRAL;  Surgeon: Elza Thomas MD;  Location: [facility] PAIN MGT;  Service: Pain Management;  Laterality: N/A;    MASTECTOMY  01/2013    TRANSFORAMINAL EPIDURAL INJECTION OF STEROID Right 11/11/2021    Procedure: Injection,steroid,epidural,transforaminal Right L4/L5 and L5/S1 Direct Referral;  Surgeon: Elza Thomas MD;  Location: [facility]IN [facility]GT;  Service: Pain Management;  Laterality: Right;    TRANSFORAMINAL EPIDURAL INJECTION OF STEROID Right 12/09/2021    Procedure: Injection,steroid,epidural,transforaminal approach RIGHT L4-L5 AND L5-S1 DIRECT REFERRAL;  Surgeon: Elza Thomas MD;  Location: [facility] PAIN [facility]GT;  Service: Pain Management;  Laterality: Right;       Current Medications:   Current Outpatient Medications:     ascorbic acid, vitamin C, (VITAMIN C) 500 MG tablet, Take 500 mg by mouth once daily., Disp: , Rfl:     aspirin 81 MG Chew, Take 81 mg by mouth once daily., Disp: , Rfl:     atorvastatin (LIPITOR) 10 MG tablet, TAKE 1 TABLET(10 MG) BY MOUTH EVERY DAY, Disp: 90 tablet, Rfl: 3    cholecalciferol, vitamin D3, (VITAMIN D3) 50 mcg (2,000 unit) Cap capsule, Take 2,000 Units by mouth once daily., Disp: , Rfl:     cyanocobalamin 1,000 mcg/mL injection, Inject 1 mL (1,000 mcg total) into the muscle every 30 days., Disp: 2 mL, Rfl: 3    cyclobenzaprine (FLEXERIL) 5 MG tablet, Take 1 tablet (5 mg total) by mouth 3 (three) times daily as needed for Muscle spasms., Disp: 30 tablet, Rfl: 0    meloxicam (MOBIC) 15 MG tablet, Take 1 tablet (15 mg total) by mouth daily as needed for Pain., Disp: 30 tablet, Rfl: 0    multivitamin capsule, Take 1 capsule by mouth once daily., Disp: , Rfl:     PROPYLENE GLYCOL//PF (SYSTANE, PF, OPHT), Apply to eye daily as  needed. , Disp: , Rfl:     thiamine (VITAMIN B-1) 100 MG tablet, Take 100 mg by mouth once daily., Disp: , Rfl:     triamterene-hydrochlorothiazide 37.5-25 mg (DYAZIDE) 37.5-25 mg per capsule, TAKE 1 CAPSULE BY MOUTH EVERY DAY, Disp: 90 capsule, Rfl: 3    Social History:   Social History     Socioeconomic History    Marital status:    Tobacco Use    Smoking status: Never    Smokeless tobacco: Never    Tobacco comments:     Retired;    Substance and Sexual Activity    Alcohol use: Not Currently     Comment: holiday - occasional wine    Drug use: No    Sexual activity: Not Currently     Partners: Male     Comment: , since 1965     Social Drivers of Health     Financial Resource Strain: Low Risk  (4/6/2023)    Overall Financial Resource Strain (CARDIA)     Difficulty of Paying Living Expenses: Not hard at all   Food Insecurity: No Food Insecurity (4/6/2023)    Hunger Vital Sign     Worried About Running Out of Food in the Last Year: Never true     Ran Out of Food in the Last Year: Never true   Transportation Needs: No Transportation Needs (4/6/2023)    PRAPARE - Transportation     Lack of Transportation (Medical): No     Lack of Transportation (Non-Medical): No   Physical Activity: Sufficiently Active (4/6/2023)    Exercise Vital Sign     Days of Exercise per Week: 7 days     Minutes of Exercise per Session: 120 min   Stress: No Stress Concern Present (4/6/2023)    [employer] - Occupational Stress Questionnaire     Feeling of Stress : Not at all   Housing Stability: Unknown (4/6/2023)    Housing Stability Vital Sign     Unable to Pay for Housing in the Last Year: No     Unstable Housing in the Last Year: No       REVIEW OF SYSTEMS:  Constitution: Negative. Negative for chills, fever and night sweats.   Cardiovascular: Negative for chest pain and syncope.   Respiratory: Negative for cough and shortness of breath.   Gastrointestinal: See HPI. Negative for  nausea/vomiting. Negative for abdominal pain.  Genitourinary: See HPI. Negative for discoloration or dysuria.  Hematologic/Lymphatic: negative for bleeding/clotting disorders.   Musculoskeletal: Negative for falls and muscle weakness.   Neurological: See HPI. no history of seizures. no history of cranial surgery or shunts.  Neurological: See HPI. No seizures.   Endocrine: Negative for polydipsia, polyphagia and polyuria.   Allergic/Immunologic: Negative for hives and persistent infections.     EXAM:  LMP 02/01/2003 (Approximate)     PHYSICAL EXAMINATION:    General: The patient is a 79 y.o. female in no apparent distress, the patient is orientatied to person, place and time.  Psych: Normal mood and affect  HEENT: Vision grossly intact, hearing intact to the spoken word.  Lungs: Respirations unlabored.  Gait: Normal station and gait, no difficulty with toe or heel walk.   Skin: Dorsal lumbar skin negative for rashes, lesions, hairy patches and surgical scars. There is lower lumbar tenderness to palpation.  Range of motion: Lumbar range of motion is acceptable.  Spinal Balance: Global saggital and coronal spinal balance acceptable, no significant for scoliosis and kyphosis.  Musculoskeletal: No pain with the range of motion of the bilateral hips. No trochanteric tenderness to palpation.  Vascular: Bilateral lower extremities warm and well perfused, Dorsalis pedis pulses 2+ bilaterally.  Neurological: Normal strength and tone in all major motor groups in the bilateral lower extremities. Normal sensation to light touch in the L2-S1 dermatomes bilaterally.  Deep tendon reflexes symmetric 2+ in the bilateral lower extremities.  Negative Babinski bilaterally. Straight leg raise negative bilaterally.    IMAGING:   Today I independently reviewed the following images and my interpretations are as follows:    AP, Lat and Flex/Ex  upright L-spine demonstrate spondylosis and DDD.    Lumbar MRI showed L3-5 mod central stenosis.  L5-S1 severe central and R foraminal stenosis.    DEXA in 2023 showed lumbar T-score of 0.9 but worst T-score of -0.2 (FN).      There is no height or weight on file to calculate BMI.  Hemoglobin A1C   Date Value Ref Range Status   09/08/2014 6.1 4.5 - 6.2 % Final       ASSESSMENT/PLAN:    Odessa was seen today for follow-up.    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    Patient has lumbar spondylosis and stenosis with RLE radiculopathy. I discussed the natural history of their diagnoses as well as surgical and nonsurgical treatment options. I educated the patient on the importance of core/back strengthening, correct posture, bending/lifting ergonomics, and low-impact aerobic exercises (walking, elliptical, and aquatherapy). Continue medications. Patient has failed conservative management and is a candidate for L5-S1 PLDF/TLIF (R). I educated her that in order to treat all her pathology, she would need a L3-S1 PLDF/TLIF. I think she is most symptomatic from L5-S1 and she wants the least surgery possible. She is at risk of needing future surgeries; she understood that and wanted to proceed. I ordered CT lumbar for preop planning. She will need preop clearance.    I had a sit down discussion with the patient and  regarding a L5-S1 PLDF/TLIF (R). We specifically discussed the risks, benefits, and alternatives to surgery. We discussed the surgical procedure including the skin incision, nerve decompression, bone fusion, allograft, iliac crest bone graft, and surgical implants including pedicle screws and interbody devices as indicated: they understand the risks include but are not limited to death, paralysis, blindness, bleeding, infection, damage  to arteries, veins and nerves, spinal fluid leak, continued or worsening pain, no improvement in symptoms, non-union, and the possible need for more surgery in the future, the possible need for perioperative blood transfusion, as well as the possibility other unforseen and unknown complications. We talked about expected hospital stay and recovery period. All questions were answered; they understand and wish to proceed.     Kamran Douglas MD  Orthopaedic Spine Surgeon  Department of Orthopaedic Surgery  [phone]

## 2024-10-28 ENCOUNTER — HOSPITAL ENCOUNTER (OUTPATIENT)
Dept: RADIOLOGY | Facility: HOSPITAL | Age: 79
Discharge: HOME | End: 2024-10-28
Attending: INTERNAL MEDICINE
Payer: MEDICARE

## 2024-10-28 ENCOUNTER — TELEPHONE (OUTPATIENT)
Dept: INTERNAL MEDICINE | Facility: CLINIC | Age: 79
End: 2024-10-28
Payer: MEDICARE

## 2024-10-28 DIAGNOSIS — E78.5 HYPERLIPIDEMIA, UNSPECIFIED HYPERLIPIDEMIA TYPE: ICD-10-CM

## 2024-10-28 DIAGNOSIS — I65.01 STENOSIS OF RIGHT VERTEBRAL ARTERY: ICD-10-CM

## 2024-10-28 DIAGNOSIS — R91.8 ABNORMAL CT SCAN OF LUNG: ICD-10-CM

## 2024-10-28 PROCEDURE — 70496 CT ANGIOGRAPHY HEAD: CPT | Mod: TC,HCNC

## 2024-10-28 PROCEDURE — 25500020 PHARM REV CODE 255: Mod: HCNC | Performed by: INTERNAL MEDICINE

## 2024-10-28 PROCEDURE — 71250 CT THORAX DX C-: CPT | Mod: 26,HCNC,, | Performed by: RADIOLOGY

## 2024-10-28 PROCEDURE — 70496 CT ANGIOGRAPHY HEAD: CPT | Mod: 26,HCNC,, | Performed by: RADIOLOGY

## 2024-10-28 PROCEDURE — 71250 CT THORAX DX C-: CPT | Mod: TC,HCNC

## 2024-10-28 PROCEDURE — 70498 CT ANGIOGRAPHY NECK: CPT | Mod: 26,HCNC,, | Performed by: RADIOLOGY

## 2024-10-28 RX ORDER — ATORVASTATIN CALCIUM 20 MG/1
TABLET, FILM COATED ORAL
Qty: 90 TABLET | Refills: 3 | Status: SHIPPED | OUTPATIENT
Start: 2024-10-28

## 2024-10-28 RX ADMIN — IOHEXOL 100 ML: 350 INJECTION, SOLUTION INTRAVENOUS at 07:10

## 2024-11-04 ENCOUNTER — TELEPHONE (OUTPATIENT)
Dept: ORTHOPEDIC SURGERY | Facility: CLINIC | Age: 79
End: 2024-11-04
Payer: MEDICARE

## 2024-11-04 ENCOUNTER — ANESTHESIA EVENT (OUTPATIENT)
Dept: SURGERY | Facility: HOSPITAL | Age: 79
DRG: 451 | End: 2024-11-04
Payer: MEDICARE

## 2024-11-04 NOTE — TELEPHONE ENCOUNTER
Spoke to pt, informed her arrival time for surgery tomorrow is 5:00am at [facility] 2nd floor, [address] No food or drink after midnight. Reminded pt to shower with Hibiclens antibacterial soap tonight and tomorrow morning. Pt pleased and verbalized understanding.     ----- Message from Kamran Douglas MD sent at 11/4/2024  2:06 PM CST -----  Regarding: RE: appointment access  Contact: [phone]    ----- Message -----  From: Jarvis King  Sent: 11/4/2024  12:23 PM CST  To: Kamran Douglas MD  Subject: appointment access                               Pt is calling to get procedure time that's scheduled for 11/05/2024 . Pt will like to speak with someone about arrival time . Please contact pt .     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 pneumonia, few coagulase neg staph    #Lt foot gangrene w/ subQ air and OM   Leukocytosis improving and patient remains afebrile.  TTE on 7/22/24 with mild aortic and mitral regurgitation (interpreted as no significant change from prior on 9/2023)   Now s/p Lt BKA revision on 7/24/24   Stopped vancomycin, flagyl, and cefepime (7/24/24)  #UTI growing C. albicans   Patient w/ suprapubic tenderness and +C. albicans on UCx on 7/21. Will treat this as a UTI.     Recommendations:  - c/w Cefazolin 2g IV q8hrs for 4 weeks (7/22-8/18)  - Continue caspofungin 50mg qd. Treatment course will be from 7/26-8/8  - Upon discharge, please get weekly CBC w/ diff, CMP, ESR, and CRP and fax to Dr. Perez's office at [phone]  - Dr. Perez's office will arrange follow up.  - consider bowel regimen given opioid use and no reported BM    ID team to follow   Case discussed with Dr. 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 pneumonia, few coagulase neg staph    #Lt foot gangrene w/ subQ air and OM   Leukocytosis improving and patient remains afebrile.  TTE on 7/22/24 with mild aortic and mitral regurgitation (interpreted as no significant change from prior on 9/2023)   Now s/p Lt BKA revision on 7/24/24   Stopped vancomycin, flagyl, and cefepime (7/24/24)  #UTI growing C. albicans   Patient w/ improvement in suprapubic tenderness     Recommendations:  - c/w Cefazolin 2g IV q8hrs for 4 weeks (7/22-8/18)  - c/w caspofungin 50mg qd. Treatment course will be from 7/26-8/8  - Upon discharge, please get weekly CBC w/ diff, CMP, ESR, and CRP and fax to Dr. Perez's office at [phone]  - Dr. Perez's office will arrange follow up.  - consider bowel regimen given opioid use and no reported BM    ID team to sign off. Please reconsult as needed.   Case discussed with Dr. Major     75 y/o M with pmhx of HLD, COPD, CAD, NSTEMI (admitted to 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 pneumonia, few coagulase neg staph    #Lt foot gangrene w/ subQ air and OM   Leukocytosis improving and patient remains afebrile.  TTE on 7/22/24 with mild aortic and mitral regurgitation (interpreted as no significant change from prior on 9/2023)   Now s/p Lt BKA revision on 7/24/24   Stopped vancomycin, flagyl, and cefepime (7/24/24)  #UTI growing C. albicans   Patient w/ improvement in suprapubic tenderness     Recommendations:  - c/w Cefazolin 2g IV q8hrs for 4 weeks (7/22-8/18)  - c/w caspofungin 50mg qd. Treatment course will be from 7/26-8/8  - Upon discharge, please get weekly CBC w/ diff, CMP, ESR, and CRP and fax to Dr. Perez's office at [phone]  - Dr. Perez's office will arrange follow up.  - consider bowel regimen given opioid use and no reported BM    ID team to sign off. Please reconsult as needed.   Case discussed with Dr. Major

## 2024-11-04 NOTE — ANESTHESIA PREPROCEDURE EVALUATION
[facility]  Anesthesia Pre-Operative Evaluation   11/04/2024        Odessa Vaughn, 1945  [id number]  Procedure(s) (LRB):  FUSION, SPINE, LUMBAR, TLIF, POSTERIOR APPROACH, USING PEDICLE SCREW L5-S1 (R) GLOBUS ROBOT SNS: SSEP/EMG (N/A)    Subjective    Odessa Vaughn is a 79 y.o. female w/ a significant PMHx of HTN, right breast cancer s/p mastectomy, and lumbar spondylosis with RLE radiculopathy.    Patient now presents for above procedure(s).     Prev Airway: 2013- Grade I with Swanson 3      Patient Active Problem List   Diagnosis    S/P right mastectomy    Other hyperlipidemia    Primary osteoarthritis of hand    Chronic midline low back pain with right-sided sciatica    Osteoarthritis of spine with radiculopathy, lumbar region    Sacroiliac dysfunction    Atherosclerosis of aorta    Essential (hemorrhagic) thrombocythemia    Lumbar facet arthropathy    Essential hypertension    History of right breast cancer    Inflammatory spondylopathy of lumbar region    Hx of folliculitis    Deformity of breast after mastectomy    Lung nodule       Review of patient's allergies indicates:   Allergen Reactions    Vicodin [hydrocodone-acetaminophen] Other (See Comments)     Dizziness       Current Inpatient Medications:       No current facility-administered medications on file prior to encounter.     Current Outpatient Medications on File Prior to Encounter   Medication Sig Dispense Refill    ascorbic acid, vitamin C, (VITAMIN C) 500 MG tablet Take 500 mg by mouth once daily.      aspirin 81 MG Chew Take 81 mg by mouth once daily.      cholecalciferol, vitamin D3, (VITAMIN D3) 50 mcg (2,000 unit) Cap capsule Take 2,000 Units by mouth once daily.      multivitamin capsule Take 1 capsule by mouth once daily.      PROPYLENE GLYCOL//PF (SYSTANE, PF, OPHT) Apply to eye daily as needed.       thiamine (VITAMIN B-1) 100 MG tablet Take 100 mg by mouth once daily.       triamterene-hydrochlorothiazide 37.5-25 mg (DYAZIDE) 37.5-25 mg per capsule TAKE 1 CAPSULE BY MOUTH EVERY DAY 90 capsule 3       Past Surgical History:   Procedure Laterality Date    BREAST BIOPSY Right 12/19/2012    Right breast- IDC    BREAST SURGERY Right 01/2013    right mastectomy, SN biopsy     COLONOSCOPY N/A 09/07/2017    Procedure: COLONOSCOPY;  Surgeon: Syed Shah MD;  Location: [facility] ENDO (4TH FLR);  Service: Endoscopy;  Laterality: N/A;    COLONOSCOPY N/A 04/10/2023    Procedure: COLONOSCOPY;  Surgeon: Kamran Mcdermott MD;  Location: [facility] ENDO (4TH FLR);  Service: Endoscopy;  Laterality: N/A;  inst portal-RB  4/3/23-procedure confirmed with precall, patient requesting prep be changed as current prep ordered is not covered by insurance. Patient requesting a call back. Notified hallway . HECTOR Dudley    DILATION AND CURETTAGE OF UTERUS      EPIDURAL STEROID INJECTION N/A 07/25/2023    Procedure: LUMBAR L5/S1 IL KARLA (AIM TO RIGHT) DIRECT REFERRAL;  Surgeon: Elza Thomas MD;  Location: [facility] PAIN MGT;  Service: Pain Management;  Laterality: N/A;    MASTECTOMY  01/2013    TRANSFORAMINAL EPIDURAL INJECTION OF STEROID Right 11/11/2021    Procedure: Injection,steroid,epidural,transforaminal Right L4/L5 and L5/S1 Direct Referral;  Surgeon: Elza Thomas MD;  Location: [facility] PAIN MGT;  Service: Pain Management;  Laterality: Right;    TRANSFORAMINAL EPIDURAL INJECTION OF STEROID Right 12/09/2021    Procedure: Injection,steroid,epidural,transforaminal approach RIGHT L4-L5 AND L5-S1 DIRECT REFERRAL;  Surgeon: Elza Thomas MD;  Location: [facility] PAIN MGT;  Service: Pain Management;  Laterality: Right;       Social History:  Tobacco Use: Low Risk  (10/23/2024)    Patient History     Smoking Tobacco Use: Never     Smokeless Tobacco Use: Never     Passive Exposure: Not on file       Alcohol Use: Not At Risk (4/6/2023)    AUDIT-C     Frequency of Alcohol Consumption: Monthly or less     Average  Number of Drinks: 1 or 2     Frequency of Binge Drinking: Never       Objective    Vital Signs Range:  BMI Readings from Last 1 Encounters:   10/16/24 28.84 kg/m²               Significant Labs:        Component Value Date/Time    WBC 8.41 10/16/2024 0918    HGB 12.1 10/16/2024 0918    HCT 40.3 10/16/2024 0918     10/16/2024 0918     10/16/2024 0918    K 3.7 10/16/2024 0918     10/16/2024 0918    CO2 27 10/16/2024 0918    GLU 88 10/16/2024 0918    BUN 16 10/16/2024 0918    CREATININE 1.0 10/16/2024 0918    MG 2.0 03/19/2024 1000    PHOS 3.3 03/06/2012 2039    CALCIUM 9.6 10/16/2024 0918    ALBUMIN 3.6 10/16/2024 0918    PROT 8.0 10/16/2024 0918    ALKPHOS 107 10/16/2024 0918    BILITOT 0.3 10/16/2024 0918    AST 19 10/16/2024 0918    ALT 17 10/16/2024 0918    INR 1.0 10/23/2024 0939    HGBA1C 6.1 09/08/2014 0751        Please see Results Review for additional labs.     Diagnostic Studies: All relevant studies, reviewed.      EKG:   Results for orders placed or performed in visit on 10/16/24   IN OFFICE EKG 12-LEAD (to [place])    Collection Time: 10/16/24  8:17 AM   Result Value Ref Range    QRS Duration 100 ms    [facility] QTC Calculation 453 ms    Narrative    Test Reason : Z01.818,I10,    Vent. Rate : 064 BPM     Atrial Rate : 064 BPM     P-R Int : 178 ms          QRS Dur : 100 ms      QT Int : 440 ms       P-R-T Axes : 051 003 004 degrees     QTc Int : 453 ms    Normal sinus rhythm  Normal ECG  When compared with ECG of 06-MAR-2012 18:58,  No significant change was found  Confirmed by Beltran Brady MD ([id number]) on 10/16/2024 2:37:21 PM    Referred By:             Confirmed By:Beltran Brady MD       ECHO:  No results found for this or any previous visit.            Pre-op Assessment    I have reviewed the Patient Summary Reports.     I have reviewed the Nursing Notes.    I have reviewed the Medications.     Review of Systems  Anesthesia Hx:  No problems with previous Anesthesia             Denies Family  Hx of Anesthesia complications.    Denies Personal Hx of Anesthesia complications.                    Social:  Non-Smoker, Social Alcohol Use       Hematology/Oncology:  Hematology Normal                       --  Cancer in past history (BREAST CANCER RIGHT): s/p surgery       Breast              EENT/Dental:  EENT/Dental Normal           Cardiovascular:     Hypertension   Denies MI.  Denies CAD.     Denies Dysrhythmias.   Denies Angina.       Denies BRUCE.      Functional Capacity 4 METS, ABLE TO CLIMB 2 FLIGHTS OF STAIRS                   Hypertension  , Recent typical clinic B/P of 134/60       Pulmonary:  Pulmonary Normal      Denies Shortness of breath.  Denies Recent URI.                 Renal/:  Renal/ Normal                 Hepatic/GI:  Hepatic/GI Normal                    Musculoskeletal:   Musculoskeletal General/Symptoms:  Functional capacity is ambulatory without assistance.   Joint Disease:  Arthritis, Osteoarthritis AND HAND       Lumbar Spine Disorders, Lumbar Disc Disease, Radiculopathy   Neurological:  Neurology Normal           Neuro Symptoms of pain OF LOWER BACK   Osteoarthritis                           Endocrine:  Endocrine Normal            Psych:  Psychiatric Normal                    Physical Exam  General: Well nourished, Oriented, Alert and Cooperative    Airway:  Mallampati: II   Mouth Opening: Normal  TM Distance: > 6 cm  Tongue: Normal  Neck ROM: Normal ROM    Dental:  Intact  Torus, upper palate  Chest/Lungs:  Normal Respiratory Rate  Room air, no respiratory distress  Heart:  Rate: Normal  Rhythm: Regular Rhythm        Anesthesia Plan  Type of Anesthesia, risks & benefits discussed:    Anesthesia Type: Gen ETT  Intra-op Monitoring Plan: Standard ASA Monitors  Post Op Pain Control Plan: multimodal analgesia and IV/PO Opioids PRN  Induction:  IV  Airway Plan: Direct, Post-Induction  Informed Consent: Informed consent signed with the Patient and all parties understand the risks and  agree with anesthesia plan.  All questions answered. Patient consented to blood products? Yes  ASA Score: 3  Day of Surgery Review of History & Physical: H&P Update referred to the surgeon/provider.    Ready For Surgery From Anesthesia Perspective.     .

## 2024-11-05 ENCOUNTER — ANESTHESIA (OUTPATIENT)
Dept: SURGERY | Facility: HOSPITAL | Age: 79
DRG: 451 | End: 2024-11-05
Payer: MEDICARE

## 2024-11-05 ENCOUNTER — HOSPITAL ENCOUNTER (INPATIENT)
Facility: HOSPITAL | Age: 79
LOS: 2 days | Discharge: HOME HEALTH | DRG: 451 | End: 2024-11-07
Attending: ORTHOPAEDIC SURGERY | Admitting: ORTHOPAEDIC SURGERY
Payer: MEDICARE

## 2024-11-05 DIAGNOSIS — M54.16 LUMBAR RADICULOPATHY, CHRONIC: Primary | ICD-10-CM

## 2024-11-05 DIAGNOSIS — G89.18 POST-OP PAIN: ICD-10-CM

## 2024-11-05 DIAGNOSIS — Z98.1 S/P LUMBAR SPINAL FUSION: Primary | ICD-10-CM

## 2024-11-05 LAB
ABO + RH BLD: NORMAL
BLD GP AB SCN CELLS X3 SERPL QL: NORMAL

## 2024-11-05 PROCEDURE — 63600175 PHARM REV CODE 636 W HCPCS: Mod: HCNC | Performed by: STUDENT IN AN ORGANIZED HEALTH CARE EDUCATION/TRAINING PROGRAM

## 2024-11-05 PROCEDURE — 27800903 OPTIME MED/SURG SUP & DEVICES OTHER IMPLANTS: Mod: HCNC | Performed by: ORTHOPAEDIC SURGERY

## 2024-11-05 PROCEDURE — 25000003 PHARM REV CODE 250: Mod: HCNC

## 2024-11-05 PROCEDURE — 63600175 PHARM REV CODE 636 W HCPCS: Mod: HCNC

## 2024-11-05 PROCEDURE — 8E0W4CZ ROBOTIC ASSISTED PROCEDURE OF TRUNK REGION, PERCUTANEOUS ENDOSCOPIC APPROACH: ICD-10-PCS | Performed by: ORTHOPAEDIC SURGERY

## 2024-11-05 PROCEDURE — 22840 INSERT SPINE FIXATION DEVICE: CPT | Mod: HCNC,,, | Performed by: ORTHOPAEDIC SURGERY

## 2024-11-05 PROCEDURE — 0SB40ZZ EXCISION OF LUMBOSACRAL DISC, OPEN APPROACH: ICD-10-PCS | Performed by: ORTHOPAEDIC SURGERY

## 2024-11-05 PROCEDURE — C1713 ANCHOR/SCREW BN/BN,TIS/BN: HCPCS | Mod: HCNC | Performed by: ORTHOPAEDIC SURGERY

## 2024-11-05 PROCEDURE — 0SG30AJ FUSION OF LUMBOSACRAL JOINT WITH INTERBODY FUSION DEVICE, POSTERIOR APPROACH, ANTERIOR COLUMN, OPEN APPROACH: ICD-10-PCS | Performed by: ORTHOPAEDIC SURGERY

## 2024-11-05 PROCEDURE — 20936 SP BONE AGRFT LOCAL ADD-ON: CPT | Mod: HCNC,,, | Performed by: ORTHOPAEDIC SURGERY

## 2024-11-05 PROCEDURE — 36000711: Mod: HCNC | Performed by: ORTHOPAEDIC SURGERY

## 2024-11-05 PROCEDURE — 36000710: Mod: HCNC | Performed by: ORTHOPAEDIC SURGERY

## 2024-11-05 PROCEDURE — 22633 ARTHRD CMBN 1NTRSPC LUMBAR: CPT | Mod: HCNC,,, | Performed by: ORTHOPAEDIC SURGERY

## 2024-11-05 PROCEDURE — C1729 CATH, DRAINAGE: HCPCS | Mod: HCNC | Performed by: ORTHOPAEDIC SURGERY

## 2024-11-05 PROCEDURE — 86900 BLOOD TYPING SEROLOGIC ABO: CPT | Mod: HCNC

## 2024-11-05 PROCEDURE — 25000003 PHARM REV CODE 250: Mod: HCNC | Performed by: STUDENT IN AN ORGANIZED HEALTH CARE EDUCATION/TRAINING PROGRAM

## 2024-11-05 PROCEDURE — 71000015 HC POSTOP RECOV 1ST HR: Mod: HCNC | Performed by: ORTHOPAEDIC SURGERY

## 2024-11-05 PROCEDURE — 71000033 HC RECOVERY, INTIAL HOUR: Mod: HCNC | Performed by: ORTHOPAEDIC SURGERY

## 2024-11-05 PROCEDURE — 22853 INSJ BIOMECHANICAL DEVICE: CPT | Mod: HCNC,,, | Performed by: ORTHOPAEDIC SURGERY

## 2024-11-05 PROCEDURE — 63052 LAM FACETC/FRMT ARTHRD LUM 1: CPT | Mod: HCNC,,, | Performed by: ORTHOPAEDIC SURGERY

## 2024-11-05 PROCEDURE — 37000008 HC ANESTHESIA 1ST 15 MINUTES: Mod: HCNC | Performed by: ORTHOPAEDIC SURGERY

## 2024-11-05 PROCEDURE — 71000016 HC POSTOP RECOV ADDL HR: Mod: HCNC | Performed by: ORTHOPAEDIC SURGERY

## 2024-11-05 PROCEDURE — 0SG3071 FUSION OF LUMBOSACRAL JOINT WITH AUTOLOGOUS TISSUE SUBSTITUTE, POSTERIOR APPROACH, POSTERIOR COLUMN, OPEN APPROACH: ICD-10-PCS | Performed by: ORTHOPAEDIC SURGERY

## 2024-11-05 PROCEDURE — 00NW0ZZ RELEASE CERVICAL SPINAL CORD, OPEN APPROACH: ICD-10-PCS | Performed by: ORTHOPAEDIC SURGERY

## 2024-11-05 PROCEDURE — 94761 N-INVAS EAR/PLS OXIMETRY MLT: CPT | Mod: HCNC

## 2024-11-05 PROCEDURE — 63600175 PHARM REV CODE 636 W HCPCS: Mod: HCNC | Performed by: ORTHOPAEDIC SURGERY

## 2024-11-05 PROCEDURE — 86850 RBC ANTIBODY SCREEN: CPT | Mod: HCNC

## 2024-11-05 PROCEDURE — 27201423 OPTIME MED/SURG SUP & DEVICES STERILE SUPPLY: Mod: HCNC | Performed by: ORTHOPAEDIC SURGERY

## 2024-11-05 PROCEDURE — 37000009 HC ANESTHESIA EA ADD 15 MINS: Mod: HCNC | Performed by: ORTHOPAEDIC SURGERY

## 2024-11-05 PROCEDURE — 99900035 HC TECH TIME PER 15 MIN (STAT): Mod: HCNC

## 2024-11-05 PROCEDURE — 61783 SCAN PROC SPINAL: CPT | Mod: 59,HCNC,, | Performed by: ORTHOPAEDIC SURGERY

## 2024-11-05 DEVICE — CAP SPINAL LOCK THRD CREO 5.5: Type: IMPLANTABLE DEVICE | Site: SPINE LUMBAR | Status: FUNCTIONAL

## 2024-11-05 DEVICE — SCREW BONE SPINAL 6.5X40MM: Type: IMPLANTABLE DEVICE | Site: SPINE LUMBAR | Status: FUNCTIONAL

## 2024-11-05 DEVICE — IMPLANTABLE DEVICE: Type: IMPLANTABLE DEVICE | Site: SPINE LUMBAR | Status: FUNCTIONAL

## 2024-11-05 DEVICE — SCREW BONE SPINAL CREO 6.5X45: Type: IMPLANTABLE DEVICE | Site: SPINE LUMBAR | Status: FUNCTIONAL

## 2024-11-05 DEVICE — HEDRON P SPACER, 10X22,  9MM, 8°
Type: IMPLANTABLE DEVICE | Site: SPINE LUMBAR | Status: FUNCTIONAL
Brand: HEDRON

## 2024-11-05 DEVICE — PUTTY OSTEOSELECT DBM SYR 5CC: Type: IMPLANTABLE DEVICE | Site: SPINE LUMBAR | Status: FUNCTIONAL

## 2024-11-05 RX ORDER — TRIAMTERENE AND HYDROCHLOROTHIAZIDE 37.5; 25 MG/1; MG/1
1 CAPSULE ORAL DAILY
Status: DISCONTINUED | OUTPATIENT
Start: 2024-11-06 | End: 2024-11-07 | Stop reason: HOSPADM

## 2024-11-05 RX ORDER — NAPROXEN SODIUM 220 MG/1
81 TABLET, FILM COATED ORAL DAILY
Status: DISCONTINUED | OUTPATIENT
Start: 2024-11-06 | End: 2024-11-07 | Stop reason: HOSPADM

## 2024-11-05 RX ORDER — HYDROMORPHONE HYDROCHLORIDE 1 MG/ML
0.2 INJECTION, SOLUTION INTRAMUSCULAR; INTRAVENOUS; SUBCUTANEOUS EVERY 5 MIN PRN
Status: DISCONTINUED | OUTPATIENT
Start: 2024-11-05 | End: 2024-11-05 | Stop reason: HOSPADM

## 2024-11-05 RX ORDER — PROCHLORPERAZINE EDISYLATE 5 MG/ML
5 INJECTION INTRAMUSCULAR; INTRAVENOUS EVERY 6 HOURS PRN
Status: DISCONTINUED | OUTPATIENT
Start: 2024-11-05 | End: 2024-11-07 | Stop reason: HOSPADM

## 2024-11-05 RX ORDER — OXYCODONE HYDROCHLORIDE 5 MG/1
5 TABLET ORAL EVERY 4 HOURS PRN
Status: DISCONTINUED | OUTPATIENT
Start: 2024-11-05 | End: 2024-11-07 | Stop reason: HOSPADM

## 2024-11-05 RX ORDER — HYDROMORPHONE HYDROCHLORIDE 1 MG/ML
1 INJECTION, SOLUTION INTRAMUSCULAR; INTRAVENOUS; SUBCUTANEOUS
Status: DISCONTINUED | OUTPATIENT
Start: 2024-11-05 | End: 2024-11-07 | Stop reason: HOSPADM

## 2024-11-05 RX ORDER — GABAPENTIN 300 MG/1
300 CAPSULE ORAL 3 TIMES DAILY
Qty: 90 CAPSULE | Refills: 0 | Status: SHIPPED | OUTPATIENT
Start: 2024-11-05 | End: 2024-12-07

## 2024-11-05 RX ORDER — CEFAZOLIN 2 G/1
2 INJECTION, POWDER, FOR SOLUTION INTRAMUSCULAR; INTRAVENOUS
Status: COMPLETED | OUTPATIENT
Start: 2024-11-05 | End: 2024-11-05

## 2024-11-05 RX ORDER — PHENYLEPHRINE HCL IN 0.9% NACL 1 MG/10 ML
SYRINGE (ML) INTRAVENOUS
Status: DISCONTINUED | OUTPATIENT
Start: 2024-11-05 | End: 2024-11-05

## 2024-11-05 RX ORDER — ACETAMINOPHEN 325 MG/1
650 TABLET ORAL EVERY 6 HOURS
Status: DISCONTINUED | OUTPATIENT
Start: 2024-11-05 | End: 2024-11-07 | Stop reason: HOSPADM

## 2024-11-05 RX ORDER — DOCUSATE SODIUM 100 MG/1
100 CAPSULE, LIQUID FILLED ORAL 2 TIMES DAILY
Qty: 60 CAPSULE | Refills: 0 | Status: SHIPPED | OUTPATIENT
Start: 2024-11-05 | End: 2024-12-07

## 2024-11-05 RX ORDER — LIDOCAINE HYDROCHLORIDE 10 MG/ML
1 INJECTION, SOLUTION EPIDURAL; INFILTRATION; INTRACAUDAL; PERINEURAL ONCE
Status: DISCONTINUED | OUTPATIENT
Start: 2024-11-05 | End: 2024-11-05 | Stop reason: HOSPADM

## 2024-11-05 RX ORDER — SUCCINYLCHOLINE CHLORIDE 20 MG/ML
INJECTION INTRAMUSCULAR; INTRAVENOUS
Status: DISCONTINUED | OUTPATIENT
Start: 2024-11-05 | End: 2024-11-05

## 2024-11-05 RX ORDER — CELECOXIB 100 MG/1
100 CAPSULE ORAL 2 TIMES DAILY
Qty: 60 CAPSULE | Refills: 0 | Status: SHIPPED | OUTPATIENT
Start: 2024-11-05 | End: 2024-12-07

## 2024-11-05 RX ORDER — HEPARIN SODIUM 5000 [USP'U]/ML
5000 INJECTION, SOLUTION INTRAVENOUS; SUBCUTANEOUS EVERY 8 HOURS
Status: DISCONTINUED | OUTPATIENT
Start: 2024-11-05 | End: 2024-11-07 | Stop reason: HOSPADM

## 2024-11-05 RX ORDER — AMOXICILLIN 250 MG
2 CAPSULE ORAL NIGHTLY PRN
Status: DISCONTINUED | OUTPATIENT
Start: 2024-11-05 | End: 2024-11-07 | Stop reason: HOSPADM

## 2024-11-05 RX ORDER — METHOCARBAMOL 750 MG/1
750 TABLET, FILM COATED ORAL 4 TIMES DAILY
Qty: 40 TABLET | Refills: 0 | Status: SHIPPED | OUTPATIENT
Start: 2024-11-05 | End: 2024-11-17

## 2024-11-05 RX ORDER — FAMOTIDINE 20 MG/1
20 TABLET, FILM COATED ORAL 2 TIMES DAILY
Qty: 60 TABLET | Refills: 0 | Status: SHIPPED | OUTPATIENT
Start: 2024-11-05 | End: 2024-12-07

## 2024-11-05 RX ORDER — DEXTROMETHORPHAN HYDROBROMIDE, GUAIFENESIN 5; 100 MG/5ML; MG/5ML
650 LIQUID ORAL EVERY 8 HOURS
Qty: 120 TABLET | Refills: 0 | Status: SHIPPED | OUTPATIENT
Start: 2024-11-05

## 2024-11-05 RX ORDER — FENTANYL CITRATE 50 UG/ML
INJECTION, SOLUTION INTRAMUSCULAR; INTRAVENOUS
Status: DISCONTINUED | OUTPATIENT
Start: 2024-11-05 | End: 2024-11-05

## 2024-11-05 RX ORDER — CYCLOBENZAPRINE HCL 5 MG
5 TABLET ORAL 3 TIMES DAILY PRN
COMMUNITY

## 2024-11-05 RX ORDER — HALOPERIDOL 5 MG/ML
0.5 INJECTION INTRAMUSCULAR EVERY 10 MIN PRN
Status: DISCONTINUED | OUTPATIENT
Start: 2024-11-05 | End: 2024-11-05 | Stop reason: HOSPADM

## 2024-11-05 RX ORDER — GLUCAGON 1 MG
1 KIT INJECTION
Status: DISCONTINUED | OUTPATIENT
Start: 2024-11-05 | End: 2024-11-05 | Stop reason: HOSPADM

## 2024-11-05 RX ORDER — PROPOFOL 10 MG/ML
VIAL (ML) INTRAVENOUS
Status: DISCONTINUED | OUTPATIENT
Start: 2024-11-05 | End: 2024-11-05

## 2024-11-05 RX ORDER — SODIUM CHLORIDE 9 MG/ML
INJECTION, SOLUTION INTRAVENOUS CONTINUOUS
Status: DISCONTINUED | OUTPATIENT
Start: 2024-11-05 | End: 2024-11-06

## 2024-11-05 RX ORDER — DOCUSATE SODIUM 100 MG/1
100 CAPSULE, LIQUID FILLED ORAL DAILY
Status: DISCONTINUED | OUTPATIENT
Start: 2024-11-05 | End: 2024-11-07 | Stop reason: HOSPADM

## 2024-11-05 RX ORDER — VANCOMYCIN HYDROCHLORIDE 1 G/20ML
INJECTION, POWDER, LYOPHILIZED, FOR SOLUTION INTRAVENOUS
Status: DISCONTINUED | OUTPATIENT
Start: 2024-11-05 | End: 2024-11-05 | Stop reason: HOSPADM

## 2024-11-05 RX ORDER — LIDOCAINE HYDROCHLORIDE 10 MG/ML
1 INJECTION, SOLUTION EPIDURAL; INFILTRATION; INTRACAUDAL; PERINEURAL ONCE AS NEEDED
Status: COMPLETED | OUTPATIENT
Start: 2024-11-05 | End: 2024-11-05

## 2024-11-05 RX ORDER — OXYCODONE HYDROCHLORIDE 5 MG/1
5 TABLET ORAL EVERY 6 HOURS PRN
Qty: 30 TABLET | Refills: 0 | Status: SHIPPED | OUTPATIENT
Start: 2024-11-05 | End: 2024-12-05

## 2024-11-05 RX ORDER — ACETAMINOPHEN 10 MG/ML
INJECTION, SOLUTION INTRAVENOUS
Status: DISCONTINUED | OUTPATIENT
Start: 2024-11-05 | End: 2024-11-05

## 2024-11-05 RX ORDER — OXYCODONE HYDROCHLORIDE 10 MG/1
10 TABLET ORAL EVERY 4 HOURS PRN
Status: DISCONTINUED | OUTPATIENT
Start: 2024-11-05 | End: 2024-11-07 | Stop reason: HOSPADM

## 2024-11-05 RX ORDER — ONDANSETRON HYDROCHLORIDE 2 MG/ML
INJECTION, SOLUTION INTRAVENOUS
Status: DISCONTINUED | OUTPATIENT
Start: 2024-11-05 | End: 2024-11-05

## 2024-11-05 RX ORDER — BISACODYL 10 MG/1
10 SUPPOSITORY RECTAL DAILY PRN
Status: DISCONTINUED | OUTPATIENT
Start: 2024-11-05 | End: 2024-11-07 | Stop reason: HOSPADM

## 2024-11-05 RX ORDER — ONDANSETRON 8 MG/1
8 TABLET, ORALLY DISINTEGRATING ORAL EVERY 6 HOURS PRN
Status: DISCONTINUED | OUTPATIENT
Start: 2024-11-05 | End: 2024-11-07 | Stop reason: HOSPADM

## 2024-11-05 RX ORDER — ALUMINUM HYDROXIDE, MAGNESIUM HYDROXIDE, AND SIMETHICONE 1200; 120; 1200 MG/30ML; MG/30ML; MG/30ML
30 SUSPENSION ORAL EVERY 4 HOURS PRN
Status: DISCONTINUED | OUTPATIENT
Start: 2024-11-05 | End: 2024-11-07 | Stop reason: HOSPADM

## 2024-11-05 RX ORDER — DEXAMETHASONE SODIUM PHOSPHATE 4 MG/ML
INJECTION, SOLUTION INTRA-ARTICULAR; INTRALESIONAL; INTRAMUSCULAR; INTRAVENOUS; SOFT TISSUE
Status: DISCONTINUED | OUTPATIENT
Start: 2024-11-05 | End: 2024-11-05

## 2024-11-05 RX ORDER — ROCURONIUM BROMIDE 10 MG/ML
INJECTION, SOLUTION INTRAVENOUS
Status: DISCONTINUED | OUTPATIENT
Start: 2024-11-05 | End: 2024-11-05

## 2024-11-05 RX ORDER — LIDOCAINE HYDROCHLORIDE 20 MG/ML
INJECTION, SOLUTION EPIDURAL; INFILTRATION; INTRACAUDAL; PERINEURAL
Status: DISCONTINUED | OUTPATIENT
Start: 2024-11-05 | End: 2024-11-05

## 2024-11-05 RX ORDER — LIDOCAINE HYDROCHLORIDE AND EPINEPHRINE 10; 10 MG/ML; UG/ML
INJECTION, SOLUTION INFILTRATION; PERINEURAL
Status: DISCONTINUED | OUTPATIENT
Start: 2024-11-05 | End: 2024-11-05 | Stop reason: HOSPADM

## 2024-11-05 RX ORDER — MUPIROCIN 20 MG/G
OINTMENT TOPICAL 2 TIMES DAILY
Status: DISCONTINUED | OUTPATIENT
Start: 2024-11-05 | End: 2024-11-07 | Stop reason: HOSPADM

## 2024-11-05 RX ORDER — REMIFENTANIL HYDROCHLORIDE 1 MG/ML
INJECTION, POWDER, LYOPHILIZED, FOR SOLUTION INTRAVENOUS CONTINUOUS PRN
Status: DISCONTINUED | OUTPATIENT
Start: 2024-11-05 | End: 2024-11-05

## 2024-11-05 RX ORDER — METHOCARBAMOL 750 MG/1
750 TABLET, FILM COATED ORAL 3 TIMES DAILY
Status: DISCONTINUED | OUTPATIENT
Start: 2024-11-05 | End: 2024-11-07 | Stop reason: HOSPADM

## 2024-11-05 RX ORDER — ATORVASTATIN CALCIUM 20 MG/1
20 TABLET, FILM COATED ORAL DAILY
Status: DISCONTINUED | OUTPATIENT
Start: 2024-11-06 | End: 2024-11-07 | Stop reason: HOSPADM

## 2024-11-05 RX ORDER — SODIUM CHLORIDE 0.9 % (FLUSH) 0.9 %
10 SYRINGE (ML) INJECTION EVERY 8 HOURS PRN
Status: DISCONTINUED | OUTPATIENT
Start: 2024-11-05 | End: 2024-11-05 | Stop reason: HOSPADM

## 2024-11-05 RX ORDER — PHENYLEPHRINE HYDROCHLORIDE 10 MG/ML
INJECTION INTRAVENOUS CONTINUOUS PRN
Status: DISCONTINUED | OUTPATIENT
Start: 2024-11-05 | End: 2024-11-05

## 2024-11-05 RX ADMIN — OXYCODONE HYDROCHLORIDE 10 MG: 10 TABLET ORAL at 10:11

## 2024-11-05 RX ADMIN — HEPARIN SODIUM 5000 UNITS: 5000 INJECTION INTRAVENOUS; SUBCUTANEOUS at 09:11

## 2024-11-05 RX ADMIN — METHOCARBAMOL TABLETS 750 MG: 750 TABLET, COATED ORAL at 02:11

## 2024-11-05 RX ADMIN — FENTANYL CITRATE 100 MCG: 50 INJECTION, SOLUTION INTRAMUSCULAR; INTRAVENOUS at 07:11

## 2024-11-05 RX ADMIN — LIDOCAINE HYDROCHLORIDE 10 MG: 10 INJECTION, SOLUTION EPIDURAL; INFILTRATION; INTRACAUDAL; PERINEURAL at 05:11

## 2024-11-05 RX ADMIN — OXYCODONE HYDROCHLORIDE 10 MG: 10 TABLET ORAL at 02:11

## 2024-11-05 RX ADMIN — ACETAMINOPHEN 1000 MG: 10 INJECTION, SOLUTION INTRAVENOUS at 08:11

## 2024-11-05 RX ADMIN — PHENYLEPHRINE HYDROCHLORIDE 0.3 MCG/KG/MIN: 10 INJECTION INTRAVENOUS at 08:11

## 2024-11-05 RX ADMIN — HYDROMORPHONE HYDROCHLORIDE 0.2 MG: 1 INJECTION, SOLUTION INTRAMUSCULAR; INTRAVENOUS; SUBCUTANEOUS at 12:11

## 2024-11-05 RX ADMIN — Medication 100 MCG: at 08:11

## 2024-11-05 RX ADMIN — THERA TABS 1 TABLET: TAB at 10:11

## 2024-11-05 RX ADMIN — Medication 100 MCG: at 07:11

## 2024-11-05 RX ADMIN — ONDANSETRON 4 MG: 2 INJECTION INTRAMUSCULAR; INTRAVENOUS at 09:11

## 2024-11-05 RX ADMIN — CEFAZOLIN 2 G: 2 INJECTION, POWDER, FOR SOLUTION INTRAMUSCULAR; INTRAVENOUS at 04:11

## 2024-11-05 RX ADMIN — SUCCINYLCHOLINE CHLORIDE 80 MG: 20 INJECTION, SOLUTION INTRAMUSCULAR; INTRAVENOUS; PARENTERAL at 07:11

## 2024-11-05 RX ADMIN — SODIUM CHLORIDE: 0.9 INJECTION, SOLUTION INTRAVENOUS at 07:11

## 2024-11-05 RX ADMIN — SUGAMMADEX 200 MG: 100 INJECTION, SOLUTION INTRAVENOUS at 08:11

## 2024-11-05 RX ADMIN — DEXAMETHASONE SODIUM PHOSPHATE 8 MG: 4 INJECTION INTRA-ARTICULAR; INTRALESIONAL; INTRAMUSCULAR; INTRAVENOUS; SOFT TISSUE at 07:11

## 2024-11-05 RX ADMIN — MUPIROCIN: 20 OINTMENT TOPICAL at 09:11

## 2024-11-05 RX ADMIN — Medication 50 MCG: at 07:11

## 2024-11-05 RX ADMIN — REMIFENTANIL HYDROCHLORIDE 0.2 MCG/KG/MIN: 1 INJECTION, POWDER, LYOPHILIZED, FOR SOLUTION INTRAVENOUS at 07:11

## 2024-11-05 RX ADMIN — HYDROMORPHONE HYDROCHLORIDE 0.2 MG: 1 INJECTION, SOLUTION INTRAMUSCULAR; INTRAVENOUS; SUBCUTANEOUS at 10:11

## 2024-11-05 RX ADMIN — LIDOCAINE HYDROCHLORIDE 60 MG: 20 INJECTION, SOLUTION EPIDURAL; INFILTRATION; INTRACAUDAL at 07:11

## 2024-11-05 RX ADMIN — CEFAZOLIN 2 G: 330 INJECTION, POWDER, FOR SOLUTION INTRAMUSCULAR; INTRAVENOUS at 07:11

## 2024-11-05 RX ADMIN — ACETAMINOPHEN 650 MG: 325 TABLET ORAL at 04:11

## 2024-11-05 RX ADMIN — ACETAMINOPHEN 650 MG: 325 TABLET ORAL at 11:11

## 2024-11-05 RX ADMIN — ROCURONIUM BROMIDE 20 MG: 10 INJECTION, SOLUTION INTRAVENOUS at 07:11

## 2024-11-05 RX ADMIN — CEFAZOLIN 2 G: 2 INJECTION, POWDER, FOR SOLUTION INTRAMUSCULAR; INTRAVENOUS at 11:11

## 2024-11-05 RX ADMIN — DOCUSATE SODIUM 100 MG: 100 CAPSULE, LIQUID FILLED ORAL at 10:11

## 2024-11-05 RX ADMIN — OXYCODONE HYDROCHLORIDE 10 MG: 10 TABLET ORAL at 11:11

## 2024-11-05 RX ADMIN — PROPOFOL 150 MG: 10 INJECTION, EMULSION INTRAVENOUS at 07:11

## 2024-11-05 RX ADMIN — SODIUM CHLORIDE: 9 INJECTION, SOLUTION INTRAVENOUS at 11:11

## 2024-11-05 RX ADMIN — METHOCARBAMOL TABLETS 750 MG: 750 TABLET, COATED ORAL at 09:11

## 2024-11-05 RX ADMIN — HEPARIN SODIUM 5000 UNITS: 5000 INJECTION INTRAVENOUS; SUBCUTANEOUS at 02:11

## 2024-11-05 RX ADMIN — ROCURONIUM BROMIDE 10 MG: 10 INJECTION, SOLUTION INTRAVENOUS at 07:11

## 2024-11-05 RX ADMIN — MUPIROCIN: 20 OINTMENT TOPICAL at 10:11

## 2024-11-05 RX ADMIN — PHENYLEPHRINE HYDROCHLORIDE 100 MCG: 10 INJECTION INTRAVENOUS at 09:11

## 2024-11-05 NOTE — DISCHARGE INSTRUCTIONS
DR. SARITHA SAMUELS'S POSTOPERATIVE INSTRUCTIONS -   LUMBAR FUSION       Antibiotics: You do not need additional antibiotics at home.    NSAIDs: Please refrain from taking ibuprofen (Advil), naproxen (Aleve), and other non steroidal anti-inflammatory medications other than the Celebrex that will be prescribed to you after surgery.    Wound Care: You may remove your dressing and shower 7 days after surgery. Until then please keep your wound clean and dry. Sponge baths are acceptable. Do not go in a pool or hot tub until seen in clinic. Please leave the small steri-strips covering your wound in place until they fall off naturally (2 weeks). You may notice clear suture ends hanging from the sides of your incision after the steri-strips are removed, it is ok to clip these with scissors.    Brace: You may be prescribed a brace, please wear this when up and walking, it is not necessary to wear at night when sleeping.    Pain: We will use a multimodal approach for pain management after your surgery.  You will be given a prescription for pain medicine when you are discharged from the hospital.  You will also be given prescriptions for Robaxin (a muscle relaxer), Gabapentin, Celebrex and Tylenol.  Please note: you will only be given ONE prescription for narcotics when you are discharged from the hospital.  This medication is for breakthrough pain only. This medication will not be refilled.  The other medications given to you may be refilled if needed.      Infection: Signs of infection include increasing wound drainage and redness around the wound, as well as a temperature over 101.5 degrees. It is unnecessary to take your temperature on a routine basis. Please call the below number if you are concerned about an infection.    Driving and Work: It is ok to return to driving and work as long as you are not taking narcotic pain medications and can walk greater than 100 feet. Please do not lift over 10 pounds or participate in  exercise or sports until cleared by Dr. Douglas.    Deep Venous Thrombosis (Blood Clots): Symptoms include swelling in the legs and shortness of breath. Please call the office or proceed to the nearest emergency room if you have any of these symptoms.    Physical Therapy: The best physical therapy immediately after surgery is walking. Please try to walk as much as possible.    Follow-up: You will be scheduled for a follow-up appointment in 4 weeks with either Dr. Douglas or his physician assistant, Neelam Owens PA-C.    Questions: During business hours please call [phone] for routine questions. For after hours questions please call [phone] and ask to speak with the Orthopaedic resident on call.    Disability: If you submitted short term disability paperwork for us to complete and would like to check the status, please call the Disability Department at [phone].  You may fax any necessary paperwork to [phone].

## 2024-11-05 NOTE — OP NOTE
DATE OF PROCEDURE: 11/5/2024.     SURGEON: Kamran Douglas M.D.     FIRST ASSISTANT: Marvin Benson MD, PGY-4 Orthopedics     PREOPERATIVE DIAGNOSIS:   1.   L5-S1 spondylosis and DDD  2.   Symptomatic lumbar spinal stenosis lumbar radiculopathy     POSTOPERATIVE DIAGNOSIS:   Same     PROCEDURES PERFORMED:  Posterior lateral and posterior lumbar interbody fusion L5-S1  2.   Posterior nonsegmental instrumentation L5-S1  3.   L5 laminectomy, and bilateral foraminotomy for decompression of central and bilateral foraminal stenosis.  4.   Application of intervertebral spacer device to L5-S1 disc defect  5.   Robotic assistance for placement of the pedicle screws (requiring greater than 30 minutes of preoperative planning time)  6.   Auto and allografting     ANESTHESIA: General endotracheal anesthesia.     ESTIMATED BLOOD LOSS: 100 mL.     IMPLANTS: Globus screws, and static 9mm height (8deg lordosis, 22mm length) 3D-printed Ti cage.  DBM putty      SPECIMENS: None.     FINDINGS: severe R L5-S1 stenosis.     DRAINS: One deep and one superficial HV drains     COMPLICATIONS: None.     SPONGE AND NEEDLE COUNT: Correct x2.     NEUROLOGICAL MONITORING: Somatosensory potentials, free run EMG, and EMG stimulation lumbar pedicle screws. There were no changes to SSEP baselines. There no significant EMG activity. All screws stimulated at or greater than 19 milliamps.      REASON FOR OPERATION AND BRIEF HISTORY AND PHYSICAL: Odessa Vaughn is a 79 y.o. female with an L5-S1 spondylosis/DDD, and refractory lumbar spinal stenosis with radiculopathy.  She has failed extensive conservative management and is here for surgery today.     DESCRIPTION OF INFORMED CONSENT: Please see my last clinic note for a full description of the informed consent I had with the patient.     DESCRIPTION OF PROCEDURE: The patient was met in the preoperative area where all final questions were answered. Their lumbar spine was marked as the operative site.  The patient was then brought to the operating room where anesthesia was induced. The patient was then flipped prone onto the Magen spine table. All bony prominences were padded, paying special attention to the eyes, nose and mouth, axillae, ulnar nerve and hands, genitalia, knees and feet, this was confirmed to be adequate with the anesthesia team. Sequential compression devices were run throughout the case on the bilateral calves. The surgical field was prepped and draped in normal sterile manner after using fluoroscopy to localize our incisoin. A full time-out was then called, verifying patient's identity, surgery, site, and that they had been administered a weight appropriate dose of Ancef, no specific nursing, anesthetic or surgical concerns were identified and it was decided that it was safe to proceed with surgery. I informed all members of the operative team that they were empowered to speak up regarding concerns at any time during the procedure.     I then made a midline incision and carried dissection down through the skin and subcutaneous tissues using combination of sharp dissection and electrocautery where necessary. The dorsal fascia of the lumbar spine was identified and incised in the midline and I performed a preliminary subperiosteal exposure of the bilateral L4-5 and L5-S1 facet joints, and what I took to be the L5 transverse processes and sacral alae bilaterally. At the conclusion my preliminary exposure, I placed a marker on what I took to be the left L5 pedicle, took a lateral radiograph, and this confirmed my levels.  Next I finalized my exposure. Subsequently I performed bilateral L5-S1 facetectomies with an osteotome.     We placed a pin in the right posterior superior iliac spine for the robotic array. AP and lateral fluoroscopy images were taken to register to the preoperative CT scan and plan that we had performed prior to the incision.  Once that was done, the robotic arm was used to  cannulate pedicle screw tracts bilaterally at L5 and S1. Xrays confirmed adequate positioning of the screws on AP and lateral images.     I then began my neurological decompression. High-speed drill was used to thin the L5 lamina at the level of the pars. This was then removed on block, and I released the ligamentum flavum centrally with a forward angle curette and resected it with a Kerrison punch. I subsequently performed a central as well as bilateral foraminal decompression with a Kerrison punch. Working on the right of midline I used a osteotome to remove the superior articular process of S1. I then performed a subtotal L5-S1 diskectomy in a standard fashion. The disc space was then thoroughly irrigated and a static 9mm height 3D-printed Ti spacer was impacted, and confirmed to be at the correct level in adequate position radiographically. The disc space then packed with morselized bone graft.     The wound was then copiously irrigated. Rods were measured, cut, contoured, and locked into place with torque wrench. Morselized bone graft, mixed with 1 g vancomycin powder, was laid dorsally along the decorticated transverse processes from L5-S1.     500 mg of vancomycin powder was placed in the deep space, and a deep drain was then placed. The deep fascia was then closed with #1 Vicryl sutures in an interrupted, figure-of-eight fashion. A superficial drain was then placed. The superficial layer was then irrigated and closed over an additional 500 mg of vancomycin powder with 2-0 Vicryl, 3-0 Monocryl, Dermabond and Steri-Strips. A soft dressing was then placed. The drains were secured in place with silk sutures. The patient was then flipped supine, extubated and transferred to the Recovery Room in stable condition.    Kamran Douglas MD  Orthopaedic Spine Surgeon  Department of Orthopaedic Surgery  [phone]

## 2024-11-05 NOTE — PLAN OF CARE
Orders placed and paper consents brought to bedside between the time of 0650 and 0700. Site kyle completed. CHG wipes completed. T&S and extension sent. Pre op checklist completed and report given to OR RN.

## 2024-11-05 NOTE — TRANSFER OF CARE
"Anesthesia Transfer of Care Note    Patient: Odessa Vaughn    Procedure(s) Performed: Procedure(s) (LRB):  FUSION, SPINE, LUMBAR, TLIF, POSTERIOR APPROACH, USING PEDICLE SCREW L5-S1 (R) (N/A)    Patient location: PACU    Anesthesia Type: general    Transport from OR: Transported from OR on 6-10 L/min O2 by face mask with adequate spontaneous ventilation    Post pain: adequate analgesia    Post assessment: no apparent anesthetic complications and tolerated procedure well    Post vital signs: stable    Level of consciousness: awake    Nausea/Vomiting: no nausea/vomiting    Complications: none    Transfer of care protocol was followed      Last vitals: Visit Vitals  BP (!) 144/91 (BP Location: Left arm, Patient Position: Lying)   Pulse 67   Temp 36.3 °C (97.3 °F) (Temporal)   Resp 16   Ht 5' 5" (1.651 m)   Wt 78.6 kg (173 lb 4.5 oz)   LMP 02/01/2003 (Approximate)   SpO2 100%   Breastfeeding No   BMI 28.84 kg/m²     "

## 2024-11-05 NOTE — PLAN OF CARE
Call placed via hospital  to [facility] Leticia to request pre op orders. Additionally, sent message to Dr Stella barajas same and need for surgical consent and admit order. Awaiting return call. Notified Dr Ruiz, with anesthesia of above as well.

## 2024-11-05 NOTE — ANESTHESIA PROCEDURE NOTES
Intubation    Date/Time: 11/5/2024 7:23 AM    Performed by: Joshua Ruiz DO  Authorized by: Rohith Marroquin MD    Intubation:     Induction:  Intravenous    Intubated:  Postinduction    Mask Ventilation:  Easy mask    Attempts:  1    Attempted By:  Resident anesthesiologist and staff anesthesiologist    Method of Intubation:  Direct    Blade:  Altman 2    Laryngeal View Grade: Grade IIb - only the arytenoids and epiglottis seen      Difficult Airway Encountered?: No      Complications:  None    Airway Device:  Oral endotracheal tube    Airway Device Size:  7.0    Style/Cuff Inflation:  Cuffed (inflated to minimal occlusive pressure)    Tube secured:  23    Secured at:  The lips    Placement Verified By:  Capnometry    Complicating Factors:  Anterior larynx (large/floppy tongue)    Findings Post-Intubation:  BS equal bilateral and atraumatic/condition of teeth unchanged

## 2024-11-05 NOTE — NURSING
Pt admitted to the floor via hospital bed. Oriented to room and call light system. Surgical sites clean dry and intact with bilateral hemovacs in place. VSS and pt resting comfortably at this time.

## 2024-11-05 NOTE — ASSESSMENT & PLAN NOTE
Odessa Vaughn is a 79 y.o. female is s/p L5-S1 TLIF on 11/5/2024      Surgical dressing C/D/I  Pain control: multimodal  PT/OT: WBAT BLE, spine precautions  DVT PPx: SQH TID, FCDs at all times when not ambulating  Abx: postop Ancef x 24hrs   Drain: x2  Hernandez: Remove POD 1   Nursing: Incentive spirometry, Monitor and record drain output each shift     Dispo: plan to dc pending pain control and drain output     Output by Drain (mL) 11/04/24 0701 - 11/04/24 1900 11/04/24 1901 - 11/05/24 0700 11/05/24 0701 - 11/05/24 1900 11/05/24 1901 - 11/06/24 0632        Closed/Suction Drain 11/05/24 0935 Right;Posterior Back Accordion 10 Fr.   10 10        Closed/Suction Drain 11/05/24 0952 Left;Posterior Back Accordion 10 Fr.   0 5

## 2024-11-05 NOTE — HOSPITAL COURSE
On 11/5/2024, the patient arrived to the [facility] OR for proper pre-operative management.  Upon completion of pre-operative preparation, the patient was taken back to the operative theatre. L5-S1 TLIF was performed without complication and the patient was transported to the post anesthesia care unit in stable condition.  After appropriate recovery from the anaesthetic agents used during the surgery, the patient was then transported to the hospital inpatient floor.  The interim of the hospital stay from arrival on the floor up to discharge has been uncomplicated. The patient has tolerated regular diet.  The patient's pain has been controlled using a multimodal approach. Currently, the patient's pain is well controlled on an oral regimen.  The patient has been voiding without difficulty.  The patient began participation in physical therapy after surgery and has progressed throughout the entire hospital stay.  Currently, the patient's progress is sufficient to allow the them to be discharged to home safely.  The patient agrees with this assessment and desires a discharge today.

## 2024-11-05 NOTE — HPI
Odessa Vaughn is a 79 y.o. female presents for initial evaluation of low back and right leg pain (Back - 5, Leg - 5). The pain has been present for years. The patient describes the pain as dull and it radiates posterolaterally down RLE to the foot.  The pain is worse with activity and improved by rest. There is R foot associated numbness and tingling. There is RLE subjective weakness. Prior treatments have included meds (mobic and flexeril), PT, KARLA, but no surgery.

## 2024-11-06 LAB
ANION GAP SERPL CALC-SCNC: 6 MMOL/L (ref 8–16)
BASOPHILS # BLD AUTO: 0.03 K/UL (ref 0–0.2)
BASOPHILS NFR BLD: 0.3 % (ref 0–1.9)
BUN SERPL-MCNC: 17 MG/DL (ref 8–23)
CALCIUM SERPL-MCNC: 8.7 MG/DL (ref 8.7–10.5)
CHLORIDE SERPL-SCNC: 107 MMOL/L (ref 95–110)
CO2 SERPL-SCNC: 26 MMOL/L (ref 23–29)
CREAT SERPL-MCNC: 1 MG/DL (ref 0.5–1.4)
DIFFERENTIAL METHOD BLD: ABNORMAL
EOSINOPHIL # BLD AUTO: 0 K/UL (ref 0–0.5)
EOSINOPHIL NFR BLD: 0.1 % (ref 0–8)
ERYTHROCYTE [DISTWIDTH] IN BLOOD BY AUTOMATED COUNT: 13.9 % (ref 11.5–14.5)
EST. GFR  (NO RACE VARIABLE): 57 ML/MIN/1.73 M^2
GLUCOSE SERPL-MCNC: 106 MG/DL (ref 70–110)
HCT VFR BLD AUTO: 33.6 % (ref 37–48.5)
HGB BLD-MCNC: 10.7 G/DL (ref 12–16)
IMM GRANULOCYTES # BLD AUTO: 0.05 K/UL (ref 0–0.04)
IMM GRANULOCYTES NFR BLD AUTO: 0.4 % (ref 0–0.5)
LYMPHOCYTES # BLD AUTO: 2.2 K/UL (ref 1–4.8)
LYMPHOCYTES NFR BLD: 18.4 % (ref 18–48)
MCH RBC QN AUTO: 27.9 PG (ref 27–31)
MCHC RBC AUTO-ENTMCNC: 31.8 G/DL (ref 32–36)
MCV RBC AUTO: 88 FL (ref 82–98)
MONOCYTES # BLD AUTO: 1 K/UL (ref 0.3–1)
MONOCYTES NFR BLD: 8.9 % (ref 4–15)
NEUTROPHILS # BLD AUTO: 8.4 K/UL (ref 1.8–7.7)
NEUTROPHILS NFR BLD: 71.9 % (ref 38–73)
NRBC BLD-RTO: 0 /100 WBC
PLATELET # BLD AUTO: 301 K/UL (ref 150–450)
PMV BLD AUTO: 8.4 FL (ref 9.2–12.9)
POTASSIUM SERPL-SCNC: 4.3 MMOL/L (ref 3.5–5.1)
RBC # BLD AUTO: 3.83 M/UL (ref 4–5.4)
SODIUM SERPL-SCNC: 139 MMOL/L (ref 136–145)
WBC # BLD AUTO: 11.7 K/UL (ref 3.9–12.7)

## 2024-11-06 PROCEDURE — 11000001 HC ACUTE MED/SURG PRIVATE ROOM: Mod: HCNC

## 2024-11-06 PROCEDURE — 97116 GAIT TRAINING THERAPY: CPT | Mod: HCNC

## 2024-11-06 PROCEDURE — 97165 OT EVAL LOW COMPLEX 30 MIN: CPT | Mod: HCNC

## 2024-11-06 PROCEDURE — 97530 THERAPEUTIC ACTIVITIES: CPT | Mod: HCNC

## 2024-11-06 PROCEDURE — 97161 PT EVAL LOW COMPLEX 20 MIN: CPT | Mod: HCNC

## 2024-11-06 PROCEDURE — 25000003 PHARM REV CODE 250: Mod: HCNC | Performed by: STUDENT IN AN ORGANIZED HEALTH CARE EDUCATION/TRAINING PROGRAM

## 2024-11-06 PROCEDURE — 80048 BASIC METABOLIC PNL TOTAL CA: CPT | Mod: HCNC | Performed by: STUDENT IN AN ORGANIZED HEALTH CARE EDUCATION/TRAINING PROGRAM

## 2024-11-06 PROCEDURE — 36415 COLL VENOUS BLD VENIPUNCTURE: CPT | Mod: HCNC | Performed by: STUDENT IN AN ORGANIZED HEALTH CARE EDUCATION/TRAINING PROGRAM

## 2024-11-06 PROCEDURE — 85025 COMPLETE CBC W/AUTO DIFF WBC: CPT | Mod: HCNC | Performed by: STUDENT IN AN ORGANIZED HEALTH CARE EDUCATION/TRAINING PROGRAM

## 2024-11-06 PROCEDURE — 63600175 PHARM REV CODE 636 W HCPCS: Mod: HCNC | Performed by: STUDENT IN AN ORGANIZED HEALTH CARE EDUCATION/TRAINING PROGRAM

## 2024-11-06 RX ADMIN — OXYCODONE HYDROCHLORIDE 10 MG: 10 TABLET ORAL at 08:11

## 2024-11-06 RX ADMIN — HEPARIN SODIUM 5000 UNITS: 5000 INJECTION INTRAVENOUS; SUBCUTANEOUS at 09:11

## 2024-11-06 RX ADMIN — TRIAMTERENE AND HYDROCHLOROTHIAZIDE 1 CAPSULE: 37.5; 25 CAPSULE ORAL at 08:11

## 2024-11-06 RX ADMIN — METHOCARBAMOL TABLETS 750 MG: 750 TABLET, COATED ORAL at 09:11

## 2024-11-06 RX ADMIN — HEPARIN SODIUM 5000 UNITS: 5000 INJECTION INTRAVENOUS; SUBCUTANEOUS at 02:11

## 2024-11-06 RX ADMIN — METHOCARBAMOL TABLETS 750 MG: 750 TABLET, COATED ORAL at 08:11

## 2024-11-06 RX ADMIN — ASPIRIN 81 MG CHEWABLE TABLET 81 MG: 81 TABLET CHEWABLE at 08:11

## 2024-11-06 RX ADMIN — HEPARIN SODIUM 5000 UNITS: 5000 INJECTION INTRAVENOUS; SUBCUTANEOUS at 05:11

## 2024-11-06 RX ADMIN — MUPIROCIN: 20 OINTMENT TOPICAL at 09:11

## 2024-11-06 RX ADMIN — ACETAMINOPHEN 650 MG: 325 TABLET ORAL at 05:11

## 2024-11-06 RX ADMIN — ATORVASTATIN CALCIUM 20 MG: 20 TABLET, FILM COATED ORAL at 08:11

## 2024-11-06 RX ADMIN — ACETAMINOPHEN 650 MG: 325 TABLET ORAL at 11:11

## 2024-11-06 RX ADMIN — METHOCARBAMOL TABLETS 750 MG: 750 TABLET, COATED ORAL at 02:11

## 2024-11-06 NOTE — PLAN OF CARE
[facility]    HOME HEALTH ORDERS  FACE TO FACE ENCOUNTER    Patient Name: Odessa Vaughn  YOB: 1945    PCP: Mitchel Spencer MD   [facility] [address] [facility] [place] [address]  PCP Phone Number: [phone]  PCP Fax: [phone]    Encounter Date: 11/06/2024    Admit to Home Health    Diagnoses:  Active Hospital Problems    Diagnosis  POA    *S/p L5-S1 TLIF 11/5/24 [M54.16]  Yes      Resolved Hospital Problems   No resolved problems to display.       Future Appointments   Date Time Provider Department Center   12/3/2024 12:45 PM [facility]   12/3/2024  1:45 PM Neelam Owens PA-C [facility]INE [facility]   8/25/2025  8:00 AM [facility] [place]           I have seen and examined this patient face to face today. My clinical findings that support the need for the home health skilled services and home bound status are the following:  Weakness/numbness causing balance and gait disturbance due to Surgery making it taxing to leave home.    Allergies:  Review of patient's allergies indicates:   Allergen Reactions    Vicodin [hydrocodone-acetaminophen] Other (See Comments)     Dizziness       Diet: regular diet    Activities: activity as tolerated. Spine precautions.     Nursing:   SN to complete comprehensive assessment including routine vital signs. Instruct on disease process and s/s of complications to report to MD. Follow specific home health arthoplasty protocol. Review/verify medication list sent home with the patient at time of discharge  and instruct patient/caregiver as needed. If coumadin ordered, coumadin clinic to manage INR with INR draws 2x per week with a goal to maintain INR between 1.8 and 2.2. Frequency may be adjusted depending on start of care date.    Notify MD if SBP > 160 or < 90; DBP > 90 or < 50; HR > 120 or < 50; Temp > 101    Home Medical Equipment:  Walker, 3-1 bedside commode, transfer tub  bench    CONSULTS:    Physical Therapy to evaluate and treat. Evaluate for home safety and equipment needs; Establish/upgrade home exercise program. Perform / instruct on therapeutic exercises, gait training, transfer training, and Range of Motion.      WOUND CARE ORDERS  ou may remove your dressing and shower 7 days after surgery. Until then please keep your wound clean and dry. Sponge baths are acceptable. Do not go in a pool or hot tub until seen in clinic. Please leave the small steri-strips covering your wound in place until they fall off naturally (2 weeks). You may notice clear suture ends hanging from the sides of your incision after the steri-strips are removed, it is ok to clip these with scissors.    Medications: Review discharge medications with patient and family and provide education.      Current Discharge Medication List        START taking these medications    Details   acetaminophen (TYLENOL) 650 MG TbSR Take 1 tablet (650 mg total) by mouth every 8 (eight) hours.  Qty: 120 tablet, Refills: 0      celecoxib (CELEBREX) 100 MG capsule Take 1 capsule (100 mg total) by mouth 2 (two) times daily.  Qty: 60 capsule, Refills: 0      docusate sodium (COLACE) 100 MG capsule Take 1 capsule (100 mg total) by mouth 2 (two) times daily.  Qty: 60 capsule, Refills: 0      famotidine (PEPCID) 20 MG tablet Take 1 tablet (20 mg total) by mouth 2 (two) times daily.  Qty: 60 tablet, Refills: 0      gabapentin (NEURONTIN) 300 MG capsule Take 1 capsule (300 mg total) by mouth 3 (three) times daily.  Qty: 90 capsule, Refills: 0      methocarbamoL (ROBAXIN) 750 MG Tab Take 1 tablet (750 mg total) by mouth 4 (four) times daily. for 10 days  Qty: 40 tablet, Refills: 0      oxyCODONE (ROXICODONE) 5 MG immediate release tablet Take 1 tablet (5 mg total) by mouth every 6 (six) hours as needed for Pain.  Qty: 30 tablet, Refills: 0    Comments: Quantity prescribed more than 7 day supply? No  Associated Diagnoses: Lumbar  radiculopathy, chronic           CONTINUE these medications which have NOT CHANGED    Details   ascorbic acid, vitamin C, (VITAMIN C) 500 MG tablet Take 500 mg by mouth once daily.      aspirin 81 MG Chew Take 81 mg by mouth once daily.      atorvastatin (LIPITOR) 20 MG tablet TAKE 1 TABLET(10 MG) BY MOUTH EVERY DAY  Qty: 90 tablet, Refills: 3    Associated Diagnoses: Hyperlipidemia, unspecified hyperlipidemia type      cholecalciferol, vitamin D3, (VITAMIN D3) 50 mcg (2,000 unit) Cap capsule Take 2,000 Units by mouth once daily.      cyanocobalamin 1,000 mcg/mL injection Inject 1 mL (1,000 mcg total) into the muscle every 30 days.  Qty: 2 mL, Refills: 3    Associated Diagnoses: B12 deficiency      cyclobenzaprine (FLEXERIL) 5 MG tablet Take 5 mg by mouth 3 (three) times daily as needed for Muscle spasms.      multivitamin capsule Take 1 capsule by mouth once daily.      thiamine (VITAMIN B-1) 100 MG tablet Take 100 mg by mouth once daily.      triamterene-hydrochlorothiazide 37.5-25 mg (DYAZIDE) 37.5-25 mg per capsule TAKE 1 CAPSULE BY MOUTH EVERY DAY  Qty: 90 capsule, Refills: 3    Comments: .  Associated Diagnoses: Essential hypertension      PROPYLENE GLYCOL//PF (SYSTANE, PF, OPHT) Apply to eye daily as needed.            STOP taking these medications       meloxicam (MOBIC) 15 MG tablet Comments:   Reason for Stopping:               I certify that this patient is confined to her home and needs physical therapy and occupational therapy.

## 2024-11-06 NOTE — PT/OT/SLP EVAL
Occupational Therapy  Co- Evaluation + Tx  Co-treatment performed due to patient's multiple deficits led to anticipated need for two skilled therapists to appropriately and safely assess patient's strength and endurance while facilitating functional tasks in addition to accommodating for patient's activity tolerance.      Name: Odessa Vaughn  MRN: [id number]  Admitting Diagnosis: Lumbar radiculopathy, chronic  Recent Surgery: Procedure(s) (LRB):  FUSION, SPINE, LUMBAR, TLIF, POSTERIOR APPROACH, USING PEDICLE SCREW L5-S1 (R) (N/A) 1 Day Post-Op    Recommendations:     Discharge Recommendations: Low Intensity Therapy  Discharge Equipment Recommendations:  none  Barriers to discharge:  None    Assessment:     Odessa Vaughn is a 79 y.o. female with a medical diagnosis of Lumbar radiculopathy, chronic.  She presents with Performance deficits affecting function: impaired self care skills, impaired functional mobility, pain, orthopedic precautions, impaired endurance. Pt presents in chair, agreeable to tx, A&O x4. Pt has been >< restroom and in chair with nsg, c/o pain as primarily barrier to increased activity. Pt demonstrates good tolerance to activities completed with slow, controlled movements given pain t/o. Pt demonstrates good safety awareness and repeats back spinal px after education is provided.   Pt is a good candidate for low intensity tx at the post acute level to address deficits impacting safety and IND with daily task in and around the home.      Rehab Prognosis: Good; patient would benefit from acute skilled OT services to address these deficits and reach maximum level of function.       Plan:     Patient to be seen 3 x/week to address the above listed problems via self-care/home management, therapeutic activities, therapeutic exercises, neuromuscular re-education  Plan of Care Expires: 11/27/24  Plan of Care Reviewed with: patient    Subjective     Chief Complaint: Pain  Patient/Family  Comments/goals: To regain IND and be home    Occupational Profile:  Living Environment: Upper level of top/bottom 2 unit home, 4 step BHR - landing - 5 step L HR - landing - 3 step R HR to porch then enters thru threshold. Pt is renovating gl unit at present, not anticipated to be done soon, WIS with GB, raised toilet.  Previous level of function: IND (I)ADLs, doesn't usually but can drive, requiring rest breaks and doing less than usual d/t pain recently.  Equipment Used at Home: cane, straight, grab bar, wheelchair, walker, rolling, shower chair  Assistance upon Discharge: Spouse     Pain/Comfort:  Pain Rating 1: 6/10  Location - Side 1: Bilateral  Location - Orientation 1: lower  Location 1: back  Pain Addressed 1: Reposition, Distraction  Pain Rating Post-Intervention 1: 7/10  Pain Rating 2: 0/10  Location - Side 2: Right  Location - Orientation 2: generalized  Location 2: ankle  Pain Addressed 2: Reposition, Cessation of Activity, Distraction  Pain Rating Post-Intervention 2: 7/10    Patients cultural, spiritual, Mosque conflicts given the current situation: no    Objective:     Communicated with: Nskei prior to session.  Patient found up in chair with hemovac upon OT entry to room.    General Precautions: Standard, fall  Orthopedic Precautions: spinal precautions  Braces: N/A  Respiratory Status: Room air    Occupational Performance:    Bed Mobility:    Educated on log roll technique    Functional Mobility/Transfers:  Patient completed Sit <> Stand Transfer with stand by assistance  with  no assistive device   Functional Mobility: ambulates given SBA with RW~200ft as in home and community distances     Activities of Daily Living:  Feeding:  modified independence to hydrate and for lunch tray at tray table from chair  Upper Body Dressing: setup to don gown to back and secure  Lower Body Dressing: total assistance to manage SCD's    Cognitive/Visual Perceptual:  Cognitive/Psychosocial Skills:     -        Oriented to: Person, Place, Time, and Situation   -       Follows Commands/attention:Follows multistep  commands  -       Safety awareness/insight to disability: intact     Physical Exam:  Dominant hand:    -       Right  Upper Extremity Range of Motion:     -       Right Upper Extremity: WFL  -       Left Upper Extremity: WFL  Upper Extremity Strength:    -       Right Upper Extremity: WFL  -       Left Upper Extremity: WFL  Fine Motor Coordination:    -       Intact    AMPAC 6 Click ADL:  AMPAC Total Score: 22    Treatment & Education:  Pt educated on Spinal precautions (No BTL) and provided edu on tasks to be mindful during that frequently elicit such movements.  Pt educated on log roll to build safe and IND bed mobility with px maintenance    -Education provided regarding spinal precautions for use during functional tasks/mobility/transfers   -Education on energy conservation and task modification to maximize safety and (I) during ADLs and mobility  -Education on importance of OOB activity to improve overall activity tolerance and promote recovery  -Pt educated to call for assistance and to transfer with hospital staff only  -Provided education regarding role of OT, POC, & discharge recommendations with pt verbalizing understanding.      Pt had no further questions & when asked whether there were any concerns pt reported none.     Patient left up in chair with all lines intact, call button in reach, and nsg notified    GOALS:   Multidisciplinary Problems       Occupational Therapy Goals          Problem: Occupational Therapy    Goal Priority Disciplines Outcome Interventions   Occupational Therapy Goal     OT, PT/OT Progressing    Description: Goals to be met by: 11/27/24     Patient will increase functional independence with ADLs by performing:    UE Dressing with Modified [place].  LE Dressing with Modified [place].  Grooming while standing at sink with Modified [place].  Toileting from toilet  with Modified [place] for hygiene and clothing management.   Toilet transfer to toilet with Modified [place].    DME: No anticipated needs                         History:     Past Medical History:   Diagnosis Date    Arthritis     hands, legs    Breast cancer 12/2012    Right breast invasive ductal carcinoma, ER positive, [place] and Her2 negative    Bursitis of left hip 2016    resolved    Chronic midline low back pain with left-sided sciatica 06/05/2017    Essential hypertension 09/21/2015    Hyperlipidemia 09/15/2014         Past Surgical History:   Procedure Laterality Date    BREAST BIOPSY Right 12/19/2012    Right breast- [facility]    BREAST SURGERY Right 01/2013    right mastectomy, SN biopsy     COLONOSCOPY N/A 09/07/2017    Procedure: COLONOSCOPY;  Surgeon: Syed Shah MD;  Location: [facility] ENDO (4TH FLR);  Service: Endoscopy;  Laterality: N/A;    COLONOSCOPY N/A 04/10/2023    Procedure: COLONOSCOPY;  Surgeon: Kamran Mcdermott MD;  Location: [facility] ENDO (4TH FLR);  Service: Endoscopy;  Laterality: N/A;  inst portal-RB  4/3/23-procedure confirmed with precall, patient requesting prep be changed as current prep ordered is not covered by insurance. Patient requesting a call back. Notified hallway . Octavia RN    DILATION AND CURETTAGE OF UTERUS      EPIDURAL STEROID INJECTION N/A 07/25/2023    Procedure: LUMBAR L5/S1 IL KARLA (AIM TO RIGHT) DIRECT REFERRAL;  Surgeon: Elza Thomas MD;  Location: [facility] PAIN MGT;  Service: Pain Management;  Laterality: N/A;    FUSION, SPINE, LUMBAR, TLIF, POSTERIOR APPROACH, USING PEDICLE SCREW N/A 11/5/2024    Procedure: FUSION, SPINE, LUMBAR, TLIF, POSTERIOR APPROACH, USING PEDICLE SCREW L5-S1 (R);  Surgeon: Kamran Douglas MD;  Location: [facility] OR [facility] FLR;  Service: Neurosurgery;  Laterality: N/A;    MASTECTOMY  01/2013    TRANSFORAMINAL EPIDURAL INJECTION OF STEROID Right 11/11/2021    Procedure: Injection,steroid,epidural,transforaminal Right L4/L5 and L5/S1  Direct Referral;  Surgeon: Elza Thomas MD;  Location: [facility] PAIN MGT;  Service: Pain Management;  Laterality: Right;    TRANSFORAMINAL EPIDURAL INJECTION OF STEROID Right 12/09/2021    Procedure: Injection,steroid,epidural,transforaminal approach RIGHT L4-L5 AND L5-S1 DIRECT REFERRAL;  Surgeon: Elza Thomas MD;  Location: [facility] PAIN MGT;  Service: Pain Management;  Laterality: Right;       Time Tracking:     OT Date of Treatment: 11/06/24  OT Start Time: 1134  OT Stop Time: 1155  OT Total Time (min): 21 min    Billable Minutes:Evaluation 10  Therapeutic Activity 11    11/6/2024

## 2024-11-06 NOTE — PT/OT/SLP EVAL
Physical Therapy  Co-Evaluation (OT) and Treatment    Odessa Vaughn   [id number]    Time Tracking:     PT Received On: 11/06/24   PT Start Time: 1134   PT Stop Time: 1155   PT Total Time (min): 21 min    Billable Minutes: Evaluation 11 and Gait Training 10  minutes    *This session was completed as a co-evaluation with OT secondary to patient's first time mobilizing out of room post-op*    Recommendations:     Therapy Intensity Recommendations at Discharge: Low Intensity Therapy     Equipment Needed After Discharge: none    Barriers to Discharge: Inaccessible home environment (flight of stairs leading to her bedroom)    Patient Information:     Recent Surgery: Procedure(s) (LRB):  FUSION, SPINE, LUMBAR, TLIF, POSTERIOR APPROACH, USING PEDICLE SCREW L5-S1 (R) (N/A) 1 Day Post-Op    Diagnosis: Lumbar radiculopathy, chronic    Length of Stay: 1 days    General Precautions: Standard, fall, WBAT  Orthopedic Precautions: spinal precautions  Brace: N/A    Assessment:     Odessa Vaughn is a 79 y.o. female [place] [facility] [place] scheduled L5-S1 fusion 2* Lumbar radiculopathy. Odessa Vaughn tolerated evaluation well today. Educated pt on her post-op spinal precautions (avoid bending, lifting, twisting) and benefits of log rolling for bed mobility. She was already up in chair upon PT/OT entry to room, walking to bathroom with nsg assistance. She was able to ambulate 200 ft in hallways with rolling walker and stand-by assistance for safety; gait is steady and pain well-controlled at 6-7/10 at her R lower back, able to hold conversation while walking. Will need to work on stair training prior to discharge, pt aware. Has all necessary DME at home though she doesn't use it at baseline. Patient currently demonstrates a need for low intensity therapy on a scheduled basis secondary to a decline in functional status due to surgical procedure. Discussed PT role, POC, and goals with patient; verbalized  "understanding. Odessa Vaughn would benefit from acute PT services to promote mobility during this admission and improve return to PLOF.    Problem List: weakness, impaired endurance, impaired self care skills, impaired functional mobility, gait instability, impaired balance, pain, decreased lower extremity function, impaired cardiopulmonary response to activity, orthopedic precautions, decreased ROM    Rehab Prognosis: Good; patient would benefit from acute skilled PT services to address these deficits and reach maximum level of function.    Plan:     Patient to be seen 4 x/week to address the above listed problems via gait training, therapeutic activities, therapeutic exercises, neuromuscular re-education    Plan of Care Expires: 12/06/24  Plan of Care reviewed with: patient    Subjective:     Communicated with HECTOR Chan prior to evaluation, appropriate to see for evaluation.    Pt found reclined in bedside chair upon PT entry to room, agreeable to evaluation.    Patient commenting: "It's the right side of my lower back that hurts and my foot too (right)."    Does this patient have any cultural, spiritual, Bahai conflicts given the current situation? Patient has no barriers to learning. Patient verbalizes understanding of his/her program and goals and demonstrates them correctly. No cultural, spiritual, or educational needs identified.    Past Medical History:   Diagnosis Date    Arthritis     hands, legs    Breast cancer 12/2012    Right breast invasive ductal carcinoma, ER positive, [place] and Her2 negative    Bursitis of left hip 2016    resolved    Chronic midline low back pain with left-sided sciatica 06/05/2017    Essential hypertension 09/21/2015    Hyperlipidemia 09/15/2014      Past Surgical History:   Procedure Laterality Date    BREAST BIOPSY Right 12/19/2012    Right breast- IDC    BREAST SURGERY Right 01/2013    right mastectomy, SN biopsy     COLONOSCOPY N/A 09/07/2017    Procedure: " "COLONOSCOPY;  Surgeon: Syed Shah MD;  Location: [facility] ENDO (4TH FLR);  Service: Endoscopy;  Laterality: N/A;    COLONOSCOPY N/A 04/10/2023    Procedure: COLONOSCOPY;  Surgeon: Kamran Mcdermott MD;  Location: [facility] (4TH FLR);  Service: Endoscopy;  Laterality: N/A;  inst portal-RB  4/3/23-procedure confirmed with precall, patient requesting prep be changed as current prep ordered is not covered by insurance. Patient requesting a call back. Notified hallway . HECTOR Dudley    DILATION AND CURETTAGE OF UTERUS      EPIDURAL STEROID INJECTION N/A 07/25/2023    Procedure: LUMBAR L5/S1 IL KARLA (AIM TO RIGHT) DIRECT REFERRAL;  Surgeon: Elza Thomas MD;  Location: [facility] PAIN T;  Service: Pain Management;  Laterality: N/A;    FUSION, SPINE, LUMBAR, TLIF, POSTERIOR APPROACH, USING PEDICLE SCREW N/A 11/5/2024    Procedure: FUSION, SPINE, LUMBAR, TLIF, POSTERIOR APPROACH, USING PEDICLE SCREW L5-S1 (R);  Surgeon: Kamran Douglas MD;  Location: [facility] OR 2ND FLR;  Service: Neurosurgery;  Laterality: N/A;    MASTECTOMY  01/2013    TRANSFORAMINAL EPIDURAL INJECTION OF STEROID Right 11/11/2021    Procedure: Injection,steroid,epidural,transforaminal Right L4/L5 and L5/S1 Direct Referral;  Surgeon: Elza Thomas MD;  Location: [facility] PAIN MGT;  Service: Pain Management;  Laterality: Right;    TRANSFORAMINAL EPIDURAL INJECTION OF STEROID Right 12/09/2021    Procedure: Injection,steroid,epidural,transforaminal approach RIGHT L4-L5 AND L5-S1 DIRECT REFERRAL;  Surgeon: Elza Thomas MD;  Location: [facility] PAIN MGT;  Service: Pain Management;  Laterality: Right;       Living Environment:  Pt lives with her spouse in a 2 story duplex with ~12 steps overall to get to her bed/bath. She reports "There's 4 steps with bilateral rails, then a landing, then 5 steps with a L rail, then another landing, the 3 final steps with a R rail before getting up to my bedroom. We are renovating the first level right now." She has " access to a walk-in shower with grab bars, has a shower chair to put in shower if needed.    PLOF:  Prior to admission, patient was independent with mobility and self-care. She no longer drives due to her back pain (spouse drives); enjoy watching her soap operas during the day, going out to eat at restaurants.    DME:  Patient owns or has access to the following DME: walker, rolling, wheelchair, shower chair, cane, straight, grab bar    Upon discharge, patient will have assistance from spouse (though he reportedly needs a knee surgery, per patient).    Objective:     Patient found with: hemovac, SCD    Pain:  Pain Rating 1: 6/10  Location - Side 1: Right  Location - Orientation 1: lower  Location 1: back  Pain Addressed 1: Reposition, Distraction  Pain Rating Post-Intervention 1: 7/10    Cognitive Exam:  Patient is oriented to Person, Place, Time, and Situation.  Patient follows 100% of single-step commands.    Sensation:   Intact at BLE to light touch    Lower Extremity Range of Motion:  Right Lower Extremity: WFL actively  Left Lower Extremity: WFL actively    Lower Extremity Strength:  Right Lower Extremity: grossly 4-/5 via MMT  Left Lower Extremity: grossly 4/5 via MMT    Functional Mobility:    Bed Mobility:  NT; patient sitting up out of bed before and after session    Transfers:  Sit to Stand: Stand-By Assist from bedside chair with no AD x 1 trial(s)    Gait:  200 feet in hallways with rolling walker and stand-by assistance for safety; gait is steady and pain well-controlled at 6-7/10 at her R lower back, able to hold conversation while walking.     Assist level: Stand-By Assist  Device: Rolling walker  Oxygen: room air    Balance:  Static Sit: Supervision at edge of bedside chair    Static Stand: Supervision with Rolling walker    AM-PAC 6 CLICK MOBILITY  Turning over in bed (including adjusting bedclothes, sheets and blankets)?: 3  Sitting down on and standing up from a chair with arms (e.g., wheelchair,  bedside commode, etc.): 4  Moving from lying on back to sitting on the side of the bed?: 3  Moving to and from a bed to a chair (including a wheelchair)?: 3  Need to walk in hospital room?: 3  Climbing 3-5 steps with a railing?: 3  Basic Mobility Total Score: 19    Patient was left reclined in bedside chair with all lines intact and call button in reach.    Clinical Decision Making for Evaluation Complexity:  1. Body System(s) Examination: 1-2  2. Clinical Presentation: Evolving  3. Evaluation Complexity: Low    GOALS:   Multidisciplinary Problems       Physical Therapy Goals          Problem: Physical Therapy    Goal Priority Disciplines Outcome Interventions   Physical Therapy Goal     PT, PT/OT     Description: Goals to be met by: 24     Patient will increase functional independence with mobility by performin. Supine to sit with Stand-by Assistance via log rolling - Not met  2. Rolling to Left and Right with Supervision - Not met  3. Sit to stand transfer with Supervision with or without RW - Not met  4. Gait  x 400 feet with Supervision with or without Rolling Walker - Not met  5. Ascend/descend 1 flight of stairs with a unilateral Handrail with Contact Guard Assistance using No Assistive Device - Not met  6. Pt will verbalize 3/3 spinal precautions without cueing - Not met                     Yasir Vargas, PT  2024

## 2024-11-06 NOTE — PROGRESS NOTES
"[facility] - [facility]  Orthopedics  Progress Note    Patient Name: Odessa Vaughn  MRN: [id number]  Admission Date: 11/5/2024  Hospital Length of Stay: 1 days  Attending Provider: Kamran Douglas MD  Primary Care Provider: Mitchel Spencer MD  Follow-up For: Procedure(s) (LRB):  FUSION, SPINE, LUMBAR, TLIF, POSTERIOR APPROACH, USING PEDICLE SCREW L5-S1 (R) (N/A)    Post-Operative Day: 1 Day Post-Op  Subjective:     Principal Problem:Lumbar radiculopathy, chronic    Principal Orthopedic Problem: Same    Interval History: NAEON, patient stable, pain controlled. No acute complaints this morning. Dressings CDI.    Review of patient's allergies indicates:   Allergen Reactions    Vicodin [hydrocodone-acetaminophen] Other (See Comments)     Dizziness       Current Facility-Administered Medications   Medication    0.9%  NaCl infusion    acetaminophen tablet 650 mg    aluminum-magnesium hydroxide-simethicone 200-200-20 mg/5 mL suspension 30 mL    aspirin chewable tablet 81 mg    atorvastatin tablet 20 mg    bisacodyL suppository 10 mg    docusate sodium capsule 100 mg    heparin (porcine) injection 5,000 Units    HYDROmorphone injection 1 mg    methocarbamoL tablet 750 mg    multivitamin tablet    mupirocin 2 % ointment    ondansetron disintegrating tablet 8 mg    oxyCODONE immediate release tablet 5 mg    oxyCODONE immediate release tablet Tab 10 mg    prochlorperazine injection Soln 5 mg    senna-docusate 8.6-50 mg per tablet 2 tablet    triamterene-hydrochlorothiazide 37.5-25 mg per capsule 1 capsule     Objective:     Vital Signs (Most Recent):  Temp: 97.7 °F (36.5 °C) (11/06/24 0503)  Pulse: 82 (11/06/24 0503)  Resp: 18 (11/06/24 0503)  BP: 136/63 (11/06/24 0503)  SpO2: 95 % (11/06/24 0503) Vital Signs (24h Range):  Temp:  [97.1 °F (36.2 °C)-97.7 °F (36.5 °C)] 97.7 °F (36.5 °C)  Pulse:  [63-83] 82  Resp:  [11-21] 18  SpO2:  [92 %-100 %] 95 %  BP: (113-156)/(55-91) 136/63     Weight: 78.6 kg (173 lb 4.5 oz)  Height: 5' 5" " (165.1 cm)  Body mass index is 28.84 kg/m².      Intake/Output Summary (Last 24 hours) at 11/6/2024 0631  Last data filed at 11/6/2024 0525  Gross per 24 hour   Intake 1279.67 ml   Output 25 ml   Net 1254.67 ml        Ortho/SPM Exam  Lumbar spine exam   EHL/FHL/TA/Gastroc intact   SILT   2+DP/PT   Dressings CDI   Drains x2       Significant Labs:   Recent Lab Results         11/06/24  0506   11/05/24  0656        Baso # 0.03         Basophil % 0.3         Differential Method Automated         Eos # 0.0         Eos % 0.1         Gran # (ANC) 8.4         Gran % 71.9         Group & Rh   B POS       Hematocrit 33.6         Hemoglobin 10.7         Immature Grans (Abs) 0.05  Comment: Mild elevation in immature granulocytes is non specific and   can be seen in a variety of conditions including stress response,   acute inflammation, trauma and pregnancy. Correlation with other   laboratory and clinical findings is essential.           Immature Granulocytes 0.4         INDIRECT CLARENCE   NEG       Lymph # 2.2         Lymph % 18.4         MCH 27.9         MCHC 31.8         MCV 88         Mono # 1.0         Mono % 8.9         MPV 8.4         nRBC 0         Platelet Count 301         RBC 3.83         RDW 13.9         WBC 11.70               All pertinent labs within the past 24 hours have been reviewed.    Significant Imaging: I have reviewed and interpreted all pertinent imaging results/findings.  Assessment/Plan:     * S/p L5-S1 TLIF 11/5/24  Odessa Vaughn is a 79 y.o. female is s/p L5-S1 TLIF on 11/5/2024      Surgical dressing C/D/I  Pain control: multimodal  PT/OT: WBAT BLE, spine precautions  DVT PPx: SQH TID, FCDs at all times when not ambulating  Abx: postop Ancef x 24hrs   Drain: x2  Hernandez: None  Nursing: Incentive spirometry, Monitor and record drain output each shift     Dispo: plan to dc pending pain control and drain output     Output by Drain (mL) 11/04/24 0701 - 11/04/24 1900 11/04/24 1901 - 11/05/24 0700  11/05/24 0701 - 11/05/24 1900 11/05/24 1901 - 11/06/24 0632        Closed/Suction Drain 11/05/24 0935 Right;Posterior Back Accordion 10 Fr.   10 10        Closed/Suction Drain 11/05/24 0952 Left;Posterior Back Accordion 10 Fr.   0 5                Thomas Benson MD  Orthopedics  [facility] - Surgery

## 2024-11-06 NOTE — SUBJECTIVE & OBJECTIVE
"Principal Problem:Lumbar radiculopathy, chronic    Principal Orthopedic Problem: Same    Interval History: THOMAS, patient stable, pain controlled. No acute complaints this morning. Dressings CDI.    Review of patient's allergies indicates:   Allergen Reactions    Vicodin [hydrocodone-acetaminophen] Other (See Comments)     Dizziness       Current Facility-Administered Medications   Medication    0.9%  NaCl infusion    acetaminophen tablet 650 mg    aluminum-magnesium hydroxide-simethicone 200-200-20 mg/5 mL suspension 30 mL    aspirin chewable tablet 81 mg    atorvastatin tablet 20 mg    bisacodyL suppository 10 mg    docusate sodium capsule 100 mg    heparin (porcine) injection 5,000 Units    HYDROmorphone injection 1 mg    methocarbamoL tablet 750 mg    multivitamin tablet    mupirocin 2 % ointment    ondansetron disintegrating tablet 8 mg    oxyCODONE immediate release tablet 5 mg    oxyCODONE immediate release tablet Tab 10 mg    prochlorperazine injection Soln 5 mg    senna-docusate 8.6-50 mg per tablet 2 tablet    triamterene-hydrochlorothiazide 37.5-25 mg per capsule 1 capsule     Objective:     Vital Signs (Most Recent):  Temp: 97.7 °F (36.5 °C) (11/06/24 0503)  Pulse: 82 (11/06/24 0503)  Resp: 18 (11/06/24 0503)  BP: 136/63 (11/06/24 0503)  SpO2: 95 % (11/06/24 0503) Vital Signs (24h Range):  Temp:  [97.1 °F (36.2 °C)-97.7 °F (36.5 °C)] 97.7 °F (36.5 °C)  Pulse:  [63-83] 82  Resp:  [11-21] 18  SpO2:  [92 %-100 %] 95 %  BP: (113-156)/(55-91) 136/63     Weight: 78.6 kg (173 lb 4.5 oz)  Height: 5' 5" (165.1 cm)  Body mass index is 28.84 kg/m².      Intake/Output Summary (Last 24 hours) at 11/6/2024 0631  Last data filed at 11/6/2024 0525  Gross per 24 hour   Intake 1279.67 ml   Output 25 ml   Net 1254.67 ml        Ortho/SPM Exam  Lumbar spine exam   EHL/FHL/TA/Gastroc intact   SILT   2+DP/PT   Dressings CDI   Drains x2       Significant Labs:   Recent Lab Results         11/06/24  0506   11/05/24  0656        " Baso # 0.03         Basophil % 0.3         Differential Method Automated         Eos # 0.0         Eos % 0.1         Gran # (ANC) 8.4         Gran % 71.9         Group & Rh   B POS       Hematocrit 33.6         Hemoglobin 10.7         Immature Grans (Abs) 0.05  Comment: Mild elevation in immature granulocytes is non specific and   can be seen in a variety of conditions including stress response,   acute inflammation, trauma and pregnancy. Correlation with other   laboratory and clinical findings is essential.           Immature Granulocytes 0.4         INDIRECT CLARENCE   NEG       Lymph # 2.2         Lymph % 18.4         MCH 27.9         MCHC 31.8         MCV 88         Mono # 1.0         Mono % 8.9         MPV 8.4         nRBC 0         Platelet Count 301         RBC 3.83         RDW 13.9         WBC 11.70               All pertinent labs within the past 24 hours have been reviewed.    Significant Imaging: I have reviewed and interpreted all pertinent imaging results/findings.

## 2024-11-06 NOTE — PLAN OF CARE
Odessa Vaughn is a 79 y.o. female admitted to [facility] on 2024 for scheduled L5-S1 fusion 2* Lumbar radiculopathy. Odessa Vaughn tolerated evaluation well today. Educated pt on her post-op spinal precautions (avoid bending, lifting, twisting) and benefits of log rolling for bed mobility. She was already up in chair upon PT/OT entry to room, walking to bathroom with nsg assistance. She was able to ambulate 200 ft in hallways with rolling walker and stand-by assistance for safety; gait is steady and pain well-controlled at 6-7/10 at her R lower back, able to hold conversation while walking. Will need to work on stair training prior to discharge, pt aware. Has all necessary DME at home though she doesn't use it at baseline. Patient currently demonstrates a need for low intensity therapy on a scheduled basis secondary to a decline in functional status due to surgical procedure. Discussed PT role, POC, and goals with patient; verbalized understanding. Odessa Vaughn would benefit from acute PT services to promote mobility during this admission and improve return to PLOF.    *Safe for patient to ambulate with rolling walker (left one in room) and nursing stand-by assistance*    Problem: Physical Therapy  Goal: Physical Therapy Goal  Description: Goals to be met by: 24     Patient will increase functional independence with mobility by performin. Supine to sit with Stand-by Assistance via log rolling - Not met  2. Rolling to Left and Right with Supervision - Not met  3. Sit to stand transfer with Supervision with or without RW - Not met  4. Gait  x 400 feet with Supervision with or without Rolling Walker - Not met  5. Ascend/descend 1 flight of stairs with a unilateral Handrail with Contact Guard Assistance using No Assistive Device - Not met  6. Pt will verbalize 3/3 spinal precautions without cueing - Not met  Outcome: Tl Vargas, PT  2024

## 2024-11-06 NOTE — PLAN OF CARE
Goals to be met by: 11/27/24     Patient will increase functional independence with ADLs by performing:    UE Dressing with Modified [place].  LE Dressing with Modified [place].  Grooming while standing at sink with Modified [place].  Toileting from toilet with Modified [place] for hygiene and clothing management.   Toilet transfer to toilet with Modified [place].    DME: No anticipated needs

## 2024-11-06 NOTE — PLAN OF CARE
[facility] - Surgery  Initial Discharge Assessment       Primary Care Provider: Mitchel Spencer MD    Admission Diagnosis: Lumbar radiculopathy, chronic [M54.16]  Post-op pain [G89.18]    Admission Date: 11/5/2024  Expected Discharge Date: 11/8/2024         Payor: HUMANA MANAGED MEDICARE / Plan: HUMANA MEDICARE SELECT PARTNER / Product Type: Medicare Advantage /     Extended Emergency Contact Information  Primary Emergency Contact: Davie Vaughn  Address: [address] at [address] [facility] [place] [address] [place]  Mobile Phone: [phone]  Relation: Spouse  Secondary Emergency Contact: mckinley vaughn  Mobile Phone: [phone]  Relation: Daughter    Discharge Plan A: Home with family, Home Health         [id number] - [place], [place] - [address] [facility] [address]  [address]  [facility] [address]  Phone: [phone] Fax: [phone]      Initial Assessment (most recent)       Adult Discharge Assessment - 11/06/24 1352          Discharge Assessment    Assessment Type Discharge Planning Assessment     Confirmed/corrected address, phone number and insurance Yes     Source of Information patient     Does patient/caregiver understand observation status Yes     Communicated STACY with patient/caregiver Yes     People in Home spouse     Facility Arrived From: Home     Do you expect to return to your current living situation? Yes     Do you have help at home or someone to help you manage your care at home? Yes     Who are your caregiver(s) and their phone number(s)? Davie (Spouse) [phone]     Prior to hospitilization cognitive status: Alert/Oriented     Current cognitive status: Alert/Oriented     Equipment Currently Used at Home wheelchair;walker, rolling;cane, straight;bath bench     Readmission within 30 days? No     Patient currently being followed by outpatient case management? No     Do you currently have service(s) that help you manage  your care at home? No     Do you take prescription medications? Yes     Do you have prescription coverage? Yes     Do you have any problems affording any of your prescribed medications? No     Is the patient taking medications as prescribed? yes     Who is going to help you get home at discharge? Spouse-Davie     How do you get to doctors appointments? family or friend will provide     Are you on dialysis? No     Do you take coumadin? No     Discharge Plan A Home with family;Home Health        Physical Activity    On average, how many days per week do you engage in moderate to strenuous exercise (like a brisk walk)? 3 days     On average, how many minutes do you engage in exercise at this level? 30 min        Financial Resource Strain    How hard is it for you to pay for the very basics like food, housing, medical care, and heating? Not hard at all        Housing Stability    In the last 12 months, was there a time when you were not able to pay the mortgage or rent on time? No     At any time in the past 12 months, were you homeless or living in a shelter (including now)? No        Transportation Needs    Has the lack of transportation kept you from medical appointments, meetings, work or from getting things needed for daily living? No        Food Insecurity    Within the past 12 months, you worried that your food would run out before you got the money to buy more. Never true     Within the past 12 months, the food you bought just didn't last and you didn't have money to get more. Never true        Stress    Do you feel stress - tense, restless, nervous, or anxious, or unable to sleep at night because your mind is troubled all the time - these days? Not at all        Social Isolation    How often do you feel lonely or isolated from those around you?  Never        Alcohol Use    Q1: How often do you have a drink containing alcohol? Never     Q2: How many drinks containing alcohol do you have on a typical day when  you are drinking? Patient does not drink     Q3: How often do you have six or more drinks on one occasion? Never        Utilities    In the past 12 months has the electric, gas, oil, or water company threatened to shut off services in your home? No        Health Literacy    How often do you need to have someone help you when you read instructions, pamphlets, or other written material from your doctor or pharmacy? Never                      Spoke with patient at bedside to complete d/c planning assessment. Patient lives with spouse in a single level home with 13 steps to enter. Independent with ADL's. Home DME includes a wheelchair, walker, cane and shower chair. Verified PCP, Pharmacy and health insurance. PT/OT eval pending for post-acute recommendations. Patient will have help at home from spouse. Discharge Plan A and Plan B have been determined by review of patient's clinical status, future medical and therapeutic needs, and coverage/benefits for post-acute care in coordination with multidisciplinary team members.        Angelique CONRAD  Case Management  [facility]  [phone]

## 2024-11-07 VITALS
HEART RATE: 74 BPM | RESPIRATION RATE: 17 BRPM | HEIGHT: 65 IN | TEMPERATURE: 98 F | OXYGEN SATURATION: 97 % | WEIGHT: 173.31 LBS | DIASTOLIC BLOOD PRESSURE: 52 MMHG | SYSTOLIC BLOOD PRESSURE: 101 MMHG | BODY MASS INDEX: 28.87 KG/M2

## 2024-11-07 PROCEDURE — 25000003 PHARM REV CODE 250: Mod: HCNC | Performed by: STUDENT IN AN ORGANIZED HEALTH CARE EDUCATION/TRAINING PROGRAM

## 2024-11-07 PROCEDURE — 63600175 PHARM REV CODE 636 W HCPCS: Mod: HCNC | Performed by: STUDENT IN AN ORGANIZED HEALTH CARE EDUCATION/TRAINING PROGRAM

## 2024-11-07 PROCEDURE — 97530 THERAPEUTIC ACTIVITIES: CPT | Mod: HCNC

## 2024-11-07 RX ADMIN — METHOCARBAMOL TABLETS 750 MG: 750 TABLET, COATED ORAL at 02:11

## 2024-11-07 RX ADMIN — HEPARIN SODIUM 5000 UNITS: 5000 INJECTION INTRAVENOUS; SUBCUTANEOUS at 06:11

## 2024-11-07 RX ADMIN — ASPIRIN 81 MG CHEWABLE TABLET 81 MG: 81 TABLET CHEWABLE at 08:11

## 2024-11-07 RX ADMIN — OXYCODONE HYDROCHLORIDE 10 MG: 10 TABLET ORAL at 08:11

## 2024-11-07 RX ADMIN — HEPARIN SODIUM 5000 UNITS: 5000 INJECTION INTRAVENOUS; SUBCUTANEOUS at 02:11

## 2024-11-07 RX ADMIN — ACETAMINOPHEN 650 MG: 325 TABLET ORAL at 12:11

## 2024-11-07 RX ADMIN — METHOCARBAMOL TABLETS 750 MG: 750 TABLET, COATED ORAL at 08:11

## 2024-11-07 RX ADMIN — ATORVASTATIN CALCIUM 20 MG: 20 TABLET, FILM COATED ORAL at 08:11

## 2024-11-07 RX ADMIN — MUPIROCIN: 20 OINTMENT TOPICAL at 08:11

## 2024-11-07 RX ADMIN — TRIAMTERENE AND HYDROCHLOROTHIAZIDE 1 CAPSULE: 37.5; 25 CAPSULE ORAL at 08:11

## 2024-11-07 RX ADMIN — DOCUSATE SODIUM 100 MG: 100 CAPSULE, LIQUID FILLED ORAL at 08:11

## 2024-11-07 RX ADMIN — THERA TABS 1 TABLET: TAB at 08:11

## 2024-11-07 RX ADMIN — ACETAMINOPHEN 650 MG: 325 TABLET ORAL at 05:11

## 2024-11-07 NOTE — SUBJECTIVE & OBJECTIVE
"Principal Problem:Lumbar radiculopathy, chronic    Principal Orthopedic Problem: Same    Interval History: NAEON, patient stable, pain controlled. No acute complaints this morning. Drains removed, 5cc output overnight per nursing.      Review of patient's allergies indicates:   Allergen Reactions    Vicodin [hydrocodone-acetaminophen] Other (See Comments)     Dizziness       Current Facility-Administered Medications   Medication    acetaminophen tablet 650 mg    aluminum-magnesium hydroxide-simethicone 200-200-20 mg/5 mL suspension 30 mL    aspirin chewable tablet 81 mg    atorvastatin tablet 20 mg    bisacodyL suppository 10 mg    docusate sodium capsule 100 mg    heparin (porcine) injection 5,000 Units    HYDROmorphone injection 1 mg    methocarbamoL tablet 750 mg    multivitamin tablet    mupirocin 2 % ointment    ondansetron disintegrating tablet 8 mg    oxyCODONE immediate release tablet 5 mg    oxyCODONE immediate release tablet Tab 10 mg    prochlorperazine injection Soln 5 mg    senna-docusate 8.6-50 mg per tablet 2 tablet    triamterene-hydrochlorothiazide 37.5-25 mg per capsule 1 capsule     Objective:     Vital Signs (Most Recent):  Temp: 98.2 °F (36.8 °C) (11/07/24 0424)  Pulse: 80 (11/07/24 0424)  Resp: 18 (11/07/24 0424)  BP: (!) 112/54 (11/07/24 0424)  SpO2: 96 % (11/07/24 0424) Vital Signs (24h Range):  Temp:  [97.7 °F (36.5 °C)-98.2 °F (36.8 °C)] 98.2 °F (36.8 °C)  Pulse:  [70-85] 80  Resp:  [17-18] 18  SpO2:  [95 %-100 %] 96 %  BP: (112-144)/(54-65) 112/54     Weight: 78.6 kg (173 lb 4.5 oz)  Height: 5' 5" (165.1 cm)  Body mass index is 28.84 kg/m².      Intake/Output Summary (Last 24 hours) at 11/7/2024 0641  Last data filed at 11/7/2024 0612  Gross per 24 hour   Intake 910 ml   Output 110 ml   Net 800 ml        Ortho/SPM Exam  Lumbar spine exam   EHL/FHL/TA/Gastroc intact   SILT   2+DP/PT   Dressings CDI          Significant Labs: CBC:   Recent Labs   Lab 11/06/24  0506   WBC 11.70   HGB 10.7* "   HCT 33.6*        CMP:   Recent Labs   Lab 11/06/24  0506      K 4.3      CO2 26      BUN 17   CREATININE 1.0   CALCIUM 8.7   ANIONGAP 6*     All pertinent labs within the past 24 hours have been reviewed.    Significant Imaging: I have reviewed and interpreted all pertinent imaging results/findings.

## 2024-11-07 NOTE — DISCHARGE SUMMARY
[facility] - Surgery  Orthopedics  Discharge Summary      Patient Name: Odessa Vaughn  MRN: [id number]  Admission Date: 11/5/2024  Hospital Length of Stay: 2 days  Discharge Date and Time: 11/7/2024  3:54 PM  Attending Physician: Isabelle att. providers found   Discharging Provider: Thomas Benson MD  Primary Care Provider: Mitchel Spencer MD    HPI:   Odessa Vaughn is a 79 y.o. female presents for initial evaluation of low back and right leg pain (Back - 5, Leg - 5). The pain has been present for years. The patient describes the pain as dull and it radiates posterolaterally down RLE to the foot.  The pain is worse with activity and improved by rest. There is R foot associated numbness and tingling. There is RLE subjective weakness. Prior treatments have included meds (mobic and flexeril), PT, KARLA, but no surgery.     Procedure(s) (LRB):  FUSION, SPINE, LUMBAR, TLIF, POSTERIOR APPROACH, USING PEDICLE SCREW L5-S1 (R) (N/A)      Hospital Course:  On 11/5/2024, the patient arrived to the [facility] [place] for proper pre-operative management.  Upon completion of pre-operative preparation, the patient was taken back to the operative theatre. L5-S1 TLIF was performed without complication and the patient was transported to the post anesthesia care unit in stable condition.  After appropriate recovery from the anaesthetic agents used during the surgery, the patient was then transported to the hospital inpatient floor.  The interim of the hospital stay from arrival on the floor up to discharge has been uncomplicated. The patient has tolerated regular diet.  The patient's pain has been controlled using a multimodal approach. Currently, the patient's pain is well controlled on an oral regimen.  The patient has been voiding without difficulty.  The patient began participation in physical therapy after surgery and has progressed throughout the entire hospital stay.  Currently, the patient's progress is sufficient to allow the them to  be discharged to home safely.  The patient agrees with this assessment and desires a discharge today.      Goals of Care Treatment Preferences:             Significant Diagnostic Studies: No pertinent studies.    Pending Diagnostic Studies:       None          Final Active Diagnoses:    Diagnosis Date Noted POA    PRINCIPAL PROBLEM:  S/p L5-S1 TLIF 11/5/24 [M54.16] 11/05/2024 Yes      Problems Resolved During this Admission:      Discharged Condition: good    Disposition: Home-Health Care [facility]    Follow Up:    Patient Instructions:   No discharge procedures on file.  Medications:  Reconciled Home Medications:      Medication List        START taking these medications      acetaminophen 650 MG Tbsr  Commonly known as: TYLENOL  Take 1 tablet (650 mg total) by mouth every 8 (eight) hours.     celecoxib 100 MG capsule  Commonly known as: CeleBREX  Take 1 capsule (100 mg total) by mouth 2 (two) times daily.     docusate sodium 100 MG capsule  Commonly known as: COLACE  Take 1 capsule (100 mg total) by mouth 2 (two) times daily.     famotidine 20 MG tablet  Commonly known as: PEPCID  Take 1 tablet (20 mg total) by mouth 2 (two) times daily.     gabapentin 300 MG capsule  Commonly known as: NEURONTIN  Take 1 capsule (300 mg total) by mouth 3 (three) times daily.     methocarbamoL 750 MG Tab  Commonly known as: ROBAXIN  Take 1 tablet (750 mg total) by mouth 4 (four) times daily. for 10 days     oxyCODONE 5 MG immediate release tablet  Commonly known as: ROXICODONE  Take 1 tablet (5 mg total) by mouth every 6 (six) hours as needed for Pain.            CONTINUE taking these medications      aspirin 81 MG Chew  Take 81 mg by mouth once daily.     atorvastatin 20 MG tablet  Commonly known as: LIPITOR  TAKE 1 TABLET(10 MG) BY MOUTH EVERY DAY     cholecalciferol (vitamin D3) 50 mcg (2,000 unit) Cap capsule  Commonly known as: VITAMIN D3  Take 2,000 Units by mouth once daily.     cyanocobalamin 1,000 mcg/mL injection  Inject 1 mL  (1,000 mcg total) into the muscle every 30 days.     cyclobenzaprine 5 MG tablet  Commonly known as: FLEXERIL  Take 5 mg by mouth 3 (three) times daily as needed for Muscle spasms.     multivitamin capsule  Take 1 capsule by mouth once daily.     SYSTANE (PF) OPHT  Apply to eye daily as needed.     triamterene-hydrochlorothiazide 37.5-25 mg 37.5-25 mg per capsule  Commonly known as: DYAZIDE  TAKE 1 CAPSULE BY MOUTH EVERY DAY     VITAMIN B-1 100 MG tablet  Generic drug: thiamine  Take 100 mg by mouth once daily.     VITAMIN C 500 MG tablet  Generic drug: ascorbic acid (vitamin C)  Take 500 mg by mouth once daily.            STOP taking these medications      meloxicam 15 MG tablet  Commonly known as: YOLANDE Benson MD  Orthopedics  [facility] - Surgery

## 2024-11-07 NOTE — PT/OT/SLP PROGRESS
"Occupational Therapy   Treatment    Name: Odessa Vaughn  MRN: [id number]  Admitting Diagnosis:  Lumbar radiculopathy, chronic  2 Days Post-Op    Recommendations:     Discharge Recommendations: Low Intensity Therapy  Discharge Equipment Recommendations:  none  Barriers to discharge:  None    Assessment:     Odessa Vaughn is a 79 y.o. female with a medical diagnosis of Lumbar radiculopathy, chronic.  She presents with performance deficits affecting function are impaired self care skills, impaired functional mobility, pain, orthopedic precautions, impaired endurance.     Pt engaged well in therapy this date, encountered with HOB raised and pleasant demeanor, agreeable to therapy.  Pt able to navigate bed mobility with modified independence, cues to continue following spinal precautions. Pt able to complete STS transfer with supervision/RW and functional mobility in 2 hallways lengths with SBA to supervision with RW. Pt on pathway to receive post acute therapy at low level of intensity.     Rehab Prognosis:  Good; patient would benefit from acute skilled OT services to address these deficits and reach maximum level of function.       Plan:     Patient to be seen 3 x/week to address the above listed problems via self-care/home management, therapeutic activities, therapeutic exercises, neuromuscular re-education  Plan of Care Expires: 11/27/24  Plan of Care Reviewed with: patient    Subjective     Chief Complaint: "It hurts a little but I can get up"   Patient/Family Comments/goals: Get better, return home   Pain/Comfort:  Pain Rating 1: 7/10  Location - Side 1: Bilateral  Location - Orientation 1: lower  Location 1: back  Pain Addressed 1: Distraction, Reposition  Pain Rating Post-Intervention 1: 7/10    Objective:     Communicated with: RN prior to session.  Patient found HOB elevated with telemetry upon OT entry to room.    General Precautions: Standard, fall    Orthopedic Precautions:spinal " precautions  Braces: N/A  Respiratory Status: Room air     Occupational Performance:     Bed Mobility:    Patient completed Rolling/Turning to Left with  supervision  Patient completed Rolling/Turning to Right with supervision  Patient completed Scooting/Bridging with supervision  Patient completed Supine to Sit with supervision  Patient completed Sit to Supine with stand by assistance with verbal cues  Pt able to sit EOB with good upright balance     Functional Mobility/Transfers:  Patient completed Sit <> Stand Transfer with stand by assistance  with  rolling walker   Functional Mobility: Pt engaging in functional mobility to simulate household/community distances in hospital hallway  with SBA to supervision and utilizing RW in order to maximize functional activity tolerance and standing balance required for engagement in occupations of choice.     Activities of Daily Living:  Upper Body Dressing: contact guard assistance donning 2nd gown as rosemarye      [facility] 6 Click ADL: 22    Treatment & Education:  Pt educated on role of OT, POC, and goals for therapy.    POC was dicussed with patient/caregiver, who was included in its development and is in agreement with the identified goals and treatment plan.   Patient and family aware of patient's deficits and therapy progression.   Time provided for therapeutic counseling and discussion of health disposition.   Educated on importance of EOB/OOB mobility, maintaining routine, sitting up in chair, and maximizing independence with ADLs during admission   Pt completed ADLs and functional mobility for treatment session as noted above   Pt/caregiver verbalized understanding and expressed no further concerns/questions.  Updated communication board with level of assist required      Patient left HOB elevated with call button in reach and PCT present    GOALS:   Multidisciplinary Problems       Occupational Therapy Goals          Problem: Occupational Therapy    Goal Priority  Disciplines Outcome Interventions   Occupational Therapy Goal     OT, PT/OT Progressing    Description: Goals to be met by: 11/27/24     Patient will increase functional independence with ADLs by performing:    UE Dressing with Modified [place].  LE Dressing with Modified [place].  Grooming while standing at sink with Modified [place].  Toileting from toilet with Modified [place] for hygiene and clothing management.   Toilet transfer to toilet with Modified [place].    DME: No anticipated needs                         Time Tracking:     OT Date of Treatment: 11/07/24  OT Start Time: 1051  OT Stop Time: 1106  OT Total Time (min): 15 min    Billable Minutes:Therapeutic Activity 15    OT/EARNEST: OT          11/7/2024

## 2024-11-07 NOTE — PLAN OF CARE
11/07/24 0730   Post-Acute Status   Post-Acute Authorization Home Health   Home Health Status Referrals Sent   Discharge Plan   Discharge Plan A Home with family;Home Health     Referral sent to [facility] for post-acute care.    Angelique CONRAD  Case Management  [facility]  [phone]

## 2024-11-07 NOTE — NURSING
Eager & in agreement w/ DC. VU of DC instructions paperwork & prescriptions passed & explained  IV removed w/ cath tip intact, WNL. Verified HH set up via CM To be DCd home w/  will be escorted downstairs via  transport team once dressed, ready & ride arrives. Free from falls, injury, or skin breakdown this hospital admission.

## 2024-11-07 NOTE — PLAN OF CARE
Problem: Adult Inpatient Plan of Care  Goal: Plan of Care Review  Outcome: Progressing  Goal: Patient-Specific Goal (Individualized)  Outcome: Progressing  Goal: Absence of Hospital-Acquired Illness or Injury  Outcome: Progressing  Goal: Optimal Comfort and Wellbeing  Outcome: Progressing  Goal: Readiness for Transition of Care  Outcome: Progressing     Problem: Fall Injury Risk  Goal: Absence of Fall and Fall-Related Injury  Outcome: Progressing     Pt resting in bed comfortably. Up to chair for meals and ambulated in halls with therapy today. PIV line intact and free of infection and irritation. Fall precautions maintained, no falls noted. Call light within reach, bed locked and in lowest position. Non-skid socks on while out of bed. Patient instructed to call for assistance. Skin integrity maintained as patient is independent with frequent repositioning. Surgical site clean, dry and intact and bilateral hemovacs in place, output recorded. C/o pain, managed with PRN meds, no other complaints or concerns. Progressing towards goals. Plan of care ongoing.

## 2024-11-07 NOTE — PLAN OF CARE
[facility]  Discharge Final Note    Primary Care Provider: Mitchel Spencer MD    Expected Discharge Date: 11/7/2024    Final Discharge Note (most recent)       Final Note - 11/07/24 1232          Final Note    Assessment Type Final Discharge Note     Anticipated Discharge Disposition [place]Health Care [facility]     What phone number can be called within the next 1-3 days to see how you are doing after discharge? --   [phone]                    Important Message from Medicare  Important Message from Medicare regarding Discharge Appeal Rights: Explained to patient/caregiver, Signed/date by patient/caregiver     Date IMM was signed: 11/07/24  Time IMM was signed: 1057      Future Appointments   Date Time Provider Department Center   12/3/2024 12:45 PM [facility]   12/3/2024  1:45 PM Neelam Owens, VERA [facility]   8/25/2025  8:00 AM [facility] [place]     Patient discharged home to care of family and [facility] on 11/7/24.    Angelique Ricks RNCM  Case Management  [facility]  [phone]

## 2024-11-07 NOTE — ASSESSMENT & PLAN NOTE
Odessa Vaughn is a 79 y.o. female is s/p L5-S1 TLIF on 11/5/2024      Surgical dressing C/D/I  Pain control: multimodal  PT/OT: WBAT BLE, spine precautions  DVT PPx: SQH TID, FCDs at all times when not ambulating  Abx: postop Ancef x 24hrs   Drain: x2 removed  Hernandez: Remove POD 1   Nursing: Incentive spirometry, Monitor and record drain output each shift     Dispo: plan to dc pending pain control and drain output     Output by Drain (mL) 11/05/24 0701 - 11/05/24 1900 11/05/24 1901 - 11/06/24 0700 11/06/24 0701 - 11/06/24 1900 11/06/24 1901 - 11/07/24 0643        Closed/Suction Drain 11/05/24 0935 Right;Posterior Back Accordion 10 Fr. 10 10 50 5        Closed/Suction Drain 11/05/24 0952 Left;Posterior Back Accordion 10 Fr. 0 5 45 10

## 2024-11-07 NOTE — PROGRESS NOTES
[facility]  Orthopedics  Progress Note    Patient Name: Odessa Vaughn  MRN: [id number]  Admission Date: 11/5/2024  Hospital Length of Stay: 2 days  Attending Provider: Kamran Douglas MD  Primary Care Provider: Mitchel Spencer MD  Follow-up For: Procedure(s) (LRB):  FUSION, SPINE, LUMBAR, TLIF, POSTERIOR APPROACH, USING PEDICLE SCREW L5-S1 (R) (N/A)    Post-Operative Day: 2 Days Post-Op  Subjective:     Principal Problem:Lumbar radiculopathy, chronic    Principal Orthopedic Problem: Same    Interval History: NAEON, patient stable, pain controlled. No acute complaints this morning. Drains removed, 5cc output overnight per nursing.      Review of patient's allergies indicates:   Allergen Reactions    Vicodin [hydrocodone-acetaminophen] Other (See Comments)     Dizziness       Current Facility-Administered Medications   Medication    acetaminophen tablet 650 mg    aluminum-magnesium hydroxide-simethicone 200-200-20 mg/5 mL suspension 30 mL    aspirin chewable tablet 81 mg    atorvastatin tablet 20 mg    bisacodyL suppository 10 mg    docusate sodium capsule 100 mg    heparin (porcine) injection 5,000 Units    HYDROmorphone injection 1 mg    methocarbamoL tablet 750 mg    multivitamin tablet    mupirocin 2 % ointment    ondansetron disintegrating tablet 8 mg    oxyCODONE immediate release tablet 5 mg    oxyCODONE immediate release tablet Tab 10 mg    prochlorperazine injection Soln 5 mg    senna-docusate 8.6-50 mg per tablet 2 tablet    triamterene-hydrochlorothiazide 37.5-25 mg per capsule 1 capsule     Objective:     Vital Signs (Most Recent):  Temp: 98.2 °F (36.8 °C) (11/07/24 0424)  Pulse: 80 (11/07/24 0424)  Resp: 18 (11/07/24 0424)  BP: (!) 112/54 (11/07/24 0424)  SpO2: 96 % (11/07/24 0424) Vital Signs (24h Range):  Temp:  [97.7 °F (36.5 °C)-98.2 °F (36.8 °C)] 98.2 °F (36.8 °C)  Pulse:  [70-85] 80  Resp:  [17-18] 18  SpO2:  [95 %-100 %] 96 %  BP: (112-144)/(54-65) 112/54     Weight: 78.6 kg (173 lb 4.5  "oz)  Height: 5' 5" (165.1 cm)  Body mass index is 28.84 kg/m².      Intake/Output Summary (Last 24 hours) at 11/7/2024 0641  Last data filed at 11/7/2024 0612  Gross per 24 hour   Intake 910 ml   Output 110 ml   Net 800 ml        Ortho/SPM Exam  Lumbar spine exam   EHL/FHL/TA/Gastroc intact   SILT   2+DP/PT   Dressings CDI          Significant Labs: CBC:   Recent Labs   Lab 11/06/24  0506   WBC 11.70   HGB 10.7*   HCT 33.6*        CMP:   Recent Labs   Lab 11/06/24  0506      K 4.3      CO2 26      BUN 17   CREATININE 1.0   CALCIUM 8.7   ANIONGAP 6*     All pertinent labs within the past 24 hours have been reviewed.    Significant Imaging: I have reviewed and interpreted all pertinent imaging results/findings.  Assessment/Plan:     * S/p L5-S1 TLIF 11/5/24  Odessa Vaughn is a 79 y.o. female is s/p L5-S1 TLIF on 11/5/2024      Surgical dressing C/D/I  Pain control: multimodal  PT/OT: WBAT BLE, spine precautions  DVT PPx: SQH TID, FCDs at all times when not ambulating  Abx: postop Ancef x 24hrs   Drain: x2 removed  Hernandez: Remove POD 1   Nursing: Incentive spirometry, Monitor and record drain output each shift     Dispo: plan to dc today after Xray    Output by Drain (mL) 11/05/24 0701 - 11/05/24 1900 11/05/24 1901 - 11/06/24 0700 11/06/24 0701 - 11/06/24 1900 11/06/24 1901 - 11/07/24 0643        Closed/Suction Drain 11/05/24 0935 Right;Posterior Back Accordion 10 Fr. 10 10 50 5        Closed/Suction Drain 11/05/24 0952 Left;Posterior Back Accordion 10 Fr. 0 5 45 10                Thomas Benson MD  Orthopedics  [facility] - Surgery    "

## 2024-11-08 ENCOUNTER — PATIENT OUTREACH (OUTPATIENT)
Dept: ADMINISTRATIVE | Facility: CLINIC | Age: 79
End: 2024-11-08
Payer: MEDICARE

## 2024-11-08 NOTE — ANESTHESIA POSTPROCEDURE EVALUATION
Anesthesia Post Evaluation    Patient: Odessa Vaughn    Procedure(s) Performed: Procedure(s) (LRB):  FUSION, SPINE, LUMBAR, TLIF, POSTERIOR APPROACH, USING PEDICLE SCREW L5-S1 (R) (N/A)    Final Anesthesia Type: general      Patient location during evaluation: PACU  Patient participation: Yes- Able to Participate  Level of consciousness: awake and alert and oriented  Post-procedure vital signs: reviewed and stable  Pain management: adequate  Airway patency: patent    PONV status at discharge: No PONV  Anesthetic complications: no      Cardiovascular status: hemodynamically stable  Respiratory status: unassisted, spontaneous ventilation and room air  Hydration status: euvolemic  Follow-up not needed.          Vital signs stable, no issues at this time.    Event Time   Out of Recovery 10:45:00         Pain/Zari Score: Pain Rating Prior to Med Admin: 5 (11/7/2024 12:13 PM)  Pain Rating Post Med Admin: 5 (11/7/2024  6:19 AM)

## 2024-11-08 NOTE — PROGRESS NOTES
C3 nurse attempted to contact Odessa Vaughn  for a TCC post hospital discharge follow up call. No answer. Left voicemail with callback information. The patient does not have a scheduled HOSFU appointment. Message sent to PCP staff for assistance with scheduling visit with patient.

## 2024-11-09 ENCOUNTER — TELEPHONE (OUTPATIENT)
Dept: ADMINISTRATIVE | Facility: CLINIC | Age: 79
End: 2024-11-09
Payer: MEDICARE

## 2024-11-09 NOTE — TELEPHONE ENCOUNTER
Calling in stating that she told provider that she has a walker, but missing parts. Asking for another one and toilet seat extension. Message routed to Kamran Douglas, surgeon and PCP Dr. Spencer. Instructed to f/u doctor Monday. Has home health visit Monday

## 2024-11-11 DIAGNOSIS — M54.16 LUMBAR RADICULOPATHY: ICD-10-CM

## 2024-11-11 DIAGNOSIS — Z98.1 S/P LUMBAR SPINAL FUSION: Primary | ICD-10-CM

## 2024-11-14 ENCOUNTER — LAB VISIT (OUTPATIENT)
Dept: LAB | Facility: HOSPITAL | Age: 79
End: 2024-11-14
Attending: INTERNAL MEDICINE
Payer: MEDICARE

## 2024-11-14 ENCOUNTER — OFFICE VISIT (OUTPATIENT)
Dept: INTERNAL MEDICINE | Facility: CLINIC | Age: 79
End: 2024-11-14
Payer: MEDICARE

## 2024-11-14 VITALS
OXYGEN SATURATION: 95 % | SYSTOLIC BLOOD PRESSURE: 130 MMHG | WEIGHT: 172.81 LBS | HEIGHT: 65 IN | BODY MASS INDEX: 28.79 KG/M2 | DIASTOLIC BLOOD PRESSURE: 60 MMHG | HEART RATE: 66 BPM

## 2024-11-14 DIAGNOSIS — E78.5 HYPERLIPIDEMIA, UNSPECIFIED HYPERLIPIDEMIA TYPE: ICD-10-CM

## 2024-11-14 DIAGNOSIS — E53.8 B12 DEFICIENCY: ICD-10-CM

## 2024-11-14 DIAGNOSIS — Z98.890 S/P LUMBAR SPINE OPERATION: ICD-10-CM

## 2024-11-14 DIAGNOSIS — Z98.890 S/P LUMBAR SPINE OPERATION: Primary | ICD-10-CM

## 2024-11-14 DIAGNOSIS — I10 ESSENTIAL HYPERTENSION: ICD-10-CM

## 2024-11-14 DIAGNOSIS — I67.2 CEREBRAL ATHEROSCLEROSIS: ICD-10-CM

## 2024-11-14 LAB
ANION GAP SERPL CALC-SCNC: 10 MMOL/L (ref 8–16)
BASOPHILS # BLD AUTO: 0.05 K/UL (ref 0–0.2)
BASOPHILS NFR BLD: 0.6 % (ref 0–1.9)
BUN SERPL-MCNC: 20 MG/DL (ref 8–23)
CALCIUM SERPL-MCNC: 9.9 MG/DL (ref 8.7–10.5)
CHLORIDE SERPL-SCNC: 101 MMOL/L (ref 95–110)
CO2 SERPL-SCNC: 27 MMOL/L (ref 23–29)
CREAT SERPL-MCNC: 0.9 MG/DL (ref 0.5–1.4)
DIFFERENTIAL METHOD BLD: ABNORMAL
EOSINOPHIL # BLD AUTO: 0.3 K/UL (ref 0–0.5)
EOSINOPHIL NFR BLD: 3.6 % (ref 0–8)
ERYTHROCYTE [DISTWIDTH] IN BLOOD BY AUTOMATED COUNT: 14.2 % (ref 11.5–14.5)
EST. GFR  (NO RACE VARIABLE): >60 ML/MIN/1.73 M^2
GLUCOSE SERPL-MCNC: 88 MG/DL (ref 70–110)
HCT VFR BLD AUTO: 32.6 % (ref 37–48.5)
HGB BLD-MCNC: 10.4 G/DL (ref 12–16)
IMM GRANULOCYTES # BLD AUTO: 0.03 K/UL (ref 0–0.04)
IMM GRANULOCYTES NFR BLD AUTO: 0.4 % (ref 0–0.5)
LYMPHOCYTES # BLD AUTO: 2.7 K/UL (ref 1–4.8)
LYMPHOCYTES NFR BLD: 33.3 % (ref 18–48)
MCH RBC QN AUTO: 27.4 PG (ref 27–31)
MCHC RBC AUTO-ENTMCNC: 31.9 G/DL (ref 32–36)
MCV RBC AUTO: 86 FL (ref 82–98)
MONOCYTES # BLD AUTO: 0.7 K/UL (ref 0.3–1)
MONOCYTES NFR BLD: 8.5 % (ref 4–15)
NEUTROPHILS # BLD AUTO: 4.4 K/UL (ref 1.8–7.7)
NEUTROPHILS NFR BLD: 53.6 % (ref 38–73)
NRBC BLD-RTO: 0 /100 WBC
PLATELET # BLD AUTO: 434 K/UL (ref 150–450)
PMV BLD AUTO: 8.5 FL (ref 9.2–12.9)
POTASSIUM SERPL-SCNC: 4.5 MMOL/L (ref 3.5–5.1)
RBC # BLD AUTO: 3.79 M/UL (ref 4–5.4)
SODIUM SERPL-SCNC: 138 MMOL/L (ref 136–145)
VIT B12 SERPL-MCNC: 661 PG/ML (ref 210–950)
WBC # BLD AUTO: 8.23 K/UL (ref 3.9–12.7)

## 2024-11-14 PROCEDURE — 36415 COLL VENOUS BLD VENIPUNCTURE: CPT | Mod: HCNC | Performed by: INTERNAL MEDICINE

## 2024-11-14 PROCEDURE — 99999 PR PBB SHADOW E&M-EST. PATIENT-LVL IV: CPT | Mod: PBBFAC,HCNC,, | Performed by: INTERNAL MEDICINE

## 2024-11-14 PROCEDURE — 80048 BASIC METABOLIC PNL TOTAL CA: CPT | Mod: HCNC | Performed by: INTERNAL MEDICINE

## 2024-11-14 PROCEDURE — 82607 VITAMIN B-12: CPT | Mod: HCNC | Performed by: INTERNAL MEDICINE

## 2024-11-14 PROCEDURE — 85025 COMPLETE CBC W/AUTO DIFF WBC: CPT | Mod: HCNC | Performed by: INTERNAL MEDICINE

## 2024-11-15 NOTE — PROGRESS NOTES
Hospital follow-up     HPI:  The patient is a 79-year-old female with hypertension, hyperlipidemia, right breast cancer status post mastectomy, abnormal CT of the chest showing micro nodules and ground-glass opacities, B12 deficiency, sciatica involving the right leg and status post TLIF of the lumbar spine who presents today as a hospital follow-up.  The patient reports she was getting home health as well as physical therapy.  Nursing changed her dressing yesterday for her back.  Physical therapy recommended a rolling walker.  The patient had a CTA prior to her operation.  It showed no significant stenosis of the vertebral arteries.  It did show mild-to-moderate stenosis of the cavernous/ supraclinoid ICA bilaterally.  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 The patient had her atorvastatin increased from 10 mg to 20 mg.  The patient has not started the new dose yet.    ROS: Patient reports no fever chills.  No chest pain.  No shortness a breath.  No dizziness or lightheadedness.  No nausea vomiting.  No abdominal pain.   Bowel problems.  Her right leg feels better.  She does report some numbness in the toes.  She also reports some achiness in the toes.    Physical exam:   General appearance: No acute distress   HEENT: Trachea is midline without JVD   Pulmonary: Good inspiratory, expiratory breath sounds are heard.  Lungs are clear auscultation.  Cardiovascular: S1-S2, rhythm is regular.  Extremities without edema.    GI: Abdomen is nontender, nondistended.  He had normoactive bowel sounds  Derm: Patient has bandage in place over her spine.  This  was not removed.      Assessment:   Status post TLIF of L5-S1  2.  Cerebral atherosclerosis   3.  Hypertension   4.  Hyperlipidemia  5.  B12 deficiency  Plan:   .  Will check a B12 level  2.  Will check a CBC, BMP  3.  Will order a walker for home use.

## 2024-11-27 NOTE — PROGRESS NOTES
Date: 11/27/2024    Supervising Physician: Kamran Douglas M.D.    Date of Surgery: 11/5/24    Procedure: L5-S1 TLIF    History: Odessa Vaughn is seen today for follow-up following the above listed procedure. Overall the patient is doing well but today notes she is doing very well with significant improvement of her right leg pain.   She is not taking  pain medication.  she denies fever, chills, and sweats since the time of the surgery.       Exam: Post op dressing taken down.  Incision is healing well, clean, dry and intact.   There is no sign of infection. Neuro exam is stable. No signs of DVT.    Radiographs: hardware in place no failure    Assessment/Plan: 4 weeks post op.    Doing well postoperatively.  reviewed.    I will plan to see the patient back for the next postop visit in 2 months.     Thank you for the opportunity to participate in this patient's care. Please give me a call if there are any concerns or questions.

## 2024-12-03 ENCOUNTER — HOSPITAL ENCOUNTER (OUTPATIENT)
Dept: RADIOLOGY | Facility: HOSPITAL | Age: 79
Discharge: HOME | End: 2024-12-03
Attending: ORTHOPAEDIC SURGERY
Payer: MEDICARE

## 2024-12-03 ENCOUNTER — OFFICE VISIT (OUTPATIENT)
Dept: ORTHOPEDICS | Facility: CLINIC | Age: 79
End: 2024-12-03
Payer: MEDICARE

## 2024-12-03 VITALS — BODY MASS INDEX: 28.79 KG/M2 | HEIGHT: 65 IN | WEIGHT: 172.81 LBS

## 2024-12-03 DIAGNOSIS — M51.369 DEGENERATION OF INTERVERTEBRAL DISC OF LUMBAR REGION, UNSPECIFIED WHETHER PAIN PRESENT: Primary | ICD-10-CM

## 2024-12-03 DIAGNOSIS — Z98.1 S/P LUMBAR SPINAL FUSION: Primary | ICD-10-CM

## 2024-12-03 DIAGNOSIS — Z98.1 S/P LUMBAR SPINAL FUSION: ICD-10-CM

## 2024-12-03 PROCEDURE — 72100 X-RAY EXAM L-S SPINE 2/3 VWS: CPT | Mod: TC,HCNC

## 2024-12-03 PROCEDURE — 99999 PR PBB SHADOW E&M-EST. PATIENT-LVL III: CPT | Mod: PBBFAC,HCNC,, | Performed by: ORTHOPAEDIC SURGERY

## 2024-12-03 PROCEDURE — 1125F AMNT PAIN NOTED PAIN PRSNT: CPT | Mod: HCNC,CPTII,S$GLB, | Performed by: ORTHOPAEDIC SURGERY

## 2024-12-03 PROCEDURE — 1159F MED LIST DOCD IN RCRD: CPT | Mod: HCNC,CPTII,S$GLB, | Performed by: ORTHOPAEDIC SURGERY

## 2024-12-03 PROCEDURE — 1101F PT FALLS ASSESS-DOCD LE1/YR: CPT | Mod: HCNC,CPTII,S$GLB, | Performed by: ORTHOPAEDIC SURGERY

## 2024-12-03 PROCEDURE — 72100 X-RAY EXAM L-S SPINE 2/3 VWS: CPT | Mod: 26,HCNC,, | Performed by: RADIOLOGY

## 2024-12-03 PROCEDURE — 99024 POSTOP FOLLOW-UP VISIT: CPT | Mod: HCNC,S$GLB,, | Performed by: ORTHOPAEDIC SURGERY

## 2024-12-03 PROCEDURE — 3288F FALL RISK ASSESSMENT DOCD: CPT | Mod: HCNC,CPTII,S$GLB, | Performed by: ORTHOPAEDIC SURGERY

## 2024-12-09 ENCOUNTER — EXTERNAL HOME HEALTH (OUTPATIENT)
Dept: HOME HEALTH SERVICES | Facility: HOSPITAL | Age: 79
End: 2024-12-09
Payer: MEDICARE

## 2024-12-12 ENCOUNTER — DOCUMENT SCAN (OUTPATIENT)
Dept: HOME HEALTH SERVICES | Facility: HOSPITAL | Age: 79
End: 2024-12-12
Payer: MEDICARE

## 2024-12-30 ENCOUNTER — TELEPHONE (OUTPATIENT)
Dept: ORTHOPEDICS | Facility: CLINIC | Age: 79
End: 2024-12-30
Payer: MEDICARE

## 2025-01-02 ENCOUNTER — PATIENT MESSAGE (OUTPATIENT)
Dept: INTERNAL MEDICINE | Facility: CLINIC | Age: 80
End: 2025-01-02
Payer: MEDICARE

## 2025-01-02 DIAGNOSIS — I10 ESSENTIAL HYPERTENSION: ICD-10-CM

## 2025-01-02 RX ORDER — TRIAMTERENE AND HYDROCHLOROTHIAZIDE 37.5; 25 MG/1; MG/1
1 CAPSULE ORAL DAILY
Qty: 90 CAPSULE | Refills: 3 | Status: SHIPPED | OUTPATIENT
Start: 2025-01-02

## 2025-01-02 NOTE — TELEPHONE ENCOUNTER
Care Due:                  Date            Visit Type   Department     Provider  --------------------------------------------------------------------------------                                [facility] INTERNAL  Last Visit: 11-      FOLLOW UP    MEDICINE       [facility]  Next Visit: None Scheduled  None         None Found                                                            Last  Test          Frequency    Reason                     Performed    Due Date  --------------------------------------------------------------------------------    Lipid Panel.  12 months..  atorvastatin.............  03- 03-    [facility] Embedded Care Due Messages. Reference number: [id number].   1/02/2025 2:31:54 PM CST

## 2025-01-02 NOTE — TELEPHONE ENCOUNTER
Pt requesting to restart Dyazide, states she continues to have leg swelling. Will pend if you feel appropriate     LOV with Mitchel Spencer MD , 11/14/2024

## 2025-01-06 ENCOUNTER — TELEPHONE (OUTPATIENT)
Dept: ORTHOPEDICS | Facility: CLINIC | Age: 80
End: 2025-01-06
Payer: MEDICARE

## 2025-01-13 ENCOUNTER — TELEPHONE (OUTPATIENT)
Dept: ORTHOPEDICS | Facility: CLINIC | Age: 80
End: 2025-01-13
Payer: MEDICARE

## 2025-01-14 RX ORDER — CYCLOBENZAPRINE HCL 5 MG
5 TABLET ORAL 3 TIMES DAILY PRN
Qty: 30 TABLET | Refills: 1 | Status: SHIPPED | OUTPATIENT
Start: 2025-01-14

## 2025-02-04 ENCOUNTER — HOSPITAL ENCOUNTER (OUTPATIENT)
Dept: RADIOLOGY | Facility: HOSPITAL | Age: 80
Discharge: HOME | End: 2025-02-04
Attending: ORTHOPAEDIC SURGERY
Payer: MEDICARE

## 2025-02-04 ENCOUNTER — OFFICE VISIT (OUTPATIENT)
Dept: ORTHOPEDICS | Facility: CLINIC | Age: 80
End: 2025-02-04
Payer: MEDICARE

## 2025-02-04 VITALS — HEIGHT: 65 IN | WEIGHT: 172.81 LBS | BODY MASS INDEX: 28.79 KG/M2

## 2025-02-04 DIAGNOSIS — M51.369 DEGENERATION OF INTERVERTEBRAL DISC OF LUMBAR REGION, UNSPECIFIED WHETHER PAIN PRESENT: ICD-10-CM

## 2025-02-04 DIAGNOSIS — Z98.1 S/P LUMBAR SPINAL FUSION: Primary | ICD-10-CM

## 2025-02-04 PROCEDURE — 1125F AMNT PAIN NOTED PAIN PRSNT: CPT | Mod: HCNC,CPTII,S$GLB, | Performed by: ORTHOPAEDIC SURGERY

## 2025-02-04 PROCEDURE — 72100 X-RAY EXAM L-S SPINE 2/3 VWS: CPT | Mod: 26,HCNC,, | Performed by: RADIOLOGY

## 2025-02-04 PROCEDURE — 72100 X-RAY EXAM L-S SPINE 2/3 VWS: CPT | Mod: TC,HCNC

## 2025-02-04 PROCEDURE — 99024 POSTOP FOLLOW-UP VISIT: CPT | Mod: HCNC,S$GLB,, | Performed by: ORTHOPAEDIC SURGERY

## 2025-02-04 PROCEDURE — 3288F FALL RISK ASSESSMENT DOCD: CPT | Mod: HCNC,CPTII,S$GLB, | Performed by: ORTHOPAEDIC SURGERY

## 2025-02-04 PROCEDURE — 99999 PR PBB SHADOW E&M-EST. PATIENT-LVL III: CPT | Mod: PBBFAC,HCNC,, | Performed by: ORTHOPAEDIC SURGERY

## 2025-02-04 PROCEDURE — 1159F MED LIST DOCD IN RCRD: CPT | Mod: HCNC,CPTII,S$GLB, | Performed by: ORTHOPAEDIC SURGERY

## 2025-02-04 PROCEDURE — 1101F PT FALLS ASSESS-DOCD LE1/YR: CPT | Mod: HCNC,CPTII,S$GLB, | Performed by: ORTHOPAEDIC SURGERY

## 2025-02-04 NOTE — PROGRESS NOTES
Date: 02/04/2025    Supervising Physician: Kamran Douglas M.D.    Date of Surgery: 11/5/24     Procedure: L5-S1 TLIF    History: Odessa Vaughn is seen today for follow-up following the above listed procedure. Overall the patient is doing well but today notes she is doing well with some low back pain but her pre op radiating leg pain has improved since surgery.   Pain is well controlled with current pain medication.  she denies fever, chills, and sweats since the time of the surgery.       Exam: Post op dressing taken down.  Incision is healing well, clean, dry and intact.   There is no sign of infection. Neuro exam is stable. No signs of DVT.    Radiographs: hardware in place no failure    Assessment/Plan: 3 months post op.    Doing well postoperatively.  reviewed.    I will plan to see the patient back for the next postop visit in 9 months.     Thank you for the opportunity to participate in this patient's care. Please give me a call if there are any concerns or questions.

## 2025-02-05 DIAGNOSIS — M54.30 SCIATICA, UNSPECIFIED LATERALITY: ICD-10-CM

## 2025-02-05 NOTE — TELEPHONE ENCOUNTER
No care due was identified.  [facility] Embedded Care Due Messages. Reference number: [id number].   2/05/2025 4:49:02 PM CST

## 2025-02-06 RX ORDER — MELOXICAM 15 MG/1
15 TABLET ORAL
Qty: 30 TABLET | Refills: 0 | OUTPATIENT
Start: 2025-02-06

## 2025-02-06 NOTE — TELEPHONE ENCOUNTER
Refill Routing Note   Medication(s) are not appropriate for processing by [facility] for the following reason(s):        Outside of protocol- to QDC    [facility] action(s):  Defer        Medication Therapy Plan: Discontinued by: Thomas Benson MD on 11/5/2024 10:10 Reason: Stop Taking at Discharge    Pharmacist review requested: Yes     Appointments  past 12m or future 3m with PCP    Date Provider   Last Visit   11/14/2024 Mitchel Spencer MD   Next Visit   Visit date not found Mitchel Spencer MD   ED visits in past 90 days: 0        Note composed:8:07 AM 02/06/2025

## 2025-02-06 NOTE — TELEPHONE ENCOUNTER
Refill Decision Note   Odessa Vaughn  is requesting a refill authorization.  Brief Assessment and Rationale for Refill:  Quick Discontinue     Medication Therapy Plan:  Discontinued by: Thomas Benson MD on 11/5/2024 10:10 Reason: Stop Taking at Discharge      Pharmacist review requested: Yes   Comments:     Note composed:8:44 AM 02/06/2025

## 2025-02-24 DIAGNOSIS — Z00.00 ENCOUNTER FOR MEDICARE ANNUAL WELLNESS EXAM: ICD-10-CM

## 2025-03-07 ENCOUNTER — TELEPHONE (OUTPATIENT)
Dept: ORTHOPEDICS | Facility: CLINIC | Age: 80
End: 2025-03-07
Payer: MEDICARE

## 2025-03-19 ENCOUNTER — TELEPHONE (OUTPATIENT)
Dept: NEUROSURGERY | Facility: CLINIC | Age: 80
End: 2025-03-19
Payer: MEDICARE

## 2025-03-26 ENCOUNTER — TELEPHONE (OUTPATIENT)
Dept: ORTHOPEDICS | Facility: CLINIC | Age: 80
End: 2025-03-26
Payer: MEDICARE

## 2025-04-30 ENCOUNTER — TELEPHONE (OUTPATIENT)
Dept: HEMATOLOGY/ONCOLOGY | Facility: CLINIC | Age: 80
End: 2025-04-30
Payer: MEDICARE

## 2025-04-30 DIAGNOSIS — Z90.10 DEFORMITY OF BREAST AFTER MASTECTOMY: Primary | ICD-10-CM

## 2025-04-30 DIAGNOSIS — N64.89 DEFORMITY OF BREAST AFTER MASTECTOMY: Primary | ICD-10-CM

## 2025-05-05 ENCOUNTER — OFFICE VISIT (OUTPATIENT)
Dept: INTERNAL MEDICINE | Facility: CLINIC | Age: 80
End: 2025-05-05
Payer: MEDICARE

## 2025-05-05 ENCOUNTER — TELEPHONE (OUTPATIENT)
Dept: INTERNAL MEDICINE | Facility: CLINIC | Age: 80
End: 2025-05-05
Payer: MEDICARE

## 2025-05-05 VITALS
WEIGHT: 170.63 LBS | SYSTOLIC BLOOD PRESSURE: 118 MMHG | BODY MASS INDEX: 28.43 KG/M2 | HEIGHT: 65 IN | HEART RATE: 85 BPM | DIASTOLIC BLOOD PRESSURE: 62 MMHG | OXYGEN SATURATION: 96 %

## 2025-05-05 DIAGNOSIS — R10.9 ACUTE LEFT FLANK PAIN: Primary | ICD-10-CM

## 2025-05-05 LAB — HOLD SPECIMEN: NORMAL

## 2025-05-05 PROCEDURE — 99999 PR PBB SHADOW E&M-EST. PATIENT-LVL IV: CPT | Mod: PBBFAC,HCNC,, | Performed by: INTERNAL MEDICINE

## 2025-05-05 PROCEDURE — 1101F PT FALLS ASSESS-DOCD LE1/YR: CPT | Mod: CPTII,HCNC,S$GLB, | Performed by: INTERNAL MEDICINE

## 2025-05-05 PROCEDURE — G2211 COMPLEX E/M VISIT ADD ON: HCPCS | Mod: HCNC,S$GLB,, | Performed by: INTERNAL MEDICINE

## 2025-05-05 PROCEDURE — 1126F AMNT PAIN NOTED NONE PRSNT: CPT | Mod: CPTII,HCNC,S$GLB, | Performed by: INTERNAL MEDICINE

## 2025-05-05 PROCEDURE — 81001 URINALYSIS AUTO W/SCOPE: CPT | Mod: HCNC | Performed by: INTERNAL MEDICINE

## 2025-05-05 PROCEDURE — 87086 URINE CULTURE/COLONY COUNT: CPT | Mod: HCNC | Performed by: INTERNAL MEDICINE

## 2025-05-05 PROCEDURE — 3288F FALL RISK ASSESSMENT DOCD: CPT | Mod: CPTII,HCNC,S$GLB, | Performed by: INTERNAL MEDICINE

## 2025-05-05 PROCEDURE — 3074F SYST BP LT 130 MM HG: CPT | Mod: CPTII,HCNC,S$GLB, | Performed by: INTERNAL MEDICINE

## 2025-05-05 PROCEDURE — 1159F MED LIST DOCD IN RCRD: CPT | Mod: CPTII,HCNC,S$GLB, | Performed by: INTERNAL MEDICINE

## 2025-05-05 PROCEDURE — 1160F RVW MEDS BY RX/DR IN RCRD: CPT | Mod: CPTII,HCNC,S$GLB, | Performed by: INTERNAL MEDICINE

## 2025-05-05 PROCEDURE — 99213 OFFICE O/P EST LOW 20 MIN: CPT | Mod: HCNC,S$GLB,, | Performed by: INTERNAL MEDICINE

## 2025-05-05 PROCEDURE — 3078F DIAST BP <80 MM HG: CPT | Mod: CPTII,HCNC,S$GLB, | Performed by: INTERNAL MEDICINE

## 2025-05-05 NOTE — PROGRESS NOTES
Subjective     Patient ID: Odessa Vaughn is a 80 y.o. female.    Chief Complaint: Abdominal Pain             History of Present Illness    CHIEF COMPLAINT:  Odessa presents today with left-sided pain and leg swelling    LEFT-SIDED PAIN:  She reports intermittent left-sided pain that started 1-2 months ago.    URINARY SYMPTOMS:  She reports cloudy urine this morning and noticed a pinkish color when wiping. She has a history of urinary symptoms with itching that previously resolved with cranberry juice.    EDEMA:  She reports daily leg swelling and attempts to manage symptoms by monitoring salt intake. She has tried compression stockings but has difficulty with their use.    MEDICAL HISTORY:  She has a history of sciatica treated initially with physical therapy followed by two epidural injections. She subsequently underwent back surgery with screw placement. She reports ongoing arthritis in back and persistent toe numbness following the second injection. She also has a history of cancer.      ROS:  Constitutional: negative activity change, negative unexpected weight change  HENT: negative trouble swallowing  Eyes: negative discharge, negative visual disturbance  Respiratory: negative chest tightness, negative wheezing, negative shortness of breath  Cardiovascular: negative chest pain, negative palpitations, +lower extremity swelling  Gastrointestinal: negative blood in stool, +constipation, negative diarrhea, negative vomiting  Endocrine: negative polydipsia, negative polyuria  Genitourinary: negative difficulty urinating, negative dysuria, negative hematuria, +abnormal urine appearance, +urine changes, +vaginal discharge  Musculoskeletal: negative arthralgias, negative joint swelling, negative neck pain  Neurological: negative weakness, negative headaches, +numbness, +decreased sensation in extremities  Psychiatric/Behavioral: negative confusion, negative dysphoric mood       Pain L flank area -- has been  off and on for long time.        HPI  Review of Systems     Objective     Physical Exam  Vitals reviewed.   Constitutional:       General: She is not in acute distress.     Appearance: Normal appearance. She is well-developed. She is not ill-appearing, toxic-appearing or diaphoretic.   HENT:      Head: Normocephalic and atraumatic.   Eyes:      General: No scleral icterus.     Pupils: Pupils are equal, round, and reactive to light.   Neck:      Thyroid: No thyromegaly.   Cardiovascular:      Rate and Rhythm: Normal rate and regular rhythm.      Heart sounds: Normal heart sounds. No murmur heard.     No friction rub. No gallop.   Pulmonary:      Effort: Pulmonary effort is normal. No respiratory distress.      Breath sounds: Normal breath sounds. No wheezing or rales.      Comments:     Abdominal:      General: Bowel sounds are normal. There is no distension.      Palpations: Abdomen is soft. There is no mass.      Tenderness: There is no abdominal tenderness. There is no right CVA tenderness, left CVA tenderness, guarding or rebound.      Comments: No focal abd tenderness to deep palpation, no suprapubic tenderness to deep palpation.   Musculoskeletal:         General: No tenderness. Normal range of motion.      Cervical back: Normal range of motion.   Lymphadenopathy:      Cervical: No cervical adenopathy.   Neurological:      General: No focal deficit present.      Mental Status: She is alert and oriented to person, place, and time.   Psychiatric:         Mood and Affect: Mood normal.         Speech: Speech normal.         Behavior: Behavior normal.            Assessment and Plan     1. Acute left flank pain  -     Urinalysis, Reflex to Urine Culture Urine, Clean Catch  -     GREY TOP URINE HOLD  -     Urinalysis Microscopic  -     Urine culture        Assessment & Plan      LOW BACK PAIN:  - Odessa reports ongoing intermittent left-sided pain in the back and hip, possibly due to osteoarthritis.  - The pain is  not constant, and patient denies any pain on the right side.    ABDOMINAL PAIN:  - Ruled out kidney stone due to lack of significant pain.  - Clarified that kidney stones typically cause more severe pain than what the patient is currently experiencing.  Check for UTI as well    EDEMA:  - Minimal swelling noted in the legs during today's exam.  - Recommend reducing salt intake to help manage leg swelling.  - Discussed how canned foods often contain high levels of salt that can contribute to edema.  - Advised patient to look for low-sodium options when purchasing canned foods, particularly at specialty stores like [employer] where low-sodium canned bean options are available.    HEMATURIA AND ABNORMAL URINE FINDINGS:  - Odessa reports cloudy urine with pinkish tint when wiping.  - Ordered urinalysis to evaluate this finding and to rule out kidney stones or infection.        FOLLOW-UP:  - Instructed patient to follow up when urinalysis results are available.  - Advised to contact the office if urinalysis shows concerning results or to inquire about the test results in a few days.               No follow-ups on file.    Visit today included increased complexity associated with the care of the episodic problem  addressed and managing the longitudinal care of the patient due to the serious and/or complex managed problem(s) .    Future Appointments   Date Time Provider Department Center   8/25/2025  8:00 AM [facility]  [facility] [place]         This note was generated with the assistance of ambient listening technology. Verbal consent was obtained by the patient and accompanying visitor(s) for the recording of patient appointment to facilitate this note. I attest to having reviewed and edited the generated note for accuracy, though some syntax or spelling errors may persist. Please contact the author of this note for any clarification.

## 2025-05-05 NOTE — TELEPHONE ENCOUNTER
----- Message from Luisa sent at 5/5/2025 11:43 AM CDT -----  Contact: Pt [phone]  1MEDICALADVICE Patient is calling for Medical Advice regarding:Appt Patient wants a call back or [facility]:Call back Comments:Pt would like a call back from nurse pt states she having stomach pain pt would like to be seen.Please advise patient replies from provider may take up to 48 hours.

## 2025-05-06 LAB
BACTERIA #/AREA URNS AUTO: ABNORMAL /HPF
BILIRUB UR QL STRIP.AUTO: NEGATIVE
CLARITY UR: CLEAR
COLOR UR AUTO: YELLOW
GLUCOSE UR QL STRIP: NEGATIVE
HGB UR QL STRIP: ABNORMAL
KETONES UR QL STRIP: NEGATIVE
LEUKOCYTE ESTERASE UR QL STRIP: ABNORMAL
MICROSCOPIC COMMENT: ABNORMAL
NITRITE UR QL STRIP: NEGATIVE
PH UR STRIP: 6 [PH]
PROT UR QL STRIP: NEGATIVE
RBC #/AREA URNS AUTO: 66 /HPF (ref 0–4)
SP GR UR STRIP: 1.01
SQUAMOUS #/AREA URNS AUTO: 1 /HPF
UROBILINOGEN UR STRIP-ACNC: NEGATIVE EU/DL
WBC #/AREA URNS AUTO: 62 /HPF (ref 0–5)

## 2025-05-08 ENCOUNTER — RESULTS FOLLOW-UP (OUTPATIENT)
Dept: INTERNAL MEDICINE | Facility: CLINIC | Age: 80
End: 2025-05-08

## 2025-05-08 ENCOUNTER — TELEPHONE (OUTPATIENT)
Dept: INTERNAL MEDICINE | Facility: CLINIC | Age: 80
End: 2025-05-08
Payer: MEDICARE

## 2025-05-08 DIAGNOSIS — N39.0 URINARY TRACT INFECTION WITHOUT HEMATURIA, SITE UNSPECIFIED: Primary | ICD-10-CM

## 2025-05-08 LAB — BACTERIA UR CULT: ABNORMAL

## 2025-05-08 RX ORDER — CIPROFLOXACIN 500 MG/1
500 TABLET ORAL 2 TIMES DAILY
Qty: 10 TABLET | Refills: 0 | Status: SHIPPED | OUTPATIENT
Start: 2025-05-08 | End: 2025-05-13

## 2025-05-08 NOTE — TELEPHONE ENCOUNTER
Hi, please call the patient -- the urine test shows that she has a urinary infection.  This may be causing her left-sided abdominal pains.  I recommend she start an antibiotic for this infection.  I have sent in -- to her pharmacy --  Orders Placed This Encounter    ciprofloxacin HCl (CIPRO) 500 MG tablet     Usually people do okay with this antibiotic but occasionally it can cause loose stools, or tendon pains.  I recommend she take it twice a day for 5 days.  I hope this helps her feel better.    Let me know if patient has any questions.  Thank you, Bunny Rhoades

## 2025-05-08 NOTE — TELEPHONE ENCOUNTER
Called patient with lab results. No answer. Message left to call the office.Message sent to patient portal :the urine test shows that she has a urinary infection.  This may be causing her left-sided abdominal pains.  I recommend she start an antibiotic for this infection.  I have sent in -- to her pharmacy --      Orders Placed This Encounter    ciprofloxacin HCl (CIPRO) 500 MG tablet      Usually people do okay with this antibiotic but occasionally it can cause loose stools, or tendon pains.  I recommend she take it twice a day for 5 days.  I hope this helps her feel better.     Let me know if patient has any questions.  Thank you, Bunny Rhoades

## 2025-05-14 ENCOUNTER — TELEPHONE (OUTPATIENT)
Dept: NEUROSURGERY | Facility: CLINIC | Age: 80
End: 2025-05-14
Payer: MEDICARE

## 2025-06-17 ENCOUNTER — PATIENT MESSAGE (OUTPATIENT)
Dept: ORTHOPEDICS | Facility: CLINIC | Age: 80
End: 2025-06-17
Payer: MEDICARE

## 2025-06-17 RX ORDER — MELOXICAM 15 MG/1
15 TABLET ORAL DAILY PRN
Qty: 90 TABLET | Refills: 0 | Status: SHIPPED | OUTPATIENT
Start: 2025-06-17

## 2025-06-24 ENCOUNTER — RESULTS FOLLOW-UP (OUTPATIENT)
Dept: INTERNAL MEDICINE | Facility: CLINIC | Age: 80
End: 2025-06-24

## 2025-06-24 ENCOUNTER — LAB VISIT (OUTPATIENT)
Dept: LAB | Facility: HOSPITAL | Age: 80
End: 2025-06-24
Payer: MEDICARE

## 2025-06-24 ENCOUNTER — OFFICE VISIT (OUTPATIENT)
Dept: INTERNAL MEDICINE | Facility: CLINIC | Age: 80
End: 2025-06-24
Payer: MEDICARE

## 2025-06-24 VITALS
DIASTOLIC BLOOD PRESSURE: 72 MMHG | BODY MASS INDEX: 28.02 KG/M2 | SYSTOLIC BLOOD PRESSURE: 134 MMHG | HEIGHT: 65 IN | OXYGEN SATURATION: 97 % | HEART RATE: 82 BPM | WEIGHT: 168.19 LBS

## 2025-06-24 DIAGNOSIS — M47.816 LUMBAR FACET ARTHROPATHY: ICD-10-CM

## 2025-06-24 DIAGNOSIS — M47.26 OSTEOARTHRITIS OF SPINE WITH RADICULOPATHY, LUMBAR REGION: ICD-10-CM

## 2025-06-24 DIAGNOSIS — I70.0 ATHEROSCLEROSIS OF AORTA: ICD-10-CM

## 2025-06-24 DIAGNOSIS — N64.89 DEFORMITY OF BREAST AFTER MASTECTOMY: ICD-10-CM

## 2025-06-24 DIAGNOSIS — M19.042 PRIMARY OSTEOARTHRITIS OF BOTH HANDS: ICD-10-CM

## 2025-06-24 DIAGNOSIS — Z90.10 DEFORMITY OF BREAST AFTER MASTECTOMY: ICD-10-CM

## 2025-06-24 DIAGNOSIS — Z85.3 HISTORY OF RIGHT BREAST CANCER: ICD-10-CM

## 2025-06-24 DIAGNOSIS — R91.1 LUNG NODULE: ICD-10-CM

## 2025-06-24 DIAGNOSIS — M54.16 LUMBAR RADICULOPATHY, CHRONIC: ICD-10-CM

## 2025-06-24 DIAGNOSIS — I10 ESSENTIAL HYPERTENSION: ICD-10-CM

## 2025-06-24 DIAGNOSIS — Z90.11 S/P RIGHT MASTECTOMY: ICD-10-CM

## 2025-06-24 DIAGNOSIS — M62.838 MUSCLE SPASMS OF LOWER EXTREMITY: ICD-10-CM

## 2025-06-24 DIAGNOSIS — Z00.00 ENCOUNTER FOR MEDICARE ANNUAL WELLNESS EXAM: Primary | ICD-10-CM

## 2025-06-24 DIAGNOSIS — E78.2 MIXED HYPERLIPIDEMIA: ICD-10-CM

## 2025-06-24 DIAGNOSIS — M19.041 PRIMARY OSTEOARTHRITIS OF BOTH HANDS: ICD-10-CM

## 2025-06-24 LAB
CHOLEST SERPL-MCNC: 142 MG/DL (ref 120–199)
CHOLEST/HDLC SERPL: 3.3 {RATIO} (ref 2–5)
HDLC SERPL-MCNC: 43 MG/DL (ref 40–75)
HDLC SERPL: 30.3 % (ref 20–50)
LDLC SERPL CALC-MCNC: 78.6 MG/DL (ref 63–159)
NONHDLC SERPL-MCNC: 99 MG/DL
TRIGL SERPL-MCNC: 102 MG/DL (ref 30–150)

## 2025-06-24 PROCEDURE — 36415 COLL VENOUS BLD VENIPUNCTURE: CPT | Mod: HCNC

## 2025-06-24 PROCEDURE — 82465 ASSAY BLD/SERUM CHOLESTEROL: CPT | Mod: HCNC

## 2025-06-24 PROCEDURE — 99999 PR PBB SHADOW E&M-EST. PATIENT-LVL V: CPT | Mod: PBBFAC,HCNC,,

## 2025-06-24 RX ORDER — CYCLOBENZAPRINE HCL 5 MG
5 TABLET ORAL 3 TIMES DAILY PRN
Qty: 30 TABLET | Refills: 1 | Status: SHIPPED | OUTPATIENT
Start: 2025-06-24

## 2025-06-24 NOTE — PROGRESS NOTES
"  Odessa Vaughn presented for a follow-up Medicare AWV today. The following components were reviewed and updated:    Medical history  Family History  Social history  Allergies and Current Medications  Health Risk Assessment  Health Maintenance  Care Team    **See Completed Assessments for Annual Wellness visit with in the encounter summary    The following assessments were completed:  Depression Screening  Cognitive function Screening  Timed Get Up Test  Whisper Test      Opioid documentation:      Patient does not have a current opioid prescription.          Vitals:    06/24/25 0808   BP: 134/72   BP Location: Left arm   Patient Position: Sitting   Pulse: 82   SpO2: 97%   Weight: 76.3 kg (168 lb 3.4 oz)   Height: 5' 5" (1.651 m)     Body mass index is 27.99 kg/m².       Physical Exam  Constitutional:       General: She is not in acute distress.     Appearance: She is not ill-appearing.   Cardiovascular:      Rate and Rhythm: Normal rate.      Pulses: Normal pulses.      Heart sounds: No murmur heard.  Pulmonary:      Effort: Pulmonary effort is normal. No respiratory distress.      Breath sounds: Normal breath sounds.   Abdominal:      General: Bowel sounds are normal.      Palpations: Abdomen is soft.      Tenderness: There is no abdominal tenderness.   Musculoskeletal:         General: Normal range of motion.   Skin:     General: Skin is warm.   Neurological:      General: No focal deficit present.      Mental Status: She is alert and oriented to person, place, and time.   Psychiatric:         Mood and Affect: Mood normal.           Diagnoses and health risks identified today and associated recommendations/orders:  1. Encounter for Medicare annual wellness exam  All age-related screenings and hm measures reviewed with patient. Interested in RSV vaccine today, FLP ordered.  - Referral to Enhanced Annual Wellness Visit (eAWV) W+1    2. Mixed hyperlipidemia  Stable. On Lipitor  - Lipid Panel; Future    3. Muscle " spasms of lower extremity  Stable. Flexeril prn  - cyclobenzaprine (FLEXERIL) 5 MG tablet; Take 1 tablet (5 mg total) by mouth 3 (three) times daily as needed for Muscle spasms.  Dispense: 30 tablet; Refill: 1    4. Lumbar facet arthropathy  Stable. Tylenol prn.    5. Osteoarthritis of spine with radiculopathy, lumbar region  Stable. Tylenol prn, gabapentin    6. S/p L5-S1 TLIF 11/5/24  Stable. No complications.    7. Lung nodule  Stable. PCP monitoring imaging.     8. Atherosclerosis of aorta  Stable. On statin, ASA.    9. Essential hypertension  Stable.on Dyazide    10. 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 No treatment at this time.     11. History of right breast cancer  Stable. F/w Hem/Onc    12. S/P right mastectomy  Stable. Using prosthesis and also has crocheted breast pad    13. Primary osteoarthritis of both hands  Stable. Mobic prn      Provided Odessa with a 5-10 year written screening schedule and personal prevention plan. Recommendations were developed using the USPSTF age appropriate recommendations. Education, counseling, and referrals were provided as needed.  After Visit Summary printed and given to patient which includes a list of additional screenings\tests needed.    Follow up in about 1 year (around 6/24/2026) for Annual Medicare Wellness Visit.      Buffy Faith NP      I offered to discuss advanced care planning, including how to pick a person who would make decisions for you if you were unable to make them for yourself, called a health care power of , and what kind of decisions you might make such as use of life sustaining treatments such as ventilators and tube feeding when faced with a life limiting illness recorded on a living will that they will need to know. (How you want to be cared for as you near the end of your natural life)     X Patient is interested in learning more about how to make advanced directives.  I provided them paperwork and offered to discuss this  with them.

## 2025-06-24 NOTE — PATIENT INSTRUCTIONS
Counseling and Referral of Other Preventative  (Italic type indicates deductible and co-insurance are waived)    Patient Name: Odessa Vaughn  Today's Date: 6/24/2025    Health Maintenance       Date Due Completion Date    RSV Vaccine (Age 60+ and Pregnant patients) (1 - 1-dose 75+ series) Never done ---    Lipid Panel 03/19/2025 3/19/2024    Override on 9/4/2018: Done    Mammogram 08/22/2025 8/22/2024    Colonoscopy 04/10/2026 4/10/2023    DEXA Scan 10/05/2028 10/5/2023    Override on 10/5/2010: Not Clinically Appropriate (DUE IN 2014)    TETANUS VACCINE 10/11/2028 10/11/2018        Orders Placed This Encounter   Procedures    Lipid Panel     The following information is provided to all patients.  This information is to help you find resources for any of the problems found today that may be affecting your health:                  Living healthy guide: www.[facility].[email or web address].[email or web address]      Understanding Diabetes: www.diabetes.org      Eating healthy: www.cdc.gov/healthyweight      CDC home safety checklist: www.cdc.gov/steadi/patient.html      Agency on Aging: www.goea.[place].[facility]      Alcoholics anonymous (AA): www.aa.org      Physical Activity: www.helena.nih.gov/kp0hswm      Tobacco use: www.quitwithusla.org

## 2025-07-08 NOTE — TELEPHONE ENCOUNTER
APPLY DICLOFENAC GEL TO LEFT THUMB BASE 4 TIMES DAILY ON SCHEDULE    TYLENOL 1000 MG TWICE DAILY (MORNING AND NIGHT) ON A SCHEDULE FOR PAIN    SHE NEEDS A CMC BRACE ON THE LEFT HAND     I AM REFERRING TO ORTHO FOR AN INJECTION TO THE LEFT THUMB AS SOON AS POSSIBLE     I RECOMMEND STOPPING methotrexate AND FOLIC ACID.  HER LIVER AND KIDNEY LABS HAVE BEEN ABNORMAL.  CURRENT PAIN IS FROM OSTEOARTHRITIS.    START VITAMIN D WEEKLY    Spoke with patient appt booked

## 2025-08-08 ENCOUNTER — OFFICE VISIT (OUTPATIENT)
Dept: OPTOMETRY | Facility: CLINIC | Age: 80
End: 2025-08-08
Payer: MEDICARE

## 2025-08-08 DIAGNOSIS — H40.013 OPEN ANGLE WITH BORDERLINE FINDINGS OF BOTH EYES: Primary | ICD-10-CM

## 2025-08-08 DIAGNOSIS — H52.03 HYPEROPIA WITH PRESBYOPIA, BILATERAL: ICD-10-CM

## 2025-08-08 DIAGNOSIS — H25.813 COMBINED FORM OF SENILE CATARACT OF BOTH EYES: ICD-10-CM

## 2025-08-08 DIAGNOSIS — H52.4 HYPEROPIA WITH PRESBYOPIA, BILATERAL: ICD-10-CM

## 2025-08-08 PROCEDURE — 99999 PR PBB SHADOW E&M-EST. PATIENT-LVL II: CPT | Mod: PBBFAC,HCNC,, | Performed by: OPTOMETRIST

## 2025-08-08 NOTE — PROGRESS NOTES
HPI    EUNICE: 03/28/2024 Dr. Weaver  LDFE: 11/16/23  Chief complaint (CC): 79 yo F presents for annual eye exam. Pt c/o blurry   vision OU. Pt denies any other ocular or visual complaints today.     Glasses? Yes  Contacts? No  H/o eye surgery, injections or laser: None   H/o eye injury: None   Known eye conditions?    Nuclear sclerosis of both eyes       Cortical cataract of both eyes       Retinal drusen of right eye      Vitreous floaters of both eyes       Hyperopia of right eye       Hyperopia of left eye with astigmatism       Presbyopia of both eyes     Family h/o eye conditions? None   Eye gtts? None       (-) Flashes (+)  Floaters (-) Mucous   (+)  Tearing OD  (-) Itching (-) Burning   (-) Headaches (-) Eye Pain/discomfort (-) Irritation   (-)  Redness (-) Double vision (+) Blurry vision    Diabetic? No  A1c? Lab Results       Component                Value               Date                       HGBA1C                   6.1                 09/08/2014                  Last edited by Benji Hook, OD on 8/8/2025  1:57 PM.            Assessment /Plan     For exam results, see Encounter Report.      Open angle with borderline findings of both eyes  - Secondary to ONH appearance OU   - IOP normotensive OU (14/14)    - OCT RNFL (8/8/25) OD WNL OS significant IT thinning    - Referred to glaucoma for further evaluation and to initiate treatment     Combined form of senile cataract of both eyes   - Mildly visually significant. Monitor.     Hyperopia with presbyopia, bilateral   - New Spectacle Rx given, discussed different options for glasses.

## 2025-08-25 ENCOUNTER — HOSPITAL ENCOUNTER (OUTPATIENT)
Dept: RADIOLOGY | Facility: HOSPITAL | Age: 80
Discharge: HOME | End: 2025-08-25
Attending: NURSE PRACTITIONER
Payer: MEDICARE

## 2025-08-25 VITALS — HEIGHT: 65 IN | BODY MASS INDEX: 27.99 KG/M2 | WEIGHT: 168 LBS

## 2025-08-25 DIAGNOSIS — Z12.31 ENCOUNTER FOR SCREENING MAMMOGRAM FOR MALIGNANT NEOPLASM OF BREAST: ICD-10-CM

## 2025-08-25 DIAGNOSIS — Z85.3 HISTORY OF RIGHT BREAST CANCER: ICD-10-CM

## 2025-08-25 PROCEDURE — 77067 SCR MAMMO BI INCL CAD: CPT | Mod: TC,52

## 2025-09-04 ENCOUNTER — HOSPITAL ENCOUNTER (OUTPATIENT)
Dept: RADIOLOGY | Facility: HOSPITAL | Age: 80
Discharge: HOME | End: 2025-09-04
Attending: NURSE PRACTITIONER
Payer: MEDICARE

## 2025-09-04 DIAGNOSIS — R92.8 ABNORMAL MAMMOGRAM: ICD-10-CM

## 2025-09-04 PROCEDURE — 77065 DX MAMMO INCL CAD UNI: CPT | Mod: 26,HCNC,LT, | Performed by: RADIOLOGY

## 2025-09-04 PROCEDURE — 77061 BREAST TOMOSYNTHESIS UNI: CPT | Mod: 26,HCNC,LT, | Performed by: RADIOLOGY

## 2025-09-04 PROCEDURE — 77061 BREAST TOMOSYNTHESIS UNI: CPT | Mod: TC,HCNC,LT

## (undated) DEVICE — APPLICATOR CHLORAPREP ORN 26ML

## (undated) DEVICE — BLADE MILL+ BONE MEDIUM DISP

## (undated) DEVICE — DRESSING LEUKOPLAST FLEX 1X3IN

## (undated) DEVICE — COVER BACK TBL HD 2-TIER 72IN

## (undated) DEVICE — NDL 20GX1-1/2IN IB

## (undated) DEVICE — GUIDE POST LP QUATTRO MEDIUM

## (undated) DEVICE — BUR BONE CUT MICRO TPS 3X3.8MM

## (undated) DEVICE — DRAPE EXCELSIUS GPS C-ARM

## (undated) DEVICE — DRAPE CORETEMP FLD WRM 56X62IN

## (undated) DEVICE — DRESSING AQUACEL SACRAL 9 X 9

## (undated) DEVICE — BIT DRILL REAM GPS 3.5MM

## (undated) DEVICE — DRILL BIT SURG GPS HI SPD 4MM

## (undated) DEVICE — SUT 2-0 SILK 30IN BLK BRAID

## (undated) DEVICE — DRESSING ABSRBNT ISLAND 3.6X8

## (undated) DEVICE — SPHERE NDI PASSIVE

## (undated) DEVICE — NDL SPINAL 18GX3.5 SPINOCAN

## (undated) DEVICE — BLADE 4IN EDGE INSULATED

## (undated) DEVICE — SPONGE LAP 4X18 PREWASHED

## (undated) DEVICE — STRIP MEDI WND CLSR 1X5IN

## (undated) DEVICE — KIT SURGIFLO HEMOSTATIC MATRIX

## (undated) DEVICE — DRAPE TOP 53X102IN

## (undated) DEVICE — MARKER SKIN RULER STERILE

## (undated) DEVICE — ELECTRODE REM PLYHSV RETURN 9

## (undated) DEVICE — DRESSING AQUACEL FOAM 5 X 5

## (undated) DEVICE — SUTURE STRATAFIX PGA PCL 3-0

## (undated) DEVICE — TRAY NEURO OMC

## (undated) DEVICE — GUIDE POST LP QUATTR SPIKE MED

## (undated) DEVICE — SPONGE COTTON TRAY 4X4IN

## (undated) DEVICE — DRAPE STERI-DRAPE 1000 17X11IN

## (undated) DEVICE — SYS CLSR DERMABOND PRINEO 22CM

## (undated) DEVICE — SUT VICRYL+ 1 CT1 18IN

## (undated) DEVICE — SUT VICRYL PLUS 2-0 CT1 18

## (undated) DEVICE — SPONGE GAUZE 16PLY 4X4

## (undated) DEVICE — DRESSING ADH ISLAND 3.6 X 14

## (undated) DEVICE — KIT SPINAL PATIENT CARE JACK

## (undated) DEVICE — DRAPE EXCELSIUS GPS MONITOR

## (undated) DEVICE — SYR IRRIGATION BULB STER 60ML

## (undated) DEVICE — CORD BIPOLAR 12 FOOT

## (undated) DEVICE — TIP YANKAUERS BULB NO VENT

## (undated) DEVICE — DRESSING MEPILEX BORDER 4 X 4

## (undated) DEVICE — DRAPE C-ARM ELAS CLIP 42X120IN

## (undated) DEVICE — NDL HYPO 27G X 1 1/2

## (undated) DEVICE — DRESSING AQUACEL FOAM 3 X 3

## (undated) DEVICE — DRAPE C-ARMOR EQUIPMENT COVER

## (undated) DEVICE — DRAPE ABDOMINAL TIBURON 14X11

## (undated) DEVICE — KIT EVACUATOR 3-SPRING 1/8 DRN

## (undated) DEVICE — TRAY CATH 1-LYR URIMTR 16FR

## (undated) DEVICE — DRESSING TRANS 4X4 TEGADERM